# Patient Record
Sex: FEMALE | Race: WHITE | Employment: OTHER | URBAN - METROPOLITAN AREA
[De-identification: names, ages, dates, MRNs, and addresses within clinical notes are randomized per-mention and may not be internally consistent; named-entity substitution may affect disease eponyms.]

---

## 2018-08-08 ENCOUNTER — HOSPITAL ENCOUNTER (OUTPATIENT)
Dept: CT IMAGING | Age: 58
Discharge: HOME | End: 2018-08-08
Payer: MEDICARE

## 2018-08-08 ENCOUNTER — HOSPITAL ENCOUNTER (OUTPATIENT)
Dept: ULTRASOUND IMAGING | Age: 58
Discharge: HOME | End: 2018-08-08
Payer: MEDICARE

## 2018-08-08 DIAGNOSIS — I99.8 ISCHEMIA: ICD-10-CM

## 2018-08-08 PROCEDURE — 70450 CT HEAD/BRAIN W/O DYE: CPT

## 2018-08-08 PROCEDURE — 93880 EXTRACRANIAL BILAT STUDY: CPT

## 2019-04-26 ENCOUNTER — HOSPITAL ENCOUNTER (OUTPATIENT)
Dept: CT IMAGING | Age: 59
Discharge: HOME | End: 2019-04-26
Payer: MEDICARE

## 2019-04-26 ENCOUNTER — HOSPITAL ENCOUNTER (OUTPATIENT)
Age: 59
Discharge: HOME | End: 2019-04-26
Payer: MEDICARE

## 2019-04-26 DIAGNOSIS — I99.8 TRANSIENT ISCHEMIA: ICD-10-CM

## 2019-04-26 DIAGNOSIS — R56.9 SEIZURE (HCC): ICD-10-CM

## 2019-04-26 LAB
ALBUMIN SERPL-MCNC: 4.3 G/DL (ref 3.5–5.2)
ALP BLD-CCNC: 70 U/L (ref 35–104)
ALT SERPL-CCNC: 36 U/L (ref 0–32)
ANION GAP SERPL CALCULATED.3IONS-SCNC: 15 MMOL/L (ref 7–16)
AST SERPL-CCNC: 41 U/L (ref 0–31)
BACTERIA: ABNORMAL /HPF
BASOPHILS ABSOLUTE: 0.04 E9/L (ref 0–0.2)
BASOPHILS RELATIVE PERCENT: 0.4 % (ref 0–2)
BILIRUB SERPL-MCNC: 0.7 MG/DL (ref 0–1.2)
BILIRUBIN URINE: ABNORMAL
BLOOD, URINE: ABNORMAL
BUN BLDV-MCNC: 12 MG/DL (ref 8–23)
CALCIUM SERPL-MCNC: 9.9 MG/DL (ref 8.6–10.2)
CASTS: ABNORMAL /LPF
CHLORIDE BLD-SCNC: 92 MMOL/L (ref 98–107)
CHOLESTEROL, FASTING: 198 MG/DL (ref 0–199)
CLARITY: ABNORMAL
CO2: 28 MMOL/L (ref 22–29)
COLOR: YELLOW
CREAT SERPL-MCNC: 0.6 MG/DL (ref 0.5–1)
EOSINOPHILS ABSOLUTE: 0.06 E9/L (ref 0.05–0.5)
EOSINOPHILS RELATIVE PERCENT: 0.6 % (ref 0–6)
EPITHELIAL CELLS, UA: ABNORMAL /HPF
GFR AFRICAN AMERICAN: >60
GFR NON-AFRICAN AMERICAN: >60 ML/MIN/1.73
GLUCOSE FASTING: 129 MG/DL (ref 74–99)
GLUCOSE URINE: NEGATIVE MG/DL
HCT VFR BLD CALC: 47.3 % (ref 34–48)
HDLC SERPL-MCNC: 55 MG/DL
HEMOGLOBIN: 16.5 G/DL (ref 11.5–15.5)
IMMATURE GRANULOCYTES #: 0.05 E9/L
IMMATURE GRANULOCYTES %: 0.5 % (ref 0–5)
KETONES, URINE: ABNORMAL MG/DL
LDL CHOLESTEROL CALCULATED: 118 MG/DL (ref 0–99)
LEUKOCYTE ESTERASE, URINE: ABNORMAL
LYMPHOCYTES ABSOLUTE: 2 E9/L (ref 1.5–4)
LYMPHOCYTES RELATIVE PERCENT: 21.4 % (ref 20–42)
MCH RBC QN AUTO: 34.6 PG (ref 26–35)
MCHC RBC AUTO-ENTMCNC: 34.9 % (ref 32–34.5)
MCV RBC AUTO: 99.2 FL (ref 80–99.9)
MONOCYTES ABSOLUTE: 1.25 E9/L (ref 0.1–0.95)
MONOCYTES RELATIVE PERCENT: 13.4 % (ref 2–12)
NEUTROPHILS ABSOLUTE: 5.95 E9/L (ref 1.8–7.3)
NEUTROPHILS RELATIVE PERCENT: 63.7 % (ref 43–80)
NITRITE, URINE: NEGATIVE
PDW BLD-RTO: 12.6 FL (ref 11.5–15)
PH UA: 6 (ref 5–9)
PLATELET # BLD: 205 E9/L (ref 130–450)
PMV BLD AUTO: 9.5 FL (ref 7–12)
POTASSIUM SERPL-SCNC: 4 MMOL/L (ref 3.5–5)
PROTEIN UA: 30 MG/DL
RBC # BLD: 4.77 E12/L (ref 3.5–5.5)
RBC UA: ABNORMAL /HPF (ref 0–2)
SODIUM BLD-SCNC: 135 MMOL/L (ref 132–146)
SPECIFIC GRAVITY UA: 1.01 (ref 1–1.03)
TOTAL PROTEIN: 8.6 G/DL (ref 6.4–8.3)
TRIGLYCERIDE, FASTING: 125 MG/DL (ref 0–149)
TSH SERPL DL<=0.05 MIU/L-ACNC: 6.06 UIU/ML (ref 0.27–4.2)
UROBILINOGEN, URINE: >=8 E.U./DL
VLDLC SERPL CALC-MCNC: 25 MG/DL
WBC # BLD: 9.4 E9/L (ref 4.5–11.5)
WBC UA: ABNORMAL /HPF (ref 0–5)

## 2019-04-26 PROCEDURE — 80053 COMPREHEN METABOLIC PANEL: CPT

## 2019-04-26 PROCEDURE — 81001 URINALYSIS AUTO W/SCOPE: CPT

## 2019-04-26 PROCEDURE — 85025 COMPLETE CBC W/AUTO DIFF WBC: CPT

## 2019-04-26 PROCEDURE — 84443 ASSAY THYROID STIM HORMONE: CPT

## 2019-04-26 PROCEDURE — 36415 COLL VENOUS BLD VENIPUNCTURE: CPT

## 2019-04-26 PROCEDURE — 80061 LIPID PANEL: CPT

## 2019-04-26 PROCEDURE — 70450 CT HEAD/BRAIN W/O DYE: CPT

## 2019-12-01 ASSESSMENT — ENCOUNTER SYMPTOMS
DIARRHEA: 0
VOMITING: 0
SHORTNESS OF BREATH: 0
NAUSEA: 0

## 2019-12-03 ENCOUNTER — HOSPITAL ENCOUNTER (OUTPATIENT)
Age: 59
Discharge: HOME | End: 2019-12-03
Payer: MEDICARE

## 2019-12-03 ENCOUNTER — OFFICE VISIT (OUTPATIENT)
Dept: INTERNAL MEDICINE CLINIC | Age: 59
End: 2019-12-03
Payer: MEDICARE

## 2019-12-03 VITALS
TEMPERATURE: 97.9 F | DIASTOLIC BLOOD PRESSURE: 64 MMHG | BODY MASS INDEX: 39.06 KG/M2 | HEIGHT: 58 IN | OXYGEN SATURATION: 92 % | HEART RATE: 102 BPM | SYSTOLIC BLOOD PRESSURE: 130 MMHG | WEIGHT: 186.1 LBS

## 2019-12-03 DIAGNOSIS — F10.10 ALCOHOL ABUSE: ICD-10-CM

## 2019-12-03 DIAGNOSIS — R53.83 FATIGUE, UNSPECIFIED TYPE: ICD-10-CM

## 2019-12-03 DIAGNOSIS — K70.30 ALCOHOLIC CIRRHOSIS, UNSPECIFIED WHETHER ASCITES PRESENT (HCC): Primary | ICD-10-CM

## 2019-12-03 DIAGNOSIS — Z86.73 HISTORY OF CVA (CEREBROVASCULAR ACCIDENT): ICD-10-CM

## 2019-12-03 DIAGNOSIS — I10 ESSENTIAL HYPERTENSION: ICD-10-CM

## 2019-12-03 DIAGNOSIS — F32.A DEPRESSION, UNSPECIFIED DEPRESSION TYPE: ICD-10-CM

## 2019-12-03 DIAGNOSIS — F17.210 CIGARETTE NICOTINE DEPENDENCE WITHOUT COMPLICATION: ICD-10-CM

## 2019-12-03 DIAGNOSIS — Z76.89 ENCOUNTER TO ESTABLISH CARE: ICD-10-CM

## 2019-12-03 DIAGNOSIS — G40.909 SEIZURE DISORDER (HCC): ICD-10-CM

## 2019-12-03 DIAGNOSIS — M19.90 ARTHRITIS: ICD-10-CM

## 2019-12-03 DIAGNOSIS — J44.9 CHRONIC OBSTRUCTIVE PULMONARY DISEASE, UNSPECIFIED COPD TYPE (HCC): ICD-10-CM

## 2019-12-03 DIAGNOSIS — F12.90 MARIJUANA USER: ICD-10-CM

## 2019-12-03 DIAGNOSIS — Z91.14 NON COMPLIANCE W MEDICATION REGIMEN: ICD-10-CM

## 2019-12-03 DIAGNOSIS — Z86.19 HISTORY OF HEPATITIS C: ICD-10-CM

## 2019-12-03 LAB
ALBUMIN SERPL-MCNC: 4.2 G/DL (ref 3.5–5.2)
ALP BLD-CCNC: 58 U/L (ref 35–104)
ALT SERPL-CCNC: 41 U/L (ref 0–32)
ANION GAP SERPL CALCULATED.3IONS-SCNC: 15 MMOL/L (ref 7–16)
AST SERPL-CCNC: 54 U/L (ref 0–31)
BASOPHILS ABSOLUTE: 0.04 E9/L (ref 0–0.2)
BASOPHILS RELATIVE PERCENT: 0.5 % (ref 0–2)
BILIRUB SERPL-MCNC: 0.5 MG/DL (ref 0–1.2)
BUN BLDV-MCNC: 16 MG/DL (ref 8–23)
CALCIUM SERPL-MCNC: 9.9 MG/DL (ref 8.6–10.2)
CHLORIDE BLD-SCNC: 94 MMOL/L (ref 98–107)
CHOLESTEROL, FASTING: 216 MG/DL (ref 0–199)
CO2: 26 MMOL/L (ref 22–29)
CREAT SERPL-MCNC: 0.7 MG/DL (ref 0.5–1)
EOSINOPHILS ABSOLUTE: 0.17 E9/L (ref 0.05–0.5)
EOSINOPHILS RELATIVE PERCENT: 2.1 % (ref 0–6)
FOLATE: 6.2 NG/ML (ref 4.8–24.2)
GFR AFRICAN AMERICAN: >60
GFR NON-AFRICAN AMERICAN: >60 ML/MIN/1.73
GLUCOSE FASTING: 120 MG/DL (ref 74–99)
HCT VFR BLD CALC: 44.7 % (ref 34–48)
HDLC SERPL-MCNC: 37 MG/DL
HEMOGLOBIN: 16 G/DL (ref 11.5–15.5)
IMMATURE GRANULOCYTES #: 0.03 E9/L
IMMATURE GRANULOCYTES %: 0.4 % (ref 0–5)
LDL CHOLESTEROL CALCULATED: 136 MG/DL (ref 0–99)
LYMPHOCYTES ABSOLUTE: 3.39 E9/L (ref 1.5–4)
LYMPHOCYTES RELATIVE PERCENT: 42.3 % (ref 20–42)
MAGNESIUM: 1.5 MG/DL (ref 1.6–2.6)
MCH RBC QN AUTO: 35 PG (ref 26–35)
MCHC RBC AUTO-ENTMCNC: 35.8 % (ref 32–34.5)
MCV RBC AUTO: 97.8 FL (ref 80–99.9)
MONOCYTES ABSOLUTE: 0.91 E9/L (ref 0.1–0.95)
MONOCYTES RELATIVE PERCENT: 11.3 % (ref 2–12)
NEUTROPHILS ABSOLUTE: 3.48 E9/L (ref 1.8–7.3)
NEUTROPHILS RELATIVE PERCENT: 43.4 % (ref 43–80)
PDW BLD-RTO: 12 FL (ref 11.5–15)
PLATELET # BLD: 189 E9/L (ref 130–450)
PMV BLD AUTO: 9.9 FL (ref 7–12)
POTASSIUM SERPL-SCNC: 3.8 MMOL/L (ref 3.5–5)
RBC # BLD: 4.57 E12/L (ref 3.5–5.5)
SODIUM BLD-SCNC: 135 MMOL/L (ref 132–146)
T4 FREE: 1.02 NG/DL (ref 0.93–1.7)
TOTAL PROTEIN: 8.1 G/DL (ref 6.4–8.3)
TRIGLYCERIDE, FASTING: 217 MG/DL (ref 0–149)
TSH SERPL DL<=0.05 MIU/L-ACNC: 7.28 UIU/ML (ref 0.27–4.2)
VITAMIN B-12: 625 PG/ML (ref 211–946)
VLDLC SERPL CALC-MCNC: 43 MG/DL
WBC # BLD: 8 E9/L (ref 4.5–11.5)

## 2019-12-03 PROCEDURE — 82746 ASSAY OF FOLIC ACID SERUM: CPT

## 2019-12-03 PROCEDURE — 85025 COMPLETE CBC W/AUTO DIFF WBC: CPT

## 2019-12-03 PROCEDURE — 84443 ASSAY THYROID STIM HORMONE: CPT

## 2019-12-03 PROCEDURE — 80053 COMPREHEN METABOLIC PANEL: CPT

## 2019-12-03 PROCEDURE — G8427 DOCREV CUR MEDS BY ELIG CLIN: HCPCS | Performed by: PHYSICIAN ASSISTANT

## 2019-12-03 PROCEDURE — 4004F PT TOBACCO SCREEN RCVD TLK: CPT | Performed by: PHYSICIAN ASSISTANT

## 2019-12-03 PROCEDURE — 82607 VITAMIN B-12: CPT

## 2019-12-03 PROCEDURE — 83735 ASSAY OF MAGNESIUM: CPT

## 2019-12-03 PROCEDURE — 80061 LIPID PANEL: CPT

## 2019-12-03 PROCEDURE — 99213 OFFICE O/P EST LOW 20 MIN: CPT | Performed by: PHYSICIAN ASSISTANT

## 2019-12-03 PROCEDURE — 36415 COLL VENOUS BLD VENIPUNCTURE: CPT

## 2019-12-03 PROCEDURE — 3023F SPIROM DOC REV: CPT | Performed by: PHYSICIAN ASSISTANT

## 2019-12-03 PROCEDURE — 90688 IIV4 VACCINE SPLT 0.5 ML IM: CPT | Performed by: PHYSICIAN ASSISTANT

## 2019-12-03 PROCEDURE — 84439 ASSAY OF FREE THYROXINE: CPT

## 2019-12-03 PROCEDURE — G8482 FLU IMMUNIZE ORDER/ADMIN: HCPCS | Performed by: PHYSICIAN ASSISTANT

## 2019-12-03 PROCEDURE — 3017F COLORECTAL CA SCREEN DOC REV: CPT | Performed by: PHYSICIAN ASSISTANT

## 2019-12-03 PROCEDURE — 99204 OFFICE O/P NEW MOD 45 MIN: CPT | Performed by: PHYSICIAN ASSISTANT

## 2019-12-03 PROCEDURE — G8926 SPIRO NO PERF OR DOC: HCPCS | Performed by: PHYSICIAN ASSISTANT

## 2019-12-03 PROCEDURE — G8417 CALC BMI ABV UP PARAM F/U: HCPCS | Performed by: PHYSICIAN ASSISTANT

## 2019-12-03 RX ORDER — CLOPIDOGREL BISULFATE 75 MG/1
75 TABLET ORAL DAILY
Qty: 30 TABLET | Refills: 3 | Status: SHIPPED
Start: 2019-12-03 | End: 2020-03-30 | Stop reason: SDUPTHER

## 2019-12-03 RX ORDER — LEVETIRACETAM 500 MG/1
TABLET ORAL
Refills: 0 | COMMUNITY
Start: 2019-11-06 | End: 2019-12-03 | Stop reason: SDUPTHER

## 2019-12-03 RX ORDER — AMLODIPINE BESYLATE 5 MG/1
5 TABLET ORAL DAILY
Qty: 30 TABLET | Refills: 3 | Status: SHIPPED
Start: 2019-12-03 | End: 2020-03-30 | Stop reason: SDUPTHER

## 2019-12-03 RX ORDER — MELOXICAM 15 MG/1
15 TABLET ORAL DAILY
Qty: 30 TABLET | Refills: 3 | Status: SHIPPED
Start: 2019-12-03 | End: 2020-03-30 | Stop reason: SDUPTHER

## 2019-12-03 RX ORDER — ALBUTEROL SULFATE 90 UG/1
2 AEROSOL, METERED RESPIRATORY (INHALATION) EVERY 6 HOURS PRN
Qty: 1 INHALER | Refills: 2 | Status: SHIPPED
Start: 2019-12-03 | End: 2020-03-30 | Stop reason: SDUPTHER

## 2019-12-03 RX ORDER — BENAZEPRIL HYDROCHLORIDE 20 MG/1
20 TABLET ORAL DAILY
Qty: 30 TABLET | Refills: 3 | Status: SHIPPED
Start: 2019-12-03 | End: 2020-03-30 | Stop reason: SDUPTHER

## 2019-12-03 RX ORDER — LEVETIRACETAM 500 MG/1
500 TABLET ORAL 2 TIMES DAILY
Qty: 60 TABLET | Refills: 3 | Status: SHIPPED
Start: 2019-12-03 | End: 2020-03-30 | Stop reason: SDUPTHER

## 2019-12-03 RX ORDER — ATENOLOL 50 MG/1
50 TABLET ORAL DAILY
Qty: 30 TABLET | Refills: 3 | Status: SHIPPED
Start: 2019-12-03 | End: 2020-03-30 | Stop reason: SDUPTHER

## 2019-12-03 RX ORDER — MELOXICAM 15 MG/1
TABLET ORAL
Refills: 0 | COMMUNITY
Start: 2019-08-29 | End: 2019-12-03 | Stop reason: SDUPTHER

## 2019-12-03 RX ORDER — CITALOPRAM 40 MG/1
40 TABLET ORAL DAILY
Qty: 30 TABLET | Refills: 3 | Status: SHIPPED
Start: 2019-12-03 | End: 2020-03-30 | Stop reason: SDUPTHER

## 2019-12-03 ASSESSMENT — PATIENT HEALTH QUESTIONNAIRE - PHQ9
1. LITTLE INTEREST OR PLEASURE IN DOING THINGS: 1
SUM OF ALL RESPONSES TO PHQ9 QUESTIONS 1 & 2: 2
2. FEELING DOWN, DEPRESSED OR HOPELESS: 1
SUM OF ALL RESPONSES TO PHQ QUESTIONS 1-9: 2
SUM OF ALL RESPONSES TO PHQ QUESTIONS 1-9: 2

## 2019-12-03 ASSESSMENT — ENCOUNTER SYMPTOMS
CHEST TIGHTNESS: 0
TROUBLE SWALLOWING: 0
BACK PAIN: 1
ABDOMINAL PAIN: 0
VOICE CHANGE: 0
WHEEZING: 1
ABDOMINAL DISTENTION: 1
BLOOD IN STOOL: 0
COUGH: 1

## 2019-12-04 ENCOUNTER — TELEPHONE (OUTPATIENT)
Dept: INTERNAL MEDICINE CLINIC | Age: 59
End: 2019-12-04

## 2019-12-05 ENCOUNTER — TELEPHONE (OUTPATIENT)
Dept: INTERNAL MEDICINE CLINIC | Age: 59
End: 2019-12-05

## 2019-12-06 ENCOUNTER — TELEPHONE (OUTPATIENT)
Dept: INTERNAL MEDICINE CLINIC | Age: 59
End: 2019-12-06

## 2019-12-06 DIAGNOSIS — E78.5 HYPERLIPIDEMIA, UNSPECIFIED HYPERLIPIDEMIA TYPE: ICD-10-CM

## 2019-12-06 DIAGNOSIS — E03.8 SUBCLINICAL HYPOTHYROIDISM: ICD-10-CM

## 2019-12-06 DIAGNOSIS — E61.2 MAGNESIUM DEFICIENCY: Primary | ICD-10-CM

## 2019-12-06 RX ORDER — ATORVASTATIN CALCIUM 40 MG/1
40 TABLET, FILM COATED ORAL DAILY
Qty: 30 TABLET | Refills: 2 | Status: SHIPPED
Start: 2019-12-06 | End: 2020-03-25 | Stop reason: SDUPTHER

## 2019-12-06 RX ORDER — CYCLOBENZAPRINE HCL 5 MG
5 TABLET ORAL 2 TIMES DAILY PRN
Qty: 30 TABLET | Refills: 0 | Status: SHIPPED | OUTPATIENT
Start: 2019-12-06 | End: 2020-01-09 | Stop reason: SDUPTHER

## 2019-12-06 RX ORDER — LEVOTHYROXINE SODIUM 0.03 MG/1
25 TABLET ORAL DAILY
Qty: 30 TABLET | Refills: 2 | Status: SHIPPED
Start: 2019-12-06 | End: 2020-03-25 | Stop reason: SDUPTHER

## 2019-12-06 RX ORDER — MAGNESIUM OXIDE 400 MG/1
400 TABLET ORAL 2 TIMES DAILY
Qty: 60 TABLET | Refills: 1 | Status: SHIPPED
Start: 2019-12-06 | End: 2020-03-13 | Stop reason: SDUPTHER

## 2020-01-09 RX ORDER — CYCLOBENZAPRINE HCL 5 MG
5 TABLET ORAL 2 TIMES DAILY PRN
Qty: 60 TABLET | Refills: 0 | Status: SHIPPED
Start: 2020-01-09 | End: 2020-02-24 | Stop reason: SDUPTHER

## 2020-02-24 RX ORDER — CYCLOBENZAPRINE HCL 5 MG
5 TABLET ORAL 2 TIMES DAILY PRN
Qty: 60 TABLET | Refills: 0 | Status: SHIPPED
Start: 2020-02-24 | End: 2020-03-23

## 2020-02-24 NOTE — TELEPHONE ENCOUNTER
Patient left  msg requesting refill of Cyclobenzaprine.     Last seen Visit date not found  Next appt 4/9/2020  [facility]

## 2020-03-11 ENCOUNTER — HOSPITAL ENCOUNTER (EMERGENCY)
Age: 60
Discharge: HOME | End: 2020-03-11
Attending: EMERGENCY MEDICINE
Payer: MEDICARE

## 2020-03-11 ENCOUNTER — TELEPHONE (OUTPATIENT)
Dept: FAMILY MEDICINE CLINIC | Age: 60
End: 2020-03-11

## 2020-03-11 VITALS
TEMPERATURE: 97.6 F | SYSTOLIC BLOOD PRESSURE: 123 MMHG | OXYGEN SATURATION: 94 % | HEART RATE: 82 BPM | RESPIRATION RATE: 20 BRPM | BODY MASS INDEX: 37.41 KG/M2 | WEIGHT: 179 LBS | DIASTOLIC BLOOD PRESSURE: 63 MMHG

## 2020-03-11 PROCEDURE — G0381 LEV 2 HOSP TYPE B ED VISIT: HCPCS

## 2020-03-11 RX ORDER — AMOXICILLIN 500 MG/1
500 CAPSULE ORAL 2 TIMES DAILY
Qty: 14 CAPSULE | Refills: 0 | Status: SHIPPED | OUTPATIENT
Start: 2020-03-11 | End: 2020-03-18

## 2020-03-11 ASSESSMENT — PAIN DESCRIPTION - FREQUENCY: FREQUENCY: CONTINUOUS

## 2020-03-11 ASSESSMENT — PAIN DESCRIPTION - PAIN TYPE: TYPE: ACUTE PAIN

## 2020-03-11 ASSESSMENT — PAIN DESCRIPTION - ORIENTATION: ORIENTATION: LEFT

## 2020-03-11 ASSESSMENT — PAIN DESCRIPTION - LOCATION: LOCATION: EAR

## 2020-03-11 ASSESSMENT — PAIN DESCRIPTION - DESCRIPTORS: DESCRIPTORS: ACHING;SHARP

## 2020-03-11 ASSESSMENT — PAIN DESCRIPTION - ONSET: ONSET: GRADUAL

## 2020-03-11 ASSESSMENT — PAIN - FUNCTIONAL ASSESSMENT: PAIN_FUNCTIONAL_ASSESSMENT: 0-10

## 2020-03-11 ASSESSMENT — PAIN SCALES - GENERAL
PAINLEVEL_OUTOF10: 10
PAINLEVEL_OUTOF10: 10

## 2020-03-11 ASSESSMENT — PAIN DESCRIPTION - PROGRESSION: CLINICAL_PROGRESSION: GRADUALLY WORSENING

## 2020-03-11 NOTE — ED PROVIDER NOTES
Codeine    -------------------------------------------------- RESULTS -------------------------------------------------  All laboratory and radiology results have been personally reviewed by myself   LABS:  No results found for this visit on 03/11/20. RADIOLOGY:  Interpreted by Radiologist.  No orders to display       ------------------------- NURSING NOTES AND VITALS REVIEWED ---------------------------   The nursing notes within the ED encounter and vital signs as below have been reviewed. /63   Pulse 82   Temp 97.6 °F (36.4 °C) (Oral)   Resp 20   Wt 179 lb (81.2 kg)   SpO2 94%   BMI 37.41 kg/m²   Oxygen Saturation Interpretation: Normal      ---------------------------------------------------PHYSICAL EXAM--------------------------------------      Constitutional/General: Alert and oriented x3, well appearing, non toxic in NAD  Head: Normocephalic and atraumatic. Right TM is pearly gray and clear. Left TM is bulging with some purulence behind the tympanic membrane. No pain with manipulation of the external auditory meatus. No pain at the mastoid. Eyes: PERRL, EOMI  Mouth: Oropharynx clear, handling secretions, no trismus  Neck: Supple, full ROM,   Pulmonary: Lungs clear to auscultation bilaterally, no wheezes, rales, or rhonchi. Not in respiratory distress  Cardiovascular:  Regular rate and rhythm, no murmurs, gallops, or rubs. 2+ distal pulses  Abdomen: Soft, non tender, non distended,   Extremities: Moves all extremities x 4. Warm and well perfused  Skin: warm and dry without rash      ------------------------------ ED COURSE/MEDICAL DECISION MAKING----------------------  Medications - No data to display      ED COURSE:       Medical Decision Making:    Patient presents with discomfort in her left ear for the past few days and is found to have a bulging, purulent tympanic membrane raising concern for acute otitis media.   Patient will be prescribed amoxicillin and she is instructed on the use of this antibiotic. Explained the warning signs and symptoms for which she should immediately return to the emergency department. Patient voices understanding is agreeable with this plan. She has no signs of otitis externa nor mastoiditis. She is stable for discharge. Counseling: The emergency provider has spoken with the patient and discussed todays results, in addition to providing specific details for the plan of care and counseling regarding the diagnosis and prognosis. Questions are answered at this time and they are agreeable with the plan.      --------------------------------- IMPRESSION AND DISPOSITION ---------------------------------    IMPRESSION  1.  Acute suppurative otitis media of left ear without spontaneous rupture of tympanic membrane, recurrence not specified        DISPOSITION  Disposition: Discharge to home  Patient condition is stable         Dewayne Su DO  03/11/20 1127

## 2020-03-13 RX ORDER — MAGNESIUM OXIDE 400 MG/1
400 TABLET ORAL 2 TIMES DAILY
Qty: 60 TABLET | Refills: 1 | Status: SHIPPED
Start: 2020-03-13 | End: 2020-04-29

## 2020-03-16 RX ORDER — BENAZEPRIL HYDROCHLORIDE 20 MG/1
TABLET ORAL
Qty: 30 TABLET | Refills: 3 | OUTPATIENT
Start: 2020-03-16

## 2020-03-16 RX ORDER — CITALOPRAM 40 MG/1
TABLET ORAL
Qty: 30 TABLET | Refills: 3 | OUTPATIENT
Start: 2020-03-16

## 2020-03-16 RX ORDER — MELOXICAM 15 MG/1
TABLET ORAL
Qty: 30 TABLET | Refills: 3 | OUTPATIENT
Start: 2020-03-16

## 2020-03-16 RX ORDER — AMLODIPINE BESYLATE 5 MG/1
TABLET ORAL
Qty: 30 TABLET | Refills: 3 | OUTPATIENT
Start: 2020-03-16

## 2020-03-16 RX ORDER — CLOPIDOGREL BISULFATE 75 MG/1
TABLET ORAL
Qty: 30 TABLET | Refills: 3 | OUTPATIENT
Start: 2020-03-16

## 2020-03-23 RX ORDER — CYCLOBENZAPRINE HCL 5 MG
TABLET ORAL
Qty: 60 TABLET | Refills: 0 | Status: SHIPPED
Start: 2020-03-23 | End: 2020-05-12 | Stop reason: SDUPTHER

## 2020-03-25 RX ORDER — ATORVASTATIN CALCIUM 40 MG/1
40 TABLET, FILM COATED ORAL DAILY
Qty: 30 TABLET | Refills: 2 | Status: SHIPPED
Start: 2020-03-25 | End: 2020-06-03

## 2020-03-25 RX ORDER — LEVOTHYROXINE SODIUM 0.03 MG/1
25 TABLET ORAL DAILY
Qty: 30 TABLET | Refills: 2 | Status: SHIPPED
Start: 2020-03-25 | End: 2020-06-03

## 2020-03-25 NOTE — TELEPHONE ENCOUNTER
Vm msg left requesting refills.     Last seen 12/3/2019  Next appt 4/9/2020  [facility] ([place])

## 2020-03-30 ENCOUNTER — TELEPHONE (OUTPATIENT)
Dept: ADMINISTRATIVE | Age: 60
End: 2020-03-30

## 2020-03-30 RX ORDER — BENAZEPRIL HYDROCHLORIDE 20 MG/1
20 TABLET ORAL DAILY
Qty: 30 TABLET | Refills: 3 | Status: SHIPPED
Start: 2020-03-30 | End: 2020-07-27

## 2020-03-30 RX ORDER — CITALOPRAM 40 MG/1
40 TABLET ORAL DAILY
Qty: 30 TABLET | Refills: 3 | Status: SHIPPED
Start: 2020-03-30 | End: 2020-07-27

## 2020-03-30 RX ORDER — MELOXICAM 15 MG/1
15 TABLET ORAL DAILY
Qty: 30 TABLET | Refills: 3 | Status: SHIPPED
Start: 2020-03-30 | End: 2020-07-27

## 2020-03-30 RX ORDER — AMLODIPINE BESYLATE 5 MG/1
5 TABLET ORAL DAILY
Qty: 30 TABLET | Refills: 3 | Status: SHIPPED
Start: 2020-03-30 | End: 2020-07-27

## 2020-03-30 RX ORDER — ATENOLOL 50 MG/1
50 TABLET ORAL DAILY
Qty: 30 TABLET | Refills: 3 | Status: SHIPPED
Start: 2020-03-30 | End: 2020-07-27

## 2020-03-30 RX ORDER — ALBUTEROL SULFATE 90 UG/1
2 AEROSOL, METERED RESPIRATORY (INHALATION) EVERY 6 HOURS PRN
Qty: 1 INHALER | Refills: 2 | Status: SHIPPED
Start: 2020-03-30 | End: 2020-10-02 | Stop reason: SDUPTHER

## 2020-03-30 RX ORDER — CLOPIDOGREL BISULFATE 75 MG/1
75 TABLET ORAL DAILY
Qty: 30 TABLET | Refills: 3 | Status: SHIPPED
Start: 2020-03-30 | End: 2020-07-27

## 2020-03-30 RX ORDER — LEVETIRACETAM 500 MG/1
500 TABLET ORAL 2 TIMES DAILY
Qty: 60 TABLET | Refills: 3 | Status: SHIPPED
Start: 2020-03-30 | End: 2020-07-27

## 2020-04-09 ENCOUNTER — TELEPHONE (OUTPATIENT)
Dept: FAMILY MEDICINE CLINIC | Age: 60
End: 2020-04-09

## 2020-04-10 ENCOUNTER — VIRTUAL VISIT (OUTPATIENT)
Dept: FAMILY MEDICINE CLINIC | Age: 60
End: 2020-04-10
Payer: MEDICARE

## 2020-04-10 PROCEDURE — G2012 BRIEF CHECK IN BY MD/QHP: HCPCS | Performed by: PHYSICIAN ASSISTANT

## 2020-04-10 RX ORDER — AMOXICILLIN AND CLAVULANATE POTASSIUM 875; 125 MG/1; MG/1
1 TABLET, FILM COATED ORAL 2 TIMES DAILY
Qty: 20 TABLET | Refills: 0 | Status: SHIPPED | OUTPATIENT
Start: 2020-04-10 | End: 2020-04-20

## 2020-04-10 ASSESSMENT — ENCOUNTER SYMPTOMS
EYE REDNESS: 0
SHORTNESS OF BREATH: 0
COUGH: 1
NAUSEA: 0
VOMITING: 0
BACK PAIN: 0
EYE DISCHARGE: 0
SORE THROAT: 0
SINUS PRESSURE: 0
DIARRHEA: 0
TROUBLE SWALLOWING: 0
SINUS PAIN: 0
RHINORRHEA: 0
ABDOMINAL PAIN: 0
FACIAL SWELLING: 0
EYE PAIN: 0
CHEST TIGHTNESS: 0
VOICE CHANGE: 0

## 2020-04-11 NOTE — PATIENT INSTRUCTIONS
to your [employer] account. Enter A643 in the [facility] box to learn more about \"[place]: Care Instructions. \"     If you do not have an account, please click on the \"Sign Up Now\" link. Current as of: July 28, 2019Content Version: 12.4  © 0032-7610 [employer]orporated. Care instructions adapted under license by [facility] ([facility]). If you have questions about a medical condition or this instruction, always ask your healthcare professional. [address] any warranty or liability for your use of this information.

## 2020-04-13 ENCOUNTER — TELEPHONE (OUTPATIENT)
Dept: PRIMARY CARE CLINIC | Age: 60
End: 2020-04-13

## 2020-04-29 RX ORDER — MAGNESIUM OXIDE 400 MG/1
TABLET ORAL
Qty: 60 TABLET | Refills: 1 | Status: SHIPPED
Start: 2020-04-29 | End: 2020-05-26

## 2020-05-12 RX ORDER — CYCLOBENZAPRINE HCL 5 MG
TABLET ORAL
Qty: 60 TABLET | Refills: 0 | Status: SHIPPED
Start: 2020-05-12 | End: 2020-06-22 | Stop reason: SDUPTHER

## 2020-06-22 RX ORDER — CYCLOBENZAPRINE HCL 5 MG
TABLET ORAL
Qty: 60 TABLET | Refills: 0 | Status: SHIPPED
Start: 2020-06-22 | End: 2020-08-05 | Stop reason: SDUPTHER

## 2020-06-29 RX ORDER — LEVOTHYROXINE SODIUM 0.03 MG/1
TABLET ORAL
Qty: 30 TABLET | Refills: 1 | OUTPATIENT
Start: 2020-06-29

## 2020-06-29 RX ORDER — ATORVASTATIN CALCIUM 40 MG/1
TABLET, FILM COATED ORAL
Qty: 30 TABLET | Refills: 1 | OUTPATIENT
Start: 2020-06-29

## 2020-07-27 RX ORDER — BENAZEPRIL HYDROCHLORIDE 20 MG/1
TABLET ORAL
Qty: 30 TABLET | Refills: 0 | Status: SHIPPED
Start: 2020-07-27 | End: 2020-08-31

## 2020-07-27 RX ORDER — CITALOPRAM 40 MG/1
TABLET ORAL
Qty: 30 TABLET | Refills: 0 | Status: SHIPPED
Start: 2020-07-27 | End: 2020-08-31

## 2020-07-27 RX ORDER — ATENOLOL 50 MG/1
TABLET ORAL
Qty: 30 TABLET | Refills: 0 | Status: SHIPPED
Start: 2020-07-27 | End: 2020-08-31

## 2020-07-27 RX ORDER — CLOPIDOGREL BISULFATE 75 MG/1
TABLET ORAL
Qty: 30 TABLET | Refills: 0 | Status: SHIPPED
Start: 2020-07-27 | End: 2020-08-31

## 2020-07-27 RX ORDER — AMLODIPINE BESYLATE 5 MG/1
TABLET ORAL
Qty: 30 TABLET | Refills: 0 | Status: SHIPPED
Start: 2020-07-27 | End: 2020-08-31

## 2020-07-27 RX ORDER — MELOXICAM 15 MG/1
TABLET ORAL
Qty: 30 TABLET | Refills: 0 | Status: SHIPPED
Start: 2020-07-27 | End: 2020-08-31

## 2020-07-27 RX ORDER — LEVETIRACETAM 500 MG/1
TABLET ORAL
Qty: 60 TABLET | Refills: 0 | Status: SHIPPED
Start: 2020-07-27 | End: 2020-08-31

## 2020-08-05 RX ORDER — CYCLOBENZAPRINE HCL 5 MG
TABLET ORAL
Qty: 60 TABLET | Refills: 0 | OUTPATIENT
Start: 2020-08-05

## 2020-08-05 RX ORDER — CYCLOBENZAPRINE HCL 5 MG
TABLET ORAL
Qty: 60 TABLET | Refills: 0 | Status: SHIPPED
Start: 2020-08-05 | End: 2020-09-10 | Stop reason: SDUPTHER

## 2020-08-31 RX ORDER — ATENOLOL 50 MG/1
TABLET ORAL
Qty: 30 TABLET | Refills: 0 | Status: SHIPPED
Start: 2020-08-31 | End: 2020-10-02 | Stop reason: SDUPTHER

## 2020-08-31 RX ORDER — CITALOPRAM 40 MG/1
TABLET ORAL
Qty: 30 TABLET | Refills: 0 | Status: SHIPPED
Start: 2020-08-31 | End: 2020-10-29 | Stop reason: SDUPTHER

## 2020-08-31 RX ORDER — BENAZEPRIL HYDROCHLORIDE 20 MG/1
TABLET ORAL
Qty: 30 TABLET | Refills: 0 | Status: SHIPPED
Start: 2020-08-31 | End: 2020-10-02 | Stop reason: SDUPTHER

## 2020-08-31 RX ORDER — MAGNESIUM OXIDE 400 MG/1
TABLET ORAL
Qty: 60 TABLET | Refills: 0 | Status: SHIPPED
Start: 2020-08-31 | End: 2020-10-02 | Stop reason: SDUPTHER

## 2020-08-31 RX ORDER — ATORVASTATIN CALCIUM 40 MG/1
TABLET, FILM COATED ORAL
Qty: 30 TABLET | Refills: 1 | Status: SHIPPED
Start: 2020-08-31 | End: 2020-10-02 | Stop reason: SDUPTHER

## 2020-08-31 RX ORDER — AMLODIPINE BESYLATE 5 MG/1
TABLET ORAL
Qty: 30 TABLET | Refills: 0 | Status: SHIPPED
Start: 2020-08-31 | End: 2020-10-02 | Stop reason: SDUPTHER

## 2020-08-31 RX ORDER — LEVOTHYROXINE SODIUM 0.03 MG/1
TABLET ORAL
Qty: 30 TABLET | Refills: 0 | Status: SHIPPED
Start: 2020-08-31 | End: 2020-10-02 | Stop reason: SDUPTHER

## 2020-08-31 RX ORDER — LEVETIRACETAM 500 MG/1
TABLET ORAL
Qty: 60 TABLET | Refills: 0 | Status: SHIPPED
Start: 2020-08-31 | End: 2020-10-02 | Stop reason: SDUPTHER

## 2020-08-31 RX ORDER — MELOXICAM 15 MG/1
TABLET ORAL
Qty: 30 TABLET | Refills: 0 | Status: SHIPPED
Start: 2020-08-31 | End: 2020-10-02 | Stop reason: SDUPTHER

## 2020-08-31 RX ORDER — CLOPIDOGREL BISULFATE 75 MG/1
TABLET ORAL
Qty: 30 TABLET | Refills: 0 | Status: SHIPPED
Start: 2020-08-31 | End: 2020-10-02 | Stop reason: SDUPTHER

## 2020-09-10 RX ORDER — CYCLOBENZAPRINE HCL 5 MG
TABLET ORAL
Qty: 60 TABLET | Refills: 0 | Status: SHIPPED
Start: 2020-09-10 | End: 2020-10-02 | Stop reason: SDUPTHER

## 2020-09-16 ENCOUNTER — TELEPHONE (OUTPATIENT)
Dept: FAMILY MEDICINE CLINIC | Age: 60
End: 2020-09-16

## 2020-09-16 NOTE — TELEPHONE ENCOUNTER
Patient's  called on behalf of patient who c/o Lt loss of hearing and occasional blood and/or fluid leaking, pain. Patient's  stated unable to make VV, due to no WiFi and transportation issues are prohibiting patient from coming into [facility] on a Wednesday, since patient can only come in the morning.  stated they don't want to have to change providers, but they are uncertain what to do.

## 2020-09-16 NOTE — TELEPHONE ENCOUNTER
The patient needs to be seen for this. She could have a severe infection that could go into her brain. If they cannot get to the office, have them at least be seen at [address] [place] [address] in [place] to get her ear looked at.     Thanks  Grisel Navarrete

## 2020-09-17 ENCOUNTER — HOSPITAL ENCOUNTER (EMERGENCY)
Age: 60
Discharge: HOME | End: 2020-09-17
Attending: EMERGENCY MEDICINE
Payer: MEDICARE

## 2020-09-17 VITALS
HEART RATE: 80 BPM | DIASTOLIC BLOOD PRESSURE: 65 MMHG | SYSTOLIC BLOOD PRESSURE: 103 MMHG | BODY MASS INDEX: 37.62 KG/M2 | OXYGEN SATURATION: 94 % | RESPIRATION RATE: 20 BRPM | TEMPERATURE: 97.3 F | WEIGHT: 180 LBS

## 2020-09-17 PROCEDURE — G0381 LEV 2 HOSP TYPE B ED VISIT: HCPCS

## 2020-09-17 RX ORDER — NEOMYCIN SULFATE, POLYMYXIN B SULFATE, HYDROCORTISONE 3.5; 10000; 1 MG/ML; [USP'U]/ML; MG/ML
2 SOLUTION/ DROPS AURICULAR (OTIC) EVERY 8 HOURS SCHEDULED
Qty: 10 ML | Refills: 0 | Status: SHIPPED | OUTPATIENT
Start: 2020-09-17 | End: 2020-09-27

## 2020-09-17 ASSESSMENT — PAIN DESCRIPTION - PROGRESSION
CLINICAL_PROGRESSION: NOT CHANGED
CLINICAL_PROGRESSION: NOT CHANGED

## 2020-09-17 ASSESSMENT — ENCOUNTER SYMPTOMS
RESPIRATORY NEGATIVE: 1
EYES NEGATIVE: 1
ALLERGIC/IMMUNOLOGIC NEGATIVE: 1
GASTROINTESTINAL NEGATIVE: 1

## 2020-09-17 ASSESSMENT — PAIN DESCRIPTION - FREQUENCY: FREQUENCY: INTERMITTENT

## 2020-09-17 ASSESSMENT — PAIN DESCRIPTION - DESCRIPTORS: DESCRIPTORS: ACHING

## 2020-09-17 ASSESSMENT — PAIN DESCRIPTION - ORIENTATION: ORIENTATION: LEFT

## 2020-09-17 ASSESSMENT — PAIN DESCRIPTION - PAIN TYPE: TYPE: ACUTE PAIN

## 2020-09-17 ASSESSMENT — PAIN SCALES - GENERAL: PAINLEVEL_OUTOF10: 5

## 2020-09-17 ASSESSMENT — PAIN DESCRIPTION - LOCATION: LOCATION: EAR

## 2020-09-17 NOTE — ED PROVIDER NOTES
Chronic drainage, some blood and occasional pain from Left EAC. Admits to QTIP use on a frequent basis           Review of Systems   Constitutional: Negative. HENT: Positive for ear discharge and ear pain. Eyes: Negative. Respiratory: Negative. Cardiovascular: Negative. Gastrointestinal: Negative. Endocrine: Negative. Genitourinary: Negative. Musculoskeletal: Negative. Skin: Negative. Allergic/Immunologic: Negative. Neurological: Negative. Hematological: Negative. Psychiatric/Behavioral: Negative. All other systems reviewed and are negative.     --------------------------------------------- PAST HISTORY ---------------------------------------------  Past Medical History:  has a past medical history of Alcohol abuse, Cerebral artery occlusion with cerebral infarction ([facility] [address] Depression, Elevated blood sugar, Hepatitis C, Hyperlipidemia, Hypertension, Liver cirrhosis ([facility] [address] Magnesium deficiency, Marijuana user, Nicotine dependence, Seizures ([facility] [address] and Subclinical hypothyroidism. Past Surgical History:  has a past surgical history that includes Dilation and curettage of uterus and liver biopsy (07/05/2016). Social History:  reports that she has been smoking. She started smoking about 50 years ago. She has a 49.00 pack-year smoking history. She has never used smokeless tobacco. She reports current alcohol use of about 4.0 standard drinks of alcohol per week. She reports current drug use. Frequency: 7.00 times per week. Drug: Marijuana. Family History: family history includes Alcohol Abuse in her brother and father; Cancer in her brother and mother; Drug Abuse in her brother and father. The patients home medications have been reviewed. Allergies: Codeine    -------------------------------------------------- RESULTS -------------------------------------------------  No results found for this visit on 09/17/20.   No orders to display ------------------------- NURSING NOTES AND VITALS REVIEWED ---------------------------   The nursing notes within the ED encounter and vital signs as below have been reviewed. /65   Pulse 80   Temp 97.3 °F (36.3 °C)   Resp 20   Wt 180 lb (81.6 kg)   SpO2 94%   BMI 37.62 kg/m²   Oxygen Saturation Interpretation: Normal      ------------------------------------------ PROGRESS NOTES ------------------------------------------   I have spoken with the patient and discussed todays results, in addition to providing specific details for the plan of care and counseling regarding the diagnosis and prognosis. Their questions are answered at this time and they are agreeable with the plan.      --------------------------------- ADDITIONAL PROVIDER NOTES ---------------------------------        This patient is stable for discharge. I have shared the specific conditions for return, as well as the importance of follow-up. IMPRESSION:     1.  Malignant otitis externa of left ear, unspecified chronicity      Patient's Medications   New Prescriptions    NEOMYCIN-POLYMYXIN-HYDROCORTISONE 1 % SOLN OTIC SOLUTION    Place 2 drops into the left ear every 8 hours for 10 days   Previous Medications    ALBUTEROL SULFATE HFA (PROAIR HFA) 108 (90 BASE) MCG/ACT INHALER    Inhale 2 puffs into the lungs every 6 hours as needed for Wheezing    AMLODIPINE (NORVASC) 5 MG TABLET    take 1 tablet by mouth once daily    ATENOLOL (TENORMIN) 50 MG TABLET    take 1 tablet by mouth once daily    ATORVASTATIN (LIPITOR) 40 MG TABLET    take 1 tablet by mouth once daily    BENAZEPRIL (LOTENSIN) 20 MG TABLET    take 1 tablet by mouth once daily    CITALOPRAM (CELEXA) 40 MG TABLET    take 1 tablet by mouth once daily    CLOPIDOGREL (PLAVIX) 75 MG TABLET    take 1 tablet by mouth once daily    CYCLOBENZAPRINE (FLEXERIL) 5 MG TABLET    take 1 tablet by mouth twice a day if needed for muscle spasm - DO NOT TAKE WHILE DRINKING ALCOHOL OR DRIVING    LEVETIRACETAM (KEPPRA) 500 MG TABLET    take 1 tablet by mouth twice a day    LEVOTHYROXINE (SYNTHROID) 25 MCG TABLET    take 1 tablet by mouth once daily    MAGNESIUM OXIDE (MAG-OX) 400 MG TABLET    take 1 tablet by mouth twice a day    MELOXICAM (MOBIC) 15 MG TABLET    take 1 tablet by mouth once daily    TIOTROPIUM (SPIRIVA) 18 MCG INHALATION CAPSULE    Inhale 1 capsule into the lungs daily   Modified Medications    No medications on file   Discontinued Medications    No medications on file         Physical Exam  Vitals signs and nursing note reviewed. Constitutional:       Appearance: Normal appearance. HENT:      Head: Normocephalic and atraumatic. Right Ear: Tympanic membrane, ear canal and external ear normal.      Left Ear: Drainage and tenderness present. Nose: Nose normal.      Mouth/Throat:      Mouth: Mucous membranes are moist.      Pharynx: Oropharynx is clear. Eyes:      Conjunctiva/sclera: Conjunctivae normal.      Pupils: Pupils are equal, round, and reactive to light. Neck:      Musculoskeletal: Normal range of motion and neck supple. Cardiovascular:      Rate and Rhythm: Normal rate and regular rhythm. Pulses: Normal pulses. Heart sounds: Normal heart sounds. Pulmonary:      Effort: Pulmonary effort is normal.      Breath sounds: Normal breath sounds. Abdominal:      General: Bowel sounds are normal.   Musculoskeletal: Normal range of motion. Skin:     General: Skin is warm and dry. Capillary Refill: Capillary refill takes less than 2 seconds. Neurological:      General: No focal deficit present. Mental Status: She is alert and oriented to person, place, and time. Mental status is at baseline.    Psychiatric:         Mood and Affect: Mood normal.          Procedures     CHRYSTAL Werner DO  09/17/20 0930

## 2020-09-28 ENCOUNTER — NURSE TRIAGE (OUTPATIENT)
Dept: OTHER | Facility: CLINIC | Age: 60
End: 2020-09-28

## 2020-09-28 ENCOUNTER — TELEPHONE (OUTPATIENT)
Dept: ADMINISTRATIVE | Age: 60
End: 2020-09-28

## 2020-09-28 RX ORDER — ATENOLOL 50 MG/1
TABLET ORAL
Qty: 30 TABLET | Refills: 0 | OUTPATIENT
Start: 2020-09-28

## 2020-09-28 RX ORDER — CLOPIDOGREL BISULFATE 75 MG/1
TABLET ORAL
Qty: 30 TABLET | Refills: 0 | OUTPATIENT
Start: 2020-09-28

## 2020-09-28 RX ORDER — CITALOPRAM 40 MG/1
TABLET ORAL
Qty: 30 TABLET | Refills: 0 | OUTPATIENT
Start: 2020-09-28

## 2020-09-28 RX ORDER — LEVOTHYROXINE SODIUM 0.03 MG/1
TABLET ORAL
Qty: 30 TABLET | Refills: 0 | OUTPATIENT
Start: 2020-09-28

## 2020-09-28 RX ORDER — BENAZEPRIL HYDROCHLORIDE 20 MG/1
TABLET ORAL
Qty: 30 TABLET | Refills: 0 | OUTPATIENT
Start: 2020-09-28

## 2020-09-28 RX ORDER — AMLODIPINE BESYLATE 5 MG/1
TABLET ORAL
Qty: 30 TABLET | Refills: 0 | OUTPATIENT
Start: 2020-09-28

## 2020-09-28 RX ORDER — LEVETIRACETAM 500 MG/1
TABLET ORAL
Qty: 60 TABLET | Refills: 0 | OUTPATIENT
Start: 2020-09-28

## 2020-09-28 RX ORDER — MELOXICAM 15 MG/1
TABLET ORAL
Qty: 30 TABLET | Refills: 0 | OUTPATIENT
Start: 2020-09-28

## 2020-09-28 RX ORDER — MAGNESIUM OXIDE 400 MG/1
TABLET ORAL
Qty: 60 TABLET | Refills: 0 | OUTPATIENT
Start: 2020-09-28

## 2020-09-28 NOTE — TELEPHONE ENCOUNTER
Reason for Disposition   Patient wants to be seen    Answer Assessment - Initial Assessment Questions  1. LOCATION: \"Which ear is involved? \"        Left ear  2. SENSATION: \"Describe how the ear feels. \"       *No Answer*  3. ONSET:  \"When did the ear symptoms start? \"        *No Answer*  4. PAIN: \"Do you also have an earache? \" If so, ask: \"How bad is it? \" (Scale 1-10; or mild, moderate, severe)      *No Answer*  5. CAUSE: \"What do you think is causing the ear congestion? \"      *No Answer*  6. URI: \"Do you have a runny nose or cough? \"       *No Answer*  7. NASAL ALLERGIES: \"Are there symptoms of hay fever, such as sneezing or a clear nasal discharge? \"      *No Answer*  8. PREGNANCY: \"Is there any chance you are pregnant? \" \"When was your last menstrual period? \"      *No Answer*    Protocols used: EAR - CONGESTION-ADULT-OH  pt seen at walk in clinic for ear ache dx with ear infection given ear drops and told to follow up with PCP    Call transferred to opal

## 2020-09-28 NOTE — TELEPHONE ENCOUNTER
Triage nurse called back to pre-service, wants pt seen by tomorrow. There are no openings for Hansa Roy on Wed. Offered virtual visit, but  refused, wants seen in the office. Please contact pt with an appt or further instructions.

## 2020-09-28 NOTE — TELEPHONE ENCOUNTER
Please let patient know that I may have an opening as soon as tomorrow afternoon, but if not this Thursday. She was advised to be seen at Urgent care and offered virtual visit. Not sure what else to do. I could also do phone call, but I cannot treat ear infection via phone call as could be detrimental to patient.       Thanks  Corona Mccarthy

## 2020-10-02 ENCOUNTER — OFFICE VISIT (OUTPATIENT)
Dept: FAMILY MEDICINE CLINIC | Age: 60
End: 2020-10-02
Payer: MEDICARE

## 2020-10-02 VITALS
HEART RATE: 75 BPM | SYSTOLIC BLOOD PRESSURE: 122 MMHG | TEMPERATURE: 97.6 F | OXYGEN SATURATION: 91 % | DIASTOLIC BLOOD PRESSURE: 62 MMHG | WEIGHT: 187 LBS | BODY MASS INDEX: 39.25 KG/M2 | HEIGHT: 58 IN

## 2020-10-02 PROCEDURE — 4004F PT TOBACCO SCREEN RCVD TLK: CPT | Performed by: PHYSICIAN ASSISTANT

## 2020-10-02 PROCEDURE — 90686 IIV4 VACC NO PRSV 0.5 ML IM: CPT | Performed by: PHYSICIAN ASSISTANT

## 2020-10-02 PROCEDURE — G0009 ADMIN PNEUMOCOCCAL VACCINE: HCPCS | Performed by: PHYSICIAN ASSISTANT

## 2020-10-02 PROCEDURE — 90732 PPSV23 VACC 2 YRS+ SUBQ/IM: CPT | Performed by: PHYSICIAN ASSISTANT

## 2020-10-02 PROCEDURE — G0008 ADMIN INFLUENZA VIRUS VAC: HCPCS | Performed by: PHYSICIAN ASSISTANT

## 2020-10-02 PROCEDURE — 3017F COLORECTAL CA SCREEN DOC REV: CPT | Performed by: PHYSICIAN ASSISTANT

## 2020-10-02 PROCEDURE — 3023F SPIROM DOC REV: CPT | Performed by: PHYSICIAN ASSISTANT

## 2020-10-02 PROCEDURE — G8482 FLU IMMUNIZE ORDER/ADMIN: HCPCS | Performed by: PHYSICIAN ASSISTANT

## 2020-10-02 PROCEDURE — G0296 VISIT TO DETERM LDCT ELIG: HCPCS | Performed by: PHYSICIAN ASSISTANT

## 2020-10-02 PROCEDURE — 99214 OFFICE O/P EST MOD 30 MIN: CPT | Performed by: PHYSICIAN ASSISTANT

## 2020-10-02 PROCEDURE — G8417 CALC BMI ABV UP PARAM F/U: HCPCS | Performed by: PHYSICIAN ASSISTANT

## 2020-10-02 PROCEDURE — G8427 DOCREV CUR MEDS BY ELIG CLIN: HCPCS | Performed by: PHYSICIAN ASSISTANT

## 2020-10-02 PROCEDURE — G8926 SPIRO NO PERF OR DOC: HCPCS | Performed by: PHYSICIAN ASSISTANT

## 2020-10-02 RX ORDER — CYCLOBENZAPRINE HCL 5 MG
TABLET ORAL
Qty: 60 TABLET | Refills: 5 | Status: SHIPPED
Start: 2020-10-02 | End: 2021-01-01 | Stop reason: SDUPTHER

## 2020-10-02 RX ORDER — MELOXICAM 15 MG/1
TABLET ORAL
Qty: 30 TABLET | Refills: 5 | Status: SHIPPED
Start: 2020-10-02 | End: 2021-01-01

## 2020-10-02 RX ORDER — LEVETIRACETAM 500 MG/1
TABLET ORAL
Qty: 60 TABLET | Refills: 5 | Status: ON HOLD
Start: 2020-10-02 | End: 2020-10-09 | Stop reason: HOSPADM

## 2020-10-02 RX ORDER — ARIPIPRAZOLE 5 MG/1
5 TABLET ORAL DAILY
Qty: 30 TABLET | Refills: 2 | Status: SHIPPED
Start: 2020-10-02 | End: 2021-01-01 | Stop reason: SDUPTHER

## 2020-10-02 RX ORDER — AMLODIPINE BESYLATE 5 MG/1
TABLET ORAL
Qty: 30 TABLET | Refills: 5 | Status: SHIPPED
Start: 2020-10-02 | End: 2021-01-01

## 2020-10-02 RX ORDER — ALBUTEROL SULFATE 90 UG/1
2 AEROSOL, METERED RESPIRATORY (INHALATION) EVERY 6 HOURS PRN
Qty: 1 INHALER | Refills: 5 | Status: SHIPPED
Start: 2020-10-02 | End: 2021-01-01 | Stop reason: SDUPTHER

## 2020-10-02 RX ORDER — BENAZEPRIL HYDROCHLORIDE 20 MG/1
TABLET ORAL
Qty: 30 TABLET | Refills: 5 | Status: SHIPPED
Start: 2020-10-02 | End: 2021-01-01

## 2020-10-02 RX ORDER — LEVOTHYROXINE SODIUM 0.03 MG/1
TABLET ORAL
Qty: 30 TABLET | Refills: 5 | Status: SHIPPED
Start: 2020-10-02 | End: 2021-01-01

## 2020-10-02 RX ORDER — CLOPIDOGREL BISULFATE 75 MG/1
TABLET ORAL
Qty: 30 TABLET | Refills: 5 | Status: SHIPPED
Start: 2020-10-02 | End: 2021-01-01

## 2020-10-02 RX ORDER — ATORVASTATIN CALCIUM 40 MG/1
TABLET, FILM COATED ORAL
Qty: 30 TABLET | Refills: 5 | Status: SHIPPED
Start: 2020-10-02 | End: 2021-01-01 | Stop reason: SDUPTHER

## 2020-10-02 RX ORDER — ATENOLOL 50 MG/1
TABLET ORAL
Qty: 30 TABLET | Refills: 5 | Status: SHIPPED
Start: 2020-10-02 | End: 2021-01-01

## 2020-10-02 RX ORDER — MAGNESIUM OXIDE 400 MG/1
TABLET ORAL
Qty: 60 TABLET | Refills: 5 | Status: SHIPPED
Start: 2020-10-02 | End: 2021-01-01

## 2020-10-02 RX ORDER — ZOSTER VACCINE RECOMBINANT, ADJUVANTED 50 MCG/0.5
0.5 KIT INTRAMUSCULAR SEE ADMIN INSTRUCTIONS
Qty: 0.5 ML | Refills: 0 | Status: SHIPPED | OUTPATIENT
Start: 2020-10-02 | End: 2021-01-01

## 2020-10-02 RX ORDER — CITALOPRAM 40 MG/1
40 TABLET ORAL DAILY
Qty: 30 TABLET | Refills: 3 | Status: CANCELLED | OUTPATIENT
Start: 2020-10-02

## 2020-10-02 ASSESSMENT — ENCOUNTER SYMPTOMS
EYE DISCHARGE: 0
SINUS PRESSURE: 0
SHORTNESS OF BREATH: 0
BACK PAIN: 1
ABDOMINAL PAIN: 0
EYE REDNESS: 0
CHEST TIGHTNESS: 0
NAUSEA: 0
FACIAL SWELLING: 0
SORE THROAT: 0
VOMITING: 0
DIARRHEA: 0
COUGH: 1
EYE PAIN: 0
WHEEZING: 1

## 2020-10-02 NOTE — PROGRESS NOTES
10/2/2020     Salomón Walker (:  1960) is a 62 y.o. female, here for evaluation of the following medical concerns:    HPI  Patient to office in need of multiple refills and having ongoing issues with her left ear. She reports that her left ear has been bothering her on and off for several months. She most recently was in ER a few weeks ago and diagnosed with malignant otitis externa and given some antibiotic ear drops. She reports that it really didn't help her and she is having difficulty hearing out of her left ear. She was apparently having some bleeding and drainage previously. She denies any severe headaches or posterior left ear pain. She does admit to continuing to drink alcohol, but reports she has \"cut down a lot\". She is continuing to smoke tobacco and has no desire to quit. She is talking all of her medications and the only medication she is out of is her hand held inhaler. She reports she does have frequent \"smokers cough\", but not really productive. Her weight has been stable. She does have a history of previous CVA as well as seizures and Hepatitis C. She knows she should not drink alcohol. She also suffers from magnesium deficiency as well as hypothyroidism, but has not had level checked in almost 1 year. She also admits to some marijuana use as well. She reports that her colonoscopy is UTD and had done with Dr. David Child. She also reports that they are coming out to her house to do Medicare AWV. She does agree to get pneumonia vaccine and flu shot today. She denies any chest pain and is active, but limited due to back pain from arthritis. She does not drive. Review of Systems   Constitutional: Positive for fatigue (chronic issues). Negative for chills and fever. HENT: Positive for ear pain (persistent left ear pain) and hearing loss (left ear). Negative for facial swelling, sinus pressure and sore throat.     Eyes: Negative for pain, discharge and redness. Respiratory: Positive for cough (\"smoker's cough\") and wheezing (on and off, uses albuterol inhaler only). Negative for chest tightness and shortness of breath (denies). Cardiovascular: Negative for chest pain (denies), palpitations and leg swelling. Gastrointestinal: Negative for abdominal pain (denies), diarrhea, nausea and vomiting. Genitourinary: Negative for dysuria and frequency. Musculoskeletal: Positive for arthralgias, back pain, gait problem (uses cane for assistance), myalgias and neck pain. Negative for neck stiffness. Skin: Negative for rash and wound. Neurological: Positive for seizures (history of, on Keppra). Negative for dizziness, syncope, weakness and headaches. Hematological: Negative for adenopathy. Psychiatric/Behavioral: Negative for self-injury and suicidal ideas. The patient is nervous/anxious (on Celexa, her  feels she needs something \"a little more\"). All other systems reviewed and are negative. Prior to Visit Medications    Medication Sig Taking?  Authorizing Provider   albuterol sulfate HFA (PROAIR HFA) 108 (90 Base) MCG/ACT inhaler Inhale 2 puffs into the lungs every 6 hours as needed for Wheezing Yes Scot Roll, PA-C   amLODIPine (NORVASC) 5 MG tablet take 1 tablet by mouth once daily Yes Scot Roll, PA-C   atenolol (TENORMIN) 50 MG tablet take 1 tablet by mouth once daily Yes Scot Roll, PA-C   atorvastatin (LIPITOR) 40 MG tablet take 1 tablet by mouth once daily Yes Scot Roll, PA-C   benazepril (LOTENSIN) 20 MG tablet take 1 tablet by mouth once daily Yes Scot Roll, PA-C   clopidogrel (PLAVIX) 75 MG tablet take 1 tablet by mouth once daily Yes Scot Roll, PA-C   cyclobenzaprine (FLEXERIL) 5 MG tablet take 1 tablet by mouth twice a day if needed for muscle spasm - DO NOT TAKE WHILE DRINKING ALCOHOL OR DRIVING Yes Scot Roll, PA-C   levETIRAcetam (KEPPRA) 500 MG tablet take 1 tablet by mouth twice a day Yes Aydee Arias PA-C   levothyroxine (SYNTHROID) 25 MCG tablet take 1 tablet by mouth once daily Yes Aydee Arias PA-C   magnesium oxide (MAG-OX) 400 MG tablet take 1 tablet by mouth twice a day Yes Aydee Arias PA-C   meloxicam (MOBIC) 15 MG tablet take 1 tablet by mouth once daily Yes Aydee Arias PA-C   zoster recombinant adjuvanted vaccine [facility]) 50 MCG/0.5ML SUSR injection Inject 0.5 mLs into the muscle See Admin Instructions 1 dose now and repeat in 2-6 months Yes Aydee Arias PA-C   ARIPiprazole (ABILIFY) 5 MG tablet Take 1 tablet by mouth daily Yes Aydee Arias PA-C   citalopram (CELEXA) 40 MG tablet take 1 tablet by mouth once daily Yes Aydee Arias PA-C          Vitals:    10/02/20 1353   BP: 122/62   Site: Left Upper Arm   Position: Sitting   Cuff Size: Medium Adult   Pulse: 75   Temp: 97.6 °F (36.4 °C)   TempSrc: Temporal   SpO2: 91%   Weight: 187 lb (84.8 kg)   Height: 4' 10\" (1.473 m)     Estimated body mass index is 39.08 kg/m² as calculated from the following:    Height as of this encounter: 4' 10\" (1.473 m). Weight as of this encounter: 187 lb (84.8 kg). Physical Exam  Vitals signs and nursing note reviewed. Constitutional:       General: She is not in acute distress. Appearance: Normal appearance. She is well-developed. She is not toxic-appearing. HENT:      Head: Normocephalic and atraumatic. Right Ear: Tympanic membrane, ear canal and external ear normal.      Left Ear: Ear canal and external ear normal.      Ears:      Comments: Left TM rupture noted, ? Chronic. No active drainage or bleeding. No tenderness to left tragus and no posterior auricular swelling or redness. No left mastoid swelling, tenderness or erythema. Eyes:      Pupils: Pupils are equal, round, and reactive to light. Neck:      Musculoskeletal: Normal range of motion and neck supple.    Cardiovascular:      Rate and Rhythm: Normal rate and regular rhythm. Pulses: Normal pulses. Heart sounds: Normal heart sounds. Pulmonary:      Effort: Pulmonary effort is normal.      Breath sounds: Normal breath sounds. No wheezing or rhonchi. Comments: Some diminished breath sounds bilateral bases. Abdominal:      General: Bowel sounds are normal.      Palpations: Abdomen is soft. Musculoskeletal: Normal range of motion. General: Swelling (trace to 1+ at bilateral ankles) present. No tenderness. Skin:     General: Skin is warm and dry. Capillary Refill: Capillary refill takes less than 2 seconds. Neurological:      General: No focal deficit present. Mental Status: She is alert and oriented to person, place, and time. Cranial Nerves: No cranial nerve deficit. Psychiatric:         Attention and Perception: Attention and perception normal.         Mood and Affect: Mood is depressed. Speech: Speech normal.         Behavior: Behavior normal. Behavior is cooperative. Thought Content: Thought content normal. Thought content does not include homicidal or suicidal ideation. Cognition and Memory: Cognition and memory normal.         Judgment: Judgment normal.         ASSESSMENT/PLAN:  [facility] was seen today for otalgia and other. Diagnoses and all orders for this visit:    Chronic obstructive pulmonary disease, unspecified COPD type ([facility] [address]  -     albuterol sulfate HFA (PROAIR HFA) 108 (90 Base) MCG/ACT inhaler; Inhale 2 puffs into the lungs every 6 hours as needed for Wheezing    Cigarette nicotine dependence without complication  -     albuterol sulfate HFA (PROAIR HFA) 108 (90 Base) MCG/ACT inhaler;  Inhale 2 puffs into the lungs every 6 hours as needed for Wheezing    Essential hypertension  -     amLODIPine (NORVASC) 5 MG tablet; take 1 tablet by mouth once daily  -     atenolol (TENORMIN) 50 MG tablet; take 1 tablet by mouth once daily  -     benazepril (LOTENSIN) 20 MG tablet; take 1 tablet by mouth once daily  -     CBC WITH AUTO DIFFERENTIAL; Future  -     COMPREHENSIVE METABOLIC PANEL; Future    Hyperlipidemia, unspecified hyperlipidemia type  -     atorvastatin (LIPITOR) 40 MG tablet; take 1 tablet by mouth once daily  -     Lipid, Fasting; Future    Depression, unspecified depression type  -     ARIPiprazole (ABILIFY) 5 MG tablet; Take 1 tablet by mouth daily    History of CVA (cerebrovascular accident)  -     clopidogrel (PLAVIX) 75 MG tablet; take 1 tablet by mouth once daily  -     COMPREHENSIVE METABOLIC PANEL; Future    Seizure disorder (HCC)  -     levETIRAcetam (KEPPRA) 500 MG tablet; take 1 tablet by mouth twice a day    Subclinical hypothyroidism  -     levothyroxine (SYNTHROID) 25 MCG tablet; take 1 tablet by mouth once daily  -     TSH; Future    Magnesium deficiency  -     magnesium oxide (MAG-OX) 400 MG tablet; take 1 tablet by mouth twice a day  -     MAGNESIUM; Future    Arthritis  -     meloxicam (MOBIC) 15 MG tablet; take 1 tablet by mouth once daily    Need for shingles vaccine  -     zoster recombinant adjuvanted vaccine [facility]) 50 MCG/0.5ML SUSR injection; Inject 0.5 mLs into the muscle See Admin Instructions 1 dose now and repeat in 2-6 months    Encounter for screening mammogram for malignant neoplasm of breast  -     [facility] DIGITAL SCREEN W OR WO CAD BILATERAL; Future    Personal history of tobacco use  -     DC VISIT TO DISCUSS LUNG CA SCREEN W LDCT  -     CT Lung Screen (Annual);  Future    Encounter for vitamin deficiency screening    Ruptured ear drum, left  -     [facility] DO, Ear, Nose, Thro[facility], [place]    Hearing loss of left ear, unspecified hearing loss type  -     [facility], Ear, N[facility], Th[facility], [place]    Need for pneumococcal vaccine  -     PNEUMOVAX 23 subcutaneous/IM (Pneumococcal polysaccharide vaccine 23-valent >= 3yo)    Elevated blood sugar  -     HEMOGLOBIN A1C; Future    Other orders  -     INFLUENZA, QUADV, 3 YRS AND OLDER, IM PF, PREFILL SYR OR SDV, 0.5ML (AFLURIA QUADV, PF)  -     cyclobenzaprine (FLEXERIL) 5 MG tablet; take 1 tablet by mouth twice a day if needed for muscle spasm - DO NOT TAKE WHILE DRINKING ALCOHOL OR DRIVING      Multiple medication refills given. Patient advised of need to get low dose CT of her chest with long standing smoking history as well as mammogram and will schedule for pap. She needs to get updated labs as well and Shingrix Rx given. Patient reports that she had colonoscopy at Dr. Gume Robles, will try and obtain a report of this. She will be referred to ENT concerning her left TM rupture and ongoing pain as well as loss of hearing to her left ear. Advised to STOP SMOKING and limit and STOP ALCOHOL. In addition to her current Celexa, will add small dose of Abilify as patient still having depressive episodes as some periodic times of elated mood and mood issues. May need official psych evaluation if not improving. An electronic signature was used to authenticate this note. --Nathan Boles PA-C on 10/3/2020 at 7:41 AM    Low Dose CT (LDCT) Lung Screening criteria met   Age 55-77   Pack year smoking >30   Still smoking or less than 15 year since quit   No sign or symptoms of lung cancer   > 11 months since last LDCT     Risks and benefits of lung cancer screening with LDCT scans discussed:    Significance of positive screen - False-positive LDCT results often occur. 95% of all positive results do not lead to a diagnosis of cancer. Usually further imaging can resolve most false-positive results; however, some patients may require invasive procedures. Over diagnosis risk - 10% to 12% of screen-detected lung cancer cases are over diagnosed--that is, the cancer would not have been detected in the patient's lifetime without the screening.     Need for follow up screens annually to continue lung cancer screening effectiveness     Risks associated with radiation from annual LDCT- Radiation exposure is about the same as for a mammogram, which is about 1/3 of the annual background radiation exposure from everyday life. Starting screening at age 54 is not likely to increase cancer risk from radiation exposure. Patients with comorbidities resulting in life expectancy of < 10 years, or that would preclude treatment of an abnormality identified on CT, should not be screened due to lack of benefit.     To obtain maximal benefit from this screening, smoking cessation and long-term abstinence from smoking is critical

## 2020-10-02 NOTE — PATIENT INSTRUCTIONS
What is lung cancer screening? Lung cancer screening is a way in which doctors check the lungs for early signs of cancer in people who have no symptoms of lung cancer. A low-dose CT scan uses much less radiation than a normal CT scan and shows a more detailed image of the lungs than a standard X-ray. The goal of lung cancer screening is to find cancer early, before it has a chance to grow, spread, or cause problems. One large study found that smokers who were screened with low-dose CT scans were less likely to die of lung cancer than those who were screened with standard X-ray. Below is a summary of the things you need to know regarding screening for lung cancer with low-dose computed tomography (LDCT). This is a screening program that involves routine annual screening with LDCT studies of the lung. The LDCTs are done using low-dose radiation that is not thought to increase your cancer risk. If you have other serious medical conditions (other cancers, congestive heart failure) that limit your life expectancy to less than 10 years, you should not undergo lung cancer screening with LDCT. The chance is 20%-60% that the LDCT result will show abnormalities. This would require additional testing which could include repeat imaging or even invasive procedures. Most (about 95%) of \"abnormal\" LDCT results are false in the sense that no lung cancer is ultimately found. Additionally, some (about 10%) of the cancers found would not affect your life expectancy, even if undetected and untreated. If you are still smoking, the single most important thing that you can do to reduce your risk of dying of lung cancer is to quit. For this screening to be covered by Medicare and most other insurers, strict criteria must be met. If you do not meet these criteria, but still wish to undergo LDCT testing, you will be required to sign a waiver indicating your willingness to pay for the scan.   Patient Education Perforated Eardrum: Care Instructions  Your Care Instructions     A tear or hole in the membrane of the middle ear is called a perforated or ruptured eardrum. This can happen if an infection builds up inside the ear or if the eardrum gets injured. You may find it hard to hear out of that ear or may hear a buzzing sound. You may have an earache or have fluids that drain from the ear. Your eardrum should heal on its own in a few weeks, and you should hear normally then. If you have an infection, your doctor may prescribe antibiotics. You may need pain relief medicine for your earache. Your doctor will check to see if your eardrum has healed. If not, you may need surgery to repair the eardrum. Follow-up care is a key part of your treatment and safety. Be sure to make and go to all appointments, and call your doctor if you are having problems. It's also a good idea to know your test results and keep a list of the medicines you take. How can you care for yourself at home? · If your doctor prescribed antibiotics, take them as directed. Do not stop taking them just because you feel better. You need to take the full course of antibiotics. · Take an over-the-counter pain medicine, such as acetaminophen (Tylenol), ibuprofen (Advil, Motrin), or naproxen (Aleve), as needed. Read and follow all instructions on the label. · Do not take two or more pain medicines at the same time unless the doctor told you to. Many pain medicines have acetaminophen, which is Tylenol. Too much acetaminophen (Tylenol) can be harmful. · To ease pain, put a warm washcloth or a heating pad set on low on your ear. You may have some drainage from the ear. · Be careful when taking over-the-counter cold or flu medicines and Tylenol at the same time. Many of these medicines have acetaminophen, which is Tylenol. Read the labels to make sure that you are not taking more than the recommended dose. Too much Tylenol can be harmful.   · Keep your ears dry.  ? Take baths until your doctor says you can take showers again. ? When you wash your hair, use cotton lightly coated with petroleum jelly as an earplug. Do not use plastic earplugs. ? Do not swim until your doctor says you can.  ? If you get water in your ears, turn your head to each side and pull the earlobe in different directions. This will help the water run out. If your ears are still wet, use a hair dryer set on the lowest heat. Hold the dryer several inches from your ear. · Do not put anything into your ear canal. For example, do not use a cotton swab to clean the inside of your ear. It can damage your ear. If you think you have something inside your ear, ask your doctor to check it. When should you call for help? Call your doctor now or seek immediate medical care if:  · You have signs of infection, such as:  ? Increased pain, swelling, warmth, or redness. ? Pus draining from the ear. ? A fever. Watch closely for changes in your health, and be sure to contact your doctor if:  · You have changes in hearing. · You do not get better as expected. Where can you learn more? Go to https://IntelliGeneScan.[employer]. org and sign in to your [employer] account. Enter X627 in the [facility] box to learn more about \"Perforated Eardrum: Care Instructions. \"     If you do not have an account, please click on the \"Sign Up Now\" link. Current as of: July 29, 2019               Content Version: 12.5  © 2006-2020 Healthwise, Incorporated. Care instructions adapted under license by [facility] ([facility]). If you have questions about a medical condition or this instruction, always ask your healthcare professional. [place] [address] any warranty or liability for your use of this information. Patient Education        Recombinant Zoster (Shingles) Vaccine: What You Need to Know  Why get vaccinated? Recombinant zoster (shingles) vaccine can prevent shingles.   Shingles (also called herpes zoster, or just zoster) is a painful skin rash, usually with blisters. In addition to the rash, shingles can cause fever, headache, chills, or upset stomach. More rarely, shingles can lead to pneumonia, hearing problems, blindness, brain inflammation (encephalitis), or death. The most common complication of shingles is long-term nerve pain called postherpetic neuralgia (PHN). PHN occurs in the areas where the shingles rash was, even after the rash clears up. It can last for months or years after the rash goes away. The pain from PHN can be severe and debilitating. About 10 to 18% of people who get shingles will experience PHN. The risk of PHN increases with age. An older adult with shingles is more likely to develop PHN and have longer lasting and more severe pain than a younger person with shingles. Shingles is caused by the varicella zoster virus, the same virus that causes chickenpox. After you have chickenpox, the virus stays in your body and can cause shingles later in life. Shingles cannot be passed from one person to another, but the virus that causes shingles can spread and cause chickenpox in someone who had never had chickenpox or received chickenpox vaccine. Recombinant shingles vaccine  Recombinant shingles vaccine provides strong protection against shingles. By preventing shingles, recombinant shingles vaccine also protects against PHN. Recombinant shingles vaccine is the preferred vaccine for the prevention of shingles. However, a different vaccine, live shingles vaccine, may be used in some circumstances. The recombinant shingles vaccine is recommended for adults 50 years and older without serious immune problems. It is given as a two-dose series. This vaccine is also recommended for people who have already gotten another type of shingles vaccine, the live shingles vaccine. There is no live virus in this vaccine. Shingles vaccine may be given at the same time as other vaccines.   Talk with your health care provider  Tell your vaccine provider if the person getting the vaccine:  · Has had an allergic reaction after a previous dose of recombinant shingles vaccine, or has any severe, life-threatening allergies. · Is pregnant or breastfeeding. · Is currently experiencing an episode of shingles. In some cases, your health care provider may decide to postpone shingles vaccination to a future visit. People with minor illnesses, such as a cold, may be vaccinated. People who are moderately or severely ill should usually wait until they recover before getting recombinant shingles vaccine. Your health care provider can give you more information. Risks of a vaccine reaction  · A sore arm with mild or moderate pain is very common after recombinant shingles vaccine, affecting about 80% of vaccinated people. Redness and swelling can also happen at the site of the injection. · Tiredness, muscle pain, headache, shivering, fever, stomach pain, and nausea happen after vaccination in more than half of people who receive recombinant shingles vaccine. In clinical trials, about 1 out of 6 people who got recombinant zoster vaccine experienced side effects that prevented them from doing regular activities. Symptoms usually went away on their own in 2 to 3 days. You should still get the second dose of recombinant zoster vaccine even if you had one of these reactions after the first dose. People sometimes faint after medical procedures, including vaccination. Tell your provider if you feel dizzy or have vision changes or ringing in the ears. As with any medicine, there is a very remote chance of a vaccine causing a severe allergic reaction, other serious injury, or death. What if there is a serious problem? An allergic reaction could occur after the vaccinated person leaves the clinic.  If you see signs of a severe allergic reaction (hives, swelling of the face and throat, difficulty breathing, a fast heartbeat, dizziness, or weakness), call 9-1-1 and get the person to the nearest hospital.  For other signs that concern you, call your health care provider. Adverse reactions should be reported to the Vaccine Adverse Event Reporting System (VAERS). Your health care provider will usually file this report, or you can do it yourself. Visit the VAERS website at www.vaers. [facility].gov or call 2-310.124.4010. VAERS is only for reporting reactions, and VAERS staff do not give medical advice. How can I learn more? · Ask your health care provider. · Call your local or state health department. · Contact the Centers for Disease Control and Prevention (CDC):  ? Call 2-940.230.3499 (1-800-CDC-INFO) or  ? Visit CDC's website at www.cdc.gov/vaccines  Vaccine Information Statement  Recombinant Zoster Vaccine  10/30/2019  [facility] and [facility] for Disease Control and Prevention  Many Vaccine Information Statements are available in Chinese and other languages. See www.immunize.org/vis. Hojas de Información Sobre Vacunas están disponibles en Español y en muchos otros idiomas. Visite Jose RafaelSchumza.si   Care instructions adapted under license by [facility] ([facility]). If you have questions about a medical condition or this instruction, always ask your healthcare professional. [place] [address] any warranty or liability for your use of this information. Patient Education        Learning About Benefits From Quitting Smoking  How does quitting smoking make you healthier? If you're thinking about quitting smoking, you may have a few reasons to be smoke-free. Your health may be one of them. · When you quit smoking, you lower your risks for cancer, lung disease, heart attack, stroke, blood vessel disease, and blindness from macular degeneration. · When you're smoke-free, you get sick less often, and you heal faster.  You are less likely to get colds, flu, bronchitis, and pneumonia. · As a nonsmoker, you may find that your mood is better and you are less stressed. When and how will you feel healthier? Quitting has real health benefits that start from day 1 of being smoke-free. And the longer you stay smoke-free, the healthier you get and the better you feel. The first hours  · After just 20 minutes, your blood pressure and heart rate go down. That means there's less stress on your heart and blood vessels. · Within 12 hours, the level of carbon monoxide in your blood drops back to normal. That makes room for more oxygen. With more oxygen in your body, you may notice that you have more energy than when you smoked. After 2 weeks  · Your lungs start to work better. · Your risk of heart attack starts to drop. After 1 month  · When your lungs are clear, you cough less and breathe deeper, so it's easier to be active. · Your sense of taste and smell return. That means you can enjoy food more than you have since you started smoking. Over the years  · Over the years, your risks of heart disease, heart attack, and stroke are lower. · After 10 years, your risk of dying from lung cancer is cut by about half. And your risk for many other types of cancer is lower too. How would quitting help others in your life? When you quit smoking, you improve the health of everyone who now breathes in your smoke. · Their heart, lung, and cancer risks drop, much like yours. · They are sick less. For babies and small children, living smoke-free means they're less likely to have ear infections, pneumonia, and bronchitis. · If you're a woman who is or will be pregnant someday, quitting smoking means a healthier . · Children who are close to you are less likely to become adult smokers. Where can you learn more? Go to https://alyssa.healthCarbon Adspartners. org and sign in to your [employer] account.  Enter [phone] in the [facility] box to learn more about \"Learning About Benefits From Quitting Smoking. \"     If you do not have an account, please click on the \"Sign Up Now\" link. Current as of: March 12, 2020               Content Version: 12.5  © 2006-2020 Healthwise, Incorporated. Care instructions adapted under license by [facility] ([facility]). If you have questions about a medical condition or this instruction, always ask your healthcare professional. [place] [address] any warranty or liability for your use of this information. Patient Education        Learning about Antidepressants  What are antidepressants? Antidepressants are medicines that change the levels of some chemicals in the brain. They can help affect moods. There are different types that work on brain chemicals in different ways. These medicines can help treat depression. They are also used for anxiety, eating disorders, post-traumatic stress disorder, and chronic pain. What are some examples? There are many different types of antidepressant medicines. They include:  · Selective serotonin reuptake inhibitors (SSRIs). Some examples include citalopram, fluoxetine, and sertraline. · Serotonin and norepinephrine reuptake inhibitors (SNRIs). Some examples include duloxetine and venlafaxine. · Atypical antidepressants. Some examples include bupropion, mirtazapine, and trazodone. · Tricyclic and tetracyclic antidepressants. Some examples include amitriptyline and nortriptyline. · Monoamine oxidase inhibitors (MAOIs). An example includes selegiline. What are the side effects? Side effects may vary. They depend on the medicine you take. But common ones include:  · Nausea. · Dry mouth. · Loss of appetite. · Diarrhea or constipation. · Sexual problems. (These may include loss of desire or erection problems.)  · Headaches. · Trouble falling asleep. Or you may wake up a lot at night. · Weight gain. · Feeling nervous or on edge. · Feeling drowsy in the daytime.   Problems with sexual arousal and a lack of interest in sex are common side effects. If this happens to you, talk to your doctor. There are other medicines that may help with these problems. Mild side effects may go away after you take the medicine for a few weeks. If the side effects bother you, talk with your doctor. He or she may prescribe a different medicine. Or the doctor may suggest ways to manage your side effects. How do you take antidepressants safely? If your doctor has prescribed antidepressants, here are some tips to help you get the most from your medicine. · Share your health history with your doctor. Tell your doctor about your other medicines and health conditions. This information can affect which antidepressant your doctor prescribes for you. Tell your doctor if you or anyone in your family has had bipolar disorder or used antidepressants before. · Take your medicine as prescribed. It works best and has fewer side effects when you take it exactly as your doctor prescribed. If you miss a dose, and if it's not too late in the day, it's okay to take it. Don't double up doses. · Keep taking your medicine for a while. Taking it for at least 6 months after you feel better can help keep you from getting symptoms again. · Be prepared to try different medicines and doses. You may start to feel better within 1 to 3 weeks after you start the medicine. If you have not improved at all in 3 weeks, your doctor may increase your dose. Or you may need to try a different medicine. Over time, your doctor may need to adjust your dose again to manage your symptoms better. · Don't take any new medicines unless you talk to your doctor first.   Even common medicines like aspirin and some vitamins and herbs can cause problems if you use them while you take antidepressants. · Do not drink alcohol. It can make the side effects worse. · Don't stop taking your medicine on your own.    Quitting antidepressants too quickly can cause withdrawal symptoms. It can also cause depression to return. If you are having a problem with your medicine or are ready to quit taking it, work with your doctor. He or she can help you to slowly reduce the dose over a span of a few weeks. · Call your doctor right away for serious side effects. Examples include:  ? Warning signs of suicide. These include talking or writing about death, giving away belongings, or withdrawing from family and friends. ? Manic behavior. This includes having very high energy, sleeping less than normal, being impulsive, or being grouchy or restless. ? Serotonin syndrome. This can happen if you take too much antidepressant medicine or take more than one type of medicine that affects serotonin. Signs may include high body temperature, clumsiness, nausea, or feeling restless. How might you feel about taking antidepressants? You may feel nervous, relieved, or a little surprised that your doctor is talking to you about antidepressants. And the thought of needing to take medicine for a long time can be scary. But whether you are depressed or you have another condition, you are taking a step to help yourself feel better. Follow-up care is a key part of your treatment and safety. Be sure to make and go to all appointments, and call your doctor if you are having problems. It's also a good idea to know your test results and keep a list of the medicines you take. Where can you learn more? Go to https://Right Relevancemasood.Petrabytes. org and sign in to your [employer] account. Enter A230 in the [employer] box to learn more about \"Learning about Antidepressants. \"     If you do not have an account, please click on the \"Sign Up Now\" link. Current as of: January 31, 2020               Content Version: 12.5  © 3882-2095 Healthwise, Incorporated. Care instructions adapted under license by [facility] ([facility]).  If you have questions about a medical condition or this instruction, always ask your epilepsy;  · trouble swallowing;  · diabetes (in you or a family member); or  · obsessive-compulsive disorder, impulse-control disorder, or addictive behaviors. Some people have thoughts about suicide while taking aripiprazole. Your doctor will need to check your progress at regular visits. Your family or other caregivers should also be alert to changes in your mood or symptoms. The liquid form (oral solution) of this medication may contain up to 15 grams of sugar per dose. Before taking aripiprazole oral solution, tell your doctor if you have diabetes. Aripiprazole may cause you to have high blood sugar (hyperglycemia). If you are diabetic, check your blood sugar levels on a regular basis while you are taking aripiprazole. The orally disintegrating tablet form of this medication may contain over 3 milligrams of phenylalanine per tablet. Before taking Abilify Discmelt, tell your doctor if you have phenylketonuria. Taking antipsychotic medicine in the last 3 months of pregnancy may cause problems in the , such as withdrawal symptoms, breathing problems, feeding problems, fussiness, tremors, and limp or stiff muscles. However, you may have withdrawal symptoms or other problems if you stop taking your medicine during pregnancy. If you become pregnant, do not stop taking aripiprazole without your doctor's advice. If you are pregnant, your name may be listed on a pregnancy registry to track the effects of aripiprazole on the baby. It may not be safe to breastfeed while using this medicine. Ask your doctor about any risk. How should I take aripiprazole? Follow all directions on your prescription label and read all medication guides or instruction sheets. Your doctor may occasionally change your dose. Use the medicine exactly as directed. Do not take aripiprazole for longer than 6 weeks unless your doctor has told you to. Aripiprazole can be taken with or without food.   Swallow the regular tablet  whole and do not crush, chew, or break it. Measure liquid medicine carefully. Use the dosing syringe provided, or use a medicine dose-measuring device (not a kitchen spoon). Remove an orally disintegrating tablet from the package only when you are ready to take the medicine. Place the tablet in your mouth and allow it to dissolve, without chewing. Swallow several times as the tablet dissolves. If desired, you may drink liquid to help swallow the dissolved tablet. Do not stop using aripiprazole suddenly, or you could have unpleasant withdrawal symptoms. Ask your doctor how to safely stop using this medicine. Your doctor will need to check your progress on a regular basis. Store at room temperature away from moisture and heat. Aripiprazole liquid may be used for up to 6 months after opening, but not after the expiration date on the medicine label. What happens if I miss a dose? Take the medicine as soon as you can, but skip the missed dose if it is almost time for your next dose. Do not take two doses at one time. Get your prescription refilled before you run out of medicine completely. What happens if I overdose? Seek emergency medical attention or call the Poison Help line at 1-297.747.6496. Overdose symptoms may include drowsiness, vomiting, aggression, confusion, tremors, fast or slow heart rate, seizure (convulsions), trouble breathing, or fainting. What should I avoid while taking aripiprazole? Avoid getting up too fast from a sitting or lying position, or you may feel dizzy. Avoid driving or hazardous activity until you know how this medicine will affect you. Dizziness or drowsiness can cause falls, accidents, or severe injuries. Avoid drinking alcohol. Dangerous side effects could occur. Avoid becoming overheated or dehydrated. Drink plenty of fluids, especially in hot weather and during exercise. It is easier to become dangerously overheated and dehydrated while you are taking aripiprazole.   What are products. Not all possible interactions are listed here. Tell your doctor about all your current medicines and any medicine you start or stop using. Where can I get more information? Your pharmacist can provide more information about aripiprazole. Remember, keep this and all other medicines out of the reach of children, never share your medicines with others, and use this medication only for the indication prescribed. Every effort has been made to ensure that the information provided by [address] [place] [address]  is accurate, up-to-date, and complete, but no guarantee is made to that effect. Drug information contained herein may be time sensitive. [facility] information has been compiled for use by healthcare practitioners and consumers in the [place] and therefore [facility] does not warrant that uses outside of the [place] are appropriate, unless specifically indicated otherwise. [facility]'s drug information does not endorse drugs, diagnose patients or recommend therapy. [facility] drug information is an informational resource designed to assist licensed healthcare practitioners in caring for their patients and/or to serve consumers viewing this service as a supplement to, and not a substitute for, the expertise, skill, knowledge and judgment of healthcare practitioners. The absence of a warning for a given drug or drug combination in no way should be construed to indicate that the drug or drug combination is safe, effective or appropriate for any given patient. [facility] does not assume any responsibility for any aspect of healthcare administered with the aid of information [facility] provides. The information contained herein is not intended to cover all possible uses, directions, precautions, warnings, drug interactions, allergic reactions, or adverse effects.  If you have questions about the drugs you are taking, check with your doctor, nurse or pharmacist.  Copyright 2501-2078 [address]: 12.02. Revision date: 2/5/2020. Care instructions adapted under license by [facility] ([facility]). If you have questions about a medical condition or this instruction, always ask your healthcare professional. [address] any warranty or liability for your use of this information.

## 2020-10-07 ENCOUNTER — APPOINTMENT (OUTPATIENT)
Dept: CT IMAGING | Age: 60
DRG: 100 | End: 2020-10-07
Payer: MEDICARE

## 2020-10-07 ENCOUNTER — HOSPITAL ENCOUNTER (INPATIENT)
Age: 60
LOS: 2 days | Discharge: HOME | DRG: 100 | End: 2020-10-09
Attending: EMERGENCY MEDICINE | Admitting: INTERNAL MEDICINE
Payer: MEDICARE

## 2020-10-07 ENCOUNTER — TELEPHONE (OUTPATIENT)
Dept: FAMILY MEDICINE CLINIC | Age: 60
End: 2020-10-07

## 2020-10-07 PROBLEM — R09.02 HYPOXIA: Status: ACTIVE | Noted: 2020-10-07

## 2020-10-07 LAB
ACETAMINOPHEN LEVEL: <5 MCG/ML (ref 10–30)
ALBUMIN SERPL-MCNC: 3.9 G/DL (ref 3.5–5.2)
ALP BLD-CCNC: 66 U/L (ref 35–104)
ALT SERPL-CCNC: 24 U/L (ref 0–32)
AMPHETAMINE SCREEN, URINE: NOT DETECTED
ANION GAP SERPL CALCULATED.3IONS-SCNC: 17 MMOL/L (ref 7–16)
AST SERPL-CCNC: 25 U/L (ref 0–31)
B.E.: -1.3 MMOL/L (ref -3–3)
BACTERIA: ABNORMAL /HPF
BARBITURATE SCREEN URINE: NOT DETECTED
BASOPHILS ABSOLUTE: 0.04 E9/L (ref 0–0.2)
BASOPHILS RELATIVE PERCENT: 0.5 % (ref 0–2)
BENZODIAZEPINE SCREEN, URINE: NOT DETECTED
BILIRUB SERPL-MCNC: 0.2 MG/DL (ref 0–1.2)
BILIRUBIN URINE: NEGATIVE
BLOOD, URINE: NEGATIVE
BUN BLDV-MCNC: 15 MG/DL (ref 8–23)
CALCIUM SERPL-MCNC: 8.6 MG/DL (ref 8.6–10.2)
CANNABINOID SCREEN URINE: NOT DETECTED
CHLORIDE BLD-SCNC: 87 MMOL/L (ref 98–107)
CLARITY: CLEAR
CO2: 24 MMOL/L (ref 22–29)
COCAINE METABOLITE SCREEN URINE: NOT DETECTED
COLOR: YELLOW
CREAT SERPL-MCNC: 0.6 MG/DL (ref 0.5–1)
DEVICE: ABNORMAL
EKG ATRIAL RATE: 75 BPM
EKG P AXIS: 34 DEGREES
EKG P-R INTERVAL: 192 MS
EKG Q-T INTERVAL: 424 MS
EKG QRS DURATION: 104 MS
EKG QTC CALCULATION (BAZETT): 473 MS
EKG R AXIS: 69 DEGREES
EKG T AXIS: 66 DEGREES
EKG VENTRICULAR RATE: 75 BPM
EOSINOPHILS ABSOLUTE: 0.13 E9/L (ref 0.05–0.5)
EOSINOPHILS RELATIVE PERCENT: 1.8 % (ref 0–6)
EPITHELIAL CELLS, UA: ABNORMAL /HPF
ETHANOL: 303 MG/DL (ref 0–0.08)
FENTANYL SCREEN, URINE: POSITIVE
GFR AFRICAN AMERICAN: >60
GFR NON-AFRICAN AMERICAN: >60 ML/MIN/1.73
GLUCOSE BLD-MCNC: 107 MG/DL (ref 74–99)
GLUCOSE URINE: NEGATIVE MG/DL
HCO3 ARTERIAL: 25 MMOL/L (ref 22–26)
HCT VFR BLD CALC: 40 % (ref 34–48)
HEMOGLOBIN: 14.3 G/DL (ref 11.5–15.5)
IMMATURE GRANULOCYTES #: 0.05 E9/L
IMMATURE GRANULOCYTES %: 0.7 % (ref 0–5)
KETONES, URINE: NEGATIVE MG/DL
LEUKOCYTE ESTERASE, URINE: ABNORMAL
LYMPHOCYTES ABSOLUTE: 3.3 E9/L (ref 1.5–4)
LYMPHOCYTES RELATIVE PERCENT: 45.2 % (ref 20–42)
Lab: ABNORMAL
MCH RBC QN AUTO: 34.4 PG (ref 26–35)
MCHC RBC AUTO-ENTMCNC: 35.8 % (ref 32–34.5)
MCV RBC AUTO: 96.2 FL (ref 80–99.9)
METHADONE SCREEN, URINE: NOT DETECTED
MONOCYTES ABSOLUTE: 0.93 E9/L (ref 0.1–0.95)
MONOCYTES RELATIVE PERCENT: 12.7 % (ref 2–12)
NEUTROPHILS ABSOLUTE: 2.85 E9/L (ref 1.8–7.3)
NEUTROPHILS RELATIVE PERCENT: 39.1 % (ref 43–80)
NITRITE, URINE: NEGATIVE
O2 SATURATION: 80 % (ref 92–98.5)
OPERATOR ID: 4473
OPIATE SCREEN URINE: NOT DETECTED
OXYCODONE URINE: NOT DETECTED
PCO2 ARTERIAL: 46.9 MMHG (ref 35–45)
PDW BLD-RTO: 12 FL (ref 11.5–15)
PH BLOOD GAS: 7.33 (ref 7.35–7.45)
PH UA: 5.5 (ref 5–9)
PHENCYCLIDINE SCREEN URINE: NOT DETECTED
PLATELET # BLD: 189 E9/L (ref 130–450)
PMV BLD AUTO: 9.1 FL (ref 7–12)
PO2 ARTERIAL: 47.6 MMHG (ref 60–80)
POTASSIUM SERPL-SCNC: 3.6 MMOL/L (ref 3.5–5)
PRO-BNP: 137 PG/ML (ref 0–125)
PROTEIN UA: NEGATIVE MG/DL
RBC # BLD: 4.16 E12/L (ref 3.5–5.5)
RBC UA: ABNORMAL /HPF (ref 0–2)
SALICYLATE, SERUM: <0.3 MG/DL (ref 0–30)
SODIUM BLD-SCNC: 128 MMOL/L (ref 132–146)
SOURCE, BLOOD GAS: ABNORMAL
SPECIFIC GRAVITY UA: <=1.005 (ref 1–1.03)
TOTAL CK: 151 U/L (ref 20–180)
TOTAL PROTEIN: 7.6 G/DL (ref 6.4–8.3)
TRICYCLIC ANTIDEPRESSANTS SCREEN SERUM: NEGATIVE NG/ML
TROPONIN: <0.01 NG/ML (ref 0–0.03)
TSH SERPL DL<=0.05 MIU/L-ACNC: 2.77 UIU/ML (ref 0.27–4.2)
UROBILINOGEN, URINE: 0.2 E.U./DL
WBC # BLD: 7.3 E9/L (ref 4.5–11.5)
WBC UA: ABNORMAL /HPF (ref 0–5)

## 2020-10-07 PROCEDURE — 85025 COMPLETE CBC W/AUTO DIFF WBC: CPT

## 2020-10-07 PROCEDURE — 80307 DRUG TEST PRSMV CHEM ANLYZR: CPT

## 2020-10-07 PROCEDURE — 96376 TX/PRO/DX INJ SAME DRUG ADON: CPT

## 2020-10-07 PROCEDURE — 96365 THER/PROPH/DIAG IV INF INIT: CPT

## 2020-10-07 PROCEDURE — G0480 DRUG TEST DEF 1-7 CLASSES: HCPCS

## 2020-10-07 PROCEDURE — 6360000002 HC RX W HCPCS: Performed by: PHYSICIAN ASSISTANT

## 2020-10-07 PROCEDURE — G0378 HOSPITAL OBSERVATION PER HR: HCPCS

## 2020-10-07 PROCEDURE — 82550 ASSAY OF CK (CPK): CPT

## 2020-10-07 PROCEDURE — 6370000000 HC RX 637 (ALT 250 FOR IP): Performed by: INTERNAL MEDICINE

## 2020-10-07 PROCEDURE — 93005 ELECTROCARDIOGRAM TRACING: CPT | Performed by: PHYSICIAN ASSISTANT

## 2020-10-07 PROCEDURE — 2580000003 HC RX 258: Performed by: PHYSICIAN ASSISTANT

## 2020-10-07 PROCEDURE — 96375 TX/PRO/DX INJ NEW DRUG ADDON: CPT

## 2020-10-07 PROCEDURE — 99223 1ST HOSP IP/OBS HIGH 75: CPT | Performed by: INTERNAL MEDICINE

## 2020-10-07 PROCEDURE — 84443 ASSAY THYROID STIM HORMONE: CPT

## 2020-10-07 PROCEDURE — 36415 COLL VENOUS BLD VENIPUNCTURE: CPT

## 2020-10-07 PROCEDURE — 99285 EMERGENCY DEPT VISIT HI MDM: CPT

## 2020-10-07 PROCEDURE — 72125 CT NECK SPINE W/O DYE: CPT

## 2020-10-07 PROCEDURE — 82803 BLOOD GASES ANY COMBINATION: CPT

## 2020-10-07 PROCEDURE — 6360000002 HC RX W HCPCS

## 2020-10-07 PROCEDURE — 81001 URINALYSIS AUTO W/SCOPE: CPT

## 2020-10-07 PROCEDURE — 6360000004 HC RX CONTRAST MEDICATION: Performed by: RADIOLOGY

## 2020-10-07 PROCEDURE — 84484 ASSAY OF TROPONIN QUANT: CPT

## 2020-10-07 PROCEDURE — 2060000000 HC ICU INTERMEDIATE R&B

## 2020-10-07 PROCEDURE — 70450 CT HEAD/BRAIN W/O DYE: CPT

## 2020-10-07 PROCEDURE — 83880 ASSAY OF NATRIURETIC PEPTIDE: CPT

## 2020-10-07 PROCEDURE — 71250 CT THORAX DX C-: CPT

## 2020-10-07 PROCEDURE — 96374 THER/PROPH/DIAG INJ IV PUSH: CPT

## 2020-10-07 PROCEDURE — 74177 CT ABD & PELVIS W/CONTRAST: CPT

## 2020-10-07 PROCEDURE — 80053 COMPREHEN METABOLIC PANEL: CPT

## 2020-10-07 PROCEDURE — 96366 THER/PROPH/DIAG IV INF ADDON: CPT

## 2020-10-07 RX ORDER — LEVETIRACETAM 500 MG/1
500 TABLET ORAL 2 TIMES DAILY
Status: DISCONTINUED | OUTPATIENT
Start: 2020-10-08 | End: 2020-10-08

## 2020-10-07 RX ORDER — CLOPIDOGREL BISULFATE 75 MG/1
75 TABLET ORAL DAILY
Status: DISCONTINUED | OUTPATIENT
Start: 2020-10-08 | End: 2020-10-09 | Stop reason: HOSPADM

## 2020-10-07 RX ORDER — NICOTINE 21 MG/24HR
1 PATCH, TRANSDERMAL 24 HOURS TRANSDERMAL DAILY
Status: DISCONTINUED | OUTPATIENT
Start: 2020-10-08 | End: 2020-10-07

## 2020-10-07 RX ORDER — AMLODIPINE BESYLATE 5 MG/1
5 TABLET ORAL DAILY
Status: DISCONTINUED | OUTPATIENT
Start: 2020-10-08 | End: 2020-10-09 | Stop reason: HOSPADM

## 2020-10-07 RX ORDER — FENTANYL CITRATE 50 UG/ML
50 INJECTION, SOLUTION INTRAMUSCULAR; INTRAVENOUS ONCE
Status: COMPLETED | OUTPATIENT
Start: 2020-10-07 | End: 2020-10-07

## 2020-10-07 RX ORDER — OXYCODONE HYDROCHLORIDE 15 MG/1
7.5 TABLET ORAL EVERY 6 HOURS PRN
Status: COMPLETED | OUTPATIENT
Start: 2020-10-07 | End: 2020-10-08

## 2020-10-07 RX ORDER — ACETAMINOPHEN 325 MG/1
325 TABLET ORAL EVERY 6 HOURS PRN
Status: DISCONTINUED | OUTPATIENT
Start: 2020-10-07 | End: 2020-10-09 | Stop reason: HOSPADM

## 2020-10-07 RX ORDER — ACETAMINOPHEN 650 MG/1
650 SUPPOSITORY RECTAL EVERY 6 HOURS PRN
Status: DISCONTINUED | OUTPATIENT
Start: 2020-10-07 | End: 2020-10-09 | Stop reason: HOSPADM

## 2020-10-07 RX ORDER — CEPHALEXIN 250 MG/1
500 CAPSULE ORAL ONCE
Status: DISCONTINUED | OUTPATIENT
Start: 2020-10-07 | End: 2020-10-07

## 2020-10-07 RX ORDER — NICOTINE 21 MG/24HR
1 PATCH, TRANSDERMAL 24 HOURS TRANSDERMAL DAILY
Status: DISCONTINUED | OUTPATIENT
Start: 2020-10-08 | End: 2020-10-09 | Stop reason: HOSPADM

## 2020-10-07 RX ORDER — IBUPROFEN 600 MG/1
600 TABLET ORAL EVERY 6 HOURS PRN
Status: DISCONTINUED | OUTPATIENT
Start: 2020-10-07 | End: 2020-10-09 | Stop reason: HOSPADM

## 2020-10-07 RX ORDER — IPRATROPIUM BROMIDE AND ALBUTEROL SULFATE 2.5; .5 MG/3ML; MG/3ML
1 SOLUTION RESPIRATORY (INHALATION) 4 TIMES DAILY
Status: DISCONTINUED | OUTPATIENT
Start: 2020-10-08 | End: 2020-10-09 | Stop reason: HOSPADM

## 2020-10-07 RX ORDER — LISINOPRIL 20 MG/1
20 TABLET ORAL DAILY
Status: DISCONTINUED | OUTPATIENT
Start: 2020-10-08 | End: 2020-10-09 | Stop reason: HOSPADM

## 2020-10-07 RX ORDER — OXYCODONE AND ACETAMINOPHEN 7.5; 325 MG/1; MG/1
1 TABLET ORAL EVERY 6 HOURS PRN
Status: DISCONTINUED | OUTPATIENT
Start: 2020-10-07 | End: 2020-10-07 | Stop reason: CLARIF

## 2020-10-07 RX ORDER — PROMETHAZINE HYDROCHLORIDE 25 MG/1
12.5 TABLET ORAL EVERY 6 HOURS PRN
Status: DISCONTINUED | OUTPATIENT
Start: 2020-10-07 | End: 2020-10-09 | Stop reason: HOSPADM

## 2020-10-07 RX ORDER — POTASSIUM CHLORIDE 20 MEQ/1
40 TABLET, EXTENDED RELEASE ORAL PRN
Status: DISCONTINUED | OUTPATIENT
Start: 2020-10-07 | End: 2020-10-09 | Stop reason: HOSPADM

## 2020-10-07 RX ORDER — LEVOTHYROXINE SODIUM 0.03 MG/1
25 TABLET ORAL DAILY
Status: DISCONTINUED | OUTPATIENT
Start: 2020-10-08 | End: 2020-10-09 | Stop reason: HOSPADM

## 2020-10-07 RX ORDER — POTASSIUM CHLORIDE 7.45 MG/ML
10 INJECTION INTRAVENOUS PRN
Status: DISCONTINUED | OUTPATIENT
Start: 2020-10-07 | End: 2020-10-09 | Stop reason: HOSPADM

## 2020-10-07 RX ORDER — ONDANSETRON 2 MG/ML
4 INJECTION INTRAMUSCULAR; INTRAVENOUS EVERY 6 HOURS PRN
Status: DISCONTINUED | OUTPATIENT
Start: 2020-10-07 | End: 2020-10-09 | Stop reason: HOSPADM

## 2020-10-07 RX ORDER — SODIUM CHLORIDE 0.9 % (FLUSH) 0.9 %
10 SYRINGE (ML) INJECTION PRN
Status: DISCONTINUED | OUTPATIENT
Start: 2020-10-07 | End: 2020-10-09 | Stop reason: HOSPADM

## 2020-10-07 RX ORDER — CYCLOBENZAPRINE HCL 10 MG
5 TABLET ORAL NIGHTLY PRN
Status: DISCONTINUED | OUTPATIENT
Start: 2020-10-07 | End: 2020-10-09 | Stop reason: HOSPADM

## 2020-10-07 RX ORDER — MAGNESIUM SULFATE 1 G/100ML
1 INJECTION INTRAVENOUS PRN
Status: DISCONTINUED | OUTPATIENT
Start: 2020-10-07 | End: 2020-10-09 | Stop reason: HOSPADM

## 2020-10-07 RX ORDER — ATENOLOL 50 MG/1
50 TABLET ORAL DAILY
Status: DISCONTINUED | OUTPATIENT
Start: 2020-10-08 | End: 2020-10-09 | Stop reason: HOSPADM

## 2020-10-07 RX ORDER — ACETAMINOPHEN 325 MG/1
650 TABLET ORAL EVERY 6 HOURS PRN
Status: DISCONTINUED | OUTPATIENT
Start: 2020-10-07 | End: 2020-10-09 | Stop reason: HOSPADM

## 2020-10-07 RX ORDER — SODIUM CHLORIDE 0.9 % (FLUSH) 0.9 %
10 SYRINGE (ML) INJECTION EVERY 12 HOURS SCHEDULED
Status: DISCONTINUED | OUTPATIENT
Start: 2020-10-08 | End: 2020-10-09 | Stop reason: HOSPADM

## 2020-10-07 RX ORDER — ATORVASTATIN CALCIUM 40 MG/1
40 TABLET, FILM COATED ORAL DAILY
Status: DISCONTINUED | OUTPATIENT
Start: 2020-10-08 | End: 2020-10-09 | Stop reason: HOSPADM

## 2020-10-07 RX ORDER — ARIPIPRAZOLE 5 MG/1
5 TABLET ORAL DAILY
Status: DISCONTINUED | OUTPATIENT
Start: 2020-10-08 | End: 2020-10-09 | Stop reason: HOSPADM

## 2020-10-07 RX ORDER — 0.9 % SODIUM CHLORIDE 0.9 %
1000 INTRAVENOUS SOLUTION INTRAVENOUS ONCE
Status: COMPLETED | OUTPATIENT
Start: 2020-10-07 | End: 2020-10-07

## 2020-10-07 RX ORDER — FENTANYL CITRATE 50 UG/ML
INJECTION, SOLUTION INTRAMUSCULAR; INTRAVENOUS
Status: COMPLETED
Start: 2020-10-07 | End: 2020-10-07

## 2020-10-07 RX ORDER — CITALOPRAM 20 MG/1
20 TABLET ORAL DAILY
Status: DISCONTINUED | OUTPATIENT
Start: 2020-10-08 | End: 2020-10-09 | Stop reason: HOSPADM

## 2020-10-07 RX ADMIN — FENTANYL CITRATE 50 MCG: 50 INJECTION, SOLUTION INTRAMUSCULAR; INTRAVENOUS at 15:29

## 2020-10-07 RX ADMIN — OXYCODONE HYDROCHLORIDE 7.5 MG: 15 TABLET ORAL at 23:58

## 2020-10-07 RX ADMIN — FENTANYL CITRATE 50 MCG: 50 INJECTION, SOLUTION INTRAMUSCULAR; INTRAVENOUS at 11:45

## 2020-10-07 RX ADMIN — SODIUM CHLORIDE 1000 ML: 9 INJECTION, SOLUTION INTRAVENOUS at 11:12

## 2020-10-07 RX ADMIN — CEFTRIAXONE SODIUM 1 G: 1 INJECTION, POWDER, FOR SOLUTION INTRAMUSCULAR; INTRAVENOUS at 15:22

## 2020-10-07 RX ADMIN — FENTANYL CITRATE 50 MCG: 50 INJECTION, SOLUTION INTRAMUSCULAR; INTRAVENOUS at 13:27

## 2020-10-07 RX ADMIN — LEVETIRACETAM 500 MG: 500 TABLET ORAL at 23:58

## 2020-10-07 RX ADMIN — IOPAMIDOL 75 ML: 755 INJECTION, SOLUTION INTRAVENOUS at 12:17

## 2020-10-07 ASSESSMENT — PAIN DESCRIPTION - ORIENTATION: ORIENTATION: LEFT

## 2020-10-07 ASSESSMENT — PAIN DESCRIPTION - PAIN TYPE: TYPE: ACUTE PAIN

## 2020-10-07 ASSESSMENT — PAIN SCALES - GENERAL
PAINLEVEL_OUTOF10: 8
PAINLEVEL_OUTOF10: 10
PAINLEVEL_OUTOF10: 6
PAINLEVEL_OUTOF10: 10

## 2020-10-07 ASSESSMENT — PAIN DESCRIPTION - LOCATION: LOCATION: RIB CAGE

## 2020-10-07 ASSESSMENT — PAIN DESCRIPTION - PROGRESSION: CLINICAL_PROGRESSION: NOT CHANGED

## 2020-10-07 NOTE — CARE COORDINATION
Emergency Department Social Work Assessment:    Pt presents to the ED with hypoxia, recent falls and alcohol intoxication. SW received a call from pt's PCP Elton Dalton PA-C, stating that she has a lot of concerns regarding pt and her  who is an amputee and wheelchair bound. Asuncion Alexis reported that pt uses alcohol daily and is her 's primary caregiver. She stated that both patient and her  appeared disheveled at their recent appointment on 10/02. SW met with pt in room to discuss transition of care and discharge plan. Pt was alert/oriented, sitting up on the side of the hospital bed. She reported that she fell two times this morning, during a \"mini seizure episode\". Pt admitted to drinking alcohol this morning, her ethanol level was 303 at 11:10am. She reported that she drinks about 4 beers and 4 diet pepsi and vodka \"cocktails\" daily. SW discussed substance abuse resources with patient, including Peer Recovery Support. Pt declined the need for any resources or interventions at this time. Pt reported that she lives with her , was independent PTA, owns/uses a cane, does not drive. She reported that her friend provides transportation to appointments. Pt stated that her  is wheelchair bound due to a recent amputation and requires hands on assistance with all ADLs. SW and pt called pt's  via speaker phone to discuss who will provide him assistance while she is admitted to the hospital. Pt's  reported that his sister, Davida Barragan, is present at his house now and will assist daily with ADLs. Pt reported that she plans on returning home at discharge with no needs. Pt would benefit from PT/OT evaluations. Assigned SW/ADELINA to follow.     Electronically signed by HARJIT Woods LSW on 10/7/2020 at 4:08 PM

## 2020-10-07 NOTE — ED NOTES
The pt complained of feeling SOB. She was placed on 3 liters cannula oxygen. Pulse ox increased to 97%.       Chantal Ceja RN  10/07/20 1016

## 2020-10-07 NOTE — TELEPHONE ENCOUNTER
Spoke with Amos,  at [facility] about my concerns with patient as well as her  who is at home and unable to care for himself. Advised her of my recent communication with patient and her  during recent office visit and my consideration in reaching out to [facility]. She will speak to patient. I advised her that patient is drinking despite the fact she told me she had slowed down as we spoke to her  today via phone. He wants her to go to rehab, but she threatened to leave him and then he would have nobody to care for him. Her husbands sister has been coming to the home to help and bathe him as he cannot do it himself and his wife refuses to as well. She will look into this as it appears that patient is going to stay for observation overnight as she apparently had hypoxia as well when EMS arrived.     Elton Dalton PA-C

## 2020-10-07 NOTE — PROGRESS NOTES
Patient is unsure of medications. Stated that no one at home would be able to tell me what she takes and that her pharmacy or doctors office would be best. Attempted to call [facility] separate times with no answer.

## 2020-10-07 NOTE — ED PROVIDER NOTES
ED Attending  CC: No                                                                                                                                      Department of Emergency Medicine   ED  Provider Note  Admit Date/RoomTime: 10/7/2020  9:58 AM  ED Room: 04/04        HPI:  10/7/20,   Time: 11:06 AM EDT         Sanju Castellano is a 62 y.o. female presenting to the ED for fall x 2 at home with hypoxia, and left sided chest and back pain, beginning just prior to arrival.  The complaint has been persistent, moderate in severity, and worsened by any movment. The patient presents to the emergency room via EMS after falling at home. On arrival she admitted to the medics that the patient was acutely intoxicated with alcohol. She was noted to be hypoxic on arrival as well. She tells me that she fell at least twice last evening. She has a history of recurrent falls. The patient states that on her second fall she did hit her head. She is on Plavix daily. The patient states she did not pass out though her memory is sketchy of some of the events. She tells me that she is not on any oxygen normally. She is a chronic smoker. The patient is complaining of severe pain in her left lateral chest region and ribs. She is also having some low back pain. She is moaning and crying out in pain.         ROS:     Constitutional: Negative for fever and chills  HENT: Negative for ear pain, sore throat and sinus pressure  Eyes: Negative for pain, discharge and redness  Respiratory: See HPI  Cardiovascular: See HPI  Gastrointestinal: Negative for nausea, vomiting, diarrhea and abdominal distention  Genitourinary: Negative for dysuria and frequency  Musculoskeletal:  See HPI  Skin: Negative for rash and wound  Neurological: See HPI  Hematological: Negative for adenopathy    All other systems reviewed and are negative      -------------------------------- PAST HISTORY ----------------------------------  Past Medical History: has a past medical history of Alcohol abuse, Cerebral artery occlusion with cerebral infarction ([facility] [address] Depression, Elevated blood sugar, Hepatitis C, Hyperlipidemia, Hypertension, Liver cirrhosis ([facility] [address] Magnesium deficiency, Marijuana user, Nicotine dependence, Seizures ([facility] [address] and Subclinical hypothyroidism. Past Surgical History:  has a past surgical history that includes Dilation and curettage of uterus and liver biopsy (07/05/2016). Social History:  reports that she has been smoking. She started smoking about 50 years ago. She has a 49.00 pack-year smoking history. She has never used smokeless tobacco. She reports current alcohol use of about 4.0 standard drinks of alcohol per week. She reports current drug use. Frequency: 7.00 times per week. Drug: Marijuana. Family History: family history includes Alcohol Abuse in her brother and father; Cancer in her brother and mother; Drug Abuse in her brother and father. The patients home medications have been reviewed.     Allergies: Codeine    --------------------------------- RESULTS ------------------------------------------  All laboratory and radiology results have been personally reviewed by myself   LABS:  Results for orders placed or performed during the hospital encounter of 10/07/20   CBC Auto Differential   Result Value Ref Range    WBC 7.3 4.5 - 11.5 E9/L    RBC 4.16 3.50 - 5.50 E12/L    Hemoglobin 14.3 11.5 - 15.5 g/dL    Hematocrit 40.0 34.0 - 48.0 %    MCV 96.2 80.0 - 99.9 fL    MCH 34.4 26.0 - 35.0 pg    MCHC 35.8 (H) 32.0 - 34.5 %    RDW 12.0 11.5 - 15.0 fL    Platelets 875 345 - 991 E9/L    MPV 9.1 7.0 - 12.0 fL    Neutrophils % 39.1 (L) 43.0 - 80.0 %    Immature Granulocytes % 0.7 0.0 - 5.0 %    Lymphocytes % 45.2 (H) 20.0 - 42.0 %    Monocytes % 12.7 (H) 2.0 - 12.0 %    Eosinophils % 1.8 0.0 - 6.0 %    Basophils % 0.5 0.0 - 2.0 %    Neutrophils Absolute 2.85 1.80 - 7.30 E9/L    Immature Granulocytes # 0.05 E9/L    Lymphocytes Absolute 3.30 1.50 - 4.00 E9/L    Monocytes Absolute 0.93 0.10 - 0.95 E9/L    Eosinophils Absolute 0.13 0.05 - 0.50 E9/L    Basophils Absolute 0.04 0.00 - 0.20 E9/L   Comprehensive Metabolic Panel   Result Value Ref Range    Sodium 128 (L) 132 - 146 mmol/L    Potassium 3.6 3.5 - 5.0 mmol/L    Chloride 87 (L) 98 - 107 mmol/L    CO2 24 22 - 29 mmol/L    Anion Gap 17 (H) 7 - 16 mmol/L    Glucose 107 (H) 74 - 99 mg/dL    BUN 15 8 - 23 mg/dL    CREATININE 0.6 0.5 - 1.0 mg/dL    GFR Non-African American >60 >=60 mL/min/1.73    GFR African American >60     Calcium 8.6 8.6 - 10.2 mg/dL    Total Protein 7.6 6.4 - 8.3 g/dL    Alb 3.9 3.5 - 5.2 g/dL    Total Bilirubin 0.2 0.0 - 1.2 mg/dL    Alkaline Phosphatase 66 35 - 104 U/L    ALT 24 0 - 32 U/L    AST 25 0 - 31 U/L   Troponin   Result Value Ref Range    Troponin <0.01 0.00 - 0.03 ng/mL   Urinalysis   Result Value Ref Range    Color, UA Yellow Straw/Yellow    Clarity, UA Clear Clear    Glucose, Ur Negative Negative mg/dL    Bilirubin Urine Negative Negative    Ketones, Urine Negative Negative mg/dL    Specific Gravity, UA <=1.005 1.005 - 1.030    Blood, Urine Negative Negative    pH, UA 5.5 5.0 - 9.0    Protein, UA Negative Negative mg/dL    Urobilinogen, Urine 0.2 <2.0 E.U./dL    Nitrite, Urine Negative Negative    Leukocyte Esterase, Urine SMALL (A) Negative   Serum Drug Screen   Result Value Ref Range    Ethanol Lvl 303 mg/dL    Acetaminophen Level <5.0 (L) 10.0 - 15.2 mcg/mL    Salicylate, Serum <8.3 0.0 - 30.0 mg/dL   Urine Drug Screen   Result Value Ref Range    Amphetamine Screen, Urine NOT DETECTED Negative <1000 ng/mL    Barbiturate Screen, Ur NOT DETECTED Negative < 200 ng/mL    Benzodiazepine Screen, Urine NOT DETECTED Negative < 200 ng/mL    Cannabinoid Scrn, Ur NOT DETECTED Negative < 50ng/mL    Cocaine Metabolite Screen, Urine NOT DETECTED Negative < 300 ng/mL    Opiate Scrn, Ur NOT DETECTED Negative < 300ng/mL    PCP Screen, Urine NOT DETECTED Negative < 25 ng/mL Methadone Screen, Urine NOT DETECTED Negative <300 ng/mL    Oxycodone Urine NOT DETECTED Negative <100 ng/mL    FENTANYL SCREEN, URINE POSITIVE (A) Negative <1 ng/mL    Drug Screen Comment: see below    Brain Natriuretic Peptide   Result Value Ref Range    Pro- (H) 0 - 125 pg/mL   TSH without Reflex   Result Value Ref Range    TSH 2.770 0.270 - 4.200 uIU/mL   CK   Result Value Ref Range    Total  20 - 180 U/L   Arterial Blood Gas, Respiratory Only   Result Value Ref Range    Source: Arterial     pH, Blood Gas 7.335 (L) 7.350 - 7.450    pCO2, Arterial 46.9 (H) 35.0 - 45.0 mmHg    pO2, Arterial 47.6 (L) 60.0 - 80.0 mmHg    HCO3, Arterial 25.0 22.0 - 26.0 mmol/L    B.E. -1.3 -3.0 - 3.0 mmol/L    O2 Sat 80.0 (L) 92.0 - 98.5 %     ID 4,473     DEVICE 15,065,[id number],933    Microscopic Urinalysis   Result Value Ref Range    WBC, UA 10-20 (A) 0 - 5 /HPF    RBC, UA NONE 0 - 2 /HPF    Epithelial Cells, UA FEW /HPF    Bacteria, UA MANY (A) None Seen /HPF   EKG 12 Lead   Result Value Ref Range    Ventricular Rate 75 BPM    Atrial Rate 75 BPM    P-R Interval 192 ms    QRS Duration 104 ms    Q-T Interval 424 ms    QTc Calculation (Bazett) 473 ms    P Axis 34 degrees    R Axis 69 degrees    T Axis 66 degrees       RADIOLOGY:  Interpreted by Radiologist.  CT CHEST WO CONTRAST   Preliminary Result   No acute intrathoracic abnormality. CT HEAD WO CONTRAST   Final Result   1. There is no acute intracranial abnormality. Specifically, there is no   intracranial hemorrhage. 2. Atrophy and periventricular leukomalacia,   3. Findings of an old left posterior parietal/occipital lobe junction   infarct. These findings are unchanged. CT CERVICAL SPINE WO CONTRAST   Final Result   1. There is no acute compression fracture or subluxation of the cervical   spine. 2. Multilevel degenerative disc and degenerative joint disease.          CT ABDOMEN PELVIS W IV CONTRAST Additional Contrast? None   Final Result   No acute posttraumatic abnormality detected. Incidental findings as detailed above including nonspecific focal stranding   of fat and edema around the gallbladder. Correlate for clinical signs of   cholecystitis. Hepatic steatosis and possible early changes of hepatic cirrhosis. Diverticulosis coli.             ----------------- NURSING NOTES AND VITALS REVIEWED ---------------   The nursing notes within the ED encounter and vital signs as below have been reviewed. /76   Pulse 81   Temp 98 °F (36.7 °C) (Oral)   Resp 20   Ht 4' 10\" (1.473 m)   Wt 187 lb (84.8 kg)   SpO2 97%   BMI 39.08 kg/m²   Oxygen Saturation Interpretation: Normal      --------------------------------PHYSICAL EXAM------------------------------------      Constitutional/General:  Somnolent. Slow to respond at times. Requires multiple repeat questioning to respond at times. Hypoxic on room air. O2 initiated via NC  Head: NC/AT  Eyes: PERRL, EOMI  Mouth: Oropharynx clear, handling secretions, no trismus  Neck: Supple, full ROM, no meningeal signs  Pulmonary: Lungs decreased but clear to auscultation bilaterally, no wheezes, rales, or rhonchi. Not in respiratory distress  Cardiovascular:  Regular rate and rhythm, no murmurs, gallops, or rubs. 2+ distal pulses  Chest: No visible trauma injury swelling or contusions. The patient is markedly tender to palpation of the left lateral chest wall with guarding. No palpable crepitations  Abdomen: Soft, + BS. No distension. Nontender. No palpable rigidity, rebound or guarding  Extremities: Moves all extremities x 4. Minor contusions noted on both knees. ROM intact. Warm and well perfused  Back:  No obvious swelling or bruising. Patient is having some bony midline tenderness in the upper lumbar region. Skin: Extensive LE telangiectasias  Neurologic: Somnolent. Intoxicated by admission. Slow to respond to questions at times.   Oriented to place, date and time.  No focal deficits. Psych:  See above      ------------------------ ED COURSE/MEDICAL DECISION MAKING----------------------  Medications   fentaNYL (SUBLIMAZE) injection 50 mcg (has no administration in time range)   0.9 % sodium chloride bolus (0 mLs Intravenous Stopped 10/7/20 1219)   fentaNYL (SUBLIMAZE) injection 50 mcg (50 mcg Intravenous Given 10/7/20 1145)   iopamidol (ISOVUE-370) 76 % injection 75 mL (75 mLs Intravenous Given 10/7/20 1217)   fentaNYL (SUBLIMAZE) injection 50 mcg (50 mcg Intravenous Given 10/7/20 1327)   cefTRIAXone (ROCEPHIN) 1 g in sterile water 10 mL IV syringe (0 g Intravenous Stopped 10/7/20 1522)         Medical Decision Making:    The patient has been in a pretty significant amount of pain throughout the hospital stay here. She continues to drop her O2 sats when we take her off oxygen as well. Her labs do show mild hyponatremia. She has a urinary tract infection as well. Imaging is negative for any obvious fracture or acute bleed. We did scans of the head cervical spine abdomen pelvis and chest.  No noted spinal injury was seen either. Again she did require several doses of pain meds to be comfortable here. Her alcohol level was 303 in spite of the fact that she told her she had not had any alcohol for at least 12 hours. There was a report of recurrent falls. May need to see . Watch for any signs or symptoms of withdrawal.  Discussed at length with Dr. Dhaval Cope. We gave her dose of Rocephin down in the ED for her urine. Additional recommendations per admitting physician       Counseling: The emergency provider has spoken with the patient and discussed todays results, in addition to providing specific details for the plan of care and counseling regarding the diagnosis and prognosis.   Questions are answered at this time and they are agreeable with the plan.      ------------------------ IMPRESSION AND DISPOSITION -------------------------------    IMPRESSION  1. Fall, initial encounter    2. Hypoxia    3. Left-sided chest wall pain    4. Acute midline low back pain without sciatica    5. Hyponatremia    6. Acute cystitis without hematuria    7.  [facility]        DISPOSITION  Disposition: Admit to telemetry  Patient condition is stable                   Dereck Lagunas PA-C  10/07/20 152

## 2020-10-07 NOTE — ED NOTES
Bed: 04  Expected date: 10/7/20  Expected time: 9:47 AM  Means of arrival: [facility]  Comments:  Fall- back/flank pain [address], RN  10/07/20 [id number]

## 2020-10-07 NOTE — PROGRESS NOTES
GOT A HOLD OF [facility]. THEY STATED THEY WILL FAX OVER A LIST OF THE PATIENT'S MEDICATIONS.

## 2020-10-08 PROBLEM — J96.01 ACUTE RESPIRATORY FAILURE WITH HYPOXIA (HCC): Status: ACTIVE | Noted: 2020-10-07

## 2020-10-08 LAB
ALBUMIN SERPL-MCNC: 4.2 G/DL (ref 3.5–5.2)
ALP BLD-CCNC: 67 U/L (ref 35–104)
ALT SERPL-CCNC: 25 U/L (ref 0–32)
ANION GAP SERPL CALCULATED.3IONS-SCNC: 11 MMOL/L (ref 7–16)
AST SERPL-CCNC: 26 U/L (ref 0–31)
BASOPHILS ABSOLUTE: 0.03 E9/L (ref 0–0.2)
BASOPHILS RELATIVE PERCENT: 0.4 % (ref 0–2)
BILIRUB SERPL-MCNC: 0.6 MG/DL (ref 0–1.2)
BILIRUBIN DIRECT: <0.2 MG/DL (ref 0–0.3)
BILIRUBIN, INDIRECT: NORMAL MG/DL (ref 0–1)
BUN BLDV-MCNC: 10 MG/DL (ref 8–23)
CALCIUM SERPL-MCNC: 9.4 MG/DL (ref 8.6–10.2)
CHLORIDE BLD-SCNC: 96 MMOL/L (ref 98–107)
CO2: 30 MMOL/L (ref 22–29)
CREAT SERPL-MCNC: 0.5 MG/DL (ref 0.5–1)
EOSINOPHILS ABSOLUTE: 0.1 E9/L (ref 0.05–0.5)
EOSINOPHILS RELATIVE PERCENT: 1.2 % (ref 0–6)
GFR AFRICAN AMERICAN: >60
GFR NON-AFRICAN AMERICAN: >60 ML/MIN/1.73
GLUCOSE BLD-MCNC: 122 MG/DL (ref 74–99)
HCT VFR BLD CALC: 44.5 % (ref 34–48)
HEMOGLOBIN: 14.8 G/DL (ref 11.5–15.5)
IMMATURE GRANULOCYTES #: 0.03 E9/L
IMMATURE GRANULOCYTES %: 0.4 % (ref 0–5)
LYMPHOCYTES ABSOLUTE: 2.71 E9/L (ref 1.5–4)
LYMPHOCYTES RELATIVE PERCENT: 32 % (ref 20–42)
MCH RBC QN AUTO: 33.4 PG (ref 26–35)
MCHC RBC AUTO-ENTMCNC: 33.3 % (ref 32–34.5)
MCV RBC AUTO: 100.5 FL (ref 80–99.9)
MONOCYTES ABSOLUTE: 1.16 E9/L (ref 0.1–0.95)
MONOCYTES RELATIVE PERCENT: 13.7 % (ref 2–12)
NEUTROPHILS ABSOLUTE: 4.44 E9/L (ref 1.8–7.3)
NEUTROPHILS RELATIVE PERCENT: 52.3 % (ref 43–80)
PDW BLD-RTO: 12.2 FL (ref 11.5–15)
PLATELET # BLD: 176 E9/L (ref 130–450)
PMV BLD AUTO: 9.8 FL (ref 7–12)
POTASSIUM REFLEX MAGNESIUM: 4.1 MMOL/L (ref 3.5–5)
RBC # BLD: 4.43 E12/L (ref 3.5–5.5)
SODIUM BLD-SCNC: 137 MMOL/L (ref 132–146)
TOTAL PROTEIN: 8 G/DL (ref 6.4–8.3)
WBC # BLD: 8.5 E9/L (ref 4.5–11.5)

## 2020-10-08 PROCEDURE — 2580000003 HC RX 258: Performed by: INTERNAL MEDICINE

## 2020-10-08 PROCEDURE — 2060000000 HC ICU INTERMEDIATE R&B

## 2020-10-08 PROCEDURE — 6360000002 HC RX W HCPCS: Performed by: INTERNAL MEDICINE

## 2020-10-08 PROCEDURE — 99233 SBSQ HOSP IP/OBS HIGH 50: CPT | Performed by: INTERNAL MEDICINE

## 2020-10-08 PROCEDURE — 97165 OT EVAL LOW COMPLEX 30 MIN: CPT

## 2020-10-08 PROCEDURE — 80076 HEPATIC FUNCTION PANEL: CPT

## 2020-10-08 PROCEDURE — 36415 COLL VENOUS BLD VENIPUNCTURE: CPT

## 2020-10-08 PROCEDURE — 6370000000 HC RX 637 (ALT 250 FOR IP): Performed by: INTERNAL MEDICINE

## 2020-10-08 PROCEDURE — 85025 COMPLETE CBC W/AUTO DIFF WBC: CPT

## 2020-10-08 PROCEDURE — G0378 HOSPITAL OBSERVATION PER HR: HCPCS

## 2020-10-08 PROCEDURE — 97530 THERAPEUTIC ACTIVITIES: CPT

## 2020-10-08 PROCEDURE — 97535 SELF CARE MNGMENT TRAINING: CPT

## 2020-10-08 PROCEDURE — 96372 THER/PROPH/DIAG INJ SC/IM: CPT

## 2020-10-08 PROCEDURE — 97110 THERAPEUTIC EXERCISES: CPT | Performed by: PHYSICAL THERAPIST

## 2020-10-08 PROCEDURE — 80048 BASIC METABOLIC PNL TOTAL CA: CPT

## 2020-10-08 PROCEDURE — 2700000000 HC OXYGEN THERAPY PER DAY

## 2020-10-08 PROCEDURE — 97161 PT EVAL LOW COMPLEX 20 MIN: CPT | Performed by: PHYSICAL THERAPIST

## 2020-10-08 PROCEDURE — 94640 AIRWAY INHALATION TREATMENT: CPT

## 2020-10-08 RX ORDER — LEVETIRACETAM 500 MG/1
1000 TABLET ORAL 2 TIMES DAILY
Status: DISCONTINUED | OUTPATIENT
Start: 2020-10-08 | End: 2020-10-09 | Stop reason: HOSPADM

## 2020-10-08 RX ADMIN — WATER 1 G: 1 INJECTION INTRAMUSCULAR; INTRAVENOUS; SUBCUTANEOUS at 18:48

## 2020-10-08 RX ADMIN — LEVOTHYROXINE SODIUM 25 MCG: 25 TABLET ORAL at 06:02

## 2020-10-08 RX ADMIN — LEVETIRACETAM 1000 MG: 500 TABLET ORAL at 11:38

## 2020-10-08 RX ADMIN — ENOXAPARIN SODIUM 40 MG: 40 INJECTION SUBCUTANEOUS at 11:39

## 2020-10-08 RX ADMIN — LEVETIRACETAM 1000 MG: 500 TABLET ORAL at 21:04

## 2020-10-08 RX ADMIN — CITALOPRAM HYDROBROMIDE 20 MG: 20 TABLET ORAL at 11:30

## 2020-10-08 RX ADMIN — ATORVASTATIN CALCIUM 40 MG: 40 TABLET, FILM COATED ORAL at 21:04

## 2020-10-08 RX ADMIN — IBUPROFEN 600 MG: 600 TABLET, FILM COATED ORAL at 17:32

## 2020-10-08 RX ADMIN — OXYCODONE HYDROCHLORIDE 7.5 MG: 15 TABLET ORAL at 12:20

## 2020-10-08 RX ADMIN — SODIUM CHLORIDE, PRESERVATIVE FREE 10 ML: 5 INJECTION INTRAVENOUS at 21:04

## 2020-10-08 RX ADMIN — ARIPIPRAZOLE 5 MG: 5 TABLET ORAL at 11:39

## 2020-10-08 RX ADMIN — IPRATROPIUM BROMIDE AND ALBUTEROL SULFATE 1 AMPULE: .5; 3 SOLUTION RESPIRATORY (INHALATION) at 18:01

## 2020-10-08 RX ADMIN — ATENOLOL 50 MG: 50 TABLET ORAL at 11:28

## 2020-10-08 RX ADMIN — OXYCODONE HYDROCHLORIDE 7.5 MG: 15 TABLET ORAL at 06:02

## 2020-10-08 RX ADMIN — AMLODIPINE BESYLATE 5 MG: 5 TABLET ORAL at 11:30

## 2020-10-08 RX ADMIN — CLOPIDOGREL BISULFATE 75 MG: 75 TABLET ORAL at 11:30

## 2020-10-08 RX ADMIN — LISINOPRIL 20 MG: 20 TABLET ORAL at 11:31

## 2020-10-08 RX ADMIN — IPRATROPIUM BROMIDE AND ALBUTEROL SULFATE 1 AMPULE: .5; 3 SOLUTION RESPIRATORY (INHALATION) at 14:18

## 2020-10-08 RX ADMIN — ATENOLOL 50 MG: 50 TABLET ORAL at 11:31

## 2020-10-08 RX ADMIN — IPRATROPIUM BROMIDE AND ALBUTEROL SULFATE 1 AMPULE: .5; 3 SOLUTION RESPIRATORY (INHALATION) at 09:19

## 2020-10-08 ASSESSMENT — PAIN SCALES - GENERAL
PAINLEVEL_OUTOF10: 5
PAINLEVEL_OUTOF10: 9
PAINLEVEL_OUTOF10: 3
PAINLEVEL_OUTOF10: 9
PAINLEVEL_OUTOF10: 5
PAINLEVEL_OUTOF10: 8

## 2020-10-08 NOTE — H&P
10 Trena Blevins: had concerns including Fall (fell at 3am and stated that her whole left side hurts. patient is positive for ETOH at her own admission). History of Present Illness:    Informant(s) for H&P:Pt and chart   Sanju Castellano is a 62 y.o. female with PMH of ( Seizure, alcohol abuse, HTN, HLD, COPD) presented to the ER in [facility] after she fell at home and possibly had seizure, patient had fell couple times in the last couple days, but last night she fell and hit her head and she is not sure about her having seizure or not but she thinks she might had 1, patient is on Keppra 500 twice a day and she has been compliant on her meds, but she is alcoholic and drinks about 3 cans of beers in addition to some cocktails, her alcohol level in ER was 300. Patient is alert oriented and answering questions appropriately. She is in pain in her left side chest where she fell on and her imaging does not show any significant fractures, and no intra-pulmonary problems or intracranial problem   Denied any fever, nausea, vomiting, diarrhea, abdominal pain, blood in stool or black stool. CT Head   Impression:      1. There is no acute intracranial abnormality.  Specifically, there is no intracranial hemorrhage. 2. Atrophy and periventricular leukomalacia,   3. Findings of an old left posterior parietal/occipital lobe junction infarct.  These findings are unchanged. CT cervical   Impression:      1. There is no acute compression fracture or subluxation of the cervical spine. 2. Multilevel degenerative disc and degenerative joint disease. CT abdomen  Impression:      No acute posttraumatic abnormality detected. Incidental findings as detailed above including nonspecific focal stranding of fat and edema around the gallbladder. Correlate for clinical signs of cholecystitis. Hepatic steatosis and possible early changes of hepatic cirrhosis. Diverticulosis coli. CT Chest  Impression:      No acute intrathoracic abnormality. In ER:    Vitals BP (!) 128/59   Pulse 82   Temp 98 °F (36.7 °C) (Oral)   Resp 18   Ht 4' 10\" (1.473 m)   Wt 190 lb (86.2 kg)   SpO2 93%   BMI 39.71 kg/m²   WBC, neutrophil %, neutrophil absolute count   Lab Results   Component Value Date    WBC 7.3 10/07/2020    NEUTOPHILPCT 39.1 (L) 10/07/2020    NEUTROABS 2.85 10/07/2020     Lactic acid No results found for: LACTSEPSIS  Cr   Lab Results   Component Value Date    CREATININE 0.6 10/07/2020       REVIEW OF SYSTEMS:  A comprehensive review of systems was negative except for: what is in the HPI    PMH:  Past Medical History:   Diagnosis Date    Alcohol abuse     [facility]erebral [facility] occlusion [facility] infarction [address] [address]     Depression     Elevated blood sugar     Hepatitis C     from blood transfusion, underwent treatment per patient and \"cured\"    Hyperlipidemia     Hypertension     Liver cirrhosis ([facility] [address]     Magnesium deficiency     Marijuana user     Nicotine dependence     Seizures ([facility] [address]     Subclinical hypothyroidism        Surgical History:  Past Surgical History:   Procedure Laterality Date    DILATION AND CURETTAGE OF UTERUS      LIVER BIOPSY  07/05/2016       Medications Prior to Admission:    Prior to Admission medications    Medication Sig Start Date End Date Taking?  Authorizing Provider   albuterol sulfate HFA (PROAIR HFA) 108 (90 Base) MCG/ACT inhaler Inhale 2 puffs into the lungs every 6 hours as needed for Wheezing 10/2/20  Yes Edward Persaud PA-C   amLODIPine (NORVASC) 5 MG tablet take 1 tablet by mouth once daily 10/2/20  Yes Edward Persaud PA-C   atenolol (TENORMIN) 50 MG tablet take 1 tablet by mouth once daily 10/2/20  Yes Edward Persaud PA-C   atorvastatin (LIPITOR) 40 MG tablet take 1 tablet by mouth once daily 10/2/20  Yes Edward Persaud PA-C   benazepril (LOTENSIN) 20 MG tablet take 1 tablet by mouth once daily 10/2/20  Yes Tyrel Charisse Marciano Garcia PA-C   clopidogrel (PLAVIX) 75 MG tablet take 1 tablet by mouth once daily 10/2/20  Yes Shaunna Flynn PA-C   cyclobenzaprine (FLEXERIL) 5 MG tablet take 1 tablet by mouth twice a day if needed for muscle spasm - DO NOT TAKE WHILE DRINKING ALCOHOL OR DRIVING 57/8/37  Yes Shaunna Flynn PA-C   levETIRAcetam (KEPPRA) 500 MG tablet take 1 tablet by mouth twice a day 10/2/20  Yes Shaunna Flynn PA-C   levothyroxine (SYNTHROID) 25 MCG tablet take 1 tablet by mouth once daily 10/2/20  Yes Shaunna Flynn PA-C   magnesium oxide (MAG-OX) 400 MG tablet take 1 tablet by mouth twice a day 10/2/20  Yes Shaunna Flynn PA-C   meloxicam (MOBIC) 15 MG tablet take 1 tablet by mouth once daily 10/2/20  Yes Shaunna Flynn PA-C   ARIPiprazole (ABILIFY) 5 MG tablet Take 1 tablet by mouth daily 10/2/20  Yes Shaunna Flynn PA-C   citalopram (CELEXA) 40 MG tablet take 1 tablet by mouth once daily 8/31/20  Yes Shaunna Flynn PA-C   zoster recombinant adjuvanted vaccine [facility]) 50 MCG/0.5ML SUSR injection Inject 0.5 mLs into the muscle See Admin Instructions 1 dose now and repeat in 2-6 months 10/2/20 3/31/21  Shaunna Flynn PA-C       Allergies:    Codeine    Social History:    reports that she has been smoking. She started smoking about 50 years ago. She has a 49.00 pack-year smoking history. She has never used smokeless tobacco. She reports current alcohol use of about 4.0 standard drinks of alcohol per week. She reports current drug use. Frequency: 7.00 times per week. Drug: Marijuana. Family History:   family history includes Alcohol Abuse in her brother and father; Cancer in her brother and mother; Drug Abuse in her brother and father.      PHYSICAL EXAM:  Vitals:  BP (!) 128/59   Pulse 82   Temp 98 °F (36.7 °C) (Oral)   Resp 18   Ht 4' 10\" (1.473 m)   Wt 190 lb (86.2 kg)   SpO2 93%   BMI 39.71 kg/m²   General Appearance: AOx3 and NAD  Skin: Warm and dry  Head: Normocephalic and is already on Keppra 500 twice a day  · Does not remember exactly when was the last seizure she had but she is had had couple seizures this year  · His Keppra at 1000 twice a day  · Neurology consult  · Percocet and motrin for pain control     Acute respiratory failure with hypoxia  · That she fell on her left side she had been having some chest wall tenderness causing some limitation of her inspiratory force  · Pain control  · Incentive spirometry  · Patient is saturating fine    Alcohol intoxication  Alcohol abuse  Hyponatremia  · Patient is a chronic drinker, not in withdrawal now, she usually does not withdrawal  · Her hyponatremia is likely due to beer potmania  · Restrict fluid free water     # Asymptomatic bacteruria: no need to treat   # Alcoholic fatty liver disease   # Hypothyroidism: Synthroid  # Essential hypertension: ACEI, amlodipine, atenolol  # Hyperlipidemia: Atorvastatin  # Depression and anxiety: Citalopram, Abilify  # Current daily smoker: Nicotine patch      Code Status: Full  DVT prophylaxis: Lovenox  Diet: General      PLEASE NOTE:    This report was transcribed using typing and voice recognition software. Every effort was made to ensure accuracy; however, inadvertent computerized transcription errors may be present.  More than 50 minutes have been spent in evaluating the patient, reviewing records and results, discussing with staff / family and other treating physicians.     Monica Michaud MD, MSc   [facility]

## 2020-10-08 NOTE — PROGRESS NOTES
Occupational Therapy  OCCUPATIONAL THERAPY INITIAL EVALUATION      Date:10/8/2020  Patient Name: Robby Verma  MRN: [id number]  : 1960  Room: [address] [place] [address]    Referring Provider: Jn Morgan MD     Evaluating OT: Jazlyn Griffith OTR/L #[id number]    AM-PAC Daily Activity Raw Score:     Recommended Placement: Home with hhot  Recommended Adaptive Equipment: TBD     Diagnosis:   1. Fall, initial encounter    2. Hypoxia    3. Left-sided chest wall pain    4. Acute midline low back pain without sciatica    5. Hyponatremia    6. Acute cystitis without hematuria    7. Acute alcoholic intoxication with complication [facility])        Surgery: N/A      Pertinent Medical History:    Past Medical History:   Diagnosis Date    Alcohol abuse     Cerebral artery occlusion with cerebral infarction ([facility] [address]     Depression     Elevated blood sugar     Hepatitis C     from blood transfusion, underwent treatment per patient and \"cured\"    Hyperlipidemia     Hypertension     Liver cirrhosis ([facility] [address]     Magnesium deficiency     Marijuana user     Nicotine dependence     Seizures ([facility] [address]     Subclinical hypothyroidism       Precautions:  Falls, O2     Home Living: Pt lives with and cares for  (who is w/c bound due to amputation) in a 1 story home with 2 SIMON with 1 rail(s). Bathroom setup: walk in shower   Equipment owned: Wheelchair, [employer], [address], Standard Walker, Bedside commode, Shower chair and [place]    Prior Level of Function: Assist prn with ADLs , Assist prn with IADLs; ambulated with hurry cane.  assists with LB ADLs. Driving: No  Occupation: Not reported    Pain Level: 9/10 pain in back. Therapist facilitated repositioning to address pain.   Cognition: A&O: 4/4; Follows 2 step directions   Memory:  good   Sequencing:  Fair+   Problem solving:  fair   Judgement/safety:  fair     Functional Assessment:   Initial Eval Status  Date: 10/8/20 Treatment Status  Date: STGs = LTGs  Time frame: 5-7 days Feeding Independent        Grooming Supervision   standing at sink, bilateral tasks  Independent    UB Dressing Minimal Assist   Don/doff gown while standing  Independent    LB Dressing Moderate Assist   Assist for threading feet and steadying assist for pulling pants to waist  Modified [place]   Bathing Moderate Assist    Modified [place]    Toileting Minimal Assist   Using commode bathroom, steadying assist upon standing  Independent    Bed Mobility  Supine to sit: NT   Sit to supine: NT  Pt EOB at beginning and end of session     Supine to sit: Independent   Sit to supine: Independent    Functional Transfers Minimal Assist   Sit<>stand  Bed, commode  Independent    Functional Mobility Minimal Assist   HHA, to/from bathroom; household distances  Modified [place]    Balance Sitting:     Static:  Fair+    Dynamic:fair+  Standing: fair  Sitting:     Static:  Good    Dynamic:Good  Standing: Good   Activity Tolerance Fair  Limited due to pain. SpO2 89% with activity on RA. Returned NC, SpO2 low 90s at end of session. Good   Visual/  Perceptual Glasses: Yes, but she lost them   [facility]       Hand Dominance Right     Strength ROM Additional Info:    RUE  4/5  WFL good  and wfl FMC/dexterity noted during ADL tasks       LUE 4/5  WFL good  and wfl FMC/dexterity noted during ADL tasks       Hearing: WFL   Sensation:  No c/o numbness or tingling Tone: WFL   Edema: None noted    Comments:   Nursing approved therapy session. Upon arrival, patient EOB and agreeable to OT session. Therapist educated pt on role of OT. At end of session, patient EOB with call light and phone within reach, all lines and tubes intact; nursing aware. Pt demonstrated fair+ understanding of education/techniques and decreased independence and safety during completion of ADL/functional transfer/mobility tasks.   Pt would benefit from continued skilled OT to increase safety and independence with completion of ADL/IADL tasks for functional independence and quality of life. Treatment:   Skilled occupational therapy services provided include instruction/training on safety and adapted techniques for completion of therapeutic activities, ADLs/IADLs. Skilled monitoring of O2 sats, HR, and pt response throughout treatment.  Therapist facilitated therapeutic activities: bed mobility, functional transfers, graded functional activities (static/dynamic sitting, static/dynamic standing, functional reaching), and functional ambulation - providing min cuing (verbal, visual, tactile) on AD management, hand placement, body mechanics, posture, breathing techniques, energy conservation, compensatory strategies, and safety.  Therapist facilitated self-care retraining: UB dressing, LB dressing, grooming, toileting tasks - providing min cuing (verbal, visual, tactile) on body mechanics, posture, breathing techniques, energy conservation, compensatory strategies, and safety. Therapist educated pt on compensatory strategies/energy conservation techniques to safely complete ADLs/IADLs.      Eval Complexity: Low    Assessment of current deficits   Functional mobility [x]  ADLs [x] Strength [x]  Cognition []  Functional transfers  [x] IADLs [x] Safety Awareness [x]  Endurance [x]  Fine Motor Coordination [] Balance [x] Vision/perception [] Sensation []   Gross Motor Coordination [] ROM [] Delirium []                  Motor Control []    Plan of Care: OT 1-3x/week for 5-7 days PRN   [x] ADL retraining/AE recommendations - AE, long handled equipment for LB ADLs  [x] Energy Conservation Techniques/Strategies - seated rest breaks, breathing techniques     [x] Neuromuscular Re-Education - standing balance     [x] Functional Transfer Training - sit<>stand from multiple surfaces        [x] Functional Mobility Training - increase ambulation distance with AD         [] Cognitive Re-Training          [] Splinting/Positioning Needs           [x] Therapeutic Activity - increase out of bed activity  [x]Therapeutic Exercise - BUE and LE strengthening  [] Visual/Perceptual   [] Delirium Prevention/Treatment   [] Positioning to Improve Functional [place], Safety, and Skin Integrity   [x] Patient and/or Family Education to Increase Safety and Functional [place]   [] Other:                 Rehab Potential: Good for established goals     Patient / Family Goal: Not stated. Patient and/or family were instructed on functional diagnosis, prognosis/goals and OT plan of care. Demonstrated fair+ understanding.     Low Evaluation    Time In: 1112  Time Out: 1139  Total Treatment Time: 23 minutes     Min Units   OT Eval Low 97165 X 1   OT Eval Medium 11560     OT Eval High 96222     OT Re-Eval [id number]     Therapeutic Ex 74181     Therapeutic Activities 60959 8 1   ADL/Self Care 04821 15 1   Orthotic Management 11340     Neuro Re-Ed 11524     Non-Billable Time     TOTAL TIMED TREATMENT 23 3       Evaluation time includes thorough review of current medical information, gathering information on past medical history/social history and prior level of function, completion of standardized testing/informal observation of tasks, assessment of data, and development of POC/Goals    Percy Mullen OTR/L #[id number]

## 2020-10-08 NOTE — CONSULTS
History Of Present Illness: Phil Mixon is a 62 y.o. female with PMH of ( Seizure, alcohol abuse, HTN, HLD, COPD) presented to the ER in [facility] after she fell at home and possibly had seizure, patient had fell couple times in the last couple days, but last night she fell and hit her head and she is not sure about her having seizure or not but she thinks she might had 1, patient is on Keppra 500 twice a day and she has been compliant on her meds, but she is alcoholic and drinks about 3 cans of beers in addition to some cocktails, her alcohol level in ER was 300. Patient is alert oriented and answering questions appropriately. She is in pain in her left side chest where she fell on and her imaging does not show any significant fractures, and no intra-pulmonary problems or intracranial problem  As above per . Patient is interviewed and somewhat of a poor historian. She reports several falls at home and is unsure if she had a seizure which led to the fall. She reports taking care of her disabled  and he may have witnessed 1 of the spells  but she is unsure of what he saw. The patient is a 62 y.o. female with significant past medical history of see below who presents with above.       The patient has the following symptoms:    Change in level of consciousness: alert    New Weakness: no    Numbness or Tingling: no    Difficulty Swallowing: no    Current Medications:   Scheduled Meds:   levETIRAcetam  1,000 mg Oral BID    ipratropium-albuterol  1 ampule Inhalation 4x daily    amLODIPine  5 mg Oral Daily    ARIPiprazole  5 mg Oral Daily    atenolol  50 mg Oral Daily    atorvastatin  40 mg Oral Daily    lisinopril  20 mg Oral Daily    citalopram  20 mg Oral Daily    clopidogrel  75 mg Oral Daily    levothyroxine  25 mcg Oral Daily    sodium chloride flush  10 mL Intravenous 2 times per day    enoxaparin  40 mg Subcutaneous Daily    nicotine  1 patch Transdermal Daily     Continuous Infusions:  PRN Meds:cyclobenzaprine, sodium chloride flush, potassium chloride **OR** potassium alternative oral replacement **OR** potassium chloride, magnesium sulfate, acetaminophen **OR** acetaminophen, magnesium hydroxide, promethazine **OR** ondansetron, ibuprofen, oxyCODONE **AND** acetaminophen    Allergies:  Codeine    Social History:   TOBACCO:   reports that she has been smoking. She started smoking about 50 years ago. She has a 49.00 pack-year smoking history. She has never used smokeless tobacco.  ETOH:   reports current alcohol use of about 4.0 standard drinks of alcohol per week. Past Medical History:        Diagnosis Date    Alcohol abuse     Cerebral artery occlusion with [facility] ([facility] [address]     Depression     Elevated blood sugar     Hepatitis C     from blood transfusion, underwent treatment per patient and \"cured\"    Hyperlipidemia     Hypertension     Liver cirrhosis ([facility] [address]     Magnesium deficiency     Marijuana user     Nicotine dependence     Seizures ([facility] [address]     Subclinical hypothyroidism        Past Surgical History:        Procedure Laterality Date    DILATION AND CURETTAGE OF UTERUS      LIVER BIOPSY  07/05/2016         Outside reports reviewed: ER records, historical medical records, lab reports and radiology reports. Patient's medications, allergies, past medical, surgical, social and family histories were reviewed and updated as appropriate. Review of Systems  A comprehensive review of systems was negative except for:       Objective:     Neuro exam 128/60, pulse 82, she is afebrile  General: normal orientation and alertness. Cranial nerve testing was normal.  Funduscopic eye exam revealed not testable. Motor exam: 4/5. Deep tendon reflexes were absent bilaterally. Plantar responses were flexor bilaterally. Cerebellar exam noted finger to nose without dysmetria.   Sensation was normal to joint position sense, light touch and a pin prick

## 2020-10-08 NOTE — PROGRESS NOTES
Physical Therapy Initial Evaluation    Room #:  0621/0621-02  Patient Name: Rosales Matthew  YOB: 1960  MRN: [id number]    Referring Provider:   Karly Major MD      Date of Service: 10/8/2020    Evaluating Physical Therapist: Mayra Wolfe, PT #[id number]       Diagnosis:   Hypoxia [R09.02]    hypoxia, recent falls and alcohol intoxication    Patient Active Problem List   Diagnosis    Acute respiratory failure with hypoxia ([facility] [address]    Alcohol abuse    Cerebral artery occlusion with cerebral infarction ([facility] [address]    Hypertension    Depression    Hyperlipidemia    Seizures ([facility] [address]    Alcoholic intoxication without complication ([facility] [address]    Hyponatremia    Nicotine dependence    Marijuana user    Fall at home, initial encounter        Tentative placement recommendation: Home Health Physical Therapy     Equipment recommendation: Nori Callejas possibly       Prior Level of Function: Patient ambulated with HurryCane    Rehab Potential: good  for baseline    Past medical history:   Past Medical History:   Diagnosis Date    Alcohol abuse     Cerebral artery occlusion with cerebral infarction ([facility] [address]     Depression     Elevated blood sugar     Hepatitis C     from blood transfusion, underwent treatment per patient and \"cured\"    Hyperlipidemia     Hypertension     Liver cirrhosis ([facility] [address]     Magnesium deficiency     Marijuana user     Nicotine dependence     Seizures ([facility] [address]     Subclinical hypothyroidism      Past Surgical History:   Procedure Laterality Date    DILATION AND CURETTAGE OF UTERUS      LIVER BIOPSY  07/05/2016       Precautions: Up as tolerated, falls, ETOH and seizures ,      SUBJECTIVE:    Social history: Patient lives with spouse   who is an amputee and wheelchair bound in a ranch home  with Ramp or 2 steps  to enter with [facility] in [facility] owned: Wheelchair, Cane, Rollator, Standard Walker, Bedside commode, Shower chair and [employer]

## 2020-10-08 NOTE — PROGRESS NOTES
[address] [place], [place] [address]ist    Progress Note    Admitting Date and Time: 10/7/2020  9:58 AM  Admit Dx: Hypoxia [R09.02]    Subjective:    Pt feeling better but her back is sore. Per RN: Neurology had wanted EEG but patient did not want today as she had wanted to get home to her .        levETIRAcetam  1,000 mg Oral BID    ipratropium-albuterol  1 ampule Inhalation 4x daily    amLODIPine  5 mg Oral Daily    ARIPiprazole  5 mg Oral Daily    atenolol  50 mg Oral Daily    atorvastatin  40 mg Oral Daily    lisinopril  20 mg Oral Daily    citalopram  20 mg Oral Daily    clopidogrel  75 mg Oral Daily    levothyroxine  25 mcg Oral Daily    sodium chloride flush  10 mL Intravenous 2 times per day    enoxaparin  40 mg Subcutaneous Daily    nicotine  1 patch Transdermal Daily     cyclobenzaprine, 5 mg, Nightly PRN  sodium chloride flush, 10 mL, PRN  potassium chloride, 40 mEq, PRN    Or  potassium alternative oral replacement, 40 mEq, PRN    Or  potassium chloride, 10 mEq, PRN  magnesium sulfate, 1 g, PRN  acetaminophen, 650 mg, Q6H PRN    Or  acetaminophen, 650 mg, Q6H PRN  magnesium hydroxide, 30 mL, Daily PRN  promethazine, 12.5 mg, Q6H PRN    Or  ondansetron, 4 mg, Q6H PRN  ibuprofen, 600 mg, Q6H PRN  acetaminophen, 325 mg, Q6H PRN         Objective:    BP (!) 174/81   Pulse 99   Temp 98.8 °F (37.1 °C) (Oral)   Resp 16   Ht 4' 10\" (1.473 m)   Wt 190 lb (86.2 kg)   SpO2 92%   BMI 39.71 kg/m²   General Appearance: alert and oriented to person, place and time and in no acute distress  Skin: warm and dry  Head: normocephalic and atraumatic  Eyes: pupils equal, round, and reactive to light, extraocular eye movements intact, conjunctivae normal  Neck: neck supple and non tender without mass   Pulmonary/Chest: clear to auscultation bilaterally- no wheezes, rales or rhonchi, normal air movement, no respiratory distress  Cardiovascular: normal rate, normal S1 and S2 and no carotid 1. There is no acute compression fracture or subluxation of the cervical   spine. 2. Multilevel degenerative disc and degenerative joint disease. CT ABDOMEN PELVIS W IV CONTRAST Additional Contrast? None   Final Result   No acute posttraumatic abnormality detected. Incidental findings as detailed above including nonspecific focal stranding   of fat and edema around the gallbladder. Correlate for clinical signs of   cholecystitis. Hepatic steatosis and possible early changes of hepatic cirrhosis. Diverticulosis coli. Assessment:  Principal Problem:    Acute respiratory failure with hypoxia (HCC)  Active Problems:    Alcohol abuse    Cerebral artery occlusion with cerebral infarction ([address]    Hypertension    Depression    Hyperlipidemia    Seizures (HCC)    Alcoholic intoxication without complication ([address]    Hyponatremia    Nicotine dependence    Marijuana user    Fall at home, initial encounter  Resolved Problems:    * No resolved hospital problems. *      Plan:    1. Fall at home--seizure vs intoxication vs both. 2. Seizure disorder--Keppra dose increased to 1000 mg bid. Appreciated neurology input. EEG recommended but patient declined. 3. Acute hypoxic respiratory failure--maybe from atelectasis from chest wall pain after fall. Encouraged IS. Wean oxygen as tolerated. She is 92% on 3L. 4. Alcohol intoxication/alcohol abuse--monitor for withdrawal. Ativan CIWA in place. 5. Hyponatremia--likely beer potomania. Na improved from admission 128 to 137. Recheck in AM.  6. Probable UTI--nocturnist felt asymptomatic but patient had fall at home. I favor treating. She did receive ceftriaxone 1g in ER and I wii continue for now. NOTE: This report was transcribed using voice recognition software. Every effort was made to ensure accuracy; however, inadvertent computerized transcription errors may be present.      Electronically signed by Paulo Espinosa MD on 10/8/2020 at 5:38 PM

## 2020-10-09 VITALS
BODY MASS INDEX: 39.88 KG/M2 | HEIGHT: 58 IN | TEMPERATURE: 98.7 F | RESPIRATION RATE: 20 BRPM | SYSTOLIC BLOOD PRESSURE: 119 MMHG | HEART RATE: 87 BPM | DIASTOLIC BLOOD PRESSURE: 65 MMHG | OXYGEN SATURATION: 93 % | WEIGHT: 190 LBS

## 2020-10-09 LAB
ALBUMIN SERPL-MCNC: 3.9 G/DL (ref 3.5–5.2)
ALP BLD-CCNC: 62 U/L (ref 35–104)
ALT SERPL-CCNC: 22 U/L (ref 0–32)
ANION GAP SERPL CALCULATED.3IONS-SCNC: 14 MMOL/L (ref 7–16)
AST SERPL-CCNC: 24 U/L (ref 0–31)
BASOPHILS ABSOLUTE: 0.04 E9/L (ref 0–0.2)
BASOPHILS RELATIVE PERCENT: 0.6 % (ref 0–2)
BILIRUB SERPL-MCNC: 0.8 MG/DL (ref 0–1.2)
BILIRUBIN DIRECT: 0.2 MG/DL (ref 0–0.3)
BILIRUBIN, INDIRECT: 0.6 MG/DL (ref 0–1)
BUN BLDV-MCNC: 8 MG/DL (ref 8–23)
CALCIUM SERPL-MCNC: 9.4 MG/DL (ref 8.6–10.2)
CHLORIDE BLD-SCNC: 95 MMOL/L (ref 98–107)
CO2: 28 MMOL/L (ref 22–29)
CREAT SERPL-MCNC: 0.5 MG/DL (ref 0.5–1)
EOSINOPHILS ABSOLUTE: 0.19 E9/L (ref 0.05–0.5)
EOSINOPHILS RELATIVE PERCENT: 2.9 % (ref 0–6)
GFR AFRICAN AMERICAN: >60
GFR NON-AFRICAN AMERICAN: >60 ML/MIN/1.73
GLUCOSE BLD-MCNC: 99 MG/DL (ref 74–99)
HCT VFR BLD CALC: 41.3 % (ref 34–48)
HEMOGLOBIN: 14 G/DL (ref 11.5–15.5)
IMMATURE GRANULOCYTES #: 0.02 E9/L
IMMATURE GRANULOCYTES %: 0.3 % (ref 0–5)
LYMPHOCYTES ABSOLUTE: 2.25 E9/L (ref 1.5–4)
LYMPHOCYTES RELATIVE PERCENT: 34.2 % (ref 20–42)
MCH RBC QN AUTO: 33.7 PG (ref 26–35)
MCHC RBC AUTO-ENTMCNC: 33.9 % (ref 32–34.5)
MCV RBC AUTO: 99.3 FL (ref 80–99.9)
MONOCYTES ABSOLUTE: 0.81 E9/L (ref 0.1–0.95)
MONOCYTES RELATIVE PERCENT: 12.3 % (ref 2–12)
NEUTROPHILS ABSOLUTE: 3.27 E9/L (ref 1.8–7.3)
NEUTROPHILS RELATIVE PERCENT: 49.7 % (ref 43–80)
PDW BLD-RTO: 11.9 FL (ref 11.5–15)
PLATELET # BLD: 167 E9/L (ref 130–450)
PMV BLD AUTO: 9.9 FL (ref 7–12)
POTASSIUM REFLEX MAGNESIUM: 3.7 MMOL/L (ref 3.5–5)
RBC # BLD: 4.16 E12/L (ref 3.5–5.5)
SODIUM BLD-SCNC: 137 MMOL/L (ref 132–146)
TOTAL PROTEIN: 7.4 G/DL (ref 6.4–8.3)
WBC # BLD: 6.6 E9/L (ref 4.5–11.5)

## 2020-10-09 PROCEDURE — 99239 HOSP IP/OBS DSCHRG MGMT >30: CPT | Performed by: INTERNAL MEDICINE

## 2020-10-09 PROCEDURE — 96366 THER/PROPH/DIAG IV INF ADDON: CPT

## 2020-10-09 PROCEDURE — 6370000000 HC RX 637 (ALT 250 FOR IP): Performed by: INTERNAL MEDICINE

## 2020-10-09 PROCEDURE — 36415 COLL VENOUS BLD VENIPUNCTURE: CPT

## 2020-10-09 PROCEDURE — 97116 GAIT TRAINING THERAPY: CPT

## 2020-10-09 PROCEDURE — G0378 HOSPITAL OBSERVATION PER HR: HCPCS

## 2020-10-09 PROCEDURE — 2580000003 HC RX 258: Performed by: INTERNAL MEDICINE

## 2020-10-09 PROCEDURE — 96372 THER/PROPH/DIAG INJ SC/IM: CPT

## 2020-10-09 PROCEDURE — 80076 HEPATIC FUNCTION PANEL: CPT

## 2020-10-09 PROCEDURE — 80048 BASIC METABOLIC PNL TOTAL CA: CPT

## 2020-10-09 PROCEDURE — 6360000002 HC RX W HCPCS: Performed by: INTERNAL MEDICINE

## 2020-10-09 PROCEDURE — 85025 COMPLETE CBC W/AUTO DIFF WBC: CPT

## 2020-10-09 RX ORDER — LEVETIRACETAM 1000 MG/1
1000 TABLET ORAL 2 TIMES DAILY
Qty: 60 TABLET | Refills: 0 | Status: SHIPPED | OUTPATIENT
Start: 2020-10-09 | End: 2021-01-01

## 2020-10-09 RX ORDER — OXYCODONE HYDROCHLORIDE 15 MG/1
7.5 TABLET ORAL ONCE
Status: COMPLETED | OUTPATIENT
Start: 2020-10-09 | End: 2020-10-09

## 2020-10-09 RX ADMIN — LEVOTHYROXINE SODIUM 25 MCG: 25 TABLET ORAL at 05:46

## 2020-10-09 RX ADMIN — ENOXAPARIN SODIUM 40 MG: 40 INJECTION SUBCUTANEOUS at 08:45

## 2020-10-09 RX ADMIN — CITALOPRAM HYDROBROMIDE 20 MG: 20 TABLET ORAL at 08:45

## 2020-10-09 RX ADMIN — ARIPIPRAZOLE 5 MG: 5 TABLET ORAL at 08:58

## 2020-10-09 RX ADMIN — CLOPIDOGREL BISULFATE 75 MG: 75 TABLET ORAL at 08:46

## 2020-10-09 RX ADMIN — LEVETIRACETAM 1000 MG: 500 TABLET ORAL at 08:45

## 2020-10-09 RX ADMIN — WATER 1 G: 1 INJECTION INTRAMUSCULAR; INTRAVENOUS; SUBCUTANEOUS at 08:45

## 2020-10-09 RX ADMIN — LISINOPRIL 20 MG: 20 TABLET ORAL at 08:45

## 2020-10-09 RX ADMIN — AMLODIPINE BESYLATE 5 MG: 5 TABLET ORAL at 08:46

## 2020-10-09 RX ADMIN — ATENOLOL 50 MG: 50 TABLET ORAL at 08:46

## 2020-10-09 RX ADMIN — OXYCODONE HYDROCHLORIDE 7.5 MG: 15 TABLET ORAL at 00:34

## 2020-10-09 ASSESSMENT — PAIN DESCRIPTION - PROGRESSION: CLINICAL_PROGRESSION: NOT CHANGED

## 2020-10-09 ASSESSMENT — PAIN SCALES - GENERAL
PAINLEVEL_OUTOF10: 7
PAINLEVEL_OUTOF10: 2
PAINLEVEL_OUTOF10: 8
PAINLEVEL_OUTOF10: 6

## 2020-10-09 NOTE — PROGRESS NOTES
Physical Therapy Treatment Note    Room #:  0621/0621-02  Patient Name: Rosanne Lopez  YOB: 1960  MRN: [id number]    Referring Provider: Bonnie Kaplan MD      Date of Service: 10/9/2020    Evaluating Physical Therapist: Alissa Moreno, PT #[id number]       Diagnosis: Hypoxia [R09.02] hypoxia, recent falls and alcohol intoxication    Patient Active Problem List   Diagnosis    Acute respiratory failure with hypoxia ([address]    Alcohol abuse    Cerebral artery occlusion with cerebral infarction ([address]    Hypertension    Depression    Hyperlipidemia    Seizures ([address]    [facility] without complication [facility]    Hyponatremia    Nicotine dependence    Marijuana user    Fall at home, initial encounter      Tentative placement recommendation: Home Health Physical Therapy     Equipment recommendation: [address] possibly       Prior Level of Function: Patient ambulated with [employer]    Rehab Potential: good for baseline    Past medical history:   Past Medical History:   Diagnosis Date    Alcohol abuse     Cerebral artery occlusion with cerebral infarction ([address]     Depression     Elevated blood sugar     Hepatitis C     from blood transfusion, underwent treatment per patient and \"cured\"    Hyperlipidemia     Hypertension     Liver cirrhosis ([address]     Magnesium deficiency     Marijuana user     Nicotine dependence     Seizures ([address]     Subclinical hypothyroidism      Past Surgical History:   Procedure Laterality Date    DILATION AND CURETTAGE OF UTERUS      LIVER BIOPSY  07/05/2016     Precautions: Up as tolerated, falls, ETOH and seizures,      SUBJECTIVE:    Social history: Patient lives with spouse  who is an amputee and wheelchair bound in a ranch home with Ramp or 2 steps to enter with [employer], Walk in shower     Equipment owned: Wheelchair, Cane, Rollator, Standard Walker, Bedside commode, Shower chair and [place]       AM-PAC Basic Mobility        AM-PAC fair wheeled walker  Sitting EOB: good    Dynamic Standing: fair  using quad cane Sitting EOB:  good    Dynamic Standing: good wheeled walker versus cane     Patient is Alert & Oriented x person, place, time and situation and follows directions    Sensation:  Patient  reports numbness and tingling bilateral lower extremities    Edema:  yes bilateral lower extremities    Endurance: fair       Vitals:    Blood Pressure at rest   Blood Pressure during session     Heart Rate at rest  Heart Rate during session    SPO2 at rest 87% 97% after donning 3 liters SPO2 during session 96%     Patient education  Patient educated on role of Physical Therapy, risks of immobility, safety and plan of care and ankle pumps, quad set and glut set for edema control, blood clot prevention  O2 line safety awareness    Patient response to education:   Pt verbalized understanding Pt demonstrated skill Pt requires further education in this area   Yes Partial Yes      Treatment:  Patient practiced and was instructed/facilitated in the following treatment:   Patient stood from bed and ambulated in hallway. Patient needed 1 standing rest break and returned to room. Therapeutic Exercises: not performed       At end of session, patient sitting edge of bed with call light and phone within reach, all lines and tubes intact, nursing notified. Patient would benefit from continued skilled Physical Therapy to improve functional independence and quality of life. Patient's/ family goals   home        ASSESSMENT: Patient was able to progress ambulation distance but was slightly unsteady at times. Patient SpO2 varied from 88-93% throughout ambulation with slight shortness of breath noted.     Plan of Care:   -Transfers: Provide instruction on proper hand and foot position for adequate transfer of weight onto lower extremities and use of gait device  -Gait: Standing activities to improve: base of support, weight shift, weight bearing -Endurance: Use graduated activities to promote good breathing techniques and provide support and education to maximize respiratory function  -Stairs: Stair training with instruction on proper technique and hand placement on rail  -Instruction in independent management of O2 line    Patient and or family understand(s) diagnosis, prognosis, and plan of care. Frequency of treatments: Patient will be seen daily. Time in: 8:22  Time out: 8:32    Total Treatment Time: 10 minutes    Evaluation time includes thorough review of current medical information, gathering information on past medical history/social history and prior level of function, completion of standardized testing/informal observation of tasks, assessment of data, and development of Plan of care and goals.      CPT codes:    Gait Training (37947) 10 minutes 1 unit(s)    Teresa Valladares, PTA

## 2020-10-09 NOTE — DISCHARGE SUMMARY
[facility] Physician Discharge Summary       Rossy Day, [address] [id number]    Schedule an appointment as soon as possible for a visit in 1 week  Hospital follow up      Activity level: As toelrated    Diet: DIET GENERAL;    Labs:None    Condition at discharge: Stable     Dispo:Home    Patient ID:  Swetha Retana  [id number]  62 y.o.  1960    Admit date: 10/7/2020    Discharge date and time:  10/9/2020  8:36 AM    Admission Diagnoses: Principal Problem:    Acute respiratory failure with hypoxia ([address]  Active Problems:    Alcohol abuse    Cerebral artery occlusion with cerebral infarction ([address]    Hypertension    Depression    Hyperlipidemia    Seizures ([address]    Alcoholic intoxication without complication ([address]    Hyponatremia    Nicotine dependence    Marijuana user    Fall at home, initial encounter  Resolved Problems:    * No resolved hospital problems. *      Discharge Diagnoses: Principal Problem:    Acute respiratory failure with hypoxia (HCC)  Active Problems:    Alcohol abuse    Cerebral artery occlusion with cerebral infarction ([address]    Hypertension    Depression    Hyperlipidemia    Seizures (HCC)    Alcoholic intoxication without complication ([address]    Hyponatremia    Nicotine dependence    Marijuana user    Fall at home, initial encounter  Resolved Problems:    * No resolved hospital problems.  *      Consults:  IP CONSULT TO NEUROLOGY    Procedures: None    History of Present Illness:    Informant(s) for H&P:Pt and chart   Swetha Retana is a 62 y.o. female with PMH of ( Seizure, alcohol abuse, HTN, HLD, COPD) presented to the ER in [facility] after she fell at home and possibly had seizure, patient had fell couple times in the last couple days, but last night she fell and hit her head and she is not sure about her having seizure or not but she thinks she might had 1, patient is on Keppra 500 twice a day and she has been compliant on her meds, but she is alcoholic and drinks about 3 cans of beers in addition to some cocktails, her alcohol level in ER was 300. Patient is alert oriented and answering questions appropriately. She is in pain in her left side chest where she fell on and her imaging does not show any significant fractures, and no intra-pulmonary problems or intracranial problem  · Denied any fever, nausea, vomiting, diarrhea, abdominal pain, blood in stool or black stool.      Hospital Course: 61 yo female admitted as per above details. See outline below for additional details and issues addressed this admission:     1. Fall at home--seizure vs intoxication vs both. Patient walked with PT today. She uses quad cane at home. 2. Seizure disorder--Keppra dose increased to 1000 mg bid. Appreciated neurology input. EEG recommended but patient declined. Rx for higher dose Keppra sent to her pharmacy. 3. Acute hypoxic respiratory failure--maybe from atelectasis from chest wall pain after fall. Encouraged IS. Wean oxygen as tolerated. She is 92% on 3L yesterday but today 93%. Ambulated with PT and lowest saturation 88%. She does not qualify for it; she did not want it as patient and  smoke in the house. 4. Alcohol intoxication/alcohol abuse--monitor for withdrawal. Ativan CIWA in place. 5. Hyponatremia--likely beer potomania. Na improved from admission 128 to 137. Recheck this morning stable at 137.  6. Probable UTI--nocturnist felt asymptomatic but patient had fall at home. I favor treating. She did receive ceftriaxone 1g in ER and I wii continue for now. Today, will get 3rd dose but since not symptomatic will not continue after discharge.       Discharge Exam:  Vitals:    10/08/20 1732 10/08/20 1945 10/09/20 0000 10/09/20 0415   BP:  139/79 (!) 154/74 (!) 140/91   Pulse:  86 71 82   Resp:  16 16 16   Temp:  97.7 °F (36.5 °C) 98.1 °F (36.7 °C) 97.7 °F (36.5 °C)   TempSrc:  Oral Oral Infrared   SpO2: 95% 98% 91% 92%   Weight:       Height:         General Appearance: alert and in no acute distress; facial plethora  Skin: warm and dry, no rash or erythema  Head: normocephalic and atraumatic  Eyes: pupils equal, round, and reactive to light, extraocular eye movements intact, conjunctivae normal  ENT: external ear and ear canal normal bilaterally, nose without deformity  Neck: supple and non-tender without mass, no cervical lymphadenopathy  Pulmonary/Chest: coarse breath sounds. Tender to palpitation on chest wall.   Cardiovascular: normal rate, regular rhythm, normal S1 and S2, no murmurs, rubs, clicks, or gallops  Abdomen: soft, non-tender, non-distended, normal bowel sounds, no masses or organomegaly  Extremities: no cyanosis, clubbing or edema  Musculoskeletal: normal range of motion, no joint swelling, deformity or tenderness  Neurologic: reflexes normal and symmetric, no cranial nerve deficit, gait, coordination and speech normal    LABS:  Recent Labs     10/07/20  1110 10/08/20  0759 10/09/20  0528   * 137 137   K 3.6 4.1 3.7   CL 87* 96* 95*   CO2 24 30* 28   BUN 15 10 8   CREATININE 0.6 0.5 0.5   GLUCOSE 107* 122* 99   CALCIUM 8.6 9.4 9.4       Recent Labs     10/07/20  1110 10/08/20  0759 10/09/20  0528   WBC 7.3 8.5 6.6   RBC 4.16 4.43 4.16   HGB 14.3 14.8 14.0   HCT 40.0 44.5 41.3   MCV 96.2 100.5* 99.3   MCH 34.4 33.4 33.7   MCHC 35.8* 33.3 33.9   RDW 12.0 12.2 11.9    176 167   MPV 9.1 9.8 9.9        Urinalysis [[id number]] (Abnormal)  Collected: 10/07/20 1308       Specimen: Urine voided  Updated: 10/07/20 1320         Color, UA  Yellow         Clarity, UA  Clear         Glucose, Ur  Negative  mg/dL           Bilirubin Urine  Negative         Ketones, Urine  Negative  mg/dL           Specific Gravity, UA  <=1.005         Blood, Urine  Negative         pH, UA  5.5         Protein, UA  Negative  mg/dL           Urobilinogen, Urine  0.2  E.U./dL           Nitrite, Urine  Negative         Leukocyte Esterase, Urine  SMALL                  Microscopic Urinalysis [[id number]] (Abnormal)  Collected: 10/07/20 1308         Updated: 10/07/20 1346         WBC, UA  10-20  /HPF           RBC, UA  NONE  /HPF           Epithelial Cells, UA  FEW  /HPF           Bacteria, UA  MANY  /HPF           Imaging:      Ct Head Wo Contrast    Result Date: 10/7/2020  EXAMINATION: CT OF THE HEAD WITHOUT CONTRAST  10/7/2020 12:15 pm TECHNIQUE: CT of the head was performed without the administration of intravenous contrast. Dose modulation, iterative reconstruction, and/or weight based adjustment of the mA/kV was utilized to reduce the radiation dose to as low as reasonably achievable. COMPARISON: 04/26/2019 HISTORY: ORDERING SYSTEM PROVIDED HISTORY: Evaluate intracranial abnormality TECHNOLOGIST PROVIDED HISTORY: Has a \"code stroke\" or \"stroke alert\" been called? ->No Reason for exam:->Evaluate intracranial abnormality Reason for exam:->Fall/head injury FINDINGS: BRAIN/VENTRICLES: There is no acute intracranial hemorrhage, mass effect or midline shift. No abnormal extra-axial fluid collection. The gray-white differentiation is maintained without evidence of an acute infarct. There is no evidence of hydrocephalus. Scattered foci of low attenuation involving the periventricular white matter compatible with microvascular ischemic changes. There is decreased attenuation seen within the posterior left parietooccipital lobe junction consistent with an old infarct. ORBITS: The visualized portion of the orbits demonstrate no acute abnormality. SINUSES: The visualized paranasal sinuses and mastoid air cells demonstrate no acute abnormality. SOFT TISSUES/SKULL:  No acute abnormality of the visualized skull or soft tissues. 1. There is no acute intracranial abnormality. Specifically, there is no intracranial hemorrhage. 2. Atrophy and periventricular leukomalacia, 3.  Findings of an old left posterior parietal/occipital lobe junction infarct. These findings are unchanged. Ct Chest Wo Contrast    Result Date: 10/8/2020  EXAMINATION: CT OF THE CHEST WITHOUT CONTRAST 10/7/2020 1:43 pm TECHNIQUE: CT of the chest was performed without the administration of intravenous contrast. Multiplanar reformatted images are provided for review. Dose modulation, iterative reconstruction, and/or weight based adjustment of the mA/kV was utilized to reduce the radiation dose to as low as reasonably achievable. COMPARISON: None. HISTORY: ORDERING SYSTEM PROVIDED HISTORY: Fall x 2 TECHNOLOGIST PROVIDED HISTORY: Reason for exam:->Fall x 2 Initial exam FINDINGS: Mediastinum: Heart is enlarged without pericardial effusion. The aorta demonstrates normal course and caliber. There are small nonenlarged mediastinal lymph nodes not meeting size criteria to indicate pathologic involvement. Lungs/pleura: The central airways are patent. No consolidation or pleural effusion. No pneumothorax noted. Subpleural interlobular septal thickening representing chronic fibrotic change bilaterally. No suspicious lung nodule. Upper Abdomen: The liver is fatty infiltrated. Soft Tissues/Bones: No suspicious lytic or blastic osseous lesion. No acute intrathoracic abnormality. Ct Cervical Spine Wo Contrast    Result Date: 10/7/2020  EXAMINATION: CT OF THE CERVICAL SPINE WITHOUT CONTRAST 10/7/2020 12:15 pm TECHNIQUE: CT of the cervical spine was performed without the administration of intravenous contrast. Multiplanar reformatted images are provided for review. Dose modulation, iterative reconstruction, and/or weight based adjustment of the mA/kV was utilized to reduce the radiation dose to as low as reasonably achievable. HISTORY: ORDERING SYSTEM PROVIDED HISTORY: Fall with left sided pain TECHNOLOGIST PROVIDED HISTORY: Reason for exam:->Fall with left sided pain FINDINGS; The ring of C1 is intact as is the dense.   There is no compression fracture of the cervical spine.  No jumped or perched facet is noted. Multilevel degenerative disc and degenerative joint disease is noted. The prevertebral soft tissues are unremarkable. The airway is widely patent. Images through the lung apices are negative for a pneumothorax. 1. There is no acute compression fracture or subluxation of the cervical spine. 2. Multilevel degenerative disc and degenerative joint disease. Ct Abdomen Pelvis W Iv Contrast Additional Contrast? None    Result Date: 10/7/2020  EXAMINATION: CT OF THE ABDOMEN AND PELVIS WITH CONTRAST 10/7/2020 12:15 pm TECHNIQUE: CT of the abdomen and pelvis was performed with the administration of intravenous contrast. Multiplanar reformatted images are provided for review. Dose modulation, iterative reconstruction, and/or weight based adjustment of the mA/kV was utilized to reduce the radiation dose to as low as reasonably achievable. COMPARISON: None. HISTORY: ORDERING SYSTEM PROVIDED HISTORY: Fall with left sided pain. Lumbar tenderness. On Plavix. TECHNOLOGIST PROVIDED HISTORY: Reason for exam:->Fall with left sided pain. Lumbar tenderness. On Plavix. Additional Contrast?->None FINDINGS: Lower Chest: The lung bases are grossly clear. Organs: There is fatty infiltration of the liver. Mild nodularity to the liver surface may reflect the early changes of hepatic cirrhosis. There is focal stranding of fat and edema around the gallbladder but no gallstones are identified there is no significant gallbladder distention. The pancreas enhances homogeneously. No hepatic or splenic laceration is visualized. No adrenal masses are identified. The kidneys enhance symmetrically. There is no hydronephrosis. No renal stones are visualized. GI/Bowel: There is no bowel obstruction or free air or free fluid. There is diverticulosis coli, without evidence of diverticulitis or colitis. The appendix is not identified.  The stomach and duodenum are grossly within normal limits. There is a small duodenal diverticulum. Pelvis: No acute abnormality visualized Peritoneum/Retroperitoneum: Severe vascular calcifications are present. No pathologically enlarged lymph nodes or retroperitoneal bleed is visualized. Bones/Soft Tissues: Degenerative changes of the spine are present. No fracture is identified. No acute posttraumatic abnormality detected. Incidental findings as detailed above including nonspecific focal stranding of fat and edema around the gallbladder. Correlate for clinical signs of cholecystitis. Hepatic steatosis and possible early changes of hepatic cirrhosis. Diverticulosis coli. Patient Instructions:   Current Discharge Medication List      CONTINUE these medications which have CHANGED    Details   levETIRAcetam (KEPPRA) 1000 MG tablet Take 1 tablet by mouth 2 times daily New higher dose  Qty: 60 tablet, Refills: 0         CONTINUE these medications which have NOT CHANGED    Details   albuterol sulfate HFA (PROAIR HFA) 108 (90 Base) MCG/ACT inhaler Inhale 2 puffs into the lungs every 6 hours as needed for Wheezing  Qty: 1 Inhaler, Refills: 5    Associated Diagnoses: Chronic obstructive pulmonary disease, unspecified COPD type ([employer] [facility] [address]  Cigarette nicotine dependence without complication      amLODIPine (NORVASC) 5 MG tablet take 1 tablet by mouth once daily  Qty: 30 tablet, Refills: 5    Associated Diagnoses: Essential hypertension      atenolol (TENORMIN) 50 MG tablet take 1 tablet by mouth once daily  Qty: 30 tablet, Refills: 5    Associated Diagnoses: Essential hypertension      atorvastatin (LIPITOR) 40 MG tablet take 1 tablet by mouth once daily  Qty: 30 tablet, Refills: 5    Associated Diagnoses: Hyperlipidemia, unspecified hyperlipidemia type      benazepril (LOTENSIN) 20 MG tablet take 1 tablet by mouth once daily  Qty: 30 tablet, Refills: 5    Associated Diagnoses: Essential hypertension      clopidogrel (PLAVIX) 75 MG tablet take 1 tablet by mouth once daily  Qty: 30 tablet, Refills: 5    Associated Diagnoses: History of CVA (cerebrovascular accident)      cyclobenzaprine (FLEXERIL) 5 MG tablet take 1 tablet by mouth twice a day if needed for muscle spasm - DO NOT TAKE WHILE DRINKING ALCOHOL OR DRIVING  Qty: 60 tablet, Refills: 5      levothyroxine (SYNTHROID) 25 MCG tablet take 1 tablet by mouth once daily  Qty: 30 tablet, Refills: 5    Associated Diagnoses: Subclinical hypothyroidism      magnesium oxide (MAG-OX) 400 MG tablet take 1 tablet by mouth twice a day  Qty: 60 tablet, Refills: 5    Associated Diagnoses: Magnesium deficiency      meloxicam (MOBIC) 15 MG tablet take 1 tablet by mouth once daily  Qty: 30 tablet, Refills: 5    Associated Diagnoses: Arthritis      ARIPiprazole (ABILIFY) 5 MG tablet Take 1 tablet by mouth daily  Qty: 30 tablet, Refills: 2    Associated Diagnoses: Depression, unspecified depression type      citalopram (CELEXA) 40 MG tablet take 1 tablet by mouth once daily  Qty: 30 tablet, Refills: 0    Associated Diagnoses: Depression, unspecified depression type      zoster recombinant adjuvanted vaccine (SHINGRIX) 50 MCG/0.5ML SUSR injection Inject 0.5 mLs into the muscle See Admin Instructions 1 dose now and repeat in 2-6 months  Qty: 0.5 mL, Refills: 0    Associated Diagnoses: Need for shingles vaccine           Note that more than 30 minutes was spent in preparing discharge papers, discussing discharge with patient, medication review, etc.    NOTE: This report was transcribed using voice recognition software.  Every effort was made to ensure accuracy; however, inadvertent computerized transcription errors may be present.     Signed:  Electronically signed by Ricardo Pritchett MD on 10/9/2020 at 8:36 AM

## 2020-10-09 NOTE — PROGRESS NOTES
Pt inquiring about receiving a COVID-19 test prior to DC. Pt is asymptomatic but would like peace of mind before returning home to . Pt is agreeable to have COVID test at PCP's office at upcoming appointment if a COVID test would delay her discharge here.

## 2020-10-12 ENCOUNTER — VIRTUAL VISIT (OUTPATIENT)
Dept: FAMILY MEDICINE CLINIC | Age: 60
End: 2020-10-12
Payer: MEDICARE

## 2020-10-12 PROCEDURE — G2012 BRIEF CHECK IN BY MD/QHP: HCPCS | Performed by: PHYSICIAN ASSISTANT

## 2020-10-12 PROCEDURE — 1111F DSCHRG MED/CURRENT MED MERGE: CPT | Performed by: PHYSICIAN ASSISTANT

## 2020-10-12 ASSESSMENT — ENCOUNTER SYMPTOMS
COUGH: 0
SHORTNESS OF BREATH: 0
EYE REDNESS: 0
DIARRHEA: 0
BACK PAIN: 1
NAUSEA: 0
VOMITING: 0
ABDOMINAL PAIN: 0
EYE PAIN: 0
SORE THROAT: 0
EYE DISCHARGE: 0
SINUS PRESSURE: 0

## 2020-10-12 NOTE — PATIENT INSTRUCTIONS
Patient Education        Epilepsy: Care Instructions  Your Care Instructions     Epilepsy is a common condition that causes repeated seizures. The seizures are caused by bursts of electrical activity in the brain that aren't normal. Seizures may cause problems with muscle control, movement, speech, vision, or awareness. They can be scary. Epilepsy affects each person differently. Some people have only a few seizures. Others get them more often. If you know what triggers a seizure, you may be able to avoid having one. You can take medicines to control and reduce seizures. You and your doctor will need to find the right combination, schedule, and dose of medicine. This may take time and careful changes. Seizures may get worse and happen more often over time. Follow-up care is a key part of your treatment and safety. Be sure to make and go to all appointments, and call your doctor if you are having problems. It's also a good idea to know your test results and keep a list of the medicines you take. How can you care for yourself at home? · Be safe with medicines. Take your medicines exactly as prescribed. Call your doctor if you think you are having a problem with your medicine. · Make a treatment plan with your doctor. Be sure to follow your plan. · Try to identify and avoid things that may make you more likely to have a seizure. These may include:  ? Not getting enough sleep. ? Using drugs or alcohol. ? Being emotionally stressed. ? Skipping meals. · Keep a record of any seizures you have. Note the date, time of day, and any details about the seizure that you can remember. Your doctor can use this information to plan or adjust your medicine or other treatment. · Be sure that any doctor treating you for another condition knows that you have epilepsy. Each doctor should know what medicines you are taking, if any. · Wear a medical ID bracelet. You can buy this at most [employer].  If you have a seizure that leaves you unconscious or unable to speak for yourself, this bracelet will let those who are treating you know that you have epilepsy. · Talk to your doctor about whether it is safe for you to do certain activities, such as drive or swim. When should you call for help? Call 911 anytime you think you may need emergency care. For example, call if:    · A seizure does not stop as it normally does.     · You have new symptoms such as:  ? Numbness, tingling, or weakness on one side of your body or face. ? Vision changes. ? Trouble speaking or thinking clearly. Call your doctor now or seek immediate medical care if:    · You have a fever.     · You have a severe headache. Watch closely for changes in your health, and be sure to contact your doctor if:    · The normal pattern or features of your seizures change. Where can you learn more? Go to https://Synthesiopepiceweb.Professionali.ru. org and sign in to your [employer] account. Enter V222 in the [employer] box to learn more about \"Epilepsy: Care Instructions. \"     If you do not have an account, please click on the \"Sign Up Now\" link. Current as of: November 20, 2019               Content Version: 12.6  © 8865-8664 [employer]. Care instructions adapted under license by [facility] ([facility]). If you have questions about a medical condition or this instruction, always ask your healthcare professional. [place] [address] any warranty or liability for your use of this information. Patient Education        Seizure: Care Instructions  Your Care Instructions     Seizures are caused by abnormal patterns of electrical signals in the brain. They are different for each person. Seizures can affect movement, speech, vision, or awareness. Some people have only slight shaking of a hand and do not pass out. Other people may pass out and have violent shaking of the whole body. Some people appear to stare into space.  They are awake, but they can't respond normally. Later, they may not remember what happened. You may need tests to identify the type and cause of the seizures. A seizure may occur only once, or you may have them more than one time. Taking medicines as directed and following up with your doctor may help keep you from having more seizures. The doctor has checked you carefully, but problems can develop later. If you notice any problems or new symptoms, get medical treatment right away. Follow-up care is a key part of your treatment and safety. Be sure to make and go to all appointments, and call your doctor if you are having problems. It's also a good idea to know your test results and keep a list of the medicines you take. How can you care for yourself at home? · Be safe with medicines. Take your medicines exactly as prescribed. Call your doctor if you think you are having a problem with your medicine. · Do not do any activity that could be dangerous to you or others until your doctor says it is safe to do so. For example, do not drive a car, operate machinery, swim, or climb ladders. · Be sure that anyone treating you for any health problem knows that you have had a seizure and what medicines you are taking for it. · Identify and avoid things that may make you more likely to have a seizure. These may include lack of sleep, alcohol or drug use, stress, or not eating. · Make sure you go to your follow-up appointment. When should you call for help? Call 911 anytime you think you may need emergency care. For example, call if:    · You have another seizure.     · You have new symptoms, such as trouble walking, speaking, or thinking clearly. Call your doctor now or seek immediate medical care if:    · You are not acting normally. Watch closely for changes in your health, and be sure to contact your doctor if you have any problems. Where can you learn more? Go to https://chpeetelvinaeb.ACKme Networks. org and sign in to your [employer] account. Enter U545 in the [facility] box to learn more about \"Seizure: Care Instructions. \"     If you do not have an account, please click on the \"Sign Up Now\" link. Current as of: November 20, 2019               Content Version: 12.6  © 1192-4169 [employer]. Care instructions adapted under license by [facility] ([facility]). If you have questions about a medical condition or this instruction, always ask your healthcare professional. [place] [address] any warranty or liability for your use of this information. Patient Education        Preventing Outdoor Falls: Care Instructions  Your Care Instructions     Worries about falls don't need to keep you indoors. Outdoor activities like walking have big benefits for your health. You will need to watch your step and learn a few safety measures. If you are worried about having a fall outdoors, ask your doctor about exercises, classes, or physical therapy that may help. You can learn ways to gain strength, flexibility, and balance. Ask if it might help to use a cane or walker. You can make your time outdoors safer with a few simple measures. Follow-up care is a key part of your treatment and safety. Be sure to make and go to all appointments, and call your doctor if you are having problems. It's also a good idea to know your test results and keep a list of the medicines you take. How can you prevent falls outdoors? · Wear shoes with firm soles and low heels. If you have to walk on an icy surface, use grippers that can be worn over your shoes in bad weather. · Be extra careful if weather is bad. Walk on the grass when the sidewalks are slick. If you live in a place that gets snow and ice in the winter, sprinkle salt on slippery stairs and sidewalks. · Be careful getting on or off buses and trains or getting in and out of cars. If handrails are available, use them. · Be careful when you cross the street.  Look for crosswalks or places where curb cuts or ramps are present. · Try not to hurry, especially if you are carrying something. · Be cautious in parking lots or garages. There may be curbs or changes in pavement, or the height of the pavement may vary. · Make sure to wear the correct eyeglasses, if you need them. Reading glasses or bifocals can make it harder to see hazards that might be in your way. · If you are walking outdoors for exercise, try to:  ? Walk in well-lighted, well-maintained areas. These include high school or college tracks, shopping malls, and public spaces. ? Walk with a partner. ? Watch out for cracked sidewalks, curbs, changes in the height of the pavement, exposed tree roots, and debris such as fallen leaves or branches. Where can you learn more? Go to https://Domo Safetypepiceweb.healthNuvola. org and sign in to your [employer] account. Enter K392 in the [employer] box to learn more about \"Preventing Outdoor Falls: Care Instructions. \"     If you do not have an account, please click on the \"Sign Up Now\" link. Current as of: April 15, 2020               Content Version: 12.6  © 2006-2020 [employer]. Care instructions adapted under license by [facility] ([facility]). If you have questions about a medical condition or this instruction, always ask your healthcare professional. [place] [address] any warranty or liability for your use of this information.

## 2020-10-12 NOTE — PROGRESS NOTES
Post-Discharge Transitional Care Management Services or Hospital Follow Up      Anna Chapa   YOB: 1960    Date of Office Visit:  10/12/2020  Date of Hospital Admission: 10/7/20  Date of Hospital Discharge: 10/9/20  Readmission Risk Score(high >=14%.  Medium >=10%):Readmission Risk Score: 16      Care management risk score Rising risk (score 2-5) and Complex Care (Scores >=6): 1     Non face to face  following discharge, date last encounter closed (first attempt may have been earlier): *No documented post hospital discharge outreach found in the last 14 days *No documented post hospital discharge outreach found in the last 14 days    Call initiated 2 business days of discharge: *No response recorded in the last 14 days     Patient Active Problem List   Diagnosis    Acute respiratory failure with hypoxia ([address]    Alcohol abuse    [facility]bral artery occlusion [facility]    Hypertension    Depression    Hyperlipidemia    Seizures ([address]    Alcoholic intoxication without complication ([facility]    Hyponatremia    Nicotine dependence    Marijuana user    Fall at home, initial encounter       Allergies   Allergen Reactions    Codeine Nausea And Vomiting       Medications listed as ordered at the time of discharge from hospital   Derick HURLEY   Home Medication Instructions JAZMINE:    Printed on:10/12/20 0044   Medication Information                      albuterol sulfate HFA (PROAIR HFA) 108 (90 Base) MCG/ACT inhaler  Inhale 2 puffs into the lungs every 6 hours as needed for Wheezing             amLODIPine (NORVASC) 5 MG tablet  take 1 tablet by mouth once daily             ARIPiprazole (ABILIFY) 5 MG tablet  Take 1 tablet by mouth daily             atenolol (TENORMIN) 50 MG tablet  take 1 tablet by mouth once daily             atorvastatin (LIPITOR) 40 MG tablet  take 1 tablet by mouth once daily             benazepril (LOTENSIN) 20 MG tablet  take 1 tablet by mouth MCG tablet take 1 tablet by mouth once daily 30 tablet 5    magnesium oxide (MAG-OX) 400 MG tablet take 1 tablet by mouth twice a day 60 tablet 5    meloxicam (MOBIC) 15 MG tablet take 1 tablet by mouth once daily 30 tablet 5    zoster recombinant adjuvanted vaccine (SHINGRIX) 50 MCG/0.5ML SUSR injection Inject 0.5 mLs into the muscle See Admin Instructions 1 dose now and repeat in 2-6 months 0.5 mL 0    ARIPiprazole (ABILIFY) 5 MG tablet Take 1 tablet by mouth daily 30 tablet 2    citalopram (CELEXA) 40 MG tablet take 1 tablet by mouth once daily 30 tablet 0        Medications patient taking as of now reconciled against medications ordered at time of hospital discharge: Yes    Chief Complaint   Patient presents with    Follow-Up from Hospital     increased Ludmila Alderman to 1000 mg BID       Patient reports that the day she went to hospital, she had fallen twice at home. She reports that it is not uncommon for her to fall. She does not recall feeling lightheaded or dizzy prior to the fall. She did not bite her tongue and denies LOC. She reports no loss of her bowel or bladder function with the falls she sustained. She was hospitalized for several days and did not want to stay for EEG. She apparently was hypoxic per EMS, but her oxygen appeared stable when hospitalized. She continues to smoke tobacco and has no desire to quit. She also continues to drink alcohol daily. Inpatient course: Discharge summary reviewed- see chart. Interval history/Current status: Stable    Review of Systems   Constitutional: Positive for fatigue (since home from hospital). Negative for chills and fever. HENT: Negative for ear pain, sinus pressure and sore throat. Eyes: Negative for pain, discharge and redness. Respiratory: Negative for cough and shortness of breath. Cardiovascular: Negative for chest pain. Gastrointestinal: Negative for abdominal pain, diarrhea, nausea and vomiting.    Genitourinary: Negative for dysuria and frequency. Musculoskeletal: Positive for back pain (chronic) and gait problem (uses cane for assistance). Negative for arthralgias. Skin: Negative for rash and wound. Neurological: Positive for seizures (history of seizures, unsure if she had a seizure when she fell). Negative for weakness and headaches. Continues to drink alcohol. 3 beers daily and 3 to 4 drinks with vodka     Hematological: Negative for adenopathy. Psychiatric/Behavioral: Negative. All other systems reviewed and are negative. There were no vitals filed for this visit. There is no height or weight on file to calculate BMI. Wt Readings from Last 3 Encounters:   10/07/20 190 lb (86.2 kg)   10/02/20 187 lb (84.8 kg)   09/17/20 180 lb (81.6 kg)     BP Readings from Last 3 Encounters:   10/09/20 119/65   10/02/20 122/62   09/17/20 103/65       Physical Exam  No exam, phone visit only as patient has transportation issues. Assessment/Plan:   Diagnosis Orders   1. Seizure disorder ([facility]  OR DISCHARGE MEDS RECONCILED W/ CURRENT OUTPATIENT MED LIST    External Referral To Neurology   2. At high risk for falls  External Referral To Neurology   3. Alcohol abuse           Medical Decision Making: high complexity    Will place outpatient consult to neurology for follow up as patient needs to complete EEG. Patient saw Dr. Aniceto Ramirez in the hospital at [facility] so consult placed with him first to see if he can follow up with patient as she has transportation issues as well and [place] office would work well for her. She will continue the Keppra 1000mg BID as well as advised to cut down her smoking, but she has no desire to quit. She is not interested in help for her alcohol use and refuses alcohol program as she does not want to quit. She is very high risk for life ending events as she has history of previous CVA as well as history of CAD, hyperlipidemia, borderline DM as well as heavy tobacco use and alcohol use. She is also obese and does not follow a heart healthy diet and gets very little if any exercise. Patient was also reminded that her low dose lung CT and her mammogram scheduled on 10-. She needs to get her lab done that day and patient aware. She was advised of need to get other labs I ordered ASAP as well as need to get her into ENT for her ruptured ear drum.

## 2020-10-19 ENCOUNTER — TELEPHONE (OUTPATIENT)
Dept: CASE MANAGEMENT | Age: 60
End: 2020-10-19

## 2020-10-29 RX ORDER — CITALOPRAM 40 MG/1
TABLET ORAL
Qty: 30 TABLET | Refills: 3 | Status: SHIPPED
Start: 2020-10-29 | End: 2021-01-01 | Stop reason: SDUPTHER

## 2021-01-01 ENCOUNTER — HOSPITAL ENCOUNTER (OUTPATIENT)
Age: 61
Discharge: HOME | End: 2021-11-15
Payer: MEDICARE

## 2021-01-01 ENCOUNTER — APPOINTMENT (OUTPATIENT)
Dept: CT IMAGING | Age: 61
DRG: 477 | End: 2021-01-01
Attending: INTERNAL MEDICINE
Payer: MEDICARE

## 2021-01-01 ENCOUNTER — CARE COORDINATION (OUTPATIENT)
Dept: CASE MANAGEMENT | Age: 61
End: 2021-01-01

## 2021-01-01 ENCOUNTER — APPOINTMENT (OUTPATIENT)
Dept: CT IMAGING | Age: 61
End: 2021-01-01
Payer: MEDICARE

## 2021-01-01 ENCOUNTER — HOSPITAL ENCOUNTER (OUTPATIENT)
Age: 61
Discharge: HOME | End: 2021-06-10
Payer: MEDICARE

## 2021-01-01 ENCOUNTER — APPOINTMENT (OUTPATIENT)
Dept: ULTRASOUND IMAGING | Age: 61
DRG: 543 | End: 2021-01-01
Payer: MEDICARE

## 2021-01-01 ENCOUNTER — APPOINTMENT (OUTPATIENT)
Dept: MRI IMAGING | Age: 61
DRG: 477 | End: 2021-01-01
Attending: INTERNAL MEDICINE
Payer: MEDICARE

## 2021-01-01 ENCOUNTER — ANESTHESIA EVENT (OUTPATIENT)
Dept: OPERATING ROOM | Age: 61
DRG: 477 | End: 2021-01-01
Payer: MEDICARE

## 2021-01-01 ENCOUNTER — APPOINTMENT (OUTPATIENT)
Dept: MRI IMAGING | Age: 61
DRG: 543 | End: 2021-01-01
Payer: MEDICARE

## 2021-01-01 ENCOUNTER — HOSPITAL ENCOUNTER (INPATIENT)
Age: 61
LOS: 5 days | Discharge: ANOTHER HOSPITAL | DRG: 543 | End: 2021-11-25
Attending: EMERGENCY MEDICINE | Admitting: FAMILY MEDICINE
Payer: MEDICARE

## 2021-01-01 ENCOUNTER — HOSPITAL ENCOUNTER (EMERGENCY)
Age: 61
Discharge: HOME | End: 2021-05-13
Attending: EMERGENCY MEDICINE
Payer: MEDICARE

## 2021-01-01 ENCOUNTER — APPOINTMENT (OUTPATIENT)
Dept: CT IMAGING | Age: 61
DRG: 543 | End: 2021-01-01
Payer: MEDICARE

## 2021-01-01 ENCOUNTER — HOSPITAL ENCOUNTER (INPATIENT)
Age: 61
LOS: 18 days | Discharge: ANOTHER HOSPITAL | DRG: 477 | End: 2021-12-13
Attending: INTERNAL MEDICINE | Admitting: INTERNAL MEDICINE
Payer: MEDICARE

## 2021-01-01 ENCOUNTER — TELEPHONE (OUTPATIENT)
Dept: FAMILY MEDICINE CLINIC | Age: 61
End: 2021-01-01

## 2021-01-01 ENCOUNTER — APPOINTMENT (OUTPATIENT)
Dept: ULTRASOUND IMAGING | Age: 61
DRG: 291 | End: 2021-01-01
Payer: MEDICARE

## 2021-01-01 ENCOUNTER — TELEPHONE (OUTPATIENT)
Dept: OTHER | Facility: CLINIC | Age: 61
End: 2021-01-01

## 2021-01-01 ENCOUNTER — HOSPITAL ENCOUNTER (INPATIENT)
Age: 61
LOS: 10 days | Discharge: HOME | DRG: 291 | End: 2021-11-19
Attending: FAMILY MEDICINE | Admitting: FAMILY MEDICINE
Payer: MEDICARE

## 2021-01-01 ENCOUNTER — APPOINTMENT (OUTPATIENT)
Dept: GENERAL RADIOLOGY | Age: 61
DRG: 477 | End: 2021-01-01
Attending: INTERNAL MEDICINE
Payer: MEDICARE

## 2021-01-01 ENCOUNTER — APPOINTMENT (OUTPATIENT)
Dept: GENERAL RADIOLOGY | Age: 61
DRG: 291 | End: 2021-01-01
Payer: MEDICARE

## 2021-01-01 ENCOUNTER — APPOINTMENT (OUTPATIENT)
Dept: NUCLEAR MEDICINE | Age: 61
DRG: 291 | End: 2021-01-01
Payer: MEDICARE

## 2021-01-01 ENCOUNTER — ANESTHESIA (OUTPATIENT)
Dept: OPERATING ROOM | Age: 61
DRG: 477 | End: 2021-01-01
Payer: MEDICARE

## 2021-01-01 ENCOUNTER — HOSPITAL ENCOUNTER (OUTPATIENT)
Age: 61
Discharge: HOME | End: 2021-06-04
Payer: MEDICARE

## 2021-01-01 ENCOUNTER — HOSPITAL ENCOUNTER (EMERGENCY)
Age: 61
Discharge: HOME | End: 2021-10-22
Payer: MEDICARE

## 2021-01-01 ENCOUNTER — OFFICE VISIT (OUTPATIENT)
Dept: FAMILY MEDICINE CLINIC | Age: 61
End: 2021-01-01
Payer: MEDICARE

## 2021-01-01 ENCOUNTER — VIRTUAL VISIT (OUTPATIENT)
Dept: FAMILY MEDICINE CLINIC | Age: 61
End: 2021-01-01
Payer: MEDICARE

## 2021-01-01 VITALS
BODY MASS INDEX: 37.36 KG/M2 | DIASTOLIC BLOOD PRESSURE: 66 MMHG | WEIGHT: 178 LBS | TEMPERATURE: 97.6 F | OXYGEN SATURATION: 90 % | RESPIRATION RATE: 16 BRPM | HEART RATE: 91 BPM | HEIGHT: 58 IN | SYSTOLIC BLOOD PRESSURE: 116 MMHG

## 2021-01-01 VITALS
WEIGHT: 175.2 LBS | RESPIRATION RATE: 18 BRPM | DIASTOLIC BLOOD PRESSURE: 61 MMHG | TEMPERATURE: 100.7 F | HEIGHT: 58 IN | BODY MASS INDEX: 36.78 KG/M2 | OXYGEN SATURATION: 95 % | SYSTOLIC BLOOD PRESSURE: 136 MMHG | HEART RATE: 74 BPM

## 2021-01-01 VITALS
SYSTOLIC BLOOD PRESSURE: 108 MMHG | TEMPERATURE: 98.3 F | DIASTOLIC BLOOD PRESSURE: 55 MMHG | HEIGHT: 58 IN | RESPIRATION RATE: 20 BRPM | WEIGHT: 175 LBS | BODY MASS INDEX: 36.73 KG/M2 | OXYGEN SATURATION: 91 % | HEART RATE: 94 BPM

## 2021-01-01 VITALS
HEIGHT: 58 IN | WEIGHT: 194.4 LBS | RESPIRATION RATE: 16 BRPM | DIASTOLIC BLOOD PRESSURE: 48 MMHG | HEART RATE: 69 BPM | BODY MASS INDEX: 40.81 KG/M2 | TEMPERATURE: 97.7 F | OXYGEN SATURATION: 91 % | SYSTOLIC BLOOD PRESSURE: 82 MMHG

## 2021-01-01 VITALS
TEMPERATURE: 98.2 F | BODY MASS INDEX: 40.72 KG/M2 | HEART RATE: 69 BPM | DIASTOLIC BLOOD PRESSURE: 57 MMHG | SYSTOLIC BLOOD PRESSURE: 118 MMHG | RESPIRATION RATE: 20 BRPM | HEIGHT: 58 IN | WEIGHT: 194 LBS | OXYGEN SATURATION: 95 %

## 2021-01-01 VITALS
DIASTOLIC BLOOD PRESSURE: 60 MMHG | OXYGEN SATURATION: 90 % | BODY MASS INDEX: 42.02 KG/M2 | WEIGHT: 200.2 LBS | TEMPERATURE: 98.9 F | HEART RATE: 82 BPM | HEIGHT: 58 IN | SYSTOLIC BLOOD PRESSURE: 126 MMHG

## 2021-01-01 VITALS
DIASTOLIC BLOOD PRESSURE: 58 MMHG | SYSTOLIC BLOOD PRESSURE: 92 MMHG | HEIGHT: 58 IN | BODY MASS INDEX: 37.99 KG/M2 | OXYGEN SATURATION: 96 % | HEART RATE: 78 BPM | TEMPERATURE: 98.3 F | RESPIRATION RATE: 16 BRPM | WEIGHT: 181 LBS

## 2021-01-01 VITALS
SYSTOLIC BLOOD PRESSURE: 110 MMHG | RESPIRATION RATE: 17 BRPM | DIASTOLIC BLOOD PRESSURE: 56 MMHG | WEIGHT: 189 LBS | HEART RATE: 91 BPM | BODY MASS INDEX: 39.5 KG/M2 | OXYGEN SATURATION: 94 %

## 2021-01-01 VITALS
DIASTOLIC BLOOD PRESSURE: 51 MMHG | SYSTOLIC BLOOD PRESSURE: 117 MMHG | OXYGEN SATURATION: 99 % | RESPIRATION RATE: 14 BRPM

## 2021-01-01 DIAGNOSIS — F17.210 CIGARETTE NICOTINE DEPENDENCE WITHOUT COMPLICATION: Chronic | ICD-10-CM

## 2021-01-01 DIAGNOSIS — R26.2 AMBULATORY DYSFUNCTION: ICD-10-CM

## 2021-01-01 DIAGNOSIS — M19.90 ARTHRITIS: ICD-10-CM

## 2021-01-01 DIAGNOSIS — I10 ESSENTIAL HYPERTENSION: ICD-10-CM

## 2021-01-01 DIAGNOSIS — E87.1 HYPONATREMIA: ICD-10-CM

## 2021-01-01 DIAGNOSIS — J44.9 CHRONIC OBSTRUCTIVE PULMONARY DISEASE, UNSPECIFIED COPD TYPE (HCC): ICD-10-CM

## 2021-01-01 DIAGNOSIS — E78.5 HYPERLIPIDEMIA, UNSPECIFIED HYPERLIPIDEMIA TYPE: ICD-10-CM

## 2021-01-01 DIAGNOSIS — F10.20 ALCOHOLISM (HCC): ICD-10-CM

## 2021-01-01 DIAGNOSIS — F32.A DEPRESSION, UNSPECIFIED DEPRESSION TYPE: ICD-10-CM

## 2021-01-01 DIAGNOSIS — Z00.00 ROUTINE GENERAL MEDICAL EXAMINATION AT A HEALTH CARE FACILITY: Primary | ICD-10-CM

## 2021-01-01 DIAGNOSIS — E86.0 DEHYDRATION: ICD-10-CM

## 2021-01-01 DIAGNOSIS — Z12.11 COLON CANCER SCREENING: ICD-10-CM

## 2021-01-01 DIAGNOSIS — B35.9 TINEA: ICD-10-CM

## 2021-01-01 DIAGNOSIS — Z71.1 FEARED COMPLAINT WITHOUT DIAGNOSIS: Primary | ICD-10-CM

## 2021-01-01 DIAGNOSIS — I10 ESSENTIAL HYPERTENSION: Primary | ICD-10-CM

## 2021-01-01 DIAGNOSIS — R73.9 HYPERGLYCEMIA: ICD-10-CM

## 2021-01-01 DIAGNOSIS — E03.8 SUBCLINICAL HYPOTHYROIDISM: ICD-10-CM

## 2021-01-01 DIAGNOSIS — I10 ESSENTIAL HYPERTENSION: Chronic | ICD-10-CM

## 2021-01-01 DIAGNOSIS — S32.010K COMPRESSION FRACTURE OF FIRST LUMBAR VERTEBRA WITH NONUNION: Primary | ICD-10-CM

## 2021-01-01 DIAGNOSIS — R09.02 HYPOXIA: ICD-10-CM

## 2021-01-01 DIAGNOSIS — E61.2 MAGNESIUM DEFICIENCY: ICD-10-CM

## 2021-01-01 DIAGNOSIS — I10 ESSENTIAL HYPERTENSION: Primary | Chronic | ICD-10-CM

## 2021-01-01 DIAGNOSIS — E78.5 HYPERLIPIDEMIA, UNSPECIFIED HYPERLIPIDEMIA TYPE: Chronic | ICD-10-CM

## 2021-01-01 DIAGNOSIS — Z13.31 POSITIVE DEPRESSION SCREENING: ICD-10-CM

## 2021-01-01 DIAGNOSIS — I95.9 HYPOTENSION, UNSPECIFIED HYPOTENSION TYPE: Primary | ICD-10-CM

## 2021-01-01 DIAGNOSIS — N30.00 ACUTE CYSTITIS WITHOUT HEMATURIA: ICD-10-CM

## 2021-01-01 DIAGNOSIS — R26.2 UNABLE TO AMBULATE: ICD-10-CM

## 2021-01-01 DIAGNOSIS — S32.050A COMPRESSION FRACTURE OF L5 VERTEBRA, INITIAL ENCOUNTER (HCC): Primary | ICD-10-CM

## 2021-01-01 DIAGNOSIS — F17.210 CIGARETTE NICOTINE DEPENDENCE WITHOUT COMPLICATION: ICD-10-CM

## 2021-01-01 DIAGNOSIS — I50.9 ACUTE ON CHRONIC CONGESTIVE HEART FAILURE, UNSPECIFIED HEART FAILURE TYPE (HCC): Primary | ICD-10-CM

## 2021-01-01 DIAGNOSIS — Z86.73 HISTORY OF CVA (CEREBROVASCULAR ACCIDENT): ICD-10-CM

## 2021-01-01 LAB
ABO/RH: NORMAL
ACETAMINOPHEN LEVEL: <5 MCG/ML (ref 10–30)
ADENOVIRUS BY PCR: NOT DETECTED
AFP-TUMOR MARKER: 1829 NG/ML (ref 0–9)
AFP-TUMOR MARKER: 1831 NG/ML (ref 0–9)
ALBUMIN SERPL-MCNC: 1.8 G/DL (ref 3.5–5.2)
ALBUMIN SERPL-MCNC: 2.2 G/DL (ref 3.5–5.2)
ALBUMIN SERPL-MCNC: 2.4 G/DL (ref 3.5–5.2)
ALBUMIN SERPL-MCNC: 2.4 G/DL (ref 3.5–5.2)
ALBUMIN SERPL-MCNC: 2.5 G/DL (ref 3.5–5.2)
ALBUMIN SERPL-MCNC: 2.6 G/DL (ref 3.5–5.2)
ALBUMIN SERPL-MCNC: 2.7 G/DL (ref 3.5–5.2)
ALBUMIN SERPL-MCNC: 2.9 G/DL (ref 3.5–5.2)
ALBUMIN SERPL-MCNC: 2.9 G/DL (ref 3.5–5.2)
ALBUMIN SERPL-MCNC: 3.3 G/DL (ref 3.5–5.2)
ALBUMIN SERPL-MCNC: 3.3 G/DL (ref 3.5–5.2)
ALBUMIN SERPL-MCNC: 3.9 G/DL (ref 3.5–5.2)
ALP BLD-CCNC: 145 U/L (ref 35–104)
ALP BLD-CCNC: 148 U/L (ref 35–104)
ALP BLD-CCNC: 154 U/L (ref 35–104)
ALP BLD-CCNC: 159 U/L (ref 35–104)
ALP BLD-CCNC: 160 U/L (ref 35–104)
ALP BLD-CCNC: 160 U/L (ref 35–104)
ALP BLD-CCNC: 164 U/L (ref 35–104)
ALP BLD-CCNC: 165 U/L (ref 35–104)
ALP BLD-CCNC: 169 U/L (ref 35–104)
ALP BLD-CCNC: 175 U/L (ref 35–104)
ALP BLD-CCNC: 177 U/L (ref 35–104)
ALP BLD-CCNC: 184 U/L (ref 35–104)
ALP BLD-CCNC: 192 U/L (ref 35–104)
ALP BLD-CCNC: 198 U/L (ref 35–104)
ALP BLD-CCNC: 94 U/L (ref 35–104)
ALT SERPL-CCNC: 118 U/L (ref 0–32)
ALT SERPL-CCNC: 120 U/L (ref 0–32)
ALT SERPL-CCNC: 132 U/L (ref 0–32)
ALT SERPL-CCNC: 195 U/L (ref 0–32)
ALT SERPL-CCNC: 22 U/L (ref 0–32)
ALT SERPL-CCNC: 228 U/L (ref 0–32)
ALT SERPL-CCNC: 246 U/L (ref 0–32)
ALT SERPL-CCNC: 259 U/L (ref 0–32)
ALT SERPL-CCNC: 264 U/L (ref 0–32)
ALT SERPL-CCNC: 274 U/L (ref 0–32)
ALT SERPL-CCNC: 280 U/L (ref 0–32)
ALT SERPL-CCNC: 294 U/L (ref 0–32)
ALT SERPL-CCNC: 310 U/L (ref 0–32)
ALT SERPL-CCNC: 337 U/L (ref 0–32)
ALT SERPL-CCNC: 364 U/L (ref 0–32)
ALT SERPL-CCNC: 59 U/L (ref 0–32)
ALT SERPL-CCNC: 99 U/L (ref 0–32)
AMMONIA: 37 UMOL/L (ref 11–51)
AMMONIA: 37 UMOL/L (ref 11–51)
AMMONIA: 45 UMOL/L (ref 11–51)
AMMONIA: 48 UMOL/L (ref 11–51)
AMMONIA: 55 UMOL/L (ref 11–51)
AMMONIA: 56 UMOL/L (ref 11–51)
AMMONIA: 58 UMOL/L (ref 11–51)
AMMONIA: 60 UMOL/L (ref 11–51)
AMMONIA: 62 UMOL/L (ref 11–51)
AMMONIA: 67 UMOL/L (ref 11–51)
AMMONIA: 69 UMOL/L (ref 11–51)
AMMONIA: 91 UMOL/L (ref 11–51)
ANION GAP SERPL CALCULATED.3IONS-SCNC: 10 MMOL/L (ref 7–16)
ANION GAP SERPL CALCULATED.3IONS-SCNC: 10 MMOL/L (ref 7–16)
ANION GAP SERPL CALCULATED.3IONS-SCNC: 11 MMOL/L (ref 7–16)
ANION GAP SERPL CALCULATED.3IONS-SCNC: 12 MMOL/L (ref 7–16)
ANION GAP SERPL CALCULATED.3IONS-SCNC: 13 MMOL/L (ref 7–16)
ANION GAP SERPL CALCULATED.3IONS-SCNC: 14 MMOL/L (ref 7–16)
ANION GAP SERPL CALCULATED.3IONS-SCNC: 14 MMOL/L (ref 7–16)
ANION GAP SERPL CALCULATED.3IONS-SCNC: 15 MMOL/L (ref 7–16)
ANION GAP SERPL CALCULATED.3IONS-SCNC: 16 MMOL/L (ref 7–16)
ANION GAP SERPL CALCULATED.3IONS-SCNC: 19 MMOL/L (ref 7–16)
ANION GAP SERPL CALCULATED.3IONS-SCNC: 7 MMOL/L (ref 7–16)
ANION GAP SERPL CALCULATED.3IONS-SCNC: 8 MMOL/L (ref 7–16)
ANION GAP SERPL CALCULATED.3IONS-SCNC: 8 MMOL/L (ref 7–16)
ANISOCYTOSIS: ABNORMAL
ANTIBODY IDENTIFICATION: NORMAL
ANTIBODY SCREEN: NORMAL
AST SERPL-CCNC: 122 U/L (ref 0–31)
AST SERPL-CCNC: 127 U/L (ref 0–31)
AST SERPL-CCNC: 166 U/L (ref 0–31)
AST SERPL-CCNC: 195 U/L (ref 0–31)
AST SERPL-CCNC: 221 U/L (ref 0–31)
AST SERPL-CCNC: 221 U/L (ref 0–31)
AST SERPL-CCNC: 228 U/L (ref 0–31)
AST SERPL-CCNC: 229 U/L (ref 0–31)
AST SERPL-CCNC: 231 U/L (ref 0–31)
AST SERPL-CCNC: 248 U/L (ref 0–31)
AST SERPL-CCNC: 25 U/L (ref 0–31)
AST SERPL-CCNC: 255 U/L (ref 0–31)
AST SERPL-CCNC: 264 U/L (ref 0–31)
AST SERPL-CCNC: 268 U/L (ref 0–31)
AST SERPL-CCNC: 321 U/L (ref 0–31)
AST SERPL-CCNC: 353 U/L (ref 0–31)
AST SERPL-CCNC: 82 U/L (ref 0–31)
BACTERIA: ABNORMAL /HPF
BASOPHILS ABSOLUTE: 0 E9/L (ref 0–0.2)
BASOPHILS ABSOLUTE: 0.03 E9/L (ref 0–0.2)
BASOPHILS ABSOLUTE: 0.03 E9/L (ref 0–0.2)
BASOPHILS ABSOLUTE: 0.04 E9/L (ref 0–0.2)
BASOPHILS ABSOLUTE: 0.05 E9/L (ref 0–0.2)
BASOPHILS ABSOLUTE: 0.06 E9/L (ref 0–0.2)
BASOPHILS ABSOLUTE: 0.24 E9/L (ref 0–0.2)
BASOPHILS RELATIVE PERCENT: 0 % (ref 0–2)
BASOPHILS RELATIVE PERCENT: 0.2 % (ref 0–2)
BASOPHILS RELATIVE PERCENT: 0.3 % (ref 0–2)
BASOPHILS RELATIVE PERCENT: 0.4 % (ref 0–2)
BASOPHILS RELATIVE PERCENT: 0.5 % (ref 0–2)
BASOPHILS RELATIVE PERCENT: 0.5 % (ref 0–2)
BASOPHILS RELATIVE PERCENT: 0.6 % (ref 0–2)
BASOPHILS RELATIVE PERCENT: 0.8 % (ref 0–2)
BASOPHILS RELATIVE PERCENT: 1.7 % (ref 0–2)
BILIRUB SERPL-MCNC: 0.9 MG/DL (ref 0–1.2)
BILIRUB SERPL-MCNC: 1 MG/DL (ref 0–1.2)
BILIRUB SERPL-MCNC: 1.1 MG/DL (ref 0–1.2)
BILIRUB SERPL-MCNC: 1.1 MG/DL (ref 0–1.2)
BILIRUB SERPL-MCNC: 1.2 MG/DL (ref 0–1.2)
BILIRUB SERPL-MCNC: 1.3 MG/DL (ref 0–1.2)
BILIRUB SERPL-MCNC: 1.3 MG/DL (ref 0–1.2)
BILIRUB SERPL-MCNC: 1.4 MG/DL (ref 0–1.2)
BILIRUB SERPL-MCNC: 1.6 MG/DL (ref 0–1.2)
BILIRUB SERPL-MCNC: 1.7 MG/DL (ref 0–1.2)
BILIRUB SERPL-MCNC: 1.8 MG/DL (ref 0–1.2)
BILIRUBIN DIRECT: 0.3 MG/DL (ref 0–0.3)
BILIRUBIN DIRECT: 0.4 MG/DL (ref 0–0.3)
BILIRUBIN DIRECT: 0.5 MG/DL (ref 0–0.3)
BILIRUBIN DIRECT: 0.6 MG/DL (ref 0–0.3)
BILIRUBIN DIRECT: 0.7 MG/DL (ref 0–0.3)
BILIRUBIN URINE: NEGATIVE
BILIRUBIN, INDIRECT: 0.7 MG/DL (ref 0–1)
BILIRUBIN, INDIRECT: 0.8 MG/DL (ref 0–1)
BILIRUBIN, INDIRECT: 0.8 MG/DL (ref 0–1)
BILIRUBIN, INDIRECT: 0.9 MG/DL (ref 0–1)
BILIRUBIN, INDIRECT: 0.9 MG/DL (ref 0–1)
BILIRUBIN, INDIRECT: 1 MG/DL (ref 0–1)
BILIRUBIN, INDIRECT: 1 MG/DL (ref 0–1)
BILIRUBIN, INDIRECT: 1.1 MG/DL (ref 0–1)
BILIRUBIN, INDIRECT: 1.3 MG/DL (ref 0–1)
BLOOD CULTURE, ROUTINE: NORMAL
BLOOD, URINE: ABNORMAL
BLOOD, URINE: ABNORMAL
BLOOD, URINE: NEGATIVE
BLOOD, URINE: NEGATIVE
BORDETELLA PARAPERTUSSIS BY PCR: NOT DETECTED
BORDETELLA PERTUSSIS BY PCR: NOT DETECTED
BUN BLDV-MCNC: 10 MG/DL (ref 6–23)
BUN BLDV-MCNC: 12 MG/DL (ref 6–23)
BUN BLDV-MCNC: 13 MG/DL (ref 6–23)
BUN BLDV-MCNC: 14 MG/DL (ref 6–23)
BUN BLDV-MCNC: 14 MG/DL (ref 6–23)
BUN BLDV-MCNC: 15 MG/DL (ref 6–23)
BUN BLDV-MCNC: 16 MG/DL (ref 6–23)
BUN BLDV-MCNC: 16 MG/DL (ref 6–23)
BUN BLDV-MCNC: 17 MG/DL (ref 6–23)
BUN BLDV-MCNC: 18 MG/DL (ref 6–23)
BUN BLDV-MCNC: 19 MG/DL (ref 6–23)
BUN BLDV-MCNC: 26 MG/DL (ref 6–23)
BUN BLDV-MCNC: 7 MG/DL (ref 6–23)
BUN BLDV-MCNC: 7 MG/DL (ref 6–23)
BUN BLDV-MCNC: 9 MG/DL (ref 6–23)
BURR CELLS: ABNORMAL
CA 19-9: 21 U/ML (ref 0–37)
CALCIUM SERPL-MCNC: 10.3 MG/DL (ref 8.6–10.2)
CALCIUM SERPL-MCNC: 10.5 MG/DL (ref 8.6–10.2)
CALCIUM SERPL-MCNC: 10.8 MG/DL (ref 8.6–10.2)
CALCIUM SERPL-MCNC: 10.9 MG/DL (ref 8.6–10.2)
CALCIUM SERPL-MCNC: 11 MG/DL (ref 8.6–10.2)
CALCIUM SERPL-MCNC: 11.1 MG/DL (ref 8.6–10.2)
CALCIUM SERPL-MCNC: 11.2 MG/DL (ref 8.6–10.2)
CALCIUM SERPL-MCNC: 11.4 MG/DL (ref 8.6–10.2)
CALCIUM SERPL-MCNC: 11.4 MG/DL (ref 8.6–10.2)
CALCIUM SERPL-MCNC: 11.5 MG/DL (ref 8.6–10.2)
CALCIUM SERPL-MCNC: 11.6 MG/DL (ref 8.6–10.2)
CALCIUM SERPL-MCNC: 11.9 MG/DL (ref 8.6–10.2)
CALCIUM SERPL-MCNC: 12 MG/DL (ref 8.6–10.2)
CALCIUM SERPL-MCNC: 8.8 MG/DL (ref 8.6–10.2)
CALCIUM SERPL-MCNC: 9.1 MG/DL (ref 8.6–10.2)
CALCIUM SERPL-MCNC: 9.2 MG/DL (ref 8.6–10.2)
CALCIUM SERPL-MCNC: 9.2 MG/DL (ref 8.6–10.2)
CALCIUM SERPL-MCNC: 9.3 MG/DL (ref 8.6–10.2)
CALCIUM SERPL-MCNC: 9.5 MG/DL (ref 8.6–10.2)
CALCIUM SERPL-MCNC: 9.5 MG/DL (ref 8.6–10.2)
CALCIUM SERPL-MCNC: 9.6 MG/DL (ref 8.6–10.2)
CALCIUM SERPL-MCNC: 9.7 MG/DL (ref 8.6–10.2)
CALCIUM SERPL-MCNC: 9.8 MG/DL (ref 8.6–10.2)
CALCIUM SERPL-MCNC: 9.9 MG/DL (ref 8.6–10.2)
CEA: 12.6 NG/ML (ref 0–5.2)
CHLAMYDOPHILIA PNEUMONIAE BY PCR: NOT DETECTED
CHLORIDE BLD-SCNC: 100 MMOL/L (ref 98–107)
CHLORIDE BLD-SCNC: 101 MMOL/L (ref 98–107)
CHLORIDE BLD-SCNC: 102 MMOL/L (ref 98–107)
CHLORIDE BLD-SCNC: 103 MMOL/L (ref 98–107)
CHLORIDE BLD-SCNC: 107 MMOL/L (ref 98–107)
CHLORIDE BLD-SCNC: 112 MMOL/L (ref 98–107)
CHLORIDE BLD-SCNC: 115 MMOL/L (ref 98–107)
CHLORIDE BLD-SCNC: 116 MMOL/L (ref 98–107)
CHLORIDE BLD-SCNC: 118 MMOL/L (ref 98–107)
CHLORIDE BLD-SCNC: 119 MMOL/L (ref 98–107)
CHLORIDE BLD-SCNC: 121 MMOL/L (ref 98–107)
CHLORIDE BLD-SCNC: 122 MMOL/L (ref 98–107)
CHLORIDE BLD-SCNC: 124 MMOL/L (ref 98–107)
CHLORIDE BLD-SCNC: 88 MMOL/L (ref 98–107)
CHLORIDE BLD-SCNC: 89 MMOL/L (ref 98–107)
CHLORIDE BLD-SCNC: 90 MMOL/L (ref 98–107)
CHLORIDE BLD-SCNC: 90 MMOL/L (ref 98–107)
CHLORIDE BLD-SCNC: 91 MMOL/L (ref 98–107)
CHLORIDE BLD-SCNC: 93 MMOL/L (ref 98–107)
CHLORIDE BLD-SCNC: 94 MMOL/L (ref 98–107)
CHLORIDE BLD-SCNC: 97 MMOL/L (ref 98–107)
CHLORIDE BLD-SCNC: 97 MMOL/L (ref 98–107)
CHLORIDE BLD-SCNC: 99 MMOL/L (ref 98–107)
CHLORIDE BLD-SCNC: 99 MMOL/L (ref 98–107)
CHOLESTEROL, FASTING: 126 MG/DL (ref 0–199)
CHOLESTEROL, TOTAL: 122 MG/DL (ref 0–199)
CHROMOGRANIN A: 53 NG/ML (ref 0–103)
CLARITY: ABNORMAL
CLARITY: CLEAR
CO2: 21 MMOL/L (ref 22–29)
CO2: 24 MMOL/L (ref 22–29)
CO2: 24 MMOL/L (ref 22–29)
CO2: 25 MMOL/L (ref 22–29)
CO2: 26 MMOL/L (ref 22–29)
CO2: 26 MMOL/L (ref 22–29)
CO2: 27 MMOL/L (ref 22–29)
CO2: 28 MMOL/L (ref 22–29)
CO2: 29 MMOL/L (ref 22–29)
CO2: 30 MMOL/L (ref 22–29)
CO2: 30 MMOL/L (ref 22–29)
CO2: 31 MMOL/L (ref 22–29)
CO2: 31 MMOL/L (ref 22–29)
COLOR: YELLOW
CORONAVIRUS 229E BY PCR: NOT DETECTED
CORONAVIRUS HKU1 BY PCR: NOT DETECTED
CORONAVIRUS NL63 BY PCR: NOT DETECTED
CORONAVIRUS OC43 BY PCR: NOT DETECTED
CREAT SERPL-MCNC: 0.4 MG/DL (ref 0.5–1)
CREAT SERPL-MCNC: 0.5 MG/DL (ref 0.5–1)
CREAT SERPL-MCNC: 0.6 MG/DL (ref 0.5–1)
CREAT SERPL-MCNC: 0.7 MG/DL (ref 0.5–1)
CULTURE, BLOOD 2: NORMAL
DAT POLYSPECIFIC: NORMAL
DR. NOTIFY: NORMAL
EKG ATRIAL RATE: 83 BPM
EKG ATRIAL RATE: 89 BPM
EKG P AXIS: 41 DEGREES
EKG P AXIS: 46 DEGREES
EKG P-R INTERVAL: 170 MS
EKG P-R INTERVAL: 182 MS
EKG Q-T INTERVAL: 380 MS
EKG Q-T INTERVAL: 394 MS
EKG QRS DURATION: 90 MS
EKG QRS DURATION: 90 MS
EKG QTC CALCULATION (BAZETT): 462 MS
EKG QTC CALCULATION (BAZETT): 462 MS
EKG R AXIS: 54 DEGREES
EKG R AXIS: 56 DEGREES
EKG T AXIS: 56 DEGREES
EKG T AXIS: 59 DEGREES
EKG VENTRICULAR RATE: 83 BPM
EKG VENTRICULAR RATE: 89 BPM
EOSINOPHILS ABSOLUTE: 0 E9/L (ref 0.05–0.5)
EOSINOPHILS ABSOLUTE: 0.02 E9/L (ref 0.05–0.5)
EOSINOPHILS ABSOLUTE: 0.04 E9/L (ref 0.05–0.5)
EOSINOPHILS ABSOLUTE: 0.09 E9/L (ref 0.05–0.5)
EOSINOPHILS ABSOLUTE: 0.09 E9/L (ref 0.05–0.5)
EOSINOPHILS ABSOLUTE: 0.1 E9/L (ref 0.05–0.5)
EOSINOPHILS ABSOLUTE: 0.1 E9/L (ref 0.05–0.5)
EOSINOPHILS ABSOLUTE: 0.13 E9/L (ref 0.05–0.5)
EOSINOPHILS ABSOLUTE: 0.19 E9/L (ref 0.05–0.5)
EOSINOPHILS ABSOLUTE: 0.22 E9/L (ref 0.05–0.5)
EOSINOPHILS ABSOLUTE: 0.3 E9/L (ref 0.05–0.5)
EOSINOPHILS RELATIVE PERCENT: 0.1 % (ref 0–6)
EOSINOPHILS RELATIVE PERCENT: 0.2 % (ref 0–6)
EOSINOPHILS RELATIVE PERCENT: 0.2 % (ref 0–6)
EOSINOPHILS RELATIVE PERCENT: 0.3 % (ref 0–6)
EOSINOPHILS RELATIVE PERCENT: 0.3 % (ref 0–6)
EOSINOPHILS RELATIVE PERCENT: 0.5 % (ref 0–6)
EOSINOPHILS RELATIVE PERCENT: 0.6 % (ref 0–6)
EOSINOPHILS RELATIVE PERCENT: 0.8 % (ref 0–6)
EOSINOPHILS RELATIVE PERCENT: 0.8 % (ref 0–6)
EOSINOPHILS RELATIVE PERCENT: 0.9 % (ref 0–6)
EOSINOPHILS RELATIVE PERCENT: 0.9 % (ref 0–6)
EOSINOPHILS RELATIVE PERCENT: 1 % (ref 0–6)
EOSINOPHILS RELATIVE PERCENT: 1.6 % (ref 0–6)
EOSINOPHILS RELATIVE PERCENT: 1.7 % (ref 0–6)
EOSINOPHILS RELATIVE PERCENT: 3 % (ref 0–6)
EPITHELIAL CELLS, UA: ABNORMAL /HPF
ETHANOL: <10 MG/DL (ref 0–0.08)
GFR AFRICAN AMERICAN: >60
GFR NON-AFRICAN AMERICAN: >60 ML/MIN/1.73
GLUCOSE BLD-MCNC: 101 MG/DL (ref 74–99)
GLUCOSE BLD-MCNC: 101 MG/DL (ref 74–99)
GLUCOSE BLD-MCNC: 103 MG/DL (ref 74–99)
GLUCOSE BLD-MCNC: 104 MG/DL (ref 74–99)
GLUCOSE BLD-MCNC: 105 MG/DL (ref 74–99)
GLUCOSE BLD-MCNC: 106 MG/DL
GLUCOSE BLD-MCNC: 107 MG/DL (ref 74–99)
GLUCOSE BLD-MCNC: 109 MG/DL (ref 74–99)
GLUCOSE BLD-MCNC: 111 MG/DL (ref 74–99)
GLUCOSE BLD-MCNC: 113 MG/DL (ref 74–99)
GLUCOSE BLD-MCNC: 116 MG/DL (ref 74–99)
GLUCOSE BLD-MCNC: 116 MG/DL (ref 74–99)
GLUCOSE BLD-MCNC: 117 MG/DL (ref 74–99)
GLUCOSE BLD-MCNC: 119 MG/DL (ref 74–99)
GLUCOSE BLD-MCNC: 121 MG/DL (ref 74–99)
GLUCOSE BLD-MCNC: 124 MG/DL (ref 74–99)
GLUCOSE BLD-MCNC: 127 MG/DL (ref 74–99)
GLUCOSE BLD-MCNC: 128 MG/DL (ref 74–99)
GLUCOSE BLD-MCNC: 133 MG/DL (ref 74–99)
GLUCOSE BLD-MCNC: 135 MG/DL (ref 74–99)
GLUCOSE BLD-MCNC: 144 MG/DL (ref 74–99)
GLUCOSE BLD-MCNC: 158 MG/DL (ref 74–99)
GLUCOSE BLD-MCNC: 183 MG/DL (ref 74–99)
GLUCOSE BLD-MCNC: 73 MG/DL (ref 74–99)
GLUCOSE BLD-MCNC: 90 MG/DL (ref 74–99)
GLUCOSE BLD-MCNC: 90 MG/DL (ref 74–99)
GLUCOSE BLD-MCNC: 91 MG/DL (ref 74–99)
GLUCOSE BLD-MCNC: 91 MG/DL (ref 74–99)
GLUCOSE BLD-MCNC: 99 MG/DL (ref 74–99)
GLUCOSE URINE: NEGATIVE MG/DL
HBA1C MFR BLD: 5.9 % (ref 4–5.6)
HBA1C MFR BLD: 5.9 % (ref 4–5.6)
HCT VFR BLD CALC: 36.5 % (ref 34–48)
HCT VFR BLD CALC: 37.5 % (ref 34–48)
HCT VFR BLD CALC: 38.8 % (ref 34–48)
HCT VFR BLD CALC: 39.6 % (ref 34–48)
HCT VFR BLD CALC: 39.7 % (ref 34–48)
HCT VFR BLD CALC: 40.2 % (ref 34–48)
HCT VFR BLD CALC: 41.5 % (ref 34–48)
HCT VFR BLD CALC: 41.7 % (ref 34–48)
HCT VFR BLD CALC: 41.7 % (ref 34–48)
HCT VFR BLD CALC: 41.8 % (ref 34–48)
HCT VFR BLD CALC: 43.1 % (ref 34–48)
HCT VFR BLD CALC: 44.2 % (ref 34–48)
HCT VFR BLD CALC: 44.3 % (ref 34–48)
HCT VFR BLD CALC: 44.6 % (ref 34–48)
HCT VFR BLD CALC: 44.8 % (ref 34–48)
HCT VFR BLD CALC: 44.9 % (ref 34–48)
HCT VFR BLD CALC: 45.8 % (ref 34–48)
HCT VFR BLD CALC: 46.5 % (ref 34–48)
HCV QNT BY NAAT IU/ML: NOT DETECTED IU/ML
HCV QNT BY NAAT LOG IU/ML: NOT DETECTED LOG IU/ML
HDLC SERPL-MCNC: 21 MG/DL
HDLC SERPL-MCNC: 42 MG/DL
HEMOGLOBIN: 12 G/DL (ref 11.5–15.5)
HEMOGLOBIN: 12.7 G/DL (ref 11.5–15.5)
HEMOGLOBIN: 12.9 G/DL (ref 11.5–15.5)
HEMOGLOBIN: 13.3 G/DL (ref 11.5–15.5)
HEMOGLOBIN: 13.7 G/DL (ref 11.5–15.5)
HEMOGLOBIN: 13.8 G/DL (ref 11.5–15.5)
HEMOGLOBIN: 13.8 G/DL (ref 11.5–15.5)
HEMOGLOBIN: 14.5 G/DL (ref 11.5–15.5)
HEMOGLOBIN: 14.6 G/DL (ref 11.5–15.5)
HEMOGLOBIN: 14.6 G/DL (ref 11.5–15.5)
HEMOGLOBIN: 14.7 G/DL (ref 11.5–15.5)
HEMOGLOBIN: 14.9 G/DL (ref 11.5–15.5)
HEMOGLOBIN: 15 G/DL (ref 11.5–15.5)
HEMOGLOBIN: 15.3 G/DL (ref 11.5–15.5)
HEMOGLOBIN: 15.6 G/DL (ref 11.5–15.5)
HEMOGLOBIN: 15.7 G/DL (ref 11.5–15.5)
HEMOGLOBIN: 15.8 G/DL (ref 11.5–15.5)
HEMOGLOBIN: 15.8 G/DL (ref 11.5–15.5)
HUMAN METAPNEUMOVIRUS BY PCR: NOT DETECTED
HUMAN RHINOVIRUS/ENTEROVIRUS BY PCR: NOT DETECTED
IMMATURE GRANULOCYTES #: 0.03 E9/L
IMMATURE GRANULOCYTES #: 0.07 E9/L
IMMATURE GRANULOCYTES #: 0.08 E9/L
IMMATURE GRANULOCYTES #: 0.08 E9/L
IMMATURE GRANULOCYTES #: 0.11 E9/L
IMMATURE GRANULOCYTES %: 0.3 % (ref 0–5)
IMMATURE GRANULOCYTES %: 0.5 % (ref 0–5)
IMMATURE GRANULOCYTES %: 0.5 % (ref 0–5)
IMMATURE GRANULOCYTES %: 0.7 % (ref 0–5)
IMMATURE GRANULOCYTES %: 0.8 % (ref 0–5)
INFLUENZA A BY PCR: NOT DETECTED
INFLUENZA B BY PCR: NOT DETECTED
INR BLD: 1.3
INR BLD: 1.4
INR BLD: 1.5
INR BLD: 1.5
INR BLD: 1.6
INR BLD: 1.7
INR BLD: 1.8
INTERPRETATION: NOT DETECTED
KETONES, URINE: 15 MG/DL
KETONES, URINE: ABNORMAL MG/DL
KETONES, URINE: NEGATIVE MG/DL
KETONES, URINE: NEGATIVE MG/DL
LACTIC ACID, SEPSIS: 2.3 MMOL/L (ref 0.5–1.9)
LACTIC ACID: 1.4 MMOL/L (ref 0.5–2.2)
LACTIC ACID: 4 MMOL/L (ref 0.5–2.2)
LDL CHOLESTEROL CALCULATED: 70 MG/DL (ref 0–99)
LDL CHOLESTEROL CALCULATED: 75 MG/DL (ref 0–99)
LEUKOCYTE ESTERASE, URINE: ABNORMAL
LEUKOCYTE ESTERASE, URINE: NEGATIVE
LIPASE: 22 U/L (ref 13–60)
LV EF: 55 %
LV EF: 89 %
LVEF MODALITY: NORMAL
LVEF MODALITY: NORMAL
LYMPHOCYTES ABSOLUTE: 1.22 E9/L (ref 1.5–4)
LYMPHOCYTES ABSOLUTE: 1.49 E9/L (ref 1.5–4)
LYMPHOCYTES ABSOLUTE: 1.65 E9/L (ref 1.5–4)
LYMPHOCYTES ABSOLUTE: 1.72 E9/L (ref 1.5–4)
LYMPHOCYTES ABSOLUTE: 2.21 E9/L (ref 1.5–4)
LYMPHOCYTES ABSOLUTE: 2.28 E9/L (ref 1.5–4)
LYMPHOCYTES ABSOLUTE: 2.42 E9/L (ref 1.5–4)
LYMPHOCYTES ABSOLUTE: 2.47 E9/L (ref 1.5–4)
LYMPHOCYTES ABSOLUTE: 2.66 E9/L (ref 1.5–4)
LYMPHOCYTES ABSOLUTE: 2.74 E9/L (ref 1.5–4)
LYMPHOCYTES ABSOLUTE: 2.78 E9/L (ref 1.5–4)
LYMPHOCYTES ABSOLUTE: 2.95 E9/L (ref 1.5–4)
LYMPHOCYTES ABSOLUTE: 2.95 E9/L (ref 1.5–4)
LYMPHOCYTES ABSOLUTE: 3.14 E9/L (ref 1.5–4)
LYMPHOCYTES ABSOLUTE: 3.99 E9/L (ref 1.5–4)
LYMPHOCYTES RELATIVE PERCENT: 12.2 % (ref 20–42)
LYMPHOCYTES RELATIVE PERCENT: 16.5 % (ref 20–42)
LYMPHOCYTES RELATIVE PERCENT: 16.9 % (ref 20–42)
LYMPHOCYTES RELATIVE PERCENT: 17 % (ref 20–42)
LYMPHOCYTES RELATIVE PERCENT: 18 % (ref 20–42)
LYMPHOCYTES RELATIVE PERCENT: 19.1 % (ref 20–42)
LYMPHOCYTES RELATIVE PERCENT: 19.3 % (ref 20–42)
LYMPHOCYTES RELATIVE PERCENT: 20 % (ref 20–42)
LYMPHOCYTES RELATIVE PERCENT: 20.9 % (ref 20–42)
LYMPHOCYTES RELATIVE PERCENT: 22.8 % (ref 20–42)
LYMPHOCYTES RELATIVE PERCENT: 22.8 % (ref 20–42)
LYMPHOCYTES RELATIVE PERCENT: 23.5 % (ref 20–42)
LYMPHOCYTES RELATIVE PERCENT: 24.4 % (ref 20–42)
LYMPHOCYTES RELATIVE PERCENT: 6.1 % (ref 20–42)
LYMPHOCYTES RELATIVE PERCENT: 7 % (ref 20–42)
MAGNESIUM: 1.5 MG/DL (ref 1.6–2.6)
MAGNESIUM: 1.5 MG/DL (ref 1.6–2.6)
MAGNESIUM: 1.6 MG/DL (ref 1.6–2.6)
MAGNESIUM: 1.7 MG/DL (ref 1.6–2.6)
MAGNESIUM: 1.9 MG/DL (ref 1.6–2.6)
MAGNESIUM: 1.9 MG/DL (ref 1.6–2.6)
MAGNESIUM: 2 MG/DL (ref 1.6–2.6)
MAGNESIUM: 2.1 MG/DL (ref 1.6–2.6)
MAGNESIUM: 2.3 MG/DL (ref 1.6–2.6)
MCH RBC QN AUTO: 31.3 PG (ref 26–35)
MCH RBC QN AUTO: 31.7 PG (ref 26–35)
MCH RBC QN AUTO: 31.8 PG (ref 26–35)
MCH RBC QN AUTO: 31.8 PG (ref 26–35)
MCH RBC QN AUTO: 31.9 PG (ref 26–35)
MCH RBC QN AUTO: 32.1 PG (ref 26–35)
MCH RBC QN AUTO: 32.2 PG (ref 26–35)
MCH RBC QN AUTO: 32.3 PG (ref 26–35)
MCH RBC QN AUTO: 32.4 PG (ref 26–35)
MCH RBC QN AUTO: 32.5 PG (ref 26–35)
MCH RBC QN AUTO: 32.6 PG (ref 26–35)
MCH RBC QN AUTO: 32.6 PG (ref 26–35)
MCH RBC QN AUTO: 33.3 PG (ref 26–35)
MCH RBC QN AUTO: 33.3 PG (ref 26–35)
MCH RBC QN AUTO: 33.5 PG (ref 26–35)
MCHC RBC AUTO-ENTMCNC: 32.4 % (ref 32–34.5)
MCHC RBC AUTO-ENTMCNC: 32.5 % (ref 32–34.5)
MCHC RBC AUTO-ENTMCNC: 32.9 % (ref 32–34.5)
MCHC RBC AUTO-ENTMCNC: 33 % (ref 32–34.5)
MCHC RBC AUTO-ENTMCNC: 33.9 % (ref 32–34.5)
MCHC RBC AUTO-ENTMCNC: 34 % (ref 32–34.5)
MCHC RBC AUTO-ENTMCNC: 34.1 % (ref 32–34.5)
MCHC RBC AUTO-ENTMCNC: 34.1 % (ref 32–34.5)
MCHC RBC AUTO-ENTMCNC: 34.3 % (ref 32–34.5)
MCHC RBC AUTO-ENTMCNC: 34.3 % (ref 32–34.5)
MCHC RBC AUTO-ENTMCNC: 34.8 % (ref 32–34.5)
MCHC RBC AUTO-ENTMCNC: 35 % (ref 32–34.5)
MCHC RBC AUTO-ENTMCNC: 35.2 % (ref 32–34.5)
MCHC RBC AUTO-ENTMCNC: 35.2 % (ref 32–34.5)
MCHC RBC AUTO-ENTMCNC: 35.7 % (ref 32–34.5)
MCHC RBC AUTO-ENTMCNC: 35.7 % (ref 32–34.5)
MCV RBC AUTO: 91.1 FL (ref 80–99.9)
MCV RBC AUTO: 91.7 FL (ref 80–99.9)
MCV RBC AUTO: 91.9 FL (ref 80–99.9)
MCV RBC AUTO: 93 FL (ref 80–99.9)
MCV RBC AUTO: 93.1 FL (ref 80–99.9)
MCV RBC AUTO: 93.1 FL (ref 80–99.9)
MCV RBC AUTO: 93.7 FL (ref 80–99.9)
MCV RBC AUTO: 93.8 FL (ref 80–99.9)
MCV RBC AUTO: 93.9 FL (ref 80–99.9)
MCV RBC AUTO: 94.5 FL (ref 80–99.9)
MCV RBC AUTO: 94.8 FL (ref 80–99.9)
MCV RBC AUTO: 95.3 FL (ref 80–99.9)
MCV RBC AUTO: 95.9 FL (ref 80–99.9)
MCV RBC AUTO: 98.1 FL (ref 80–99.9)
MCV RBC AUTO: 98.3 FL (ref 80–99.9)
MCV RBC AUTO: 99.1 FL (ref 80–99.9)
METAMYELOCYTES RELATIVE PERCENT: 0.9 % (ref 0–1)
METAMYELOCYTES RELATIVE PERCENT: 0.9 % (ref 0–1)
METER GLUCOSE: 106 MG/DL (ref 74–99)
MONOCYTES ABSOLUTE: 0.74 E9/L (ref 0.1–0.95)
MONOCYTES ABSOLUTE: 0.79 E9/L (ref 0.1–0.95)
MONOCYTES ABSOLUTE: 0.85 E9/L (ref 0.1–0.95)
MONOCYTES ABSOLUTE: 1.02 E9/L (ref 0.1–0.95)
MONOCYTES ABSOLUTE: 1.24 E9/L (ref 0.1–0.95)
MONOCYTES ABSOLUTE: 1.43 E9/L (ref 0.1–0.95)
MONOCYTES ABSOLUTE: 1.44 E9/L (ref 0.1–0.95)
MONOCYTES ABSOLUTE: 1.46 E9/L (ref 0.1–0.95)
MONOCYTES ABSOLUTE: 1.52 E9/L (ref 0.1–0.95)
MONOCYTES ABSOLUTE: 1.58 E9/L (ref 0.1–0.95)
MONOCYTES ABSOLUTE: 1.67 E9/L (ref 0.1–0.95)
MONOCYTES ABSOLUTE: 1.69 E9/L (ref 0.1–0.95)
MONOCYTES ABSOLUTE: 1.73 E9/L (ref 0.1–0.95)
MONOCYTES ABSOLUTE: 1.95 E9/L (ref 0.1–0.95)
MONOCYTES ABSOLUTE: 2.18 E9/L (ref 0.1–0.95)
MONOCYTES RELATIVE PERCENT: 12.3 % (ref 2–12)
MONOCYTES RELATIVE PERCENT: 12.3 % (ref 2–12)
MONOCYTES RELATIVE PERCENT: 12.7 % (ref 2–12)
MONOCYTES RELATIVE PERCENT: 13.6 % (ref 2–12)
MONOCYTES RELATIVE PERCENT: 13.9 % (ref 2–12)
MONOCYTES RELATIVE PERCENT: 14.4 % (ref 2–12)
MONOCYTES RELATIVE PERCENT: 14.8 % (ref 2–12)
MONOCYTES RELATIVE PERCENT: 15 % (ref 2–12)
MONOCYTES RELATIVE PERCENT: 4.3 % (ref 2–12)
MONOCYTES RELATIVE PERCENT: 6.1 % (ref 2–12)
MONOCYTES RELATIVE PERCENT: 6.1 % (ref 2–12)
MONOCYTES RELATIVE PERCENT: 6.9 % (ref 2–12)
MONOCYTES RELATIVE PERCENT: 6.9 % (ref 2–12)
MONOCYTES RELATIVE PERCENT: 7.8 % (ref 2–12)
MONOCYTES RELATIVE PERCENT: 8.3 % (ref 2–12)
MYCOPLASMA PNEUMONIAE BY PCR: NOT DETECTED
MYELOCYTE PERCENT: 0.9 % (ref 0–0)
MYELOCYTE PERCENT: 1.7 % (ref 0–0)
NEUTROPHILS ABSOLUTE: 10.06 E9/L (ref 1.8–7.3)
NEUTROPHILS ABSOLUTE: 11.02 E9/L (ref 1.8–7.3)
NEUTROPHILS ABSOLUTE: 11.32 E9/L (ref 1.8–7.3)
NEUTROPHILS ABSOLUTE: 11.83 E9/L (ref 1.8–7.3)
NEUTROPHILS ABSOLUTE: 17.75 E9/L (ref 1.8–7.3)
NEUTROPHILS ABSOLUTE: 18.96 E9/L (ref 1.8–7.3)
NEUTROPHILS ABSOLUTE: 6.13 E9/L (ref 1.8–7.3)
NEUTROPHILS ABSOLUTE: 6.57 E9/L (ref 1.8–7.3)
NEUTROPHILS ABSOLUTE: 7.25 E9/L (ref 1.8–7.3)
NEUTROPHILS ABSOLUTE: 8.61 E9/L (ref 1.8–7.3)
NEUTROPHILS ABSOLUTE: 8.91 E9/L (ref 1.8–7.3)
NEUTROPHILS ABSOLUTE: 9.1 E9/L (ref 1.8–7.3)
NEUTROPHILS ABSOLUTE: 9.17 E9/L (ref 1.8–7.3)
NEUTROPHILS ABSOLUTE: 9.65 E9/L (ref 1.8–7.3)
NEUTROPHILS ABSOLUTE: 9.81 E9/L (ref 1.8–7.3)
NEUTROPHILS RELATIVE PERCENT: 61.2 % (ref 43–80)
NEUTROPHILS RELATIVE PERCENT: 62.7 % (ref 43–80)
NEUTROPHILS RELATIVE PERCENT: 64.4 % (ref 43–80)
NEUTROPHILS RELATIVE PERCENT: 65 % (ref 43–80)
NEUTROPHILS RELATIVE PERCENT: 66.4 % (ref 43–80)
NEUTROPHILS RELATIVE PERCENT: 66.7 % (ref 43–80)
NEUTROPHILS RELATIVE PERCENT: 67.6 % (ref 43–80)
NEUTROPHILS RELATIVE PERCENT: 67.8 % (ref 43–80)
NEUTROPHILS RELATIVE PERCENT: 68.7 % (ref 43–80)
NEUTROPHILS RELATIVE PERCENT: 69.5 % (ref 43–80)
NEUTROPHILS RELATIVE PERCENT: 73.1 % (ref 43–80)
NEUTROPHILS RELATIVE PERCENT: 74.2 % (ref 43–80)
NEUTROPHILS RELATIVE PERCENT: 74.8 % (ref 43–80)
NEUTROPHILS RELATIVE PERCENT: 86.1 % (ref 43–80)
NEUTROPHILS RELATIVE PERCENT: 88.7 % (ref 43–80)
NITRITE, URINE: NEGATIVE
NITRITE, URINE: NEGATIVE
NITRITE, URINE: POSITIVE
NITRITE, URINE: POSITIVE
NUCLEATED RED BLOOD CELLS: 10.4 /100 WBC
NUCLEATED RED BLOOD CELLS: 3.5 /100 WBC
NUCLEATED RED BLOOD CELLS: 4.3 /100 WBC
NUCLEATED RED BLOOD CELLS: 4.3 /100 WBC
NUCLEATED RED BLOOD CELLS: 6.1 /100 WBC
ORGANISM: ABNORMAL
OSMOLALITY: 337 MOSM/KG (ref 285–310)
OVALOCYTES: ABNORMAL
PARAINFLUENZA VIRUS 1 BY PCR: NOT DETECTED
PARAINFLUENZA VIRUS 2 BY PCR: NOT DETECTED
PARAINFLUENZA VIRUS 3 BY PCR: NOT DETECTED
PARAINFLUENZA VIRUS 4 BY PCR: NOT DETECTED
PDW BLD-RTO: 12.1 FL (ref 11.5–15)
PDW BLD-RTO: 13.2 FL (ref 11.5–15)
PDW BLD-RTO: 13.7 FL (ref 11.5–15)
PDW BLD-RTO: 13.8 FL (ref 11.5–15)
PDW BLD-RTO: 14.2 FL (ref 11.5–15)
PDW BLD-RTO: 14.4 FL (ref 11.5–15)
PDW BLD-RTO: 14.6 FL (ref 11.5–15)
PDW BLD-RTO: 14.6 FL (ref 11.5–15)
PDW BLD-RTO: 14.7 FL (ref 11.5–15)
PDW BLD-RTO: 14.8 FL (ref 11.5–15)
PDW BLD-RTO: 14.9 FL (ref 11.5–15)
PDW BLD-RTO: 15.1 FL (ref 11.5–15)
PDW BLD-RTO: 15.3 FL (ref 11.5–15)
PDW BLD-RTO: 15.6 FL (ref 11.5–15)
PDW BLD-RTO: 15.7 FL (ref 11.5–15)
PDW BLD-RTO: 15.9 FL (ref 11.5–15)
PH UA: 5.5 (ref 5–9)
PLATELET # BLD: 174 E9/L (ref 130–450)
PLATELET # BLD: 187 E9/L (ref 130–450)
PLATELET # BLD: 198 E9/L (ref 130–450)
PLATELET # BLD: 200 E9/L (ref 130–450)
PLATELET # BLD: 204 E9/L (ref 130–450)
PLATELET # BLD: 207 E9/L (ref 130–450)
PLATELET # BLD: 209 E9/L (ref 130–450)
PLATELET # BLD: 214 E9/L (ref 130–450)
PLATELET # BLD: 218 E9/L (ref 130–450)
PLATELET # BLD: 219 E9/L (ref 130–450)
PLATELET # BLD: 220 E9/L (ref 130–450)
PLATELET # BLD: 222 E9/L (ref 130–450)
PLATELET # BLD: 224 E9/L (ref 130–450)
PLATELET # BLD: 247 E9/L (ref 130–450)
PLATELET # BLD: 267 E9/L (ref 130–450)
PLATELET # BLD: 277 E9/L (ref 130–450)
PLATELET # BLD: 281 E9/L (ref 130–450)
PLATELET # BLD: 291 E9/L (ref 130–450)
PMV BLD AUTO: 10.2 FL (ref 7–12)
PMV BLD AUTO: 10.4 FL (ref 7–12)
PMV BLD AUTO: 10.5 FL (ref 7–12)
PMV BLD AUTO: 10.8 FL (ref 7–12)
PMV BLD AUTO: 10.8 FL (ref 7–12)
PMV BLD AUTO: 11.2 FL (ref 7–12)
PMV BLD AUTO: 11.3 FL (ref 7–12)
PMV BLD AUTO: 11.3 FL (ref 7–12)
PMV BLD AUTO: 11.4 FL (ref 7–12)
PMV BLD AUTO: 11.4 FL (ref 7–12)
PMV BLD AUTO: 11.5 FL (ref 7–12)
PMV BLD AUTO: 11.5 FL (ref 7–12)
PMV BLD AUTO: 11.7 FL (ref 7–12)
PMV BLD AUTO: 11.7 FL (ref 7–12)
PMV BLD AUTO: 12.1 FL (ref 7–12)
PMV BLD AUTO: 12.7 FL (ref 7–12)
PMV BLD AUTO: 12.9 FL (ref 7–12)
PMV BLD AUTO: 9.5 FL (ref 7–12)
POIKILOCYTES: ABNORMAL
POLYCHROMASIA: ABNORMAL
POTASSIUM REFLEX MAGNESIUM: 2.8 MMOL/L (ref 3.5–5)
POTASSIUM REFLEX MAGNESIUM: 2.9 MMOL/L (ref 3.5–5)
POTASSIUM REFLEX MAGNESIUM: 2.9 MMOL/L (ref 3.5–5)
POTASSIUM REFLEX MAGNESIUM: 3 MMOL/L (ref 3.5–5)
POTASSIUM REFLEX MAGNESIUM: 3 MMOL/L (ref 3.5–5)
POTASSIUM REFLEX MAGNESIUM: 3.2 MMOL/L (ref 3.5–5)
POTASSIUM REFLEX MAGNESIUM: 3.3 MMOL/L (ref 3.5–5)
POTASSIUM REFLEX MAGNESIUM: 3.7 MMOL/L (ref 3.5–5)
POTASSIUM REFLEX MAGNESIUM: 3.9 MMOL/L (ref 3.5–5)
POTASSIUM REFLEX MAGNESIUM: 3.9 MMOL/L (ref 3.5–5)
POTASSIUM SERPL-SCNC: 3 MMOL/L (ref 3.5–5)
POTASSIUM SERPL-SCNC: 3.2 MMOL/L (ref 3.5–5)
POTASSIUM SERPL-SCNC: 3.2 MMOL/L (ref 3.5–5)
POTASSIUM SERPL-SCNC: 3.3 MMOL/L (ref 3.5–5)
POTASSIUM SERPL-SCNC: 3.3 MMOL/L (ref 3.5–5)
POTASSIUM SERPL-SCNC: 3.4 MMOL/L (ref 3.5–5)
POTASSIUM SERPL-SCNC: 3.5 MMOL/L (ref 3.5–5)
POTASSIUM SERPL-SCNC: 3.6 MMOL/L (ref 3.5–5)
POTASSIUM SERPL-SCNC: 3.7 MMOL/L (ref 3.5–5)
POTASSIUM SERPL-SCNC: 3.7 MMOL/L (ref 3.5–5)
POTASSIUM SERPL-SCNC: 3.8 MMOL/L (ref 3.5–5)
POTASSIUM SERPL-SCNC: 3.8 MMOL/L (ref 3.5–5)
POTASSIUM SERPL-SCNC: 3.9 MMOL/L (ref 3.5–5)
POTASSIUM SERPL-SCNC: 4.3 MMOL/L (ref 3.5–5)
POTASSIUM SERPL-SCNC: 4.3 MMOL/L (ref 3.5–5)
POTASSIUM SERPL-SCNC: 4.6 MMOL/L (ref 3.5–5)
PRO-BNP: 221 PG/ML (ref 0–125)
PRO-BNP: 231 PG/ML (ref 0–125)
PRO-BNP: 290 PG/ML (ref 0–125)
PRO-BNP: 333 PG/ML (ref 0–125)
PRO-BNP: 337 PG/ML (ref 0–125)
PROTEIN UA: NEGATIVE MG/DL
PROTHROMBIN TIME: 14.4 SEC (ref 9.3–12.4)
PROTHROMBIN TIME: 15.3 SEC (ref 9.3–12.4)
PROTHROMBIN TIME: 15.7 SEC (ref 9.3–12.4)
PROTHROMBIN TIME: 15.8 SEC (ref 9.3–12.4)
PROTHROMBIN TIME: 16.2 SEC (ref 9.3–12.4)
PROTHROMBIN TIME: 16.3 SEC (ref 9.3–12.4)
PROTHROMBIN TIME: 17.6 SEC (ref 9.3–12.4)
PROTHROMBIN TIME: 18.1 SEC (ref 9.3–12.4)
PROTHROMBIN TIME: 18.2 SEC (ref 9.3–12.4)
PROTHROMBIN TIME: 18.3 SEC (ref 9.3–12.4)
PROTHROMBIN TIME: 19.4 SEC (ref 9.3–12.4)
PROTHROMBIN TIME: 19.7 SEC (ref 9.3–12.4)
PROTHROMBIN TIME: 20.1 SEC (ref 9.3–12.4)
PROTHROMBIN TIME: 20.4 SEC (ref 9.3–12.4)
RBC # BLD: 3.83 E12/L (ref 3.5–5.5)
RBC # BLD: 4 E12/L (ref 3.5–5.5)
RBC # BLD: 4.04 E12/L (ref 3.5–5.5)
RBC # BLD: 4.17 E12/L (ref 3.5–5.5)
RBC # BLD: 4.23 E12/L (ref 3.5–5.5)
RBC # BLD: 4.32 E12/L (ref 3.5–5.5)
RBC # BLD: 4.32 E12/L (ref 3.5–5.5)
RBC # BLD: 4.41 E12/L (ref 3.5–5.5)
RBC # BLD: 4.45 E12/L (ref 3.5–5.5)
RBC # BLD: 4.52 E12/L (ref 3.5–5.5)
RBC # BLD: 4.54 E12/L (ref 3.5–5.5)
RBC # BLD: 4.59 E12/L (ref 3.5–5.5)
RBC # BLD: 4.65 E12/L (ref 3.5–5.5)
RBC # BLD: 4.71 E12/L (ref 3.5–5.5)
RBC # BLD: 4.72 E12/L (ref 3.5–5.5)
RBC # BLD: 4.85 E12/L (ref 3.5–5.5)
RBC # BLD: 4.85 E12/L (ref 3.5–5.5)
RBC # BLD: 4.92 E12/L (ref 3.5–5.5)
RBC UA: ABNORMAL /HPF (ref 0–2)
REASON FOR REJECTION: NORMAL
REJECTED TEST: NORMAL
RESPIRATORY SYNCYTIAL VIRUS BY PCR: NOT DETECTED
SALICYLATE, SERUM: <0.3 MG/DL (ref 0–30)
SARS-COV-2, NAAT: ABNORMAL
SARS-COV-2, NAAT: NOT DETECTED
SARS-COV-2, PCR: NOT DETECTED
SCHISTOCYTES: ABNORMAL
SMUDGE CELLS: ABNORMAL
SODIUM BLD-SCNC: 128 MMOL/L (ref 132–146)
SODIUM BLD-SCNC: 129 MMOL/L (ref 132–146)
SODIUM BLD-SCNC: 129 MMOL/L (ref 132–146)
SODIUM BLD-SCNC: 132 MMOL/L (ref 132–146)
SODIUM BLD-SCNC: 133 MMOL/L (ref 132–146)
SODIUM BLD-SCNC: 134 MMOL/L (ref 132–146)
SODIUM BLD-SCNC: 135 MMOL/L (ref 132–146)
SODIUM BLD-SCNC: 135 MMOL/L (ref 132–146)
SODIUM BLD-SCNC: 138 MMOL/L (ref 132–146)
SODIUM BLD-SCNC: 140 MMOL/L (ref 132–146)
SODIUM BLD-SCNC: 141 MMOL/L (ref 132–146)
SODIUM BLD-SCNC: 146 MMOL/L (ref 132–146)
SODIUM BLD-SCNC: 152 MMOL/L (ref 132–146)
SODIUM BLD-SCNC: 153 MMOL/L (ref 132–146)
SODIUM BLD-SCNC: 155 MMOL/L (ref 132–146)
SODIUM BLD-SCNC: 158 MMOL/L (ref 132–146)
SODIUM BLD-SCNC: 159 MMOL/L (ref 132–146)
SODIUM BLD-SCNC: 162 MMOL/L (ref 132–146)
SPECIFIC GRAVITY UA: 1.01 (ref 1–1.03)
T4 FREE: 1.29 NG/DL (ref 0.93–1.7)
TARGET CELLS: ABNORMAL
TEAR DROP CELLS: ABNORMAL
TOTAL PROTEIN: 6 G/DL (ref 6.4–8.3)
TOTAL PROTEIN: 6.1 G/DL (ref 6.4–8.3)
TOTAL PROTEIN: 6.2 G/DL (ref 6.4–8.3)
TOTAL PROTEIN: 6.5 G/DL (ref 6.4–8.3)
TOTAL PROTEIN: 6.5 G/DL (ref 6.4–8.3)
TOTAL PROTEIN: 6.6 G/DL (ref 6.4–8.3)
TOTAL PROTEIN: 6.7 G/DL (ref 6.4–8.3)
TOTAL PROTEIN: 6.7 G/DL (ref 6.4–8.3)
TOTAL PROTEIN: 6.8 G/DL (ref 6.4–8.3)
TOTAL PROTEIN: 7 G/DL (ref 6.4–8.3)
TOTAL PROTEIN: 7 G/DL (ref 6.4–8.3)
TOTAL PROTEIN: 7.1 G/DL (ref 6.4–8.3)
TOTAL PROTEIN: 7.2 G/DL (ref 6.4–8.3)
TOTAL PROTEIN: 7.3 G/DL (ref 6.4–8.3)
TOTAL PROTEIN: 7.4 G/DL (ref 6.4–8.3)
TOTAL PROTEIN: 7.8 G/DL (ref 6.4–8.3)
TOTAL PROTEIN: 7.9 G/DL (ref 6.4–8.3)
TRICYCLIC ANTIDEPRESSANTS SCREEN SERUM: NEGATIVE NG/ML
TRIGL SERPL-MCNC: 129 MG/DL (ref 0–149)
TRIGLYCERIDE, FASTING: 70 MG/DL (ref 0–149)
TROPONIN, HIGH SENSITIVITY: 10 NG/L (ref 0–9)
TROPONIN, HIGH SENSITIVITY: 11 NG/L (ref 0–9)
TROPONIN, HIGH SENSITIVITY: 16 NG/L (ref 0–9)
TSH SERPL DL<=0.05 MIU/L-ACNC: 3.13 UIU/ML (ref 0.27–4.2)
TSH SERPL DL<=0.05 MIU/L-ACNC: 3.45 UIU/ML (ref 0.27–4.2)
TSH SERPL DL<=0.05 MIU/L-ACNC: 3.55 UIU/ML (ref 0.27–4.2)
URINE CULTURE, ROUTINE: ABNORMAL
UROBILINOGEN, URINE: 0.2 E.U./DL
UROBILINOGEN, URINE: 0.2 E.U./DL
UROBILINOGEN, URINE: 1 E.U./DL
UROBILINOGEN, URINE: 2 E.U./DL
VLDLC SERPL CALC-MCNC: 14 MG/DL
VLDLC SERPL CALC-MCNC: 26 MG/DL
WBC # BLD: 10 E9/L (ref 4.5–11.5)
WBC # BLD: 10.1 E9/L (ref 4.5–11.5)
WBC # BLD: 11 E9/L (ref 4.5–11.5)
WBC # BLD: 11.3 E9/L (ref 4.5–11.5)
WBC # BLD: 11.6 E9/L (ref 4.5–11.5)
WBC # BLD: 12.3 E9/L (ref 4.5–11.5)
WBC # BLD: 13 E9/L (ref 4.5–11.5)
WBC # BLD: 13.1 E9/L (ref 4.5–11.5)
WBC # BLD: 13.6 E9/L (ref 4.5–11.5)
WBC # BLD: 13.9 E9/L (ref 4.5–11.5)
WBC # BLD: 14.4 E9/L (ref 4.5–11.5)
WBC # BLD: 14.5 E9/L (ref 4.5–11.5)
WBC # BLD: 14.8 E9/L (ref 4.5–11.5)
WBC # BLD: 14.9 E9/L (ref 4.5–11.5)
WBC # BLD: 15.5 E9/L (ref 4.5–11.5)
WBC # BLD: 17.5 E9/L (ref 4.5–11.5)
WBC # BLD: 20.4 E9/L (ref 4.5–11.5)
WBC # BLD: 21.3 E9/L (ref 4.5–11.5)
WBC UA: >20 /HPF (ref 0–5)
WBC UA: ABNORMAL /HPF (ref 0–5)
YEAST: PRESENT /HPF

## 2021-01-01 PROCEDURE — 6370000000 HC RX 637 (ALT 250 FOR IP): Performed by: FAMILY MEDICINE

## 2021-01-01 PROCEDURE — 2060000000 HC ICU INTERMEDIATE R&B

## 2021-01-01 PROCEDURE — 6370000000 HC RX 637 (ALT 250 FOR IP): Performed by: INTERNAL MEDICINE

## 2021-01-01 PROCEDURE — 6360000002 HC RX W HCPCS: Performed by: EMERGENCY MEDICINE

## 2021-01-01 PROCEDURE — 85610 PROTHROMBIN TIME: CPT

## 2021-01-01 PROCEDURE — 2720000010 HC SURG SUPPLY STERILE: Performed by: NEUROLOGICAL SURGERY

## 2021-01-01 PROCEDURE — 97530 THERAPEUTIC ACTIVITIES: CPT

## 2021-01-01 PROCEDURE — 6360000002 HC RX W HCPCS: Performed by: INTERNAL MEDICINE

## 2021-01-01 PROCEDURE — 71260 CT THORAX DX C+: CPT

## 2021-01-01 PROCEDURE — 36415 COLL VENOUS BLD VENIPUNCTURE: CPT

## 2021-01-01 PROCEDURE — 2700000000 HC OXYGEN THERAPY PER DAY

## 2021-01-01 PROCEDURE — 96361 HYDRATE IV INFUSION ADD-ON: CPT

## 2021-01-01 PROCEDURE — 2580000003 HC RX 258: Performed by: INTERNAL MEDICINE

## 2021-01-01 PROCEDURE — 83605 ASSAY OF LACTIC ACID: CPT

## 2021-01-01 PROCEDURE — 81001 URINALYSIS AUTO W/SCOPE: CPT

## 2021-01-01 PROCEDURE — 3017F COLORECTAL CA SCREEN DOC REV: CPT | Performed by: FAMILY MEDICINE

## 2021-01-01 PROCEDURE — 76705 ECHO EXAM OF ABDOMEN: CPT

## 2021-01-01 PROCEDURE — 2500000003 HC RX 250 WO HCPCS: Performed by: NEUROLOGICAL SURGERY

## 2021-01-01 PROCEDURE — 2580000003 HC RX 258: Performed by: SPECIALIST

## 2021-01-01 PROCEDURE — 99232 SBSQ HOSP IP/OBS MODERATE 35: CPT | Performed by: TRANSPLANT SURGERY

## 2021-01-01 PROCEDURE — 3700000000 HC ANESTHESIA ATTENDED CARE: Performed by: NEUROLOGICAL SURGERY

## 2021-01-01 PROCEDURE — A9500 TC99M SESTAMIBI: HCPCS | Performed by: RADIOLOGY

## 2021-01-01 PROCEDURE — 82105 ALPHA-FETOPROTEIN SERUM: CPT

## 2021-01-01 PROCEDURE — 99223 1ST HOSP IP/OBS HIGH 75: CPT | Performed by: STUDENT IN AN ORGANIZED HEALTH CARE EDUCATION/TRAINING PROGRAM

## 2021-01-01 PROCEDURE — 80048 BASIC METABOLIC PNL TOTAL CA: CPT

## 2021-01-01 PROCEDURE — 97161 PT EVAL LOW COMPLEX 20 MIN: CPT | Performed by: PHYSICAL THERAPIST

## 2021-01-01 PROCEDURE — 6360000002 HC RX W HCPCS

## 2021-01-01 PROCEDURE — 80076 HEPATIC FUNCTION PANEL: CPT

## 2021-01-01 PROCEDURE — 80053 COMPREHEN METABOLIC PANEL: CPT

## 2021-01-01 PROCEDURE — 85025 COMPLETE CBC W/AUTO DIFF WBC: CPT

## 2021-01-01 PROCEDURE — 97110 THERAPEUTIC EXERCISES: CPT

## 2021-01-01 PROCEDURE — 1200000000 HC SEMI PRIVATE

## 2021-01-01 PROCEDURE — 6370000000 HC RX 637 (ALT 250 FOR IP): Performed by: PHYSICIAN ASSISTANT

## 2021-01-01 PROCEDURE — 99222 1ST HOSP IP/OBS MODERATE 55: CPT | Performed by: NEUROLOGICAL SURGERY

## 2021-01-01 PROCEDURE — 36410 VNPNXR 3YR/> PHY/QHP DX/THER: CPT

## 2021-01-01 PROCEDURE — 86901 BLOOD TYPING SEROLOGIC RH(D): CPT

## 2021-01-01 PROCEDURE — 99284 EMERGENCY DEPT VISIT MOD MDM: CPT

## 2021-01-01 PROCEDURE — 97535 SELF CARE MNGMENT TRAINING: CPT

## 2021-01-01 PROCEDURE — 74178 CT ABD&PLV WO CNTR FLWD CNTR: CPT

## 2021-01-01 PROCEDURE — 2580000003 HC RX 258: Performed by: FAMILY MEDICINE

## 2021-01-01 PROCEDURE — 82962 GLUCOSE BLOOD TEST: CPT

## 2021-01-01 PROCEDURE — 84443 ASSAY THYROID STIM HORMONE: CPT

## 2021-01-01 PROCEDURE — 88342 IMHCHEM/IMCYTCHM 1ST ANTB: CPT

## 2021-01-01 PROCEDURE — 93454 CORONARY ARTERY ANGIO S&I: CPT | Performed by: INTERNAL MEDICINE

## 2021-01-01 PROCEDURE — 6370000000 HC RX 637 (ALT 250 FOR IP): Performed by: EMERGENCY MEDICINE

## 2021-01-01 PROCEDURE — 6360000002 HC RX W HCPCS: Performed by: FAMILY MEDICINE

## 2021-01-01 PROCEDURE — 99232 SBSQ HOSP IP/OBS MODERATE 35: CPT | Performed by: INTERNAL MEDICINE

## 2021-01-01 PROCEDURE — 99233 SBSQ HOSP IP/OBS HIGH 50: CPT | Performed by: INTERNAL MEDICINE

## 2021-01-01 PROCEDURE — 80061 LIPID PANEL: CPT

## 2021-01-01 PROCEDURE — 83735 ASSAY OF MAGNESIUM: CPT

## 2021-01-01 PROCEDURE — 99214 OFFICE O/P EST MOD 30 MIN: CPT | Performed by: FAMILY MEDICINE

## 2021-01-01 PROCEDURE — 0QU03JZ SUPPLEMENT LUMBAR VERTEBRA WITH SYNTHETIC SUBSTITUTE, PERCUTANEOUS APPROACH: ICD-10-PCS | Performed by: NEUROLOGICAL SURGERY

## 2021-01-01 PROCEDURE — 2500000003 HC RX 250 WO HCPCS: Performed by: RADIOLOGY

## 2021-01-01 PROCEDURE — 97530 THERAPEUTIC ACTIVITIES: CPT | Performed by: OCCUPATIONAL THERAPIST

## 2021-01-01 PROCEDURE — 82140 ASSAY OF AMMONIA: CPT

## 2021-01-01 PROCEDURE — 47000 NEEDLE BIOPSY OF LIVER PERQ: CPT | Performed by: RADIOLOGY

## 2021-01-01 PROCEDURE — 97165 OT EVAL LOW COMPLEX 30 MIN: CPT

## 2021-01-01 PROCEDURE — 22514 PERQ VERTEBRAL AUGMENTATION: CPT | Performed by: NEUROLOGICAL SURGERY

## 2021-01-01 PROCEDURE — 3600000004 HC SURGERY LEVEL 4 BASE: Performed by: NEUROLOGICAL SURGERY

## 2021-01-01 PROCEDURE — 3700000001 HC ADD 15 MINUTES (ANESTHESIA): Performed by: NEUROLOGICAL SURGERY

## 2021-01-01 PROCEDURE — 0QS03ZZ REPOSITION LUMBAR VERTEBRA, PERCUTANEOUS APPROACH: ICD-10-PCS | Performed by: NEUROLOGICAL SURGERY

## 2021-01-01 PROCEDURE — 86880 COOMBS TEST DIRECT: CPT

## 2021-01-01 PROCEDURE — 99283 EMERGENCY DEPT VISIT LOW MDM: CPT

## 2021-01-01 PROCEDURE — 87040 BLOOD CULTURE FOR BACTERIA: CPT

## 2021-01-01 PROCEDURE — 97530 THERAPEUTIC ACTIVITIES: CPT | Performed by: PHYSICAL THERAPIST

## 2021-01-01 PROCEDURE — 71045 X-RAY EXAM CHEST 1 VIEW: CPT

## 2021-01-01 PROCEDURE — 83880 ASSAY OF NATRIURETIC PEPTIDE: CPT

## 2021-01-01 PROCEDURE — G0438 PPPS, INITIAL VISIT: HCPCS | Performed by: PHYSICIAN ASSISTANT

## 2021-01-01 PROCEDURE — 87635 SARS-COV-2 COVID-19 AMP PRB: CPT

## 2021-01-01 PROCEDURE — 88341 IMHCHEM/IMCYTCHM EA ADD ANTB: CPT

## 2021-01-01 PROCEDURE — 93016 CV STRESS TEST SUPVJ ONLY: CPT | Performed by: INTERNAL MEDICINE

## 2021-01-01 PROCEDURE — 93017 CV STRESS TEST TRACING ONLY: CPT

## 2021-01-01 PROCEDURE — 83036 HEMOGLOBIN GLYCOSYLATED A1C: CPT

## 2021-01-01 PROCEDURE — 85027 COMPLETE CBC AUTOMATED: CPT

## 2021-01-01 PROCEDURE — 05HB33Z INSERTION OF INFUSION DEVICE INTO RIGHT BASILIC VEIN, PERCUTANEOUS APPROACH: ICD-10-PCS | Performed by: INTERNAL MEDICINE

## 2021-01-01 PROCEDURE — C1713 ANCHOR/SCREW BN/BN,TIS/BN: HCPCS | Performed by: NEUROLOGICAL SURGERY

## 2021-01-01 PROCEDURE — 78452 HT MUSCLE IMAGE SPECT MULT: CPT

## 2021-01-01 PROCEDURE — 99285 EMERGENCY DEPT VISIT HI MDM: CPT

## 2021-01-01 PROCEDURE — 2709999900 CT NEEDLE BIOPSY LIVER PERCUTANEOUS

## 2021-01-01 PROCEDURE — C1769 GUIDE WIRE: HCPCS

## 2021-01-01 PROCEDURE — 88307 TISSUE EXAM BY PATHOLOGIST: CPT

## 2021-01-01 PROCEDURE — G8417 CALC BMI ABV UP PARAM F/U: HCPCS | Performed by: FAMILY MEDICINE

## 2021-01-01 PROCEDURE — 0202U NFCT DS 22 TRGT SARS-COV-2: CPT

## 2021-01-01 PROCEDURE — 71046 X-RAY EXAM CHEST 2 VIEWS: CPT

## 2021-01-01 PROCEDURE — 3017F COLORECTAL CA SCREEN DOC REV: CPT | Performed by: PHYSICIAN ASSISTANT

## 2021-01-01 PROCEDURE — G8431 POS CLIN DEPRES SCRN F/U DOC: HCPCS | Performed by: PHYSICIAN ASSISTANT

## 2021-01-01 PROCEDURE — 78452 HT MUSCLE IMAGE SPECT MULT: CPT | Performed by: INTERNAL MEDICINE

## 2021-01-01 PROCEDURE — 2580000003 HC RX 258: Performed by: EMERGENCY MEDICINE

## 2021-01-01 PROCEDURE — 6360000002 HC RX W HCPCS: Performed by: RADIOLOGY

## 2021-01-01 PROCEDURE — 76937 US GUIDE VASCULAR ACCESS: CPT

## 2021-01-01 PROCEDURE — 3430000000 HC RX DIAGNOSTIC RADIOPHARMACEUTICAL: Performed by: RADIOLOGY

## 2021-01-01 PROCEDURE — 92507 TX SP LANG VOICE COMM INDIV: CPT

## 2021-01-01 PROCEDURE — 83930 ASSAY OF BLOOD OSMOLALITY: CPT

## 2021-01-01 PROCEDURE — 96374 THER/PROPH/DIAG INJ IV PUSH: CPT

## 2021-01-01 PROCEDURE — 97112 NEUROMUSCULAR REEDUCATION: CPT | Performed by: PHYSICAL THERAPIST

## 2021-01-01 PROCEDURE — 2500000003 HC RX 250 WO HCPCS

## 2021-01-01 PROCEDURE — 77012 CT SCAN FOR NEEDLE BIOPSY: CPT

## 2021-01-01 PROCEDURE — 82077 ASSAY SPEC XCP UR&BREATH IA: CPT

## 2021-01-01 PROCEDURE — 2709999900 HC NON-CHARGEABLE SUPPLY

## 2021-01-01 PROCEDURE — 99223 1ST HOSP IP/OBS HIGH 75: CPT | Performed by: NURSE PRACTITIONER

## 2021-01-01 PROCEDURE — 96372 THER/PROPH/DIAG INJ SC/IM: CPT

## 2021-01-01 PROCEDURE — 6370000000 HC RX 637 (ALT 250 FOR IP)

## 2021-01-01 PROCEDURE — 72100 X-RAY EXAM L-S SPINE 2/3 VWS: CPT

## 2021-01-01 PROCEDURE — 2580000003 HC RX 258

## 2021-01-01 PROCEDURE — 96365 THER/PROPH/DIAG IV INF INIT: CPT

## 2021-01-01 PROCEDURE — 97161 PT EVAL LOW COMPLEX 20 MIN: CPT

## 2021-01-01 PROCEDURE — 83690 ASSAY OF LIPASE: CPT

## 2021-01-01 PROCEDURE — 6360000004 HC RX CONTRAST MEDICATION: Performed by: RADIOLOGY

## 2021-01-01 PROCEDURE — 87088 URINE BACTERIA CULTURE: CPT

## 2021-01-01 PROCEDURE — 97165 OT EVAL LOW COMPLEX 30 MIN: CPT | Performed by: OCCUPATIONAL THERAPIST

## 2021-01-01 PROCEDURE — 84439 ASSAY OF FREE THYROXINE: CPT

## 2021-01-01 PROCEDURE — C1894 INTRO/SHEATH, NON-LASER: HCPCS | Performed by: NEUROLOGICAL SURGERY

## 2021-01-01 PROCEDURE — 1111F DSCHRG MED/CURRENT MED MERGE: CPT | Performed by: FAMILY MEDICINE

## 2021-01-01 PROCEDURE — APPSS60 APP SPLIT SHARED TIME 46-60 MINUTES: Performed by: PHYSICIAN ASSISTANT

## 2021-01-01 PROCEDURE — 87077 CULTURE AEROBIC IDENTIFY: CPT

## 2021-01-01 PROCEDURE — 6360000004 HC RX CONTRAST MEDICATION: Performed by: FAMILY MEDICINE

## 2021-01-01 PROCEDURE — 6360000002 HC RX W HCPCS: Performed by: SPECIALIST

## 2021-01-01 PROCEDURE — C1751 CATH, INF, PER/CENT/MIDLINE: HCPCS

## 2021-01-01 PROCEDURE — G8427 DOCREV CUR MEDS BY ELIG CLIN: HCPCS | Performed by: FAMILY MEDICINE

## 2021-01-01 PROCEDURE — 86316 IMMUNOASSAY TUMOR OTHER: CPT

## 2021-01-01 PROCEDURE — 77012 CT SCAN FOR NEEDLE BIOPSY: CPT | Performed by: RADIOLOGY

## 2021-01-01 PROCEDURE — 93005 ELECTROCARDIOGRAM TRACING: CPT | Performed by: EMERGENCY MEDICINE

## 2021-01-01 PROCEDURE — 86870 RBC ANTIBODY IDENTIFICATION: CPT

## 2021-01-01 PROCEDURE — APPSS30 APP SPLIT SHARED TIME 16-30 MINUTES: Performed by: PHYSICIAN ASSISTANT

## 2021-01-01 PROCEDURE — 87522 HEPATITIS C REVRS TRNSCRPJ: CPT

## 2021-01-01 PROCEDURE — 70450 CT HEAD/BRAIN W/O DYE: CPT

## 2021-01-01 PROCEDURE — 86301 IMMUNOASSAY TUMOR CA 19-9: CPT

## 2021-01-01 PROCEDURE — 93005 ELECTROCARDIOGRAM TRACING: CPT | Performed by: PHYSICIAN ASSISTANT

## 2021-01-01 PROCEDURE — 72146 MRI CHEST SPINE W/O DYE: CPT

## 2021-01-01 PROCEDURE — 72131 CT LUMBAR SPINE W/O DYE: CPT

## 2021-01-01 PROCEDURE — 0QB03ZX EXCISION OF LUMBAR VERTEBRA, PERCUTANEOUS APPROACH, DIAGNOSTIC: ICD-10-PCS | Performed by: NEUROLOGICAL SURGERY

## 2021-01-01 PROCEDURE — 99223 1ST HOSP IP/OBS HIGH 75: CPT | Performed by: TRANSPLANT SURGERY

## 2021-01-01 PROCEDURE — 99221 1ST HOSP IP/OBS SF/LOW 40: CPT

## 2021-01-01 PROCEDURE — 3600000014 HC SURGERY LEVEL 4 ADDTL 15MIN: Performed by: NEUROLOGICAL SURGERY

## 2021-01-01 PROCEDURE — 6360000002 HC RX W HCPCS: Performed by: PHYSICIAN ASSISTANT

## 2021-01-01 PROCEDURE — 6360000002 HC RX W HCPCS: Performed by: NURSE PRACTITIONER

## 2021-01-01 PROCEDURE — 84484 ASSAY OF TROPONIN QUANT: CPT

## 2021-01-01 PROCEDURE — 72148 MRI LUMBAR SPINE W/O DYE: CPT

## 2021-01-01 PROCEDURE — 2709999900 HC NON-CHARGEABLE SUPPLY: Performed by: NEUROLOGICAL SURGERY

## 2021-01-01 PROCEDURE — 88311 DECALCIFY TISSUE: CPT

## 2021-01-01 PROCEDURE — 80143 DRUG ASSAY ACETAMINOPHEN: CPT

## 2021-01-01 PROCEDURE — 7100000001 HC PACU RECOVERY - ADDTL 15 MIN: Performed by: NEUROLOGICAL SURGERY

## 2021-01-01 PROCEDURE — 92523 SPEECH SOUND LANG COMPREHEN: CPT

## 2021-01-01 PROCEDURE — 80307 DRUG TEST PRSMV CHEM ANLYZR: CPT

## 2021-01-01 PROCEDURE — 3209999900 FLUORO FOR SURGICAL PROCEDURES

## 2021-01-01 PROCEDURE — 7100000000 HC PACU RECOVERY - FIRST 15 MIN: Performed by: NEUROLOGICAL SURGERY

## 2021-01-01 PROCEDURE — 4004F PT TOBACCO SCREEN RCVD TLK: CPT | Performed by: FAMILY MEDICINE

## 2021-01-01 PROCEDURE — 82378 CARCINOEMBRYONIC ANTIGEN: CPT

## 2021-01-01 PROCEDURE — C8929 TTE W OR WO FOL WCON,DOPPLER: HCPCS

## 2021-01-01 PROCEDURE — 86900 BLOOD TYPING SEROLOGIC ABO: CPT

## 2021-01-01 PROCEDURE — C1894 INTRO/SHEATH, NON-LASER: HCPCS

## 2021-01-01 PROCEDURE — 87186 SC STD MICRODIL/AGAR DIL: CPT

## 2021-01-01 PROCEDURE — 93018 CV STRESS TEST I&R ONLY: CPT | Performed by: INTERNAL MEDICINE

## 2021-01-01 PROCEDURE — 86850 RBC ANTIBODY SCREEN: CPT

## 2021-01-01 PROCEDURE — 80179 DRUG ASSAY SALICYLATE: CPT

## 2021-01-01 PROCEDURE — 70551 MRI BRAIN STEM W/O DYE: CPT

## 2021-01-01 DEVICE — BONE CEMENT C01B HV-R WITH MIXER  US
Type: IMPLANTABLE DEVICE | Site: BACK | Status: FUNCTIONAL
Brand: KYPHON® HV-R® BONE CEMENT AND KYPHON® MIXER PACK

## 2021-01-01 RX ORDER — MORPHINE SULFATE 2 MG/ML
1 INJECTION, SOLUTION INTRAMUSCULAR; INTRAVENOUS EVERY 5 MIN PRN
Status: DISCONTINUED | OUTPATIENT
Start: 2021-01-01 | End: 2021-01-01 | Stop reason: HOSPADM

## 2021-01-01 RX ORDER — OXYCODONE HYDROCHLORIDE 5 MG/1
5 TABLET ORAL EVERY 4 HOURS PRN
Status: DISCONTINUED | OUTPATIENT
Start: 2021-01-01 | End: 2021-01-01 | Stop reason: HOSPADM

## 2021-01-01 RX ORDER — ATORVASTATIN CALCIUM 40 MG/1
40 TABLET, FILM COATED ORAL NIGHTLY
Status: DISCONTINUED | OUTPATIENT
Start: 2021-01-01 | End: 2021-01-01 | Stop reason: HOSPADM

## 2021-01-01 RX ORDER — LORAZEPAM 2 MG/ML
2 INJECTION INTRAMUSCULAR
Status: DISCONTINUED | OUTPATIENT
Start: 2021-01-01 | End: 2021-01-01

## 2021-01-01 RX ORDER — LORAZEPAM 1 MG/1
1 TABLET ORAL
Status: DISCONTINUED | OUTPATIENT
Start: 2021-01-01 | End: 2021-01-01

## 2021-01-01 RX ORDER — POTASSIUM CHLORIDE 7.45 MG/ML
30 INJECTION INTRAVENOUS ONCE
Status: DISCONTINUED | OUTPATIENT
Start: 2021-01-01 | End: 2021-01-01

## 2021-01-01 RX ORDER — SODIUM CHLORIDE 9 MG/ML
25 INJECTION, SOLUTION INTRAVENOUS PRN
Status: DISCONTINUED | OUTPATIENT
Start: 2021-01-01 | End: 2021-01-01 | Stop reason: HOSPADM

## 2021-01-01 RX ORDER — NICOTINE 21 MG/24HR
1 PATCH, TRANSDERMAL 24 HOURS TRANSDERMAL DAILY
Status: DISCONTINUED | OUTPATIENT
Start: 2021-01-01 | End: 2021-01-01 | Stop reason: HOSPADM

## 2021-01-01 RX ORDER — ONDANSETRON 4 MG/1
4 TABLET, ORALLY DISINTEGRATING ORAL EVERY 8 HOURS PRN
Status: DISCONTINUED | OUTPATIENT
Start: 2021-01-01 | End: 2021-01-01 | Stop reason: HOSPADM

## 2021-01-01 RX ORDER — CLOPIDOGREL BISULFATE 75 MG/1
75 TABLET ORAL DAILY
Status: DISCONTINUED | OUTPATIENT
Start: 2021-01-01 | End: 2021-01-01 | Stop reason: HOSPADM

## 2021-01-01 RX ORDER — NEOSTIGMINE METHYLSULFATE 1 MG/ML
INJECTION, SOLUTION INTRAVENOUS PRN
Status: DISCONTINUED | OUTPATIENT
Start: 2021-01-01 | End: 2021-01-01 | Stop reason: SDUPTHER

## 2021-01-01 RX ORDER — LORAZEPAM 1 MG/1
2 TABLET ORAL
Status: DISCONTINUED | OUTPATIENT
Start: 2021-01-01 | End: 2021-01-01 | Stop reason: HOSPADM

## 2021-01-01 RX ORDER — FUROSEMIDE 40 MG/1
40 TABLET ORAL DAILY
Status: DISCONTINUED | OUTPATIENT
Start: 2021-01-01 | End: 2021-01-01 | Stop reason: HOSPADM

## 2021-01-01 RX ORDER — ATENOLOL 25 MG/1
25 TABLET ORAL DAILY
Qty: 30 TABLET | Refills: 3 | Status: ON HOLD
Start: 2021-01-01 | End: 2021-01-01 | Stop reason: HOSPADM

## 2021-01-01 RX ORDER — OXYCODONE HYDROCHLORIDE 5 MG/1
5 TABLET ORAL EVERY 8 HOURS PRN
Status: ON HOLD | COMMUNITY
End: 2021-01-01 | Stop reason: HOSPADM

## 2021-01-01 RX ORDER — GAUZE BANDAGE 2" X 2"
100 BANDAGE TOPICAL DAILY
Status: DISCONTINUED | OUTPATIENT
Start: 2021-01-01 | End: 2021-01-01 | Stop reason: HOSPADM

## 2021-01-01 RX ORDER — SODIUM CHLORIDE 0.9 % (FLUSH) 0.9 %
5-40 SYRINGE (ML) INJECTION PRN
Status: DISCONTINUED | OUTPATIENT
Start: 2021-01-01 | End: 2021-01-01 | Stop reason: HOSPADM

## 2021-01-01 RX ORDER — 0.9 % SODIUM CHLORIDE 0.9 %
500 INTRAVENOUS SOLUTION INTRAVENOUS ONCE
Status: COMPLETED | OUTPATIENT
Start: 2021-01-01 | End: 2021-01-01

## 2021-01-01 RX ORDER — LORAZEPAM 1 MG/1
4 TABLET ORAL
Status: DISCONTINUED | OUTPATIENT
Start: 2021-01-01 | End: 2021-01-01

## 2021-01-01 RX ORDER — MELOXICAM 15 MG/1
TABLET ORAL
Qty: 30 TABLET | Refills: 0 | Status: SHIPPED
Start: 2021-01-01 | End: 2021-01-01 | Stop reason: SDUPTHER

## 2021-01-01 RX ORDER — POTASSIUM CHLORIDE 20 MEQ/1
20 TABLET, EXTENDED RELEASE ORAL ONCE
Status: COMPLETED | OUTPATIENT
Start: 2021-01-01 | End: 2021-01-01

## 2021-01-01 RX ORDER — ZIPRASIDONE MESYLATE 20 MG/ML
20 INJECTION, POWDER, LYOPHILIZED, FOR SOLUTION INTRAMUSCULAR ONCE
Status: COMPLETED | OUTPATIENT
Start: 2021-01-01 | End: 2021-01-01

## 2021-01-01 RX ORDER — LORAZEPAM 1 MG/1
3 TABLET ORAL
Status: DISCONTINUED | OUTPATIENT
Start: 2021-01-01 | End: 2021-01-01

## 2021-01-01 RX ORDER — LEVETIRACETAM 500 MG/1
500 TABLET ORAL 2 TIMES DAILY
Status: DISCONTINUED | OUTPATIENT
Start: 2021-01-01 | End: 2021-01-01 | Stop reason: HOSPADM

## 2021-01-01 RX ORDER — HEPARIN SODIUM (PORCINE) LOCK FLUSH IV SOLN 100 UNIT/ML 100 UNIT/ML
1 SOLUTION INTRAVENOUS EVERY 12 HOURS SCHEDULED
Status: DISCONTINUED | OUTPATIENT
Start: 2021-01-01 | End: 2021-01-01 | Stop reason: HOSPADM

## 2021-01-01 RX ORDER — ATENOLOL 25 MG/1
25 TABLET ORAL DAILY
Status: DISCONTINUED | OUTPATIENT
Start: 2021-01-01 | End: 2021-01-01 | Stop reason: HOSPADM

## 2021-01-01 RX ORDER — MIDAZOLAM HYDROCHLORIDE 1 MG/ML
INJECTION INTRAMUSCULAR; INTRAVENOUS
Status: COMPLETED | OUTPATIENT
Start: 2021-01-01 | End: 2021-01-01

## 2021-01-01 RX ORDER — ACETAMINOPHEN 325 MG/1
650 TABLET ORAL EVERY 6 HOURS PRN
Status: DISCONTINUED | OUTPATIENT
Start: 2021-01-01 | End: 2021-01-01 | Stop reason: HOSPADM

## 2021-01-01 RX ORDER — 0.9 % SODIUM CHLORIDE 0.9 %
1000 INTRAVENOUS SOLUTION INTRAVENOUS ONCE
Status: COMPLETED | OUTPATIENT
Start: 2021-01-01 | End: 2021-01-01

## 2021-01-01 RX ORDER — POTASSIUM CHLORIDE 7.45 MG/ML
10 INJECTION INTRAVENOUS
Status: DISPENSED | OUTPATIENT
Start: 2021-01-01 | End: 2021-01-01

## 2021-01-01 RX ORDER — LACTULOSE 10 G/15ML
20 SOLUTION ORAL 2 TIMES DAILY
Status: DISCONTINUED | OUTPATIENT
Start: 2021-01-01 | End: 2021-01-01 | Stop reason: HOSPADM

## 2021-01-01 RX ORDER — HALOPERIDOL 0.5 MG/1
0.5 TABLET ORAL EVERY 6 HOURS PRN
Qty: 120 TABLET | Refills: 3
Start: 2021-01-01

## 2021-01-01 RX ORDER — CITALOPRAM 40 MG/1
TABLET ORAL
Qty: 90 TABLET | Refills: 1 | Status: SHIPPED | OUTPATIENT
Start: 2021-01-01

## 2021-01-01 RX ORDER — HEPARIN SODIUM (PORCINE) LOCK FLUSH IV SOLN 100 UNIT/ML 100 UNIT/ML
1 SOLUTION INTRAVENOUS PRN
Status: DISCONTINUED | OUTPATIENT
Start: 2021-01-01 | End: 2021-01-01 | Stop reason: HOSPADM

## 2021-01-01 RX ORDER — SODIUM CHLORIDE 0.9 % (FLUSH) 0.9 %
10 SYRINGE (ML) INJECTION PRN
Status: DISCONTINUED | OUTPATIENT
Start: 2021-01-01 | End: 2021-01-01 | Stop reason: HOSPADM

## 2021-01-01 RX ORDER — LEVETIRACETAM 5 MG/ML
500 INJECTION INTRAVASCULAR 2 TIMES DAILY
Status: DISCONTINUED | OUTPATIENT
Start: 2021-01-01 | End: 2021-01-01 | Stop reason: HOSPADM

## 2021-01-01 RX ORDER — LACTULOSE 10 G/15ML
30 SOLUTION ORAL 3 TIMES DAILY
Status: DISCONTINUED | OUTPATIENT
Start: 2021-01-01 | End: 2021-01-01 | Stop reason: HOSPADM

## 2021-01-01 RX ORDER — SODIUM CHLORIDE 9 MG/ML
INJECTION, SOLUTION INTRAVENOUS CONTINUOUS PRN
Status: DISCONTINUED | OUTPATIENT
Start: 2021-01-01 | End: 2021-01-01 | Stop reason: SDUPTHER

## 2021-01-01 RX ORDER — BUPIVACAINE HYDROCHLORIDE 2.5 MG/ML
INJECTION, SOLUTION EPIDURAL; INFILTRATION; INTRACAUDAL PRN
Status: DISCONTINUED | OUTPATIENT
Start: 2021-01-01 | End: 2021-01-01 | Stop reason: ALTCHOICE

## 2021-01-01 RX ORDER — LISINOPRIL 10 MG/1
10 TABLET ORAL DAILY
Status: DISCONTINUED | OUTPATIENT
Start: 2021-01-01 | End: 2021-01-01 | Stop reason: HOSPADM

## 2021-01-01 RX ORDER — POLYETHYLENE GLYCOL 3350 17 G/17G
17 POWDER, FOR SOLUTION ORAL DAILY PRN
Status: DISCONTINUED | OUTPATIENT
Start: 2021-01-01 | End: 2021-01-01 | Stop reason: HOSPADM

## 2021-01-01 RX ORDER — POTASSIUM CHLORIDE 7.45 MG/ML
10 INJECTION INTRAVENOUS
Status: COMPLETED | OUTPATIENT
Start: 2021-01-01 | End: 2021-01-01

## 2021-01-01 RX ORDER — ONDANSETRON 2 MG/ML
4 INJECTION INTRAMUSCULAR; INTRAVENOUS EVERY 6 HOURS PRN
Status: DISCONTINUED | OUTPATIENT
Start: 2021-01-01 | End: 2021-01-01 | Stop reason: HOSPADM

## 2021-01-01 RX ORDER — MAGNESIUM OXIDE 400 MG/1
400 TABLET ORAL 2 TIMES DAILY
Status: DISCONTINUED | OUTPATIENT
Start: 2021-01-01 | End: 2021-01-01 | Stop reason: HOSPADM

## 2021-01-01 RX ORDER — MELOXICAM 15 MG/1
TABLET ORAL
Qty: 30 TABLET | Refills: 0 | Status: SHIPPED
Start: 2021-01-01 | End: 2021-01-01

## 2021-01-01 RX ORDER — LACTULOSE 10 G/15ML
20 SOLUTION ORAL 2 TIMES DAILY
Status: DISCONTINUED | OUTPATIENT
Start: 2021-01-01 | End: 2021-01-01

## 2021-01-01 RX ORDER — LISINOPRIL 10 MG/1
10 TABLET ORAL DAILY
Qty: 30 TABLET | Refills: 1
Start: 2021-01-01

## 2021-01-01 RX ORDER — CYCLOBENZAPRINE HCL 5 MG
TABLET ORAL
Qty: 60 TABLET | Refills: 5 | Status: ON HOLD
Start: 2021-01-01 | End: 2021-01-01 | Stop reason: HOSPADM

## 2021-01-01 RX ORDER — LEVOTHYROXINE SODIUM 0.05 MG/1
50 TABLET ORAL
Status: DISCONTINUED | OUTPATIENT
Start: 2021-01-01 | End: 2021-01-01 | Stop reason: HOSPADM

## 2021-01-01 RX ORDER — SPIRONOLACTONE 25 MG/1
25 TABLET ORAL DAILY
Status: DISCONTINUED | OUTPATIENT
Start: 2021-01-01 | End: 2021-01-01 | Stop reason: HOSPADM

## 2021-01-01 RX ORDER — SODIUM CHLORIDE 0.9 % (FLUSH) 0.9 %
10 SYRINGE (ML) INJECTION
Status: ACTIVE | OUTPATIENT
Start: 2021-01-01 | End: 2021-01-01

## 2021-01-01 RX ORDER — OXYCODONE HYDROCHLORIDE 5 MG/1
5 TABLET ORAL EVERY 6 HOURS PRN
Status: DISCONTINUED | OUTPATIENT
Start: 2021-01-01 | End: 2021-01-01 | Stop reason: HOSPADM

## 2021-01-01 RX ORDER — FLUCONAZOLE 150 MG/1
150 TABLET ORAL DAILY
Qty: 3 TABLET | Refills: 0 | Status: SHIPPED | OUTPATIENT
Start: 2021-01-01 | End: 2021-01-01

## 2021-01-01 RX ORDER — CITALOPRAM 20 MG/1
40 TABLET ORAL DAILY
Status: DISCONTINUED | OUTPATIENT
Start: 2021-01-01 | End: 2021-01-01 | Stop reason: HOSPADM

## 2021-01-01 RX ORDER — ONDANSETRON 2 MG/ML
INJECTION INTRAMUSCULAR; INTRAVENOUS PRN
Status: DISCONTINUED | OUTPATIENT
Start: 2021-01-01 | End: 2021-01-01 | Stop reason: SDUPTHER

## 2021-01-01 RX ORDER — MEPERIDINE HYDROCHLORIDE 25 MG/ML
12.5 INJECTION INTRAMUSCULAR; INTRAVENOUS; SUBCUTANEOUS EVERY 5 MIN PRN
Status: DISCONTINUED | OUTPATIENT
Start: 2021-01-01 | End: 2021-01-01 | Stop reason: HOSPADM

## 2021-01-01 RX ORDER — MAGNESIUM SULFATE 1 G/100ML
1000 INJECTION INTRAVENOUS ONCE
Status: COMPLETED | OUTPATIENT
Start: 2021-01-01 | End: 2021-01-01

## 2021-01-01 RX ORDER — GABAPENTIN 100 MG/1
200 CAPSULE ORAL 3 TIMES DAILY
Status: DISCONTINUED | OUTPATIENT
Start: 2021-01-01 | End: 2021-01-01 | Stop reason: HOSPADM

## 2021-01-01 RX ORDER — THIAMINE HYDROCHLORIDE 100 MG/ML
100 INJECTION, SOLUTION INTRAMUSCULAR; INTRAVENOUS ONCE
Status: COMPLETED | OUTPATIENT
Start: 2021-01-01 | End: 2021-01-01

## 2021-01-01 RX ORDER — LEVETIRACETAM 500 MG/1
TABLET ORAL
COMMUNITY
Start: 2021-01-01 | End: 2021-01-01 | Stop reason: SDUPTHER

## 2021-01-01 RX ORDER — LISINOPRIL 10 MG/1
10 TABLET ORAL DAILY
Refills: 1 | Status: DISCONTINUED | OUTPATIENT
Start: 2021-01-01 | End: 2021-01-01 | Stop reason: HOSPADM

## 2021-01-01 RX ORDER — LORAZEPAM 1 MG/1
2 TABLET ORAL
Status: DISCONTINUED | OUTPATIENT
Start: 2021-01-01 | End: 2021-01-01

## 2021-01-01 RX ORDER — CHLORDIAZEPOXIDE HYDROCHLORIDE 5 MG/1
5 CAPSULE, GELATIN COATED ORAL EVERY 6 HOURS
Status: DISPENSED | OUTPATIENT
Start: 2021-01-01 | End: 2021-01-01

## 2021-01-01 RX ORDER — FUROSEMIDE 10 MG/ML
40 INJECTION INTRAMUSCULAR; INTRAVENOUS 2 TIMES DAILY
Status: DISCONTINUED | OUTPATIENT
Start: 2021-01-01 | End: 2021-01-01

## 2021-01-01 RX ORDER — SUCCINYLCHOLINE/SOD CL,ISO/PF 200MG/10ML
SYRINGE (ML) INTRAVENOUS PRN
Status: DISCONTINUED | OUTPATIENT
Start: 2021-01-01 | End: 2021-01-01 | Stop reason: SDUPTHER

## 2021-01-01 RX ORDER — POTASSIUM CHLORIDE 7.45 MG/ML
10 INJECTION INTRAVENOUS PRN
Status: DISCONTINUED | OUTPATIENT
Start: 2021-01-01 | End: 2021-01-01 | Stop reason: HOSPADM

## 2021-01-01 RX ORDER — LORAZEPAM 2 MG/ML
3 INJECTION INTRAMUSCULAR
Status: DISCONTINUED | OUTPATIENT
Start: 2021-01-01 | End: 2021-01-01 | Stop reason: HOSPADM

## 2021-01-01 RX ORDER — ONDANSETRON 2 MG/ML
4 INJECTION INTRAMUSCULAR; INTRAVENOUS
Status: DISCONTINUED | OUTPATIENT
Start: 2021-01-01 | End: 2021-01-01 | Stop reason: HOSPADM

## 2021-01-01 RX ORDER — LORAZEPAM 2 MG/ML
3 INJECTION INTRAMUSCULAR
Status: DISCONTINUED | OUTPATIENT
Start: 2021-01-01 | End: 2021-01-01

## 2021-01-01 RX ORDER — SODIUM CHLORIDE 0.9 % (FLUSH) 0.9 %
5-40 SYRINGE (ML) INJECTION EVERY 12 HOURS SCHEDULED
Status: DISCONTINUED | OUTPATIENT
Start: 2021-01-01 | End: 2021-01-01 | Stop reason: HOSPADM

## 2021-01-01 RX ORDER — MORPHINE SULFATE 2 MG/ML
2 INJECTION, SOLUTION INTRAMUSCULAR; INTRAVENOUS EVERY 5 MIN PRN
Status: DISCONTINUED | OUTPATIENT
Start: 2021-01-01 | End: 2021-01-01 | Stop reason: HOSPADM

## 2021-01-01 RX ORDER — ALBUTEROL SULFATE 90 UG/1
2 AEROSOL, METERED RESPIRATORY (INHALATION) EVERY 6 HOURS PRN
Qty: 1 INHALER | Refills: 5 | Status: ON HOLD
Start: 2021-01-01 | End: 2021-01-01 | Stop reason: HOSPADM

## 2021-01-01 RX ORDER — LORAZEPAM 2 MG/ML
4 INJECTION INTRAMUSCULAR
Status: DISCONTINUED | OUTPATIENT
Start: 2021-01-01 | End: 2021-01-01

## 2021-01-01 RX ORDER — ONDANSETRON 4 MG/1
4 TABLET, ORALLY DISINTEGRATING ORAL EVERY 8 HOURS PRN
Qty: 1 TABLET | Refills: 0
Start: 2021-01-01

## 2021-01-01 RX ORDER — LORAZEPAM 2 MG/ML
2 INJECTION INTRAMUSCULAR
Status: DISCONTINUED | OUTPATIENT
Start: 2021-01-01 | End: 2021-01-01 | Stop reason: HOSPADM

## 2021-01-01 RX ORDER — ACETAMINOPHEN 650 MG/1
650 SUPPOSITORY RECTAL EVERY 6 HOURS PRN
Status: DISCONTINUED | OUTPATIENT
Start: 2021-01-01 | End: 2021-01-01 | Stop reason: HOSPADM

## 2021-01-01 RX ORDER — MAGNESIUM OXIDE 400 MG/1
TABLET ORAL
Qty: 60 TABLET | Refills: 3 | Status: ON HOLD
Start: 2021-01-01 | End: 2021-01-01 | Stop reason: HOSPADM

## 2021-01-01 RX ORDER — LIDOCAINE HYDROCHLORIDE AND EPINEPHRINE BITARTRATE 20; .01 MG/ML; MG/ML
INJECTION, SOLUTION SUBCUTANEOUS PRN
Status: DISCONTINUED | OUTPATIENT
Start: 2021-01-01 | End: 2021-01-01 | Stop reason: ALTCHOICE

## 2021-01-01 RX ORDER — ATORVASTATIN CALCIUM 40 MG/1
TABLET, FILM COATED ORAL
Status: COMPLETED
Start: 2021-01-01 | End: 2021-01-01

## 2021-01-01 RX ORDER — MAGNESIUM OXIDE 400 MG/1
TABLET ORAL
Qty: 60 TABLET | Refills: 5 | Status: SHIPPED
Start: 2021-01-01 | End: 2021-01-01 | Stop reason: SDUPTHER

## 2021-01-01 RX ORDER — FOLIC ACID 1 MG/1
1 TABLET ORAL ONCE
Status: COMPLETED | OUTPATIENT
Start: 2021-01-01 | End: 2021-01-01

## 2021-01-01 RX ORDER — CITALOPRAM 40 MG/1
TABLET ORAL
Qty: 30 TABLET | Refills: 3 | Status: SHIPPED
Start: 2021-01-01 | End: 2021-01-01 | Stop reason: SDUPTHER

## 2021-01-01 RX ORDER — LACTULOSE 10 G/15ML
20 SOLUTION ORAL 2 TIMES DAILY
Qty: 1800 ML | Refills: 1 | Status: SHIPPED | OUTPATIENT
Start: 2021-01-01 | End: 2021-01-01

## 2021-01-01 RX ORDER — LEVETIRACETAM 500 MG/1
TABLET ORAL
Qty: 180 TABLET | Refills: 1 | Status: SHIPPED | OUTPATIENT
Start: 2021-01-01

## 2021-01-01 RX ORDER — MAGNESIUM OXIDE 400 MG/1
TABLET ORAL
Qty: 60 TABLET | Refills: 3 | Status: SHIPPED
Start: 2021-01-01 | End: 2021-01-01 | Stop reason: SDUPTHER

## 2021-01-01 RX ORDER — DEXAMETHASONE SODIUM PHOSPHATE 10 MG/ML
INJECTION INTRAMUSCULAR; INTRAVENOUS PRN
Status: DISCONTINUED | OUTPATIENT
Start: 2021-01-01 | End: 2021-01-01 | Stop reason: SDUPTHER

## 2021-01-01 RX ORDER — SODIUM CHLORIDE 9 MG/ML
INJECTION, SOLUTION INTRAVENOUS CONTINUOUS
Status: DISCONTINUED | OUTPATIENT
Start: 2021-01-01 | End: 2021-01-01 | Stop reason: HOSPADM

## 2021-01-01 RX ORDER — LIDOCAINE HYDROCHLORIDE 20 MG/ML
INJECTION, SOLUTION INFILTRATION; PERINEURAL
Status: COMPLETED | OUTPATIENT
Start: 2021-01-01 | End: 2021-01-01

## 2021-01-01 RX ORDER — ACETAMINOPHEN 325 MG/1
650 TABLET ORAL EVERY 4 HOURS PRN
Status: DISCONTINUED | OUTPATIENT
Start: 2021-01-01 | End: 2021-01-01 | Stop reason: HOSPADM

## 2021-01-01 RX ORDER — CLOPIDOGREL BISULFATE 75 MG/1
TABLET ORAL
Qty: 90 TABLET | Refills: 1 | Status: SHIPPED | OUTPATIENT
Start: 2021-01-01

## 2021-01-01 RX ORDER — SODIUM CHLORIDE 0.9 % (FLUSH) 0.9 %
5-40 SYRINGE (ML) INJECTION PRN
Status: DISCONTINUED | OUTPATIENT
Start: 2021-01-01 | End: 2021-01-01 | Stop reason: SDUPTHER

## 2021-01-01 RX ORDER — POTASSIUM CHLORIDE 7.45 MG/ML
30 INJECTION INTRAVENOUS ONCE
Status: DISCONTINUED | OUTPATIENT
Start: 2021-01-01 | End: 2021-01-01 | Stop reason: SDUPTHER

## 2021-01-01 RX ORDER — POTASSIUM CHLORIDE 20 MEQ/1
40 TABLET, EXTENDED RELEASE ORAL ONCE
Status: COMPLETED | OUTPATIENT
Start: 2021-01-01 | End: 2021-01-01

## 2021-01-01 RX ORDER — LORAZEPAM 2 MG/ML
1 INJECTION INTRAMUSCULAR
Status: DISCONTINUED | OUTPATIENT
Start: 2021-01-01 | End: 2021-01-01

## 2021-01-01 RX ORDER — LISINOPRIL 10 MG/1
20 TABLET ORAL DAILY
Refills: 1 | Status: DISCONTINUED | OUTPATIENT
Start: 2021-01-01 | End: 2021-01-01

## 2021-01-01 RX ORDER — HALOPERIDOL 0.5 MG/1
0.5 TABLET ORAL EVERY 6 HOURS PRN
Status: DISCONTINUED | OUTPATIENT
Start: 2021-01-01 | End: 2021-01-01 | Stop reason: HOSPADM

## 2021-01-01 RX ORDER — SODIUM CHLORIDE 9 MG/ML
25 INJECTION, SOLUTION INTRAVENOUS PRN
Status: DISCONTINUED | OUTPATIENT
Start: 2021-01-01 | End: 2021-01-01 | Stop reason: SDUPTHER

## 2021-01-01 RX ORDER — THIAMINE MONONITRATE (VIT B1) 100 MG
100 TABLET ORAL DAILY
Qty: 1 TABLET | Refills: 0
Start: 2021-01-01

## 2021-01-01 RX ORDER — ARIPIPRAZOLE 5 MG/1
5 TABLET ORAL DAILY
Status: DISCONTINUED | OUTPATIENT
Start: 2021-01-01 | End: 2021-01-01 | Stop reason: HOSPADM

## 2021-01-01 RX ORDER — LORAZEPAM 1 MG/1
1 TABLET ORAL
Status: DISCONTINUED | OUTPATIENT
Start: 2021-01-01 | End: 2021-01-01 | Stop reason: HOSPADM

## 2021-01-01 RX ORDER — ATORVASTATIN CALCIUM 40 MG/1
TABLET, FILM COATED ORAL
Qty: 90 TABLET | Refills: 1 | Status: ON HOLD
Start: 2021-01-01 | End: 2021-01-01 | Stop reason: HOSPADM

## 2021-01-01 RX ORDER — FENTANYL CITRATE 50 UG/ML
INJECTION, SOLUTION INTRAMUSCULAR; INTRAVENOUS PRN
Status: DISCONTINUED | OUTPATIENT
Start: 2021-01-01 | End: 2021-01-01 | Stop reason: SDUPTHER

## 2021-01-01 RX ORDER — LORAZEPAM 2 MG/ML
4 INJECTION INTRAMUSCULAR
Status: DISCONTINUED | OUTPATIENT
Start: 2021-01-01 | End: 2021-01-01 | Stop reason: HOSPADM

## 2021-01-01 RX ORDER — GLYCOPYRROLATE 1 MG/5 ML
SYRINGE (ML) INTRAVENOUS PRN
Status: DISCONTINUED | OUTPATIENT
Start: 2021-01-01 | End: 2021-01-01 | Stop reason: SDUPTHER

## 2021-01-01 RX ORDER — SODIUM CHLORIDE 0.9 % (FLUSH) 0.9 %
5-40 SYRINGE (ML) INJECTION EVERY 12 HOURS SCHEDULED
Status: DISCONTINUED | OUTPATIENT
Start: 2021-01-01 | End: 2021-01-01 | Stop reason: SDUPTHER

## 2021-01-01 RX ORDER — ROCURONIUM BROMIDE 10 MG/ML
INJECTION, SOLUTION INTRAVENOUS PRN
Status: DISCONTINUED | OUTPATIENT
Start: 2021-01-01 | End: 2021-01-01 | Stop reason: SDUPTHER

## 2021-01-01 RX ORDER — FUROSEMIDE 20 MG/1
20 TABLET ORAL 2 TIMES DAILY
Qty: 60 TABLET | Refills: 0 | Status: ON HOLD | OUTPATIENT
Start: 2021-01-01 | End: 2021-01-01 | Stop reason: HOSPADM

## 2021-01-01 RX ORDER — ATENOLOL 50 MG/1
TABLET ORAL
Qty: 90 TABLET | Refills: 1 | Status: ON HOLD
Start: 2021-01-01 | End: 2021-01-01 | Stop reason: HOSPADM

## 2021-01-01 RX ORDER — KETOROLAC TROMETHAMINE 30 MG/ML
30 INJECTION, SOLUTION INTRAMUSCULAR; INTRAVENOUS ONCE
Status: COMPLETED | OUTPATIENT
Start: 2021-01-01 | End: 2021-01-01

## 2021-01-01 RX ORDER — ATENOLOL 50 MG/1
50 TABLET ORAL DAILY
Status: DISCONTINUED | OUTPATIENT
Start: 2021-01-01 | End: 2021-01-01 | Stop reason: HOSPADM

## 2021-01-01 RX ORDER — FUROSEMIDE 20 MG/1
20 TABLET ORAL 2 TIMES DAILY
Status: DISCONTINUED | OUTPATIENT
Start: 2021-01-01 | End: 2021-01-01 | Stop reason: HOSPADM

## 2021-01-01 RX ORDER — ALBUTEROL SULFATE 90 UG/1
2 AEROSOL, METERED RESPIRATORY (INHALATION) EVERY 6 HOURS PRN
Status: DISCONTINUED | OUTPATIENT
Start: 2021-01-01 | End: 2021-01-01 | Stop reason: HOSPADM

## 2021-01-01 RX ORDER — M-VIT,TX,IRON,MINS/CALC/FOLIC 27MG-0.4MG
1 TABLET ORAL DAILY
Status: DISCONTINUED | OUTPATIENT
Start: 2021-01-01 | End: 2021-01-01 | Stop reason: HOSPADM

## 2021-01-01 RX ORDER — LIDOCAINE 4 G/G
1 PATCH TOPICAL DAILY
Status: DISCONTINUED | OUTPATIENT
Start: 2021-01-01 | End: 2021-01-01 | Stop reason: HOSPADM

## 2021-01-01 RX ORDER — OXYCODONE HYDROCHLORIDE 5 MG/1
5 TABLET ORAL EVERY 6 HOURS PRN
Qty: 1 TABLET | Refills: 0 | Status: ON HOLD
Start: 2021-01-01 | End: 2021-01-01 | Stop reason: HOSPADM

## 2021-01-01 RX ORDER — HYDROCODONE BITARTRATE AND ACETAMINOPHEN 5; 325 MG/1; MG/1
1 TABLET ORAL EVERY 6 HOURS PRN
Qty: 12 TABLET | Refills: 0 | Status: SHIPPED | OUTPATIENT
Start: 2021-01-01 | End: 2021-01-01

## 2021-01-01 RX ORDER — CITALOPRAM 20 MG/1
20 TABLET ORAL DAILY
Status: DISCONTINUED | OUTPATIENT
Start: 2021-01-01 | End: 2021-01-01 | Stop reason: HOSPADM

## 2021-01-01 RX ORDER — LEVETIRACETAM 500 MG/1
500 TABLET ORAL 2 TIMES DAILY
Status: DISCONTINUED | OUTPATIENT
Start: 2021-01-01 | End: 2021-01-01

## 2021-01-01 RX ORDER — POTASSIUM CHLORIDE 7.45 MG/ML
40 INJECTION INTRAVENOUS ONCE
Status: COMPLETED | OUTPATIENT
Start: 2021-01-01 | End: 2021-01-01

## 2021-01-01 RX ORDER — POTASSIUM CHLORIDE 20 MEQ/1
40 TABLET, EXTENDED RELEASE ORAL PRN
Status: DISCONTINUED | OUTPATIENT
Start: 2021-01-01 | End: 2021-01-01 | Stop reason: HOSPADM

## 2021-01-01 RX ORDER — AMLODIPINE BESYLATE 5 MG/1
TABLET ORAL
Qty: 90 TABLET | Refills: 1 | Status: SHIPPED | OUTPATIENT
Start: 2021-01-01

## 2021-01-01 RX ORDER — ALBUTEROL SULFATE 2.5 MG/3ML
2.5 SOLUTION RESPIRATORY (INHALATION) EVERY 6 HOURS PRN
Qty: 120 EACH | Refills: 3
Start: 2021-01-01

## 2021-01-01 RX ORDER — LEVOTHYROXINE SODIUM 0.05 MG/1
50 TABLET ORAL DAILY
Status: DISCONTINUED | OUTPATIENT
Start: 2021-01-01 | End: 2021-01-01 | Stop reason: HOSPADM

## 2021-01-01 RX ORDER — LIDOCAINE HYDROCHLORIDE 20 MG/ML
INJECTION, SOLUTION INTRAVENOUS PRN
Status: DISCONTINUED | OUTPATIENT
Start: 2021-01-01 | End: 2021-01-01 | Stop reason: SDUPTHER

## 2021-01-01 RX ORDER — KETOCONAZOLE 20 MG/G
CREAM TOPICAL
Qty: 30 G | Refills: 1 | Status: SHIPPED
Start: 2021-01-01 | End: 2021-01-01 | Stop reason: ALTCHOICE

## 2021-01-01 RX ORDER — ARIPIPRAZOLE 5 MG/1
5 TABLET ORAL DAILY
Qty: 30 TABLET | Refills: 2 | Status: SHIPPED
Start: 2021-01-01 | End: 2021-01-01

## 2021-01-01 RX ORDER — ATORVASTATIN CALCIUM 40 MG/1
40 TABLET, FILM COATED ORAL DAILY
Status: DISCONTINUED | OUTPATIENT
Start: 2021-01-01 | End: 2021-01-01 | Stop reason: HOSPADM

## 2021-01-01 RX ORDER — CYCLOBENZAPRINE HCL 10 MG
10 TABLET ORAL 3 TIMES DAILY PRN
Status: DISCONTINUED | OUTPATIENT
Start: 2021-01-01 | End: 2021-01-01 | Stop reason: HOSPADM

## 2021-01-01 RX ORDER — LACTULOSE 10 G/15ML
30 SOLUTION ORAL
Status: DISCONTINUED | OUTPATIENT
Start: 2021-01-01 | End: 2021-01-01

## 2021-01-01 RX ORDER — LORAZEPAM 1 MG/1
4 TABLET ORAL
Status: DISCONTINUED | OUTPATIENT
Start: 2021-01-01 | End: 2021-01-01 | Stop reason: HOSPADM

## 2021-01-01 RX ORDER — HALOPERIDOL 5 MG/ML
5 INJECTION INTRAMUSCULAR ONCE
Status: COMPLETED | OUTPATIENT
Start: 2021-01-01 | End: 2021-01-01

## 2021-01-01 RX ORDER — GABAPENTIN 100 MG/1
100 CAPSULE ORAL 3 TIMES DAILY
Status: DISCONTINUED | OUTPATIENT
Start: 2021-01-01 | End: 2021-01-01

## 2021-01-01 RX ORDER — POLYETHYLENE GLYCOL 3350 17 G/17G
17 POWDER, FOR SOLUTION ORAL DAILY PRN
Qty: 527 G | Refills: 1
Start: 2021-01-01 | End: 2021-12-25

## 2021-01-01 RX ORDER — ALBUTEROL SULFATE 90 UG/1
2 AEROSOL, METERED RESPIRATORY (INHALATION) EVERY 6 HOURS PRN
Status: DISCONTINUED | OUTPATIENT
Start: 2021-01-01 | End: 2021-01-01

## 2021-01-01 RX ORDER — LORAZEPAM 2 MG/ML
1 INJECTION INTRAMUSCULAR
Status: DISCONTINUED | OUTPATIENT
Start: 2021-01-01 | End: 2021-01-01 | Stop reason: HOSPADM

## 2021-01-01 RX ORDER — MAGNESIUM OXIDE 400 MG/1
400 TABLET ORAL DAILY
Qty: 30 TABLET | Refills: 1
Start: 2021-01-01

## 2021-01-01 RX ORDER — OXYCODONE HYDROCHLORIDE AND ACETAMINOPHEN 5; 325 MG/1; MG/1
1 TABLET ORAL
Status: DISCONTINUED | OUTPATIENT
Start: 2021-01-01 | End: 2021-01-01 | Stop reason: HOSPADM

## 2021-01-01 RX ORDER — AMLODIPINE BESYLATE 2.5 MG/1
2.5 TABLET ORAL DAILY
Status: DISCONTINUED | OUTPATIENT
Start: 2021-01-01 | End: 2021-01-01 | Stop reason: HOSPADM

## 2021-01-01 RX ORDER — AMLODIPINE BESYLATE 5 MG/1
5 TABLET ORAL DAILY
Status: DISCONTINUED | OUTPATIENT
Start: 2021-01-01 | End: 2021-01-01 | Stop reason: HOSPADM

## 2021-01-01 RX ORDER — MAGNESIUM OXIDE 400 MG/1
400 TABLET ORAL DAILY
Status: DISCONTINUED | OUTPATIENT
Start: 2021-01-01 | End: 2021-01-01 | Stop reason: HOSPADM

## 2021-01-01 RX ORDER — LEVOFLOXACIN 500 MG/1
500 TABLET, FILM COATED ORAL DAILY
Status: COMPLETED | OUTPATIENT
Start: 2021-01-01 | End: 2021-01-01

## 2021-01-01 RX ORDER — HALOPERIDOL 5 MG/ML
0.5 INJECTION INTRAMUSCULAR EVERY 6 HOURS PRN
Status: DISCONTINUED | OUTPATIENT
Start: 2021-01-01 | End: 2021-01-01 | Stop reason: HOSPADM

## 2021-01-01 RX ORDER — PROPOFOL 10 MG/ML
INJECTION, EMULSION INTRAVENOUS PRN
Status: DISCONTINUED | OUTPATIENT
Start: 2021-01-01 | End: 2021-01-01 | Stop reason: SDUPTHER

## 2021-01-01 RX ORDER — ONDANSETRON 2 MG/ML
4 INJECTION INTRAMUSCULAR; INTRAVENOUS EVERY 6 HOURS PRN
Qty: 84 ML | Refills: 0
Start: 2021-01-01

## 2021-01-01 RX ORDER — M-VIT,TX,IRON,MINS/CALC/FOLIC 27MG-0.4MG
1 TABLET ORAL DAILY
Qty: 1 TABLET | Refills: 0 | Status: ON HOLD
Start: 2021-01-01 | End: 2021-01-01 | Stop reason: HOSPADM

## 2021-01-01 RX ORDER — LEVOFLOXACIN 500 MG/1
500 TABLET, FILM COATED ORAL DAILY
Qty: 5 TABLET | Refills: 0 | Status: SHIPPED | OUTPATIENT
Start: 2021-01-01 | End: 2021-01-01

## 2021-01-01 RX ORDER — NICOTINE 21 MG/24HR
1 PATCH, TRANSDERMAL 24 HOURS TRANSDERMAL DAILY
Qty: 30 PATCH | Refills: 3 | Status: SHIPPED | OUTPATIENT
Start: 2021-01-01

## 2021-01-01 RX ORDER — LEVOTHYROXINE SODIUM 0.03 MG/1
TABLET ORAL
Qty: 90 TABLET | Refills: 0 | Status: SHIPPED
Start: 2021-01-01 | End: 2021-01-01

## 2021-01-01 RX ORDER — DEXTROSE MONOHYDRATE 50 MG/ML
INJECTION, SOLUTION INTRAVENOUS CONTINUOUS
Status: DISCONTINUED | OUTPATIENT
Start: 2021-01-01 | End: 2021-01-01 | Stop reason: HOSPADM

## 2021-01-01 RX ORDER — SPIRONOLACTONE 25 MG/1
25 TABLET ORAL DAILY
Qty: 30 TABLET | Refills: 3 | Status: ON HOLD | OUTPATIENT
Start: 2021-01-01 | End: 2021-01-01 | Stop reason: HOSPADM

## 2021-01-01 RX ORDER — MELOXICAM 15 MG/1
15 TABLET ORAL DAILY
Qty: 90 TABLET | Refills: 1 | Status: ON HOLD
Start: 2021-01-01 | End: 2021-01-01 | Stop reason: HOSPADM

## 2021-01-01 RX ORDER — ALBUTEROL SULFATE 2.5 MG/3ML
2.5 SOLUTION RESPIRATORY (INHALATION) EVERY 6 HOURS PRN
Status: DISCONTINUED | OUTPATIENT
Start: 2021-01-01 | End: 2021-01-01 | Stop reason: HOSPADM

## 2021-01-01 RX ORDER — FENTANYL CITRATE 50 UG/ML
INJECTION, SOLUTION INTRAMUSCULAR; INTRAVENOUS
Status: COMPLETED | OUTPATIENT
Start: 2021-01-01 | End: 2021-01-01

## 2021-01-01 RX ORDER — BENAZEPRIL HYDROCHLORIDE 20 MG/1
TABLET ORAL
Qty: 90 TABLET | Refills: 1 | Status: ON HOLD
Start: 2021-01-01 | End: 2021-01-01 | Stop reason: HOSPADM

## 2021-01-01 RX ORDER — LORAZEPAM 1 MG/1
3 TABLET ORAL
Status: DISCONTINUED | OUTPATIENT
Start: 2021-01-01 | End: 2021-01-01 | Stop reason: HOSPADM

## 2021-01-01 RX ORDER — LEVOTHYROXINE SODIUM 0.03 MG/1
25 TABLET ORAL DAILY
Status: DISCONTINUED | OUTPATIENT
Start: 2021-01-01 | End: 2021-01-01 | Stop reason: HOSPADM

## 2021-01-01 RX ADMIN — SPIRONOLACTONE 25 MG: 25 TABLET ORAL at 09:43

## 2021-01-01 RX ADMIN — LEVETIRACETAM 500 MG: 500 TABLET, FILM COATED ORAL at 20:30

## 2021-01-01 RX ADMIN — HALOPERIDOL 0.5 MG: 0.5 TABLET ORAL at 03:27

## 2021-01-01 RX ADMIN — AMPICILLIN SODIUM AND SULBACTAM SODIUM 3000 MG: 2; 1 INJECTION, POWDER, FOR SOLUTION INTRAMUSCULAR; INTRAVENOUS at 04:26

## 2021-01-01 RX ADMIN — ARIPIPRAZOLE 5 MG: 5 TABLET ORAL at 08:43

## 2021-01-01 RX ADMIN — SODIUM CHLORIDE: 9 INJECTION, SOLUTION INTRAVENOUS at 17:43

## 2021-01-01 RX ADMIN — CITALOPRAM HYDROBROMIDE 40 MG: 20 TABLET ORAL at 13:39

## 2021-01-01 RX ADMIN — CITALOPRAM HYDROBROMIDE 40 MG: 20 TABLET ORAL at 08:10

## 2021-01-01 RX ADMIN — Medication 10 ML: at 12:34

## 2021-01-01 RX ADMIN — CLOPIDOGREL BISULFATE 75 MG: 75 TABLET ORAL at 08:18

## 2021-01-01 RX ADMIN — AMPICILLIN SODIUM AND SULBACTAM SODIUM 3000 MG: 2; 1 INJECTION, POWDER, FOR SOLUTION INTRAMUSCULAR; INTRAVENOUS at 16:03

## 2021-01-01 RX ADMIN — FOLIC ACID 1 MG: 1 TABLET ORAL at 13:14

## 2021-01-01 RX ADMIN — ATENOLOL 25 MG: 25 TABLET ORAL at 12:31

## 2021-01-01 RX ADMIN — CITALOPRAM 20 MG: 20 TABLET, FILM COATED ORAL at 09:39

## 2021-01-01 RX ADMIN — POTASSIUM CHLORIDE 10 MEQ: 10 INJECTION, SOLUTION INTRAVENOUS at 17:35

## 2021-01-01 RX ADMIN — SODIUM CHLORIDE, PRESERVATIVE FREE 10 ML: 5 INJECTION INTRAVENOUS at 22:44

## 2021-01-01 RX ADMIN — Medication 100 MG: at 09:38

## 2021-01-01 RX ADMIN — FENTANYL CITRATE 50 MCG: 50 INJECTION, SOLUTION INTRAMUSCULAR; INTRAVENOUS at 10:21

## 2021-01-01 RX ADMIN — RIFAXIMIN 550 MG: 550 TABLET ORAL at 21:14

## 2021-01-01 RX ADMIN — MAGNESIUM OXIDE 400 MG: 400 TABLET ORAL at 20:49

## 2021-01-01 RX ADMIN — LACTULOSE 20 G: 20 SOLUTION ORAL at 20:23

## 2021-01-01 RX ADMIN — RIFAXIMIN 550 MG: 550 TABLET ORAL at 10:14

## 2021-01-01 RX ADMIN — LACTULOSE 20 G: 20 SOLUTION ORAL at 21:24

## 2021-01-01 RX ADMIN — LEVETIRACETAM 500 MG: 500 TABLET, FILM COATED ORAL at 09:27

## 2021-01-01 RX ADMIN — LEVOTHYROXINE SODIUM 50 MCG: 0.05 TABLET ORAL at 06:33

## 2021-01-01 RX ADMIN — FUROSEMIDE 40 MG: 40 TABLET ORAL at 12:51

## 2021-01-01 RX ADMIN — LEVETIRACETAM 500 MG: 500 TABLET, FILM COATED ORAL at 20:35

## 2021-01-01 RX ADMIN — FUROSEMIDE 40 MG: 10 INJECTION, SOLUTION INTRAMUSCULAR; INTRAVENOUS at 09:29

## 2021-01-01 RX ADMIN — LEVETIRACETAM 500 MG: 5 INJECTION INTRAVENOUS at 22:21

## 2021-01-01 RX ADMIN — Medication 100 MG: at 10:07

## 2021-01-01 RX ADMIN — LACTULOSE 30 G: 20 SOLUTION ORAL at 20:25

## 2021-01-01 RX ADMIN — GABAPENTIN 200 MG: 100 CAPSULE ORAL at 00:04

## 2021-01-01 RX ADMIN — MAGNESIUM OXIDE 400 MG: 400 TABLET ORAL at 10:11

## 2021-01-01 RX ADMIN — LACTULOSE 30 G: 20 SOLUTION ORAL at 09:29

## 2021-01-01 RX ADMIN — LACTULOSE 20 G: 20 SOLUTION ORAL at 20:26

## 2021-01-01 RX ADMIN — ATORVASTATIN CALCIUM 40 MG: 40 TABLET, FILM COATED ORAL at 09:29

## 2021-01-01 RX ADMIN — CITALOPRAM 20 MG: 20 TABLET, FILM COATED ORAL at 10:08

## 2021-01-01 RX ADMIN — MAGNESIUM OXIDE 400 MG: 400 TABLET ORAL at 12:51

## 2021-01-01 RX ADMIN — Medication 1 TABLET: at 12:29

## 2021-01-01 RX ADMIN — SODIUM CHLORIDE, PRESERVATIVE FREE 10 ML: 5 INJECTION INTRAVENOUS at 22:35

## 2021-01-01 RX ADMIN — SODIUM CHLORIDE, PRESERVATIVE FREE 10 ML: 5 INJECTION INTRAVENOUS at 08:53

## 2021-01-01 RX ADMIN — CITALOPRAM HYDROBROMIDE 40 MG: 20 TABLET ORAL at 10:10

## 2021-01-01 RX ADMIN — RIFAXIMIN 550 MG: 550 TABLET ORAL at 09:30

## 2021-01-01 RX ADMIN — LEVOTHYROXINE SODIUM 25 MCG: 0.03 TABLET ORAL at 06:17

## 2021-01-01 RX ADMIN — LEVETIRACETAM 500 MG: 500 TABLET, FILM COATED ORAL at 13:18

## 2021-01-01 RX ADMIN — GABAPENTIN 200 MG: 100 CAPSULE ORAL at 08:16

## 2021-01-01 RX ADMIN — LEVETIRACETAM 500 MG: 500 TABLET, FILM COATED ORAL at 21:54

## 2021-01-01 RX ADMIN — CITALOPRAM HYDROBROMIDE 40 MG: 20 TABLET ORAL at 08:36

## 2021-01-01 RX ADMIN — LEVETIRACETAM 500 MG: 5 INJECTION INTRAVENOUS at 20:21

## 2021-01-01 RX ADMIN — LEVETIRACETAM 500 MG: 500 TABLET, FILM COATED ORAL at 22:38

## 2021-01-01 RX ADMIN — LEVETIRACETAM 500 MG: 500 TABLET, FILM COATED ORAL at 08:11

## 2021-01-01 RX ADMIN — HALOPERIDOL LACTATE 5 MG: 5 INJECTION, SOLUTION INTRAMUSCULAR at 05:38

## 2021-01-01 RX ADMIN — CITALOPRAM 20 MG: 20 TABLET, FILM COATED ORAL at 09:37

## 2021-01-01 RX ADMIN — GABAPENTIN 100 MG: 100 CAPSULE ORAL at 09:32

## 2021-01-01 RX ADMIN — LEVETIRACETAM 500 MG: 500 TABLET, FILM COATED ORAL at 21:00

## 2021-01-01 RX ADMIN — LEVOFLOXACIN 500 MG: 500 TABLET, FILM COATED ORAL at 08:36

## 2021-01-01 RX ADMIN — ATORVASTATIN CALCIUM 40 MG: 40 TABLET, FILM COATED ORAL at 21:46

## 2021-01-01 RX ADMIN — LEVETIRACETAM 500 MG: 500 TABLET, FILM COATED ORAL at 12:51

## 2021-01-01 RX ADMIN — GABAPENTIN 100 MG: 100 CAPSULE ORAL at 13:56

## 2021-01-01 RX ADMIN — ZIPRASIDONE MESYLATE 20 MG: 20 INJECTION, POWDER, LYOPHILIZED, FOR SOLUTION INTRAMUSCULAR at 22:18

## 2021-01-01 RX ADMIN — AMPICILLIN SODIUM AND SULBACTAM SODIUM 3000 MG: 2; 1 INJECTION, POWDER, FOR SOLUTION INTRAMUSCULAR; INTRAVENOUS at 05:05

## 2021-01-01 RX ADMIN — POTASSIUM CHLORIDE 40 MEQ: 7.46 INJECTION, SOLUTION INTRAVENOUS at 21:30

## 2021-01-01 RX ADMIN — LEVETIRACETAM 500 MG: 5 INJECTION INTRAVENOUS at 08:51

## 2021-01-01 RX ADMIN — LEVETIRACETAM 500 MG: 500 TABLET, FILM COATED ORAL at 22:00

## 2021-01-01 RX ADMIN — OXYCODONE 5 MG: 5 TABLET ORAL at 17:32

## 2021-01-01 RX ADMIN — RIFAXIMIN 550 MG: 550 TABLET ORAL at 20:26

## 2021-01-01 RX ADMIN — MAGNESIUM OXIDE 400 MG: 400 TABLET ORAL at 21:00

## 2021-01-01 RX ADMIN — DEXTROSE MONOHYDRATE: 50 INJECTION, SOLUTION INTRAVENOUS at 21:36

## 2021-01-01 RX ADMIN — Medication 0.2 MG: at 10:28

## 2021-01-01 RX ADMIN — WATER 1000 MG: 1 INJECTION INTRAMUSCULAR; INTRAVENOUS; SUBCUTANEOUS at 09:42

## 2021-01-01 RX ADMIN — ENOXAPARIN SODIUM 80 MG: 100 INJECTION SUBCUTANEOUS at 21:15

## 2021-01-01 RX ADMIN — AMLODIPINE BESYLATE 5 MG: 5 TABLET ORAL at 09:43

## 2021-01-01 RX ADMIN — RIFAXIMIN 550 MG: 550 TABLET ORAL at 21:37

## 2021-01-01 RX ADMIN — LEVETIRACETAM 500 MG: 500 TABLET, FILM COATED ORAL at 19:54

## 2021-01-01 RX ADMIN — ATORVASTATIN CALCIUM 40 MG: 40 TABLET, FILM COATED ORAL at 21:54

## 2021-01-01 RX ADMIN — OXYCODONE 5 MG: 5 TABLET ORAL at 08:10

## 2021-01-01 RX ADMIN — LEVOTHYROXINE SODIUM 25 MCG: 0.03 TABLET ORAL at 06:40

## 2021-01-01 RX ADMIN — LEVOTHYROXINE SODIUM 50 MCG: 0.05 TABLET ORAL at 05:19

## 2021-01-01 RX ADMIN — AMLODIPINE BESYLATE 5 MG: 5 TABLET ORAL at 09:41

## 2021-01-01 RX ADMIN — LISINOPRIL 10 MG: 10 TABLET ORAL at 09:29

## 2021-01-01 RX ADMIN — LEVETIRACETAM 500 MG: 500 TABLET, FILM COATED ORAL at 20:36

## 2021-01-01 RX ADMIN — RIFAXIMIN 550 MG: 550 TABLET ORAL at 14:45

## 2021-01-01 RX ADMIN — OXYCODONE 5 MG: 5 TABLET ORAL at 05:56

## 2021-01-01 RX ADMIN — OXYCODONE 5 MG: 5 TABLET ORAL at 16:28

## 2021-01-01 RX ADMIN — LACTULOSE 30 G: 20 SOLUTION ORAL at 13:38

## 2021-01-01 RX ADMIN — Medication 400 MG: at 09:38

## 2021-01-01 RX ADMIN — ARIPIPRAZOLE 5 MG: 5 TABLET ORAL at 12:51

## 2021-01-01 RX ADMIN — GABAPENTIN 200 MG: 100 CAPSULE ORAL at 20:22

## 2021-01-01 RX ADMIN — LEVETIRACETAM 500 MG: 500 TABLET, FILM COATED ORAL at 09:03

## 2021-01-01 RX ADMIN — FUROSEMIDE 20 MG: 20 TABLET ORAL at 21:30

## 2021-01-01 RX ADMIN — Medication 10 ML: at 08:36

## 2021-01-01 RX ADMIN — SODIUM CHLORIDE, PRESERVATIVE FREE 10 ML: 5 INJECTION INTRAVENOUS at 09:33

## 2021-01-01 RX ADMIN — OXYCODONE 5 MG: 5 TABLET ORAL at 01:07

## 2021-01-01 RX ADMIN — CHLORDIAZEPOXIDE HYDROCHLORIDE 5 MG: 5 CAPSULE ORAL at 14:23

## 2021-01-01 RX ADMIN — GABAPENTIN 200 MG: 100 CAPSULE ORAL at 13:54

## 2021-01-01 RX ADMIN — FUROSEMIDE 40 MG: 40 TABLET ORAL at 10:10

## 2021-01-01 RX ADMIN — MAGNESIUM OXIDE 400 MG: 400 TABLET ORAL at 08:59

## 2021-01-01 RX ADMIN — CITALOPRAM 20 MG: 20 TABLET, FILM COATED ORAL at 08:50

## 2021-01-01 RX ADMIN — GABAPENTIN 200 MG: 100 CAPSULE ORAL at 15:09

## 2021-01-01 RX ADMIN — ENOXAPARIN SODIUM 80 MG: 100 INJECTION SUBCUTANEOUS at 10:26

## 2021-01-01 RX ADMIN — ARIPIPRAZOLE 5 MG: 5 TABLET ORAL at 08:52

## 2021-01-01 RX ADMIN — Medication 10 ML: at 10:08

## 2021-01-01 RX ADMIN — OXYCODONE 5 MG: 5 TABLET ORAL at 12:28

## 2021-01-01 RX ADMIN — ATENOLOL 25 MG: 25 TABLET ORAL at 10:31

## 2021-01-01 RX ADMIN — GABAPENTIN 200 MG: 100 CAPSULE ORAL at 22:20

## 2021-01-01 RX ADMIN — Medication 10 ML: at 09:39

## 2021-01-01 RX ADMIN — SODIUM CHLORIDE 500 ML: 9 INJECTION, SOLUTION INTRAVENOUS at 13:48

## 2021-01-01 RX ADMIN — LISINOPRIL 10 MG: 10 TABLET ORAL at 09:41

## 2021-01-01 RX ADMIN — LISINOPRIL 10 MG: 10 TABLET ORAL at 09:38

## 2021-01-01 RX ADMIN — LACTULOSE 30 G: 20 SOLUTION ORAL at 14:49

## 2021-01-01 RX ADMIN — LEVOFLOXACIN 500 MG: 500 TABLET, FILM COATED ORAL at 08:10

## 2021-01-01 RX ADMIN — Medication 1 TABLET: at 08:52

## 2021-01-01 RX ADMIN — SPIRONOLACTONE 25 MG: 25 TABLET ORAL at 08:10

## 2021-01-01 RX ADMIN — LEVOTHYROXINE SODIUM 50 MCG: 0.05 TABLET ORAL at 05:56

## 2021-01-01 RX ADMIN — OXYCODONE 5 MG: 5 TABLET ORAL at 23:48

## 2021-01-01 RX ADMIN — MAGNESIUM OXIDE 400 MG: 400 TABLET ORAL at 08:24

## 2021-01-01 RX ADMIN — Medication 10 ML: at 22:04

## 2021-01-01 RX ADMIN — RIFAXIMIN 550 MG: 550 TABLET ORAL at 08:16

## 2021-01-01 RX ADMIN — LEVETIRACETAM 500 MG: 500 TABLET, FILM COATED ORAL at 10:08

## 2021-01-01 RX ADMIN — KETOROLAC TROMETHAMINE 30 MG: 30 INJECTION, SOLUTION INTRAMUSCULAR at 12:02

## 2021-01-01 RX ADMIN — Medication 10 ML: at 20:02

## 2021-01-01 RX ADMIN — RIFAXIMIN 550 MG: 550 TABLET ORAL at 19:38

## 2021-01-01 RX ADMIN — IOHEXOL 50 ML: 240 INJECTION, SOLUTION INTRATHECAL; INTRAVASCULAR; INTRAVENOUS; ORAL at 19:03

## 2021-01-01 RX ADMIN — AMLODIPINE BESYLATE 5 MG: 5 TABLET ORAL at 09:30

## 2021-01-01 RX ADMIN — ARIPIPRAZOLE 5 MG: 5 TABLET ORAL at 08:18

## 2021-01-01 RX ADMIN — LACTULOSE 20 G: 20 SOLUTION ORAL at 08:12

## 2021-01-01 RX ADMIN — Medication 10 ML: at 09:48

## 2021-01-01 RX ADMIN — AMLODIPINE BESYLATE 5 MG: 5 TABLET ORAL at 08:11

## 2021-01-01 RX ADMIN — CLOPIDOGREL BISULFATE 75 MG: 75 TABLET ORAL at 08:11

## 2021-01-01 RX ADMIN — POTASSIUM CHLORIDE 10 MEQ: 10 INJECTION, SOLUTION INTRAVENOUS at 16:21

## 2021-01-01 RX ADMIN — GABAPENTIN 200 MG: 100 CAPSULE ORAL at 09:00

## 2021-01-01 RX ADMIN — THIAMINE HCL TAB 100 MG 100 MG: 100 TAB at 09:27

## 2021-01-01 RX ADMIN — ATENOLOL 25 MG: 25 TABLET ORAL at 09:26

## 2021-01-01 RX ADMIN — LEVETIRACETAM 500 MG: 500 TABLET, FILM COATED ORAL at 08:53

## 2021-01-01 RX ADMIN — FUROSEMIDE 40 MG: 10 INJECTION, SOLUTION INTRAMUSCULAR; INTRAVENOUS at 08:36

## 2021-01-01 RX ADMIN — ATORVASTATIN CALCIUM 40 MG: 40 TABLET, FILM COATED ORAL at 21:37

## 2021-01-01 RX ADMIN — RIFAXIMIN 550 MG: 550 TABLET ORAL at 09:38

## 2021-01-01 RX ADMIN — LEVETIRACETAM 500 MG: 500 TABLET, FILM COATED ORAL at 20:34

## 2021-01-01 RX ADMIN — AMPICILLIN SODIUM AND SULBACTAM SODIUM 3000 MG: 2; 1 INJECTION, POWDER, FOR SOLUTION INTRAMUSCULAR; INTRAVENOUS at 05:06

## 2021-01-01 RX ADMIN — MULTIPLE VITAMINS W/ MINERALS TAB 1 TABLET: TAB at 09:30

## 2021-01-01 RX ADMIN — AMLODIPINE BESYLATE 5 MG: 5 TABLET ORAL at 09:25

## 2021-01-01 RX ADMIN — POTASSIUM CHLORIDE 40 MEQ: 1500 TABLET, EXTENDED RELEASE ORAL at 12:21

## 2021-01-01 RX ADMIN — LEVETIRACETAM 500 MG: 500 TABLET, FILM COATED ORAL at 20:27

## 2021-01-01 RX ADMIN — Medication 10 ML: at 21:10

## 2021-01-01 RX ADMIN — MULTIPLE VITAMINS W/ MINERALS TAB 1 TABLET: TAB at 08:10

## 2021-01-01 RX ADMIN — ATORVASTATIN CALCIUM 40 MG: 40 TABLET, FILM COATED ORAL at 08:10

## 2021-01-01 RX ADMIN — Medication 1 TABLET: at 11:32

## 2021-01-01 RX ADMIN — GABAPENTIN 200 MG: 100 CAPSULE ORAL at 15:16

## 2021-01-01 RX ADMIN — Medication 1 TABLET: at 08:16

## 2021-01-01 RX ADMIN — GABAPENTIN 200 MG: 100 CAPSULE ORAL at 21:14

## 2021-01-01 RX ADMIN — ARIPIPRAZOLE 5 MG: 5 TABLET ORAL at 10:14

## 2021-01-01 RX ADMIN — FUROSEMIDE 20 MG: 20 TABLET ORAL at 21:29

## 2021-01-01 RX ADMIN — ROCURONIUM BROMIDE 10 MG: 10 INJECTION, SOLUTION INTRAVENOUS at 10:08

## 2021-01-01 RX ADMIN — LACTULOSE 30 G: 20 SOLUTION ORAL at 21:31

## 2021-01-01 RX ADMIN — LACTULOSE 20 G: 20 SOLUTION ORAL at 09:27

## 2021-01-01 RX ADMIN — Medication 1 TABLET: at 09:37

## 2021-01-01 RX ADMIN — ACETAMINOPHEN 650 MG: 325 TABLET ORAL at 13:56

## 2021-01-01 RX ADMIN — RIFAXIMIN 550 MG: 550 TABLET ORAL at 11:32

## 2021-01-01 RX ADMIN — LIDOCAINE HYDROCHLORIDE 60 MG: 20 INJECTION, SOLUTION INTRAVENOUS at 10:08

## 2021-01-01 RX ADMIN — SODIUM CHLORIDE, PRESERVATIVE FREE 10 ML: 5 INJECTION INTRAVENOUS at 19:35

## 2021-01-01 RX ADMIN — AMLODIPINE BESYLATE 2.5 MG: 2.5 TABLET ORAL at 09:17

## 2021-01-01 RX ADMIN — MULTIPLE VITAMINS W/ MINERALS TAB 1 TABLET: TAB at 09:44

## 2021-01-01 RX ADMIN — ARIPIPRAZOLE 5 MG: 5 TABLET ORAL at 09:45

## 2021-01-01 RX ADMIN — Medication 5 ML: at 08:19

## 2021-01-01 RX ADMIN — OXYCODONE 5 MG: 5 TABLET ORAL at 22:28

## 2021-01-01 RX ADMIN — SPIRONOLACTONE 25 MG: 25 TABLET ORAL at 09:18

## 2021-01-01 RX ADMIN — ACETAMINOPHEN 650 MG: 325 TABLET ORAL at 16:40

## 2021-01-01 RX ADMIN — LEVETIRACETAM 500 MG: 500 TABLET, FILM COATED ORAL at 20:08

## 2021-01-01 RX ADMIN — SODIUM CHLORIDE: 9 INJECTION, SOLUTION INTRAVENOUS at 10:01

## 2021-01-01 RX ADMIN — ARIPIPRAZOLE 5 MG: 5 TABLET ORAL at 08:11

## 2021-01-01 RX ADMIN — POTASSIUM CHLORIDE 10 MEQ: 10 INJECTION, SOLUTION INTRAVENOUS at 20:04

## 2021-01-01 RX ADMIN — LISINOPRIL 10 MG: 10 TABLET ORAL at 09:16

## 2021-01-01 RX ADMIN — LEVETIRACETAM 500 MG: 500 TABLET, FILM COATED ORAL at 21:09

## 2021-01-01 RX ADMIN — CITALOPRAM 20 MG: 20 TABLET, FILM COATED ORAL at 10:31

## 2021-01-01 RX ADMIN — LACTULOSE: 10 SOLUTION ORAL at 12:00

## 2021-01-01 RX ADMIN — Medication 1 TABLET: at 09:16

## 2021-01-01 RX ADMIN — ATORVASTATIN CALCIUM 40 MG: 40 TABLET, FILM COATED ORAL at 19:54

## 2021-01-01 RX ADMIN — RIFAXIMIN 550 MG: 550 TABLET ORAL at 09:00

## 2021-01-01 RX ADMIN — ATENOLOL 25 MG: 25 TABLET ORAL at 09:17

## 2021-01-01 RX ADMIN — ARIPIPRAZOLE 5 MG: 5 TABLET ORAL at 10:28

## 2021-01-01 RX ADMIN — ATENOLOL 50 MG: 50 TABLET ORAL at 08:36

## 2021-01-01 RX ADMIN — GABAPENTIN 200 MG: 100 CAPSULE ORAL at 14:49

## 2021-01-01 RX ADMIN — OXYCODONE 5 MG: 5 TABLET ORAL at 16:40

## 2021-01-01 RX ADMIN — OXYCODONE 5 MG: 5 TABLET ORAL at 16:38

## 2021-01-01 RX ADMIN — OXYCODONE 5 MG: 5 TABLET ORAL at 02:55

## 2021-01-01 RX ADMIN — ATENOLOL 25 MG: 25 TABLET ORAL at 09:28

## 2021-01-01 RX ADMIN — AMPICILLIN SODIUM AND SULBACTAM SODIUM 3000 MG: 2; 1 INJECTION, POWDER, FOR SOLUTION INTRAMUSCULAR; INTRAVENOUS at 17:00

## 2021-01-01 RX ADMIN — Medication 10 ML: at 20:29

## 2021-01-01 RX ADMIN — LEVOFLOXACIN 500 MG: 500 TABLET, FILM COATED ORAL at 08:24

## 2021-01-01 RX ADMIN — ATORVASTATIN CALCIUM 40 MG: 40 TABLET, FILM COATED ORAL at 08:34

## 2021-01-01 RX ADMIN — CITALOPRAM 20 MG: 20 TABLET, FILM COATED ORAL at 09:38

## 2021-01-01 RX ADMIN — SODIUM CHLORIDE 1000 ML: 9 INJECTION, SOLUTION INTRAVENOUS at 13:16

## 2021-01-01 RX ADMIN — ACETAMINOPHEN 650 MG: 325 TABLET ORAL at 06:52

## 2021-01-01 RX ADMIN — LACTULOSE 30 G: 20 SOLUTION ORAL at 09:00

## 2021-01-01 RX ADMIN — ATENOLOL 25 MG: 25 TABLET ORAL at 08:11

## 2021-01-01 RX ADMIN — CITALOPRAM 20 MG: 20 TABLET, FILM COATED ORAL at 08:16

## 2021-01-01 RX ADMIN — ARIPIPRAZOLE 5 MG: 5 TABLET ORAL at 08:34

## 2021-01-01 RX ADMIN — LACTULOSE 20 G: 20 SOLUTION ORAL at 08:52

## 2021-01-01 RX ADMIN — Medication 10 ML: at 20:51

## 2021-01-01 RX ADMIN — SODIUM CHLORIDE, PRESERVATIVE FREE 10 ML: 5 INJECTION INTRAVENOUS at 16:34

## 2021-01-01 RX ADMIN — Medication 10 ML: at 08:40

## 2021-01-01 RX ADMIN — Medication 100 MG: at 12:29

## 2021-01-01 RX ADMIN — THIAMINE HCL TAB 100 MG 100 MG: 100 TAB at 08:11

## 2021-01-01 RX ADMIN — AMPICILLIN SODIUM AND SULBACTAM SODIUM 3000 MG: 2; 1 INJECTION, POWDER, FOR SOLUTION INTRAMUSCULAR; INTRAVENOUS at 22:43

## 2021-01-01 RX ADMIN — RIFAXIMIN 550 MG: 550 TABLET ORAL at 22:00

## 2021-01-01 RX ADMIN — LACTULOSE 30 G: 20 SOLUTION ORAL at 10:13

## 2021-01-01 RX ADMIN — ATORVASTATIN CALCIUM 40 MG: 40 TABLET, FILM COATED ORAL at 20:35

## 2021-01-01 RX ADMIN — LEVETIRACETAM 500 MG: 500 TABLET, FILM COATED ORAL at 12:32

## 2021-01-01 RX ADMIN — ATORVASTATIN CALCIUM 40 MG: 40 TABLET, FILM COATED ORAL at 20:08

## 2021-01-01 RX ADMIN — MAGNESIUM OXIDE 400 MG: 400 TABLET ORAL at 08:10

## 2021-01-01 RX ADMIN — AMPICILLIN SODIUM AND SULBACTAM SODIUM 3000 MG: 2; 1 INJECTION, POWDER, FOR SOLUTION INTRAMUSCULAR; INTRAVENOUS at 11:57

## 2021-01-01 RX ADMIN — POTASSIUM CHLORIDE 10 MEQ: 7.46 INJECTION, SOLUTION INTRAVENOUS at 13:16

## 2021-01-01 RX ADMIN — ATENOLOL 25 MG: 25 TABLET ORAL at 09:39

## 2021-01-01 RX ADMIN — AMPICILLIN SODIUM AND SULBACTAM SODIUM 3000 MG: 2; 1 INJECTION, POWDER, FOR SOLUTION INTRAMUSCULAR; INTRAVENOUS at 10:18

## 2021-01-01 RX ADMIN — DEXAMETHASONE SODIUM PHOSPHATE 10 MG: 10 INJECTION INTRAMUSCULAR; INTRAVENOUS at 10:11

## 2021-01-01 RX ADMIN — POTASSIUM CHLORIDE 10 MEQ: 7.46 INJECTION, SOLUTION INTRAVENOUS at 16:55

## 2021-01-01 RX ADMIN — LACTULOSE 20 G: 20 SOLUTION ORAL at 21:32

## 2021-01-01 RX ADMIN — IOPAMIDOL 1 ML: 612 INJECTION, SOLUTION INTRAVENOUS at 10:58

## 2021-01-01 RX ADMIN — GABAPENTIN 200 MG: 100 CAPSULE ORAL at 13:38

## 2021-01-01 RX ADMIN — POTASSIUM CHLORIDE 10 MEQ: 10 INJECTION, SOLUTION INTRAVENOUS at 13:15

## 2021-01-01 RX ADMIN — ATENOLOL 25 MG: 25 TABLET ORAL at 09:40

## 2021-01-01 RX ADMIN — FUROSEMIDE 40 MG: 40 TABLET ORAL at 08:17

## 2021-01-01 RX ADMIN — LEVETIRACETAM 500 MG: 5 INJECTION INTRAVENOUS at 08:06

## 2021-01-01 RX ADMIN — SODIUM CHLORIDE, PRESERVATIVE FREE 10 ML: 5 INJECTION INTRAVENOUS at 21:15

## 2021-01-01 RX ADMIN — LEVETIRACETAM 500 MG: 500 TABLET, FILM COATED ORAL at 21:31

## 2021-01-01 RX ADMIN — LEVOTHYROXINE SODIUM 25 MCG: 0.03 TABLET ORAL at 06:36

## 2021-01-01 RX ADMIN — FUROSEMIDE 40 MG: 10 INJECTION, SOLUTION INTRAMUSCULAR; INTRAVENOUS at 17:06

## 2021-01-01 RX ADMIN — Medication 100 MG: at 09:37

## 2021-01-01 RX ADMIN — THIAMINE HCL TAB 100 MG 100 MG: 100 TAB at 14:42

## 2021-01-01 RX ADMIN — AMPICILLIN SODIUM AND SULBACTAM SODIUM 3000 MG: 2; 1 INJECTION, POWDER, FOR SOLUTION INTRAMUSCULAR; INTRAVENOUS at 22:37

## 2021-01-01 RX ADMIN — MULTIPLE VITAMINS W/ MINERALS TAB 1 TABLET: TAB at 08:33

## 2021-01-01 RX ADMIN — FENTANYL CITRATE 50 MCG: 50 INJECTION, SOLUTION INTRAMUSCULAR; INTRAVENOUS at 10:08

## 2021-01-01 RX ADMIN — LEVETIRACETAM 500 MG: 500 TABLET, FILM COATED ORAL at 22:02

## 2021-01-01 RX ADMIN — ARIPIPRAZOLE 5 MG: 5 TABLET ORAL at 09:47

## 2021-01-01 RX ADMIN — ATORVASTATIN CALCIUM 40 MG: 40 TABLET, FILM COATED ORAL at 21:14

## 2021-01-01 RX ADMIN — ENOXAPARIN SODIUM 80 MG: 100 INJECTION SUBCUTANEOUS at 20:25

## 2021-01-01 RX ADMIN — Medication 10 ML: at 09:32

## 2021-01-01 RX ADMIN — CLOPIDOGREL BISULFATE 75 MG: 75 TABLET ORAL at 08:36

## 2021-01-01 RX ADMIN — Medication 10 ML: at 08:12

## 2021-01-01 RX ADMIN — OXYCODONE 5 MG: 5 TABLET ORAL at 08:17

## 2021-01-01 RX ADMIN — OXYCODONE 5 MG: 5 TABLET ORAL at 02:16

## 2021-01-01 RX ADMIN — ENOXAPARIN SODIUM 80 MG: 100 INJECTION SUBCUTANEOUS at 20:21

## 2021-01-01 RX ADMIN — POTASSIUM CHLORIDE 10 MEQ: 10 INJECTION, SOLUTION INTRAVENOUS at 17:18

## 2021-01-01 RX ADMIN — RIFAXIMIN 550 MG: 550 TABLET ORAL at 21:54

## 2021-01-01 RX ADMIN — ACETAMINOPHEN 650 MG: 325 TABLET ORAL at 20:26

## 2021-01-01 RX ADMIN — POTASSIUM CHLORIDE 10 MEQ: 7.46 INJECTION, SOLUTION INTRAVENOUS at 18:02

## 2021-01-01 RX ADMIN — LEVETIRACETAM 500 MG: 5 INJECTION INTRAVENOUS at 19:33

## 2021-01-01 RX ADMIN — ARIPIPRAZOLE 5 MG: 5 TABLET ORAL at 11:33

## 2021-01-01 RX ADMIN — MULTIPLE VITAMINS W/ MINERALS TAB 1 TABLET: TAB at 09:26

## 2021-01-01 RX ADMIN — RIFAXIMIN 550 MG: 550 TABLET ORAL at 20:32

## 2021-01-01 RX ADMIN — ACETAMINOPHEN 650 MG: 325 TABLET ORAL at 08:52

## 2021-01-01 RX ADMIN — LEVETIRACETAM 500 MG: 500 TABLET, FILM COATED ORAL at 08:33

## 2021-01-01 RX ADMIN — AMPICILLIN SODIUM AND SULBACTAM SODIUM 3000 MG: 2; 1 INJECTION, POWDER, FOR SOLUTION INTRAMUSCULAR; INTRAVENOUS at 22:47

## 2021-01-01 RX ADMIN — CITALOPRAM HYDROBROMIDE 40 MG: 20 TABLET ORAL at 08:59

## 2021-01-01 RX ADMIN — ATENOLOL 50 MG: 50 TABLET ORAL at 13:20

## 2021-01-01 RX ADMIN — OXYCODONE 5 MG: 5 TABLET ORAL at 21:37

## 2021-01-01 RX ADMIN — Medication 1 TABLET: at 09:00

## 2021-01-01 RX ADMIN — GABAPENTIN 200 MG: 100 CAPSULE ORAL at 21:37

## 2021-01-01 RX ADMIN — ACETAMINOPHEN 650 MG: 325 TABLET ORAL at 20:48

## 2021-01-01 RX ADMIN — LEVETIRACETAM 500 MG: 500 TABLET, FILM COATED ORAL at 22:34

## 2021-01-01 RX ADMIN — GABAPENTIN 200 MG: 100 CAPSULE ORAL at 20:32

## 2021-01-01 RX ADMIN — LACTULOSE 20 G: 20 SOLUTION ORAL at 20:34

## 2021-01-01 RX ADMIN — MAGNESIUM OXIDE 400 MG: 400 TABLET ORAL at 09:43

## 2021-01-01 RX ADMIN — ATORVASTATIN CALCIUM 40 MG: 40 TABLET, FILM COATED ORAL at 20:26

## 2021-01-01 RX ADMIN — POTASSIUM CHLORIDE 10 MEQ: 7.46 INJECTION, SOLUTION INTRAVENOUS at 15:24

## 2021-01-01 RX ADMIN — LACTULOSE 30 G: 20 SOLUTION ORAL at 15:16

## 2021-01-01 RX ADMIN — RIFAXIMIN 550 MG: 550 TABLET ORAL at 20:27

## 2021-01-01 RX ADMIN — LEVETIRACETAM 500 MG: 5 INJECTION INTRAVENOUS at 00:04

## 2021-01-01 RX ADMIN — LORAZEPAM 4 MG: 2 INJECTION INTRAMUSCULAR; INTRAVENOUS at 06:51

## 2021-01-01 RX ADMIN — MAGNESIUM OXIDE 400 MG: 400 TABLET ORAL at 09:28

## 2021-01-01 RX ADMIN — LEVOTHYROXINE SODIUM 50 MCG: 0.05 TABLET ORAL at 06:45

## 2021-01-01 RX ADMIN — LEVOTHYROXINE SODIUM 50 MCG: 0.05 TABLET ORAL at 06:32

## 2021-01-01 RX ADMIN — CITALOPRAM HYDROBROMIDE 40 MG: 20 TABLET ORAL at 09:18

## 2021-01-01 RX ADMIN — Medication 2000 MG: at 10:11

## 2021-01-01 RX ADMIN — OXYCODONE 5 MG: 5 TABLET ORAL at 23:57

## 2021-01-01 RX ADMIN — ENOXAPARIN SODIUM 80 MG: 100 INJECTION SUBCUTANEOUS at 00:03

## 2021-01-01 RX ADMIN — Medication 400 MG: at 11:32

## 2021-01-01 RX ADMIN — LEVETIRACETAM 500 MG: 5 INJECTION INTRAVENOUS at 10:19

## 2021-01-01 RX ADMIN — POTASSIUM CHLORIDE 10 MEQ: 7.46 INJECTION, SOLUTION INTRAVENOUS at 15:13

## 2021-01-01 RX ADMIN — RIFAXIMIN 550 MG: 550 TABLET ORAL at 21:20

## 2021-01-01 RX ADMIN — MIDAZOLAM 1 MG: 1 INJECTION INTRAMUSCULAR; INTRAVENOUS at 10:20

## 2021-01-01 RX ADMIN — Medication 400 MG: at 08:16

## 2021-01-01 RX ADMIN — LEVETIRACETAM 500 MG: 5 INJECTION INTRAVENOUS at 09:30

## 2021-01-01 RX ADMIN — CITALOPRAM HYDROBROMIDE 40 MG: 20 TABLET ORAL at 09:25

## 2021-01-01 RX ADMIN — IOPAMIDOL 90 ML: 755 INJECTION, SOLUTION INTRAVENOUS at 10:17

## 2021-01-01 RX ADMIN — AMPICILLIN SODIUM AND SULBACTAM SODIUM 3000 MG: 2; 1 INJECTION, POWDER, FOR SOLUTION INTRAMUSCULAR; INTRAVENOUS at 11:47

## 2021-01-01 RX ADMIN — AMPICILLIN SODIUM AND SULBACTAM SODIUM 3000 MG: 2; 1 INJECTION, POWDER, FOR SOLUTION INTRAMUSCULAR; INTRAVENOUS at 09:32

## 2021-01-01 RX ADMIN — LACTULOSE 20 G: 20 SOLUTION ORAL at 09:37

## 2021-01-01 RX ADMIN — POTASSIUM CHLORIDE 10 MEQ: 7.46 INJECTION, SOLUTION INTRAVENOUS at 18:24

## 2021-01-01 RX ADMIN — Medication 100 MG: at 08:16

## 2021-01-01 RX ADMIN — RIFAXIMIN 550 MG: 550 TABLET ORAL at 12:30

## 2021-01-01 RX ADMIN — POTASSIUM CHLORIDE 10 MEQ: 10 INJECTION, SOLUTION INTRAVENOUS at 18:33

## 2021-01-01 RX ADMIN — Medication 100 MG: at 10:28

## 2021-01-01 RX ADMIN — SPIRONOLACTONE 25 MG: 25 TABLET ORAL at 12:51

## 2021-01-01 RX ADMIN — FUROSEMIDE 20 MG: 20 TABLET ORAL at 08:11

## 2021-01-01 RX ADMIN — FUROSEMIDE 20 MG: 20 TABLET ORAL at 09:41

## 2021-01-01 RX ADMIN — Medication 100 MG: at 09:04

## 2021-01-01 RX ADMIN — FUROSEMIDE 20 MG: 20 TABLET ORAL at 21:25

## 2021-01-01 RX ADMIN — ARIPIPRAZOLE 5 MG: 5 TABLET ORAL at 09:00

## 2021-01-01 RX ADMIN — MULTIPLE VITAMINS W/ MINERALS TAB 1 TABLET: TAB at 13:14

## 2021-01-01 RX ADMIN — Medication 10 ML: at 20:22

## 2021-01-01 RX ADMIN — LEVOTHYROXINE SODIUM 50 MCG: 0.05 TABLET ORAL at 06:14

## 2021-01-01 RX ADMIN — RIFAXIMIN 550 MG: 550 TABLET ORAL at 09:16

## 2021-01-01 RX ADMIN — LIDOCAINE HYDROCHLORIDE 5 ML: 20 INJECTION, SOLUTION INFILTRATION; PERINEURAL at 10:41

## 2021-01-01 RX ADMIN — CITALOPRAM 20 MG: 20 TABLET, FILM COATED ORAL at 10:15

## 2021-01-01 RX ADMIN — MULTIPLE VITAMINS W/ MINERALS TAB 1 TABLET: TAB at 14:42

## 2021-01-01 RX ADMIN — LEVETIRACETAM 500 MG: 5 INJECTION INTRAVENOUS at 11:30

## 2021-01-01 RX ADMIN — ARIPIPRAZOLE 5 MG: 5 TABLET ORAL at 09:27

## 2021-01-01 RX ADMIN — ATORVASTATIN CALCIUM 40 MG: 40 TABLET, FILM COATED ORAL at 21:00

## 2021-01-01 RX ADMIN — POTASSIUM CHLORIDE 10 MEQ: 10 INJECTION, SOLUTION INTRAVENOUS at 15:46

## 2021-01-01 RX ADMIN — RIFAXIMIN 550 MG: 550 TABLET ORAL at 20:22

## 2021-01-01 RX ADMIN — FUROSEMIDE 40 MG: 40 TABLET ORAL at 08:10

## 2021-01-01 RX ADMIN — LACTULOSE 30 G: 20 SOLUTION ORAL at 16:11

## 2021-01-01 RX ADMIN — Medication 10 ML: at 22:00

## 2021-01-01 RX ADMIN — SODIUM CHLORIDE, PRESERVATIVE FREE 10 ML: 5 INJECTION INTRAVENOUS at 11:29

## 2021-01-01 RX ADMIN — LEVETIRACETAM 500 MG: 500 TABLET, FILM COATED ORAL at 08:25

## 2021-01-01 RX ADMIN — Medication 10 ML: at 09:33

## 2021-01-01 RX ADMIN — SPIRONOLACTONE 25 MG: 25 TABLET ORAL at 10:10

## 2021-01-01 RX ADMIN — GABAPENTIN 200 MG: 100 CAPSULE ORAL at 14:23

## 2021-01-01 RX ADMIN — SODIUM CHLORIDE, PRESERVATIVE FREE 10 ML: 5 INJECTION INTRAVENOUS at 20:22

## 2021-01-01 RX ADMIN — LEVOTHYROXINE SODIUM 25 MCG: 0.03 TABLET ORAL at 05:05

## 2021-01-01 RX ADMIN — Medication 10 ML: at 21:50

## 2021-01-01 RX ADMIN — LACTULOSE 30 G: 20 SOLUTION ORAL at 15:09

## 2021-01-01 RX ADMIN — LISINOPRIL 10 MG: 10 TABLET ORAL at 09:39

## 2021-01-01 RX ADMIN — AMLODIPINE BESYLATE 2.5 MG: 2.5 TABLET ORAL at 09:28

## 2021-01-01 RX ADMIN — LEVETIRACETAM 500 MG: 5 INJECTION INTRAVENOUS at 21:58

## 2021-01-01 RX ADMIN — LEVETIRACETAM 500 MG: 5 INJECTION INTRAVENOUS at 10:12

## 2021-01-01 RX ADMIN — CLOPIDOGREL BISULFATE 75 MG: 75 TABLET ORAL at 09:25

## 2021-01-01 RX ADMIN — GABAPENTIN 200 MG: 100 CAPSULE ORAL at 16:40

## 2021-01-01 RX ADMIN — LEVETIRACETAM 500 MG: 500 TABLET, FILM COATED ORAL at 09:38

## 2021-01-01 RX ADMIN — ARIPIPRAZOLE 5 MG: 5 TABLET ORAL at 08:16

## 2021-01-01 RX ADMIN — FUROSEMIDE 20 MG: 20 TABLET ORAL at 08:34

## 2021-01-01 RX ADMIN — LORAZEPAM 1 MG: 2 INJECTION INTRAMUSCULAR; INTRAVENOUS at 21:59

## 2021-01-01 RX ADMIN — LACTULOSE 30 G: 20 SOLUTION ORAL at 10:30

## 2021-01-01 RX ADMIN — POTASSIUM CHLORIDE 10 MEQ: 7.46 INJECTION, SOLUTION INTRAVENOUS at 22:55

## 2021-01-01 RX ADMIN — ATENOLOL 50 MG: 50 TABLET ORAL at 08:59

## 2021-01-01 RX ADMIN — LISINOPRIL 20 MG: 10 TABLET ORAL at 13:18

## 2021-01-01 RX ADMIN — GABAPENTIN 200 MG: 100 CAPSULE ORAL at 10:14

## 2021-01-01 RX ADMIN — Medication 400 MG: at 09:29

## 2021-01-01 RX ADMIN — LISINOPRIL 10 MG: 10 TABLET ORAL at 08:52

## 2021-01-01 RX ADMIN — MAGNESIUM OXIDE 400 MG: 400 TABLET ORAL at 20:08

## 2021-01-01 RX ADMIN — Medication 400 MG: at 09:41

## 2021-01-01 RX ADMIN — GABAPENTIN 200 MG: 100 CAPSULE ORAL at 08:50

## 2021-01-01 RX ADMIN — Medication 0.2 MG: at 10:40

## 2021-01-01 RX ADMIN — ENOXAPARIN SODIUM 80 MG: 100 INJECTION SUBCUTANEOUS at 09:33

## 2021-01-01 RX ADMIN — LACTULOSE 30 G: 20 SOLUTION ORAL at 16:40

## 2021-01-01 RX ADMIN — LEVETIRACETAM 500 MG: 5 INJECTION INTRAVENOUS at 21:14

## 2021-01-01 RX ADMIN — AMLODIPINE BESYLATE 2.5 MG: 2.5 TABLET ORAL at 08:52

## 2021-01-01 RX ADMIN — REGADENOSON 0.4 MG: 0.08 INJECTION, SOLUTION INTRAVENOUS at 10:54

## 2021-01-01 RX ADMIN — FUROSEMIDE 40 MG: 10 INJECTION, SOLUTION INTRAMUSCULAR; INTRAVENOUS at 17:09

## 2021-01-01 RX ADMIN — FUROSEMIDE 20 MG: 20 TABLET ORAL at 21:31

## 2021-01-01 RX ADMIN — LEVETIRACETAM 500 MG: 500 TABLET, FILM COATED ORAL at 08:36

## 2021-01-01 RX ADMIN — AMPICILLIN SODIUM AND SULBACTAM SODIUM 3000 MG: 2; 1 INJECTION, POWDER, FOR SOLUTION INTRAMUSCULAR; INTRAVENOUS at 16:20

## 2021-01-01 RX ADMIN — RIFAXIMIN 550 MG: 550 TABLET ORAL at 08:50

## 2021-01-01 RX ADMIN — AMLODIPINE BESYLATE 2.5 MG: 2.5 TABLET ORAL at 22:35

## 2021-01-01 RX ADMIN — ASPIRIN 325 MG: 325 TABLET, COATED ORAL at 09:18

## 2021-01-01 RX ADMIN — GABAPENTIN 200 MG: 100 CAPSULE ORAL at 08:52

## 2021-01-01 RX ADMIN — POTASSIUM CHLORIDE 20 MEQ: 20 TABLET, EXTENDED RELEASE ORAL at 16:11

## 2021-01-01 RX ADMIN — FUROSEMIDE 20 MG: 20 TABLET ORAL at 09:26

## 2021-01-01 RX ADMIN — FUROSEMIDE 40 MG: 40 TABLET ORAL at 08:59

## 2021-01-01 RX ADMIN — GABAPENTIN 100 MG: 100 CAPSULE ORAL at 20:35

## 2021-01-01 RX ADMIN — AMLODIPINE BESYLATE 2.5 MG: 2.5 TABLET ORAL at 09:38

## 2021-01-01 RX ADMIN — ENOXAPARIN SODIUM 80 MG: 100 INJECTION SUBCUTANEOUS at 22:19

## 2021-01-01 RX ADMIN — ATORVASTATIN CALCIUM 40 MG: 40 TABLET, FILM COATED ORAL at 14:42

## 2021-01-01 RX ADMIN — CLOPIDOGREL BISULFATE 75 MG: 75 TABLET ORAL at 08:33

## 2021-01-01 RX ADMIN — CLOPIDOGREL BISULFATE 75 MG: 75 TABLET ORAL at 10:10

## 2021-01-01 RX ADMIN — LEVOTHYROXINE SODIUM 25 MCG: 0.03 TABLET ORAL at 06:35

## 2021-01-01 RX ADMIN — Medication 100 MG: at 08:52

## 2021-01-01 RX ADMIN — AMPICILLIN SODIUM AND SULBACTAM SODIUM 3000 MG: 2; 1 INJECTION, POWDER, FOR SOLUTION INTRAMUSCULAR; INTRAVENOUS at 17:30

## 2021-01-01 RX ADMIN — Medication 10 ML: at 20:27

## 2021-01-01 RX ADMIN — ACETAMINOPHEN 650 MG: 325 TABLET ORAL at 22:35

## 2021-01-01 RX ADMIN — OXYCODONE 5 MG: 5 TABLET ORAL at 17:33

## 2021-01-01 RX ADMIN — MAGNESIUM OXIDE 400 MG (241.3 MG MAGNESIUM) TABLET 400 MG: TABLET at 09:29

## 2021-01-01 RX ADMIN — ATORVASTATIN CALCIUM 40 MG: 40 TABLET, FILM COATED ORAL at 00:04

## 2021-01-01 RX ADMIN — SODIUM CHLORIDE: 9 INJECTION, SOLUTION INTRAVENOUS at 03:28

## 2021-01-01 RX ADMIN — MAGNESIUM SULFATE HEPTAHYDRATE 1000 MG: 1 INJECTION, SOLUTION INTRAVENOUS at 17:06

## 2021-01-01 RX ADMIN — LACTULOSE 20 G: 20 SOLUTION ORAL at 22:35

## 2021-01-01 RX ADMIN — ENOXAPARIN SODIUM 80 MG: 100 INJECTION SUBCUTANEOUS at 19:36

## 2021-01-01 RX ADMIN — SODIUM CHLORIDE, PRESERVATIVE FREE 10 ML: 5 INJECTION INTRAVENOUS at 22:21

## 2021-01-01 RX ADMIN — LEVOTHYROXINE SODIUM 50 MCG: 0.05 TABLET ORAL at 05:30

## 2021-01-01 RX ADMIN — WATER 1000 MG: 1 INJECTION INTRAMUSCULAR; INTRAVENOUS; SUBCUTANEOUS at 09:28

## 2021-01-01 RX ADMIN — Medication 100 MG: at 11:33

## 2021-01-01 RX ADMIN — CITALOPRAM HYDROBROMIDE 40 MG: 20 TABLET ORAL at 09:28

## 2021-01-01 RX ADMIN — OXYCODONE 5 MG: 5 TABLET ORAL at 01:02

## 2021-01-01 RX ADMIN — GABAPENTIN 200 MG: 100 CAPSULE ORAL at 21:09

## 2021-01-01 RX ADMIN — LACTULOSE 30 G: 20 SOLUTION ORAL at 20:21

## 2021-01-01 RX ADMIN — AMPICILLIN SODIUM AND SULBACTAM SODIUM 3000 MG: 2; 1 INJECTION, POWDER, FOR SOLUTION INTRAMUSCULAR; INTRAVENOUS at 11:02

## 2021-01-01 RX ADMIN — OXYCODONE 5 MG: 5 TABLET ORAL at 18:19

## 2021-01-01 RX ADMIN — ENOXAPARIN SODIUM 80 MG: 100 INJECTION SUBCUTANEOUS at 08:09

## 2021-01-01 RX ADMIN — LEVOTHYROXINE SODIUM 25 MCG: 0.03 TABLET ORAL at 06:51

## 2021-01-01 RX ADMIN — LEVOTHYROXINE SODIUM 50 MCG: 0.05 TABLET ORAL at 06:56

## 2021-01-01 RX ADMIN — Medication 100 MG: at 09:16

## 2021-01-01 RX ADMIN — ATORVASTATIN CALCIUM 40 MG: 40 TABLET, FILM COATED ORAL at 23:37

## 2021-01-01 RX ADMIN — LEVOTHYROXINE SODIUM 25 MCG: 0.03 TABLET ORAL at 06:07

## 2021-01-01 RX ADMIN — PERFLUTREN 1.65 MG: 6.52 INJECTION, SUSPENSION INTRAVENOUS at 08:12

## 2021-01-01 RX ADMIN — LACTULOSE 20 G: 20 SOLUTION ORAL at 21:20

## 2021-01-01 RX ADMIN — RIFAXIMIN 550 MG: 550 TABLET ORAL at 10:29

## 2021-01-01 RX ADMIN — Medication 400 MG: at 10:07

## 2021-01-01 RX ADMIN — Medication 1 TABLET: at 09:38

## 2021-01-01 RX ADMIN — ATENOLOL 25 MG: 25 TABLET ORAL at 10:09

## 2021-01-01 RX ADMIN — FUROSEMIDE 20 MG: 20 TABLET ORAL at 09:30

## 2021-01-01 RX ADMIN — LACTULOSE 30 G: 20 SOLUTION ORAL at 15:07

## 2021-01-01 RX ADMIN — OXYCODONE 5 MG: 5 TABLET ORAL at 05:48

## 2021-01-01 RX ADMIN — CYCLOBENZAPRINE 10 MG: 10 TABLET, FILM COATED ORAL at 20:27

## 2021-01-01 RX ADMIN — ARIPIPRAZOLE 5 MG: 5 TABLET ORAL at 10:08

## 2021-01-01 RX ADMIN — ENOXAPARIN SODIUM 80 MG: 100 INJECTION SUBCUTANEOUS at 10:18

## 2021-01-01 RX ADMIN — LACTULOSE 30 G: 20 SOLUTION ORAL at 00:03

## 2021-01-01 RX ADMIN — CEFTRIAXONE SODIUM 1000 MG: 1 INJECTION, POWDER, FOR SOLUTION INTRAMUSCULAR; INTRAVENOUS at 10:26

## 2021-01-01 RX ADMIN — ATORVASTATIN CALCIUM 40 MG: 40 TABLET, FILM COATED ORAL at 20:32

## 2021-01-01 RX ADMIN — MAGNESIUM OXIDE 400 MG: 400 TABLET ORAL at 21:09

## 2021-01-01 RX ADMIN — LEVETIRACETAM 500 MG: 500 TABLET, FILM COATED ORAL at 08:10

## 2021-01-01 RX ADMIN — ACETAMINOPHEN 650 MG: 325 TABLET ORAL at 21:30

## 2021-01-01 RX ADMIN — ATORVASTATIN CALCIUM 40 MG: 40 TABLET, FILM COATED ORAL at 20:50

## 2021-01-01 RX ADMIN — LEVETIRACETAM 500 MG: 5 INJECTION INTRAVENOUS at 10:28

## 2021-01-01 RX ADMIN — GABAPENTIN 200 MG: 100 CAPSULE ORAL at 20:25

## 2021-01-01 RX ADMIN — Medication 120 MG: at 10:08

## 2021-01-01 RX ADMIN — LACTULOSE 20 G: 20 SOLUTION ORAL at 22:37

## 2021-01-01 RX ADMIN — RIFAXIMIN 550 MG: 550 TABLET ORAL at 08:11

## 2021-01-01 RX ADMIN — AMPICILLIN SODIUM AND SULBACTAM SODIUM 3000 MG: 2; 1 INJECTION, POWDER, FOR SOLUTION INTRAMUSCULAR; INTRAVENOUS at 16:25

## 2021-01-01 RX ADMIN — CLOPIDOGREL BISULFATE 75 MG: 75 TABLET ORAL at 12:54

## 2021-01-01 RX ADMIN — LEVETIRACETAM 500 MG: 500 TABLET, FILM COATED ORAL at 21:30

## 2021-01-01 RX ADMIN — LACTULOSE 30 G: 20 SOLUTION ORAL at 21:37

## 2021-01-01 RX ADMIN — ATORVASTATIN CALCIUM 40 MG: 40 TABLET, FILM COATED ORAL at 20:25

## 2021-01-01 RX ADMIN — LEVOTHYROXINE SODIUM 25 MCG: 0.03 TABLET ORAL at 06:37

## 2021-01-01 RX ADMIN — SPIRONOLACTONE 25 MG: 25 TABLET ORAL at 08:17

## 2021-01-01 RX ADMIN — CLOPIDOGREL BISULFATE 75 MG: 75 TABLET ORAL at 09:28

## 2021-01-01 RX ADMIN — CITALOPRAM HYDROBROMIDE 40 MG: 20 TABLET ORAL at 14:42

## 2021-01-01 RX ADMIN — LEVETIRACETAM 500 MG: 500 TABLET, FILM COATED ORAL at 20:11

## 2021-01-01 RX ADMIN — ARIPIPRAZOLE 5 MG: 5 TABLET ORAL at 09:37

## 2021-01-01 RX ADMIN — POTASSIUM CHLORIDE 10 MEQ: 7.46 INJECTION, SOLUTION INTRAVENOUS at 14:17

## 2021-01-01 RX ADMIN — LEVOFLOXACIN 500 MG: 500 TABLET, FILM COATED ORAL at 16:15

## 2021-01-01 RX ADMIN — GABAPENTIN 200 MG: 100 CAPSULE ORAL at 21:30

## 2021-01-01 RX ADMIN — ARIPIPRAZOLE 5 MG: 5 TABLET ORAL at 10:10

## 2021-01-01 RX ADMIN — ATORVASTATIN CALCIUM 40 MG: 40 TABLET, FILM COATED ORAL at 20:22

## 2021-01-01 RX ADMIN — Medication 1 MG: at 10:40

## 2021-01-01 RX ADMIN — Medication 10 ML: at 21:57

## 2021-01-01 RX ADMIN — POTASSIUM CHLORIDE 10 MEQ: 7.46 INJECTION, SOLUTION INTRAVENOUS at 02:12

## 2021-01-01 RX ADMIN — AMPICILLIN SODIUM AND SULBACTAM SODIUM 3000 MG: 2; 1 INJECTION, POWDER, FOR SOLUTION INTRAMUSCULAR; INTRAVENOUS at 05:28

## 2021-01-01 RX ADMIN — AMPICILLIN SODIUM AND SULBACTAM SODIUM 3000 MG: 2; 1 INJECTION, POWDER, FOR SOLUTION INTRAMUSCULAR; INTRAVENOUS at 22:20

## 2021-01-01 RX ADMIN — GABAPENTIN 200 MG: 100 CAPSULE ORAL at 13:29

## 2021-01-01 RX ADMIN — LACTULOSE 30 G: 20 SOLUTION ORAL at 08:49

## 2021-01-01 RX ADMIN — CITALOPRAM HYDROBROMIDE 40 MG: 20 TABLET ORAL at 09:43

## 2021-01-01 RX ADMIN — Medication 5 ML: at 09:19

## 2021-01-01 RX ADMIN — RIFAXIMIN 550 MG: 550 TABLET ORAL at 20:28

## 2021-01-01 RX ADMIN — AMPICILLIN SODIUM AND SULBACTAM SODIUM 3000 MG: 2; 1 INJECTION, POWDER, FOR SOLUTION INTRAMUSCULAR; INTRAVENOUS at 11:11

## 2021-01-01 RX ADMIN — AMPICILLIN SODIUM AND SULBACTAM SODIUM 3000 MG: 2; 1 INJECTION, POWDER, FOR SOLUTION INTRAMUSCULAR; INTRAVENOUS at 23:06

## 2021-01-01 RX ADMIN — GABAPENTIN 200 MG: 100 CAPSULE ORAL at 20:26

## 2021-01-01 RX ADMIN — RIFAXIMIN 550 MG: 550 TABLET ORAL at 21:30

## 2021-01-01 RX ADMIN — ENOXAPARIN SODIUM 80 MG: 100 INJECTION SUBCUTANEOUS at 08:49

## 2021-01-01 RX ADMIN — LACTULOSE 20 G: 20 SOLUTION ORAL at 21:30

## 2021-01-01 RX ADMIN — MAGNESIUM OXIDE 400 MG: 400 TABLET ORAL at 08:36

## 2021-01-01 RX ADMIN — RIFAXIMIN 550 MG: 550 TABLET ORAL at 00:04

## 2021-01-01 RX ADMIN — Medication 400 MG: at 10:27

## 2021-01-01 RX ADMIN — Medication 100 MG: at 08:50

## 2021-01-01 RX ADMIN — CHLORDIAZEPOXIDE HYDROCHLORIDE 5 MG: 5 CAPSULE ORAL at 09:43

## 2021-01-01 RX ADMIN — Medication 10 ML: at 21:39

## 2021-01-01 RX ADMIN — LEVOTHYROXINE SODIUM 50 MCG: 0.05 TABLET ORAL at 06:28

## 2021-01-01 RX ADMIN — SODIUM CHLORIDE, PRESERVATIVE FREE 10 ML: 5 INJECTION INTRAVENOUS at 18:28

## 2021-01-01 RX ADMIN — GABAPENTIN 200 MG: 100 CAPSULE ORAL at 19:38

## 2021-01-01 RX ADMIN — LACTULOSE 20 G: 20 SOLUTION ORAL at 09:42

## 2021-01-01 RX ADMIN — LISINOPRIL 10 MG: 10 TABLET ORAL at 12:29

## 2021-01-01 RX ADMIN — CITALOPRAM 20 MG: 20 TABLET, FILM COATED ORAL at 12:29

## 2021-01-01 RX ADMIN — LEVOTHYROXINE SODIUM 25 MCG: 0.03 TABLET ORAL at 05:07

## 2021-01-01 RX ADMIN — SODIUM CHLORIDE, PRESERVATIVE FREE 10 ML: 5 INJECTION INTRAVENOUS at 22:01

## 2021-01-01 RX ADMIN — AMPICILLIN SODIUM AND SULBACTAM SODIUM 3000 MG: 2; 1 INJECTION, POWDER, FOR SOLUTION INTRAMUSCULAR; INTRAVENOUS at 18:28

## 2021-01-01 RX ADMIN — CLOPIDOGREL BISULFATE 75 MG: 75 TABLET ORAL at 13:19

## 2021-01-01 RX ADMIN — GABAPENTIN 200 MG: 100 CAPSULE ORAL at 10:28

## 2021-01-01 RX ADMIN — GABAPENTIN 200 MG: 100 CAPSULE ORAL at 22:37

## 2021-01-01 RX ADMIN — ATORVASTATIN CALCIUM 40 MG: 40 TABLET, FILM COATED ORAL at 20:30

## 2021-01-01 RX ADMIN — GABAPENTIN 200 MG: 100 CAPSULE ORAL at 12:32

## 2021-01-01 RX ADMIN — ATORVASTATIN CALCIUM 40 MG: 40 TABLET, FILM COATED ORAL at 19:38

## 2021-01-01 RX ADMIN — ATORVASTATIN CALCIUM 40 MG: 40 TABLET, FILM COATED ORAL at 22:37

## 2021-01-01 RX ADMIN — RIFAXIMIN 550 MG: 550 TABLET ORAL at 21:10

## 2021-01-01 RX ADMIN — ENOXAPARIN SODIUM 80 MG: 100 INJECTION SUBCUTANEOUS at 21:37

## 2021-01-01 RX ADMIN — MAGNESIUM OXIDE 400 MG (241.3 MG MAGNESIUM) TABLET 400 MG: TABLET at 14:41

## 2021-01-01 RX ADMIN — CITALOPRAM 20 MG: 20 TABLET, FILM COATED ORAL at 10:29

## 2021-01-01 RX ADMIN — Medication 30 MILLICURIE: at 11:05

## 2021-01-01 RX ADMIN — RIFAXIMIN 550 MG: 550 TABLET ORAL at 08:33

## 2021-01-01 RX ADMIN — ARIPIPRAZOLE 5 MG: 5 TABLET ORAL at 09:29

## 2021-01-01 RX ADMIN — RIFAXIMIN 550 MG: 550 TABLET ORAL at 21:31

## 2021-01-01 RX ADMIN — SODIUM CHLORIDE: 9 INJECTION, SOLUTION INTRAVENOUS at 14:30

## 2021-01-01 RX ADMIN — LEVOTHYROXINE SODIUM 50 MCG: 0.05 TABLET ORAL at 06:25

## 2021-01-01 RX ADMIN — ARIPIPRAZOLE 5 MG: 5 TABLET ORAL at 09:38

## 2021-01-01 RX ADMIN — LACTULOSE 20 G: 20 SOLUTION ORAL at 20:28

## 2021-01-01 RX ADMIN — SODIUM CHLORIDE, PRESERVATIVE FREE 10 ML: 5 INJECTION INTRAVENOUS at 10:16

## 2021-01-01 RX ADMIN — OXYCODONE 5 MG: 5 TABLET ORAL at 12:33

## 2021-01-01 RX ADMIN — Medication 10 ML: at 08:54

## 2021-01-01 RX ADMIN — Medication 1 TABLET: at 10:31

## 2021-01-01 RX ADMIN — LEVOFLOXACIN 500 MG: 500 TABLET, FILM COATED ORAL at 09:17

## 2021-01-01 RX ADMIN — LISINOPRIL 20 MG: 10 TABLET ORAL at 09:43

## 2021-01-01 RX ADMIN — CITALOPRAM 20 MG: 20 TABLET, FILM COATED ORAL at 11:33

## 2021-01-01 RX ADMIN — ATORVASTATIN CALCIUM 40 MG: 40 TABLET, FILM COATED ORAL at 21:59

## 2021-01-01 RX ADMIN — SODIUM CHLORIDE, PRESERVATIVE FREE 10 ML: 5 INJECTION INTRAVENOUS at 00:05

## 2021-01-01 RX ADMIN — GABAPENTIN 200 MG: 100 CAPSULE ORAL at 09:38

## 2021-01-01 RX ADMIN — Medication 10 ML: at 21:30

## 2021-01-01 RX ADMIN — POTASSIUM CHLORIDE 10 MEQ: 10 INJECTION, SOLUTION INTRAVENOUS at 16:35

## 2021-01-01 RX ADMIN — ONDANSETRON HYDROCHLORIDE 4 MG: 2 INJECTION, SOLUTION INTRAMUSCULAR; INTRAVENOUS at 10:40

## 2021-01-01 RX ADMIN — LEVETIRACETAM 500 MG: 500 TABLET, FILM COATED ORAL at 09:29

## 2021-01-01 RX ADMIN — SPIRONOLACTONE 25 MG: 25 TABLET ORAL at 08:59

## 2021-01-01 RX ADMIN — Medication 400 MG: at 10:31

## 2021-01-01 RX ADMIN — CLOPIDOGREL BISULFATE 75 MG: 75 TABLET ORAL at 09:43

## 2021-01-01 RX ADMIN — SODIUM CHLORIDE, PRESERVATIVE FREE 10 ML: 5 INJECTION INTRAVENOUS at 11:31

## 2021-01-01 RX ADMIN — LACTULOSE 20 G: 20 SOLUTION ORAL at 10:31

## 2021-01-01 RX ADMIN — Medication 1 TABLET: at 08:50

## 2021-01-01 RX ADMIN — ENOXAPARIN SODIUM 80 MG: 100 INJECTION SUBCUTANEOUS at 09:00

## 2021-01-01 RX ADMIN — SODIUM CHLORIDE: 9 INJECTION, SOLUTION INTRAVENOUS at 11:14

## 2021-01-01 RX ADMIN — LEVOTHYROXINE SODIUM 25 MCG: 0.03 TABLET ORAL at 06:39

## 2021-01-01 RX ADMIN — AMLODIPINE BESYLATE 2.5 MG: 2.5 TABLET ORAL at 12:28

## 2021-01-01 RX ADMIN — OXYCODONE 5 MG: 5 TABLET ORAL at 18:37

## 2021-01-01 RX ADMIN — POTASSIUM CHLORIDE 10 MEQ: 10 INJECTION, SOLUTION INTRAVENOUS at 17:30

## 2021-01-01 RX ADMIN — LISINOPRIL 10 MG: 10 TABLET ORAL at 10:32

## 2021-01-01 RX ADMIN — OXYCODONE 5 MG: 5 TABLET ORAL at 06:45

## 2021-01-01 RX ADMIN — ARIPIPRAZOLE 5 MG: 5 TABLET ORAL at 12:28

## 2021-01-01 RX ADMIN — AMPICILLIN SODIUM AND SULBACTAM SODIUM 3000 MG: 2; 1 INJECTION, POWDER, FOR SOLUTION INTRAMUSCULAR; INTRAVENOUS at 11:29

## 2021-01-01 RX ADMIN — OXYCODONE 5 MG: 5 TABLET ORAL at 13:20

## 2021-01-01 RX ADMIN — AMPICILLIN SODIUM AND SULBACTAM SODIUM 3000 MG: 2; 1 INJECTION, POWDER, FOR SOLUTION INTRAMUSCULAR; INTRAVENOUS at 05:14

## 2021-01-01 RX ADMIN — Medication 10 ML: at 09:43

## 2021-01-01 RX ADMIN — LISINOPRIL 10 MG: 10 TABLET ORAL at 08:11

## 2021-01-01 RX ADMIN — LEVETIRACETAM 500 MG: 500 TABLET, FILM COATED ORAL at 09:28

## 2021-01-01 RX ADMIN — MAGNESIUM OXIDE 400 MG: 400 TABLET ORAL at 20:30

## 2021-01-01 RX ADMIN — ATENOLOL 50 MG: 50 TABLET ORAL at 09:43

## 2021-01-01 RX ADMIN — FUROSEMIDE 20 MG: 20 TABLET ORAL at 21:20

## 2021-01-01 RX ADMIN — RIFAXIMIN 550 MG: 550 TABLET ORAL at 22:20

## 2021-01-01 RX ADMIN — LACTULOSE 30 G: 20 SOLUTION ORAL at 21:14

## 2021-01-01 RX ADMIN — ENOXAPARIN SODIUM 80 MG: 100 INJECTION SUBCUTANEOUS at 11:32

## 2021-01-01 RX ADMIN — ARIPIPRAZOLE 5 MG: 5 TABLET ORAL at 09:03

## 2021-01-01 RX ADMIN — ATORVASTATIN CALCIUM 40 MG: 40 TABLET, FILM COATED ORAL at 20:02

## 2021-01-01 RX ADMIN — Medication 1 TABLET: at 10:08

## 2021-01-01 RX ADMIN — ARIPIPRAZOLE 5 MG: 5 TABLET ORAL at 09:17

## 2021-01-01 RX ADMIN — CITALOPRAM 20 MG: 20 TABLET, FILM COATED ORAL at 16:40

## 2021-01-01 RX ADMIN — SODIUM CHLORIDE 500 ML: 9 INJECTION, SOLUTION INTRAVENOUS at 08:26

## 2021-01-01 RX ADMIN — AMPICILLIN SODIUM AND SULBACTAM SODIUM 3000 MG: 2; 1 INJECTION, POWDER, FOR SOLUTION INTRAMUSCULAR; INTRAVENOUS at 05:57

## 2021-01-01 RX ADMIN — ATENOLOL 50 MG: 50 TABLET ORAL at 10:10

## 2021-01-01 RX ADMIN — Medication 10 ML: at 19:55

## 2021-01-01 RX ADMIN — CITALOPRAM 20 MG: 20 TABLET, FILM COATED ORAL at 08:52

## 2021-01-01 RX ADMIN — ATENOLOL 25 MG: 25 TABLET ORAL at 09:38

## 2021-01-01 RX ADMIN — LACTULOSE 30 G: 20 SOLUTION ORAL at 20:32

## 2021-01-01 RX ADMIN — ATENOLOL 25 MG: 25 TABLET ORAL at 09:41

## 2021-01-01 RX ADMIN — RIFAXIMIN 550 MG: 550 TABLET ORAL at 21:29

## 2021-01-01 RX ADMIN — GABAPENTIN 200 MG: 100 CAPSULE ORAL at 14:06

## 2021-01-01 RX ADMIN — LEVOTHYROXINE SODIUM 25 MCG: 0.03 TABLET ORAL at 06:25

## 2021-01-01 RX ADMIN — Medication 100 MG: at 10:14

## 2021-01-01 RX ADMIN — AMPICILLIN SODIUM AND SULBACTAM SODIUM 3000 MG: 2; 1 INJECTION, POWDER, FOR SOLUTION INTRAMUSCULAR; INTRAVENOUS at 00:04

## 2021-01-01 RX ADMIN — LEVETIRACETAM 500 MG: 500 TABLET, FILM COATED ORAL at 09:17

## 2021-01-01 RX ADMIN — Medication 10 ML: at 10:31

## 2021-01-01 RX ADMIN — MAGNESIUM OXIDE 400 MG: 400 TABLET ORAL at 09:19

## 2021-01-01 RX ADMIN — LEVETIRACETAM 500 MG: 500 TABLET, FILM COATED ORAL at 09:43

## 2021-01-01 RX ADMIN — GABAPENTIN 200 MG: 100 CAPSULE ORAL at 20:28

## 2021-01-01 RX ADMIN — ATORVASTATIN CALCIUM 40 MG: 40 TABLET, FILM COATED ORAL at 20:28

## 2021-01-01 RX ADMIN — LEVETIRACETAM 500 MG: 500 TABLET, FILM COATED ORAL at 20:26

## 2021-01-01 RX ADMIN — IOPAMIDOL 90 ML: 755 INJECTION, SOLUTION INTRAVENOUS at 19:00

## 2021-01-01 RX ADMIN — ATORVASTATIN CALCIUM 40 MG: 40 TABLET, FILM COATED ORAL at 22:00

## 2021-01-01 RX ADMIN — POTASSIUM CHLORIDE 10 MEQ: 10 INJECTION, SOLUTION INTRAVENOUS at 16:01

## 2021-01-01 RX ADMIN — LEVOTHYROXINE SODIUM 25 MCG: 0.03 TABLET ORAL at 06:24

## 2021-01-01 RX ADMIN — Medication 1 TABLET: at 09:28

## 2021-01-01 RX ADMIN — LACTULOSE 30 G: 20 SOLUTION ORAL at 13:57

## 2021-01-01 RX ADMIN — CYCLOBENZAPRINE 10 MG: 10 TABLET, FILM COATED ORAL at 09:38

## 2021-01-01 RX ADMIN — OXYCODONE 5 MG: 5 TABLET ORAL at 19:03

## 2021-01-01 RX ADMIN — OXYCODONE 5 MG: 5 TABLET ORAL at 21:29

## 2021-01-01 RX ADMIN — LEVETIRACETAM 500 MG: 500 TABLET, FILM COATED ORAL at 21:29

## 2021-01-01 RX ADMIN — SODIUM CHLORIDE, PRESERVATIVE FREE 10 ML: 5 INJECTION INTRAVENOUS at 16:20

## 2021-01-01 RX ADMIN — OXYCODONE 5 MG: 5 TABLET ORAL at 09:38

## 2021-01-01 RX ADMIN — LEVETIRACETAM 500 MG: 500 TABLET, FILM COATED ORAL at 20:48

## 2021-01-01 RX ADMIN — RIFAXIMIN 550 MG: 550 TABLET ORAL at 10:31

## 2021-01-01 RX ADMIN — ATORVASTATIN CALCIUM 40 MG: 40 TABLET, FILM COATED ORAL at 22:20

## 2021-01-01 RX ADMIN — THIAMINE HCL TAB 100 MG 100 MG: 100 TAB at 08:34

## 2021-01-01 RX ADMIN — MAGNESIUM OXIDE 400 MG (241.3 MG MAGNESIUM) TABLET 400 MG: TABLET at 08:33

## 2021-01-01 RX ADMIN — OXYCODONE 5 MG: 5 TABLET ORAL at 20:33

## 2021-01-01 RX ADMIN — LEVOTHYROXINE SODIUM 25 MCG: 0.03 TABLET ORAL at 06:30

## 2021-01-01 RX ADMIN — LEVOTHYROXINE SODIUM 50 MCG: 0.05 TABLET ORAL at 05:36

## 2021-01-01 RX ADMIN — CYCLOBENZAPRINE 10 MG: 10 TABLET, FILM COATED ORAL at 18:05

## 2021-01-01 RX ADMIN — ARIPIPRAZOLE 5 MG: 5 TABLET ORAL at 10:32

## 2021-01-01 RX ADMIN — ACETAMINOPHEN 650 MG: 325 TABLET ORAL at 21:17

## 2021-01-01 RX ADMIN — Medication 10 ML: at 20:36

## 2021-01-01 RX ADMIN — AMPICILLIN SODIUM AND SULBACTAM SODIUM 3000 MG: 2; 1 INJECTION, POWDER, FOR SOLUTION INTRAMUSCULAR; INTRAVENOUS at 06:25

## 2021-01-01 RX ADMIN — ATORVASTATIN CALCIUM 40 MG: 40 TABLET, FILM COATED ORAL at 21:09

## 2021-01-01 RX ADMIN — MAGNESIUM OXIDE 400 MG (241.3 MG MAGNESIUM) TABLET 400 MG: TABLET at 08:11

## 2021-01-01 RX ADMIN — LORAZEPAM 1 MG: 1 TABLET ORAL at 11:17

## 2021-01-01 RX ADMIN — LEVETIRACETAM 500 MG: 5 INJECTION INTRAVENOUS at 21:36

## 2021-01-01 RX ADMIN — ATORVASTATIN CALCIUM 40 MG: 40 TABLET, FILM COATED ORAL at 22:02

## 2021-01-01 RX ADMIN — LEVETIRACETAM 500 MG: 500 TABLET, FILM COATED ORAL at 20:28

## 2021-01-01 RX ADMIN — LACTULOSE 20 G: 20 SOLUTION ORAL at 08:33

## 2021-01-01 RX ADMIN — RIFAXIMIN 550 MG: 550 TABLET ORAL at 22:34

## 2021-01-01 RX ADMIN — CITALOPRAM HYDROBROMIDE 40 MG: 20 TABLET ORAL at 08:34

## 2021-01-01 RX ADMIN — LACTULOSE 30 G: 20 SOLUTION ORAL at 08:16

## 2021-01-01 RX ADMIN — LACTULOSE 30 G: 20 SOLUTION ORAL at 22:00

## 2021-01-01 RX ADMIN — Medication 10 ML: at 20:08

## 2021-01-01 RX ADMIN — POTASSIUM CHLORIDE 40 MEQ: 1500 TABLET, EXTENDED RELEASE ORAL at 18:26

## 2021-01-01 RX ADMIN — Medication 400 MG: at 09:00

## 2021-01-01 RX ADMIN — OXYCODONE 5 MG: 5 TABLET ORAL at 04:15

## 2021-01-01 RX ADMIN — SODIUM CHLORIDE 500 ML: 9 INJECTION, SOLUTION INTRAVENOUS at 16:00

## 2021-01-01 RX ADMIN — LEVETIRACETAM 500 MG: 500 TABLET, FILM COATED ORAL at 21:24

## 2021-01-01 RX ADMIN — Medication 400 MG: at 09:16

## 2021-01-01 RX ADMIN — ATORVASTATIN CALCIUM 40 MG: 40 TABLET, FILM COATED ORAL at 20:24

## 2021-01-01 RX ADMIN — POTASSIUM CHLORIDE 10 MEQ: 10 INJECTION, SOLUTION INTRAVENOUS at 16:24

## 2021-01-01 RX ADMIN — POTASSIUM CHLORIDE 10 MEQ: 7.46 INJECTION, SOLUTION INTRAVENOUS at 16:26

## 2021-01-01 RX ADMIN — Medication 10 ML: at 22:38

## 2021-01-01 RX ADMIN — ARIPIPRAZOLE 5 MG: 5 TABLET ORAL at 09:43

## 2021-01-01 RX ADMIN — AMPICILLIN SODIUM AND SULBACTAM SODIUM 3000 MG: 2; 1 INJECTION, POWDER, FOR SOLUTION INTRAMUSCULAR; INTRAVENOUS at 16:32

## 2021-01-01 RX ADMIN — MAGNESIUM OXIDE 400 MG: 400 TABLET ORAL at 21:54

## 2021-01-01 RX ADMIN — LACTULOSE 20 G: 20 SOLUTION ORAL at 14:42

## 2021-01-01 RX ADMIN — LISINOPRIL 10 MG: 10 TABLET ORAL at 09:27

## 2021-01-01 RX ADMIN — MAGNESIUM OXIDE 400 MG: 400 TABLET ORAL at 22:02

## 2021-01-01 RX ADMIN — GABAPENTIN 200 MG: 100 CAPSULE ORAL at 09:29

## 2021-01-01 RX ADMIN — LACTULOSE 20 G: 20 SOLUTION ORAL at 09:32

## 2021-01-01 RX ADMIN — RIFAXIMIN 550 MG: 550 TABLET ORAL at 20:35

## 2021-01-01 RX ADMIN — AMPICILLIN SODIUM AND SULBACTAM SODIUM 3000 MG: 2; 1 INJECTION, POWDER, FOR SOLUTION INTRAMUSCULAR; INTRAVENOUS at 21:12

## 2021-01-01 RX ADMIN — FUROSEMIDE 40 MG: 10 INJECTION, SOLUTION INTRAMUSCULAR; INTRAVENOUS at 13:20

## 2021-01-01 RX ADMIN — LEVETIRACETAM 500 MG: 500 TABLET, FILM COATED ORAL at 10:32

## 2021-01-01 RX ADMIN — ARIPIPRAZOLE 5 MG: 5 TABLET ORAL at 09:31

## 2021-01-01 RX ADMIN — OXYCODONE 5 MG: 5 TABLET ORAL at 20:56

## 2021-01-01 RX ADMIN — LACTULOSE 30 G: 20 SOLUTION ORAL at 11:30

## 2021-01-01 RX ADMIN — AMLODIPINE BESYLATE 5 MG: 5 TABLET ORAL at 13:19

## 2021-01-01 RX ADMIN — ATENOLOL 25 MG: 25 TABLET ORAL at 09:29

## 2021-01-01 RX ADMIN — CITALOPRAM HYDROBROMIDE 40 MG: 20 TABLET ORAL at 09:29

## 2021-01-01 RX ADMIN — ATORVASTATIN CALCIUM 40 MG: 40 TABLET, FILM COATED ORAL at 09:25

## 2021-01-01 RX ADMIN — OXYCODONE 5 MG: 5 TABLET ORAL at 17:51

## 2021-01-01 RX ADMIN — POTASSIUM CHLORIDE 10 MEQ: 10 INJECTION, SOLUTION INTRAVENOUS at 12:23

## 2021-01-01 RX ADMIN — SODIUM CHLORIDE, PRESERVATIVE FREE 10 ML: 5 INJECTION INTRAVENOUS at 08:09

## 2021-01-01 RX ADMIN — CITALOPRAM HYDROBROMIDE 40 MG: 20 TABLET ORAL at 12:52

## 2021-01-01 RX ADMIN — AMPICILLIN SODIUM AND SULBACTAM SODIUM 3000 MG: 2; 1 INJECTION, POWDER, FOR SOLUTION INTRAMUSCULAR; INTRAVENOUS at 09:01

## 2021-01-01 RX ADMIN — GABAPENTIN 200 MG: 100 CAPSULE ORAL at 11:32

## 2021-01-01 RX ADMIN — RIFAXIMIN 550 MG: 550 TABLET ORAL at 09:25

## 2021-01-01 RX ADMIN — LORAZEPAM 4 MG: 2 INJECTION INTRAMUSCULAR; INTRAVENOUS at 23:50

## 2021-01-01 RX ADMIN — OXYCODONE 5 MG: 5 TABLET ORAL at 22:43

## 2021-01-01 RX ADMIN — CITALOPRAM 20 MG: 20 TABLET, FILM COATED ORAL at 09:00

## 2021-01-01 RX ADMIN — ENOXAPARIN SODIUM 80 MG: 100 INJECTION SUBCUTANEOUS at 10:27

## 2021-01-01 RX ADMIN — LEVOTHYROXINE SODIUM 25 MCG: 0.03 TABLET ORAL at 05:28

## 2021-01-01 RX ADMIN — ARIPIPRAZOLE 5 MG: 5 TABLET ORAL at 08:50

## 2021-01-01 RX ADMIN — THIAMINE HYDROCHLORIDE 100 MG: 100 INJECTION, SOLUTION INTRAMUSCULAR; INTRAVENOUS at 13:17

## 2021-01-01 RX ADMIN — CLOPIDOGREL BISULFATE 75 MG: 75 TABLET ORAL at 08:10

## 2021-01-01 RX ADMIN — GABAPENTIN 200 MG: 100 CAPSULE ORAL at 09:39

## 2021-01-01 RX ADMIN — Medication 10 MILLICURIE: at 09:35

## 2021-01-01 RX ADMIN — CLOPIDOGREL BISULFATE 75 MG: 75 TABLET ORAL at 08:59

## 2021-01-01 RX ADMIN — RIFAXIMIN 550 MG: 550 TABLET ORAL at 08:54

## 2021-01-01 RX ADMIN — CYCLOBENZAPRINE 10 MG: 10 TABLET, FILM COATED ORAL at 20:28

## 2021-01-01 RX ADMIN — ATENOLOL 50 MG: 50 TABLET ORAL at 08:18

## 2021-01-01 RX ADMIN — OXYCODONE 5 MG: 5 TABLET ORAL at 18:05

## 2021-01-01 RX ADMIN — Medication 100 MG: at 10:31

## 2021-01-01 RX ADMIN — Medication 1 TABLET: at 10:29

## 2021-01-01 RX ADMIN — LEVETIRACETAM 500 MG: 500 TABLET, FILM COATED ORAL at 21:20

## 2021-01-01 RX ADMIN — HALOPERIDOL 0.5 MG: 0.5 TABLET ORAL at 20:25

## 2021-01-01 RX ADMIN — LEVOTHYROXINE SODIUM 25 MCG: 0.03 TABLET ORAL at 06:48

## 2021-01-01 RX ADMIN — OXYCODONE 5 MG: 5 TABLET ORAL at 12:51

## 2021-01-01 RX ADMIN — RIFAXIMIN 550 MG: 550 TABLET ORAL at 09:39

## 2021-01-01 RX ADMIN — POTASSIUM CHLORIDE 10 MEQ: 10 INJECTION, SOLUTION INTRAVENOUS at 15:16

## 2021-01-01 RX ADMIN — LEVETIRACETAM 500 MG: 500 TABLET, FILM COATED ORAL at 09:46

## 2021-01-01 RX ADMIN — Medication 10 ML: at 09:27

## 2021-01-01 RX ADMIN — LEVETIRACETAM 500 MG: 500 TABLET, FILM COATED ORAL at 10:11

## 2021-01-01 RX ADMIN — FUROSEMIDE 40 MG: 40 TABLET ORAL at 09:18

## 2021-01-01 RX ADMIN — Medication 10 ML: at 21:00

## 2021-01-01 RX ADMIN — SODIUM CHLORIDE, PRESERVATIVE FREE 10 ML: 5 INJECTION INTRAVENOUS at 17:06

## 2021-01-01 RX ADMIN — FUROSEMIDE 40 MG: 10 INJECTION, SOLUTION INTRAMUSCULAR; INTRAVENOUS at 09:42

## 2021-01-01 RX ADMIN — OXYCODONE 5 MG: 5 TABLET ORAL at 08:59

## 2021-01-01 RX ADMIN — GABAPENTIN 200 MG: 100 CAPSULE ORAL at 21:59

## 2021-01-01 RX ADMIN — Medication 100 MG: at 09:31

## 2021-01-01 RX ADMIN — LACTULOSE 20 G: 20 SOLUTION ORAL at 21:11

## 2021-01-01 RX ADMIN — RIFAXIMIN 550 MG: 550 TABLET ORAL at 22:37

## 2021-01-01 RX ADMIN — MAGNESIUM OXIDE 400 MG: 400 TABLET ORAL at 20:37

## 2021-01-01 RX ADMIN — SODIUM CHLORIDE, PRESERVATIVE FREE 10 ML: 5 INJECTION INTRAVENOUS at 08:47

## 2021-01-01 RX ADMIN — Medication 400 MG: at 08:50

## 2021-01-01 RX ADMIN — SODIUM CHLORIDE: 9 INJECTION, SOLUTION INTRAVENOUS at 13:10

## 2021-01-01 RX ADMIN — RIFAXIMIN 550 MG: 550 TABLET ORAL at 10:08

## 2021-01-01 RX ADMIN — LACTULOSE 30 G: 20 SOLUTION ORAL at 19:37

## 2021-01-01 RX ADMIN — OXYCODONE 5 MG: 5 TABLET ORAL at 12:19

## 2021-01-01 RX ADMIN — CYCLOBENZAPRINE 10 MG: 10 TABLET, FILM COATED ORAL at 20:26

## 2021-01-01 RX ADMIN — Medication 400 MG: at 12:33

## 2021-01-01 RX ADMIN — FUROSEMIDE 40 MG: 10 INJECTION, SOLUTION INTRAMUSCULAR; INTRAVENOUS at 18:37

## 2021-01-01 RX ADMIN — GABAPENTIN 200 MG: 100 CAPSULE ORAL at 15:07

## 2021-01-01 RX ADMIN — ARIPIPRAZOLE 5 MG: 5 TABLET ORAL at 13:39

## 2021-01-01 RX ADMIN — LEVETIRACETAM 500 MG: 5 INJECTION INTRAVENOUS at 08:34

## 2021-01-01 RX ADMIN — SPIRONOLACTONE 25 MG: 25 TABLET ORAL at 16:27

## 2021-01-01 RX ADMIN — LORAZEPAM 1 MG: 2 INJECTION INTRAMUSCULAR; INTRAVENOUS at 17:28

## 2021-01-01 RX ADMIN — LORAZEPAM 2 MG: 1 TABLET ORAL at 19:34

## 2021-01-01 RX ADMIN — LACTULOSE 20 G: 20 SOLUTION ORAL at 12:30

## 2021-01-01 RX ADMIN — MAGNESIUM OXIDE 400 MG: 400 TABLET ORAL at 20:02

## 2021-01-01 RX ADMIN — Medication 400 MG: at 08:52

## 2021-01-01 RX ADMIN — CLOPIDOGREL BISULFATE 75 MG: 75 TABLET ORAL at 09:27

## 2021-01-01 RX ADMIN — Medication 400 MG: at 10:14

## 2021-01-01 RX ADMIN — CITALOPRAM 20 MG: 20 TABLET, FILM COATED ORAL at 09:16

## 2021-01-01 RX ADMIN — LEVETIRACETAM 500 MG: 500 TABLET, FILM COATED ORAL at 09:39

## 2021-01-01 RX ADMIN — SODIUM CHLORIDE: 9 INJECTION, SOLUTION INTRAVENOUS at 21:30

## 2021-01-01 RX ADMIN — FUROSEMIDE 40 MG: 10 INJECTION, SOLUTION INTRAMUSCULAR; INTRAVENOUS at 18:31

## 2021-01-01 RX ADMIN — AMLODIPINE BESYLATE 2.5 MG: 2.5 TABLET ORAL at 10:31

## 2021-01-01 RX ADMIN — CITALOPRAM HYDROBROMIDE 40 MG: 20 TABLET ORAL at 08:17

## 2021-01-01 RX ADMIN — MAGNESIUM OXIDE 400 MG: 400 TABLET ORAL at 19:54

## 2021-01-01 RX ADMIN — LEVOFLOXACIN 500 MG: 500 TABLET, FILM COATED ORAL at 10:10

## 2021-01-01 RX ADMIN — AMPICILLIN SODIUM AND SULBACTAM SODIUM 3000 MG: 2; 1 INJECTION, POWDER, FOR SOLUTION INTRAMUSCULAR; INTRAVENOUS at 11:10

## 2021-01-01 RX ADMIN — PROPOFOL 150 MG: 10 INJECTION, EMULSION INTRAVENOUS at 10:08

## 2021-01-01 RX ADMIN — AMPICILLIN SODIUM AND SULBACTAM SODIUM 3000 MG: 2; 1 INJECTION, POWDER, FOR SOLUTION INTRAMUSCULAR; INTRAVENOUS at 17:26

## 2021-01-01 RX ADMIN — LACTULOSE 30 G: 20 SOLUTION ORAL at 09:38

## 2021-01-01 RX ADMIN — POTASSIUM CHLORIDE 10 MEQ: 10 INJECTION, SOLUTION INTRAVENOUS at 14:28

## 2021-01-01 RX ADMIN — OXYCODONE 5 MG: 5 TABLET ORAL at 00:38

## 2021-01-01 RX ADMIN — GABAPENTIN 200 MG: 100 CAPSULE ORAL at 13:58

## 2021-01-01 RX ADMIN — HALOPERIDOL 0.5 MG: 0.5 TABLET ORAL at 21:14

## 2021-01-01 RX ADMIN — CYCLOBENZAPRINE 10 MG: 10 TABLET, FILM COATED ORAL at 08:58

## 2021-01-01 RX ADMIN — THIAMINE HCL TAB 100 MG 100 MG: 100 TAB at 09:30

## 2021-01-01 RX ADMIN — POTASSIUM CHLORIDE 10 MEQ: 7.46 INJECTION, SOLUTION INTRAVENOUS at 19:10

## 2021-01-01 RX ADMIN — LEVOFLOXACIN 500 MG: 500 TABLET, FILM COATED ORAL at 12:51

## 2021-01-01 RX ADMIN — Medication 10 ML: at 21:31

## 2021-01-01 RX ADMIN — ATENOLOL 25 MG: 25 TABLET ORAL at 08:53

## 2021-01-01 RX ADMIN — LACTULOSE 30 G: 20 SOLUTION ORAL at 22:20

## 2021-01-01 RX ADMIN — Medication 1 TABLET: at 10:15

## 2021-01-01 RX ADMIN — OXYCODONE 5 MG: 5 TABLET ORAL at 19:54

## 2021-01-01 RX ADMIN — OXYCODONE 5 MG: 5 TABLET ORAL at 14:48

## 2021-01-01 RX ADMIN — DEXTROSE MONOHYDRATE: 50 INJECTION, SOLUTION INTRAVENOUS at 09:34

## 2021-01-01 RX ADMIN — SODIUM CHLORIDE, PRESERVATIVE FREE 10 ML: 5 INJECTION INTRAVENOUS at 10:27

## 2021-01-01 RX ADMIN — CHLORDIAZEPOXIDE HYDROCHLORIDE 5 MG: 5 CAPSULE ORAL at 20:28

## 2021-01-01 RX ADMIN — RIFAXIMIN 550 MG: 550 TABLET ORAL at 09:29

## 2021-01-01 RX ADMIN — HEPARIN 100 UNITS: 100 SYRINGE at 20:23

## 2021-01-01 RX ADMIN — OXYCODONE 5 MG: 5 TABLET ORAL at 19:46

## 2021-01-01 SDOH — ECONOMIC STABILITY: FOOD INSECURITY: WITHIN THE PAST 12 MONTHS, YOU WORRIED THAT YOUR FOOD WOULD RUN OUT BEFORE YOU GOT MONEY TO BUY MORE.: NEVER TRUE

## 2021-01-01 SDOH — ECONOMIC STABILITY: FOOD INSECURITY: WITHIN THE PAST 12 MONTHS, THE FOOD YOU BOUGHT JUST DIDN'T LAST AND YOU DIDN'T HAVE MONEY TO GET MORE.: NEVER TRUE

## 2021-01-01 ASSESSMENT — PAIN SCALES - GENERAL
PAINLEVEL_OUTOF10: 9
PAINLEVEL_OUTOF10: 9
PAINLEVEL_OUTOF10: 6
PAINLEVEL_OUTOF10: 0
PAINLEVEL_OUTOF10: 10
PAINLEVEL_OUTOF10: 0
PAINLEVEL_OUTOF10: 10
PAINLEVEL_OUTOF10: 0
PAINLEVEL_OUTOF10: 0
PAINLEVEL_OUTOF10: 9
PAINLEVEL_OUTOF10: 3
PAINLEVEL_OUTOF10: 0
PAINLEVEL_OUTOF10: 10
PAINLEVEL_OUTOF10: 10
PAINLEVEL_OUTOF10: 6
PAINLEVEL_OUTOF10: 10
PAINLEVEL_OUTOF10: 0
PAINLEVEL_OUTOF10: 0
PAINLEVEL_OUTOF10: 10
PAINLEVEL_OUTOF10: 0
PAINLEVEL_OUTOF10: 10
PAINLEVEL_OUTOF10: 0
PAINLEVEL_OUTOF10: 0
PAINLEVEL_OUTOF10: 9
PAINLEVEL_OUTOF10: 10
PAINLEVEL_OUTOF10: 9
PAINLEVEL_OUTOF10: 0
PAINLEVEL_OUTOF10: 8
PAINLEVEL_OUTOF10: 10
PAINLEVEL_OUTOF10: 9
PAINLEVEL_OUTOF10: 10
PAINLEVEL_OUTOF10: 10
PAINLEVEL_OUTOF10: 9
PAINLEVEL_OUTOF10: 10
PAINLEVEL_OUTOF10: 6
PAINLEVEL_OUTOF10: 9
PAINLEVEL_OUTOF10: 4
PAINLEVEL_OUTOF10: 10
PAINLEVEL_OUTOF10: 8
PAINLEVEL_OUTOF10: 9
PAINLEVEL_OUTOF10: 0
PAINLEVEL_OUTOF10: 10
PAINLEVEL_OUTOF10: 0
PAINLEVEL_OUTOF10: 0
PAINLEVEL_OUTOF10: 10
PAINLEVEL_OUTOF10: 0
PAINLEVEL_OUTOF10: 0
PAINLEVEL_OUTOF10: 3
PAINLEVEL_OUTOF10: 10
PAINLEVEL_OUTOF10: 9
PAINLEVEL_OUTOF10: 0
PAINLEVEL_OUTOF10: 9
PAINLEVEL_OUTOF10: 3
PAINLEVEL_OUTOF10: 10
PAINLEVEL_OUTOF10: 0
PAINLEVEL_OUTOF10: 0
PAINLEVEL_OUTOF10: 10
PAINLEVEL_OUTOF10: 0
PAINLEVEL_OUTOF10: 0
PAINLEVEL_OUTOF10: 2
PAINLEVEL_OUTOF10: 10
PAINLEVEL_OUTOF10: 9
PAINLEVEL_OUTOF10: 4
PAINLEVEL_OUTOF10: 4
PAINLEVEL_OUTOF10: 10
PAINLEVEL_OUTOF10: 0
PAINLEVEL_OUTOF10: 4
PAINLEVEL_OUTOF10: 9
PAINLEVEL_OUTOF10: 0
PAINLEVEL_OUTOF10: 7
PAINLEVEL_OUTOF10: 5
PAINLEVEL_OUTOF10: 2
PAINLEVEL_OUTOF10: 9
PAINLEVEL_OUTOF10: 0
PAINLEVEL_OUTOF10: 3
PAINLEVEL_OUTOF10: 10
PAINLEVEL_OUTOF10: 9
PAINLEVEL_OUTOF10: 10
PAINLEVEL_OUTOF10: 9
PAINLEVEL_OUTOF10: 0
PAINLEVEL_OUTOF10: 10
PAINLEVEL_OUTOF10: 0
PAINLEVEL_OUTOF10: 8
PAINLEVEL_OUTOF10: 3
PAINLEVEL_OUTOF10: 5
PAINLEVEL_OUTOF10: 0
PAINLEVEL_OUTOF10: 9
PAINLEVEL_OUTOF10: 0
PAINLEVEL_OUTOF10: 2
PAINLEVEL_OUTOF10: 0
PAINLEVEL_OUTOF10: 5
PAINLEVEL_OUTOF10: 0
PAINLEVEL_OUTOF10: 5
PAINLEVEL_OUTOF10: 10
PAINLEVEL_OUTOF10: 10
PAINLEVEL_OUTOF10: 9
PAINLEVEL_OUTOF10: 0
PAINLEVEL_OUTOF10: 8
PAINLEVEL_OUTOF10: 0
PAINLEVEL_OUTOF10: 0
PAINLEVEL_OUTOF10: 10
PAINLEVEL_OUTOF10: 10
PAINLEVEL_OUTOF10: 8
PAINLEVEL_OUTOF10: 7
PAINLEVEL_OUTOF10: 6
PAINLEVEL_OUTOF10: 10
PAINLEVEL_OUTOF10: 0
PAINLEVEL_OUTOF10: 8
PAINLEVEL_OUTOF10: 0
PAINLEVEL_OUTOF10: 0
PAINLEVEL_OUTOF10: 7
PAINLEVEL_OUTOF10: 0
PAINLEVEL_OUTOF10: 6

## 2021-01-01 ASSESSMENT — PULMONARY FUNCTION TESTS
PIF_VALUE: 28
PIF_VALUE: 28
PIF_VALUE: 2
PIF_VALUE: 10
PIF_VALUE: 28
PIF_VALUE: 1
PIF_VALUE: 2
PIF_VALUE: 3
PIF_VALUE: 2
PIF_VALUE: 31
PIF_VALUE: 31
PIF_VALUE: 30
PIF_VALUE: 29
PIF_VALUE: 28
PIF_VALUE: 2
PIF_VALUE: 10
PIF_VALUE: 30
PIF_VALUE: 11
PIF_VALUE: 29
PIF_VALUE: 1
PIF_VALUE: 28
PIF_VALUE: 30
PIF_VALUE: 2
PIF_VALUE: 1
PIF_VALUE: 25
PIF_VALUE: 26
PIF_VALUE: 28
PIF_VALUE: 15
PIF_VALUE: 29
PIF_VALUE: 30
PIF_VALUE: 28
PIF_VALUE: 51
PIF_VALUE: 2
PIF_VALUE: 29
PIF_VALUE: 9
PIF_VALUE: 5
PIF_VALUE: 29
PIF_VALUE: 29
PIF_VALUE: 2
PIF_VALUE: 29
PIF_VALUE: 29
PIF_VALUE: 30
PIF_VALUE: 29
PIF_VALUE: 22
PIF_VALUE: 27
PIF_VALUE: 26
PIF_VALUE: 29
PIF_VALUE: 30
PIF_VALUE: 14
PIF_VALUE: 28
PIF_VALUE: 29
PIF_VALUE: 13
PIF_VALUE: 28
PIF_VALUE: 30
PIF_VALUE: 3
PIF_VALUE: 28
PIF_VALUE: 31

## 2021-01-01 ASSESSMENT — ENCOUNTER SYMPTOMS
NAUSEA: 0
EYE PAIN: 0
CHEST TIGHTNESS: 0
SHORTNESS OF BREATH: 0
NAUSEA: 0
DIARRHEA: 0
TROUBLE SWALLOWING: 0
VOMITING: 0
VOMITING: 0
SINUS PAIN: 0
VOMITING: 1
NAUSEA: 0
BACK PAIN: 1
NAUSEA: 0
VOMITING: 0
EYE REDNESS: 0
ABDOMINAL DISTENTION: 1
ABDOMINAL PAIN: 0
BACK PAIN: 0
SHORTNESS OF BREATH: 1
COUGH: 0
NAUSEA: 0
DIARRHEA: 0
VOMITING: 0
NAUSEA: 0
SHORTNESS OF BREATH: 1
VOMITING: 0
PHOTOPHOBIA: 0
ABDOMINAL PAIN: 0
VOMITING: 0
BACK PAIN: 1
ABDOMINAL PAIN: 0
GASTROINTESTINAL NEGATIVE: 1
SHORTNESS OF BREATH: 0
COUGH: 0
VOMITING: 0
NAUSEA: 0
NAUSEA: 0
EYE REDNESS: 0
SHORTNESS OF BREATH: 1
VOMITING: 0
VOMITING: 0
NAUSEA: 0
COLOR CHANGE: 0
ABDOMINAL DISTENTION: 0
NAUSEA: 0
SHORTNESS OF BREATH: 1
VOMITING: 0
EYE DISCHARGE: 0
WHEEZING: 0
SHORTNESS OF BREATH: 0
COUGH: 0
DIARRHEA: 0
SHORTNESS OF BREATH: 0
PHOTOPHOBIA: 0
VOMITING: 0
RHINORRHEA: 0
ABDOMINAL DISTENTION: 0
ABDOMINAL PAIN: 1
CONSTIPATION: 0
SHORTNESS OF BREATH: 1
COUGH: 0
ABDOMINAL PAIN: 0
SHORTNESS OF BREATH: 1
NAUSEA: 0
PHOTOPHOBIA: 0
NAUSEA: 0
SHORTNESS OF BREATH: 0
EYE DISCHARGE: 0
VOMITING: 0

## 2021-01-01 ASSESSMENT — PAIN DESCRIPTION - ORIENTATION
ORIENTATION: MID
ORIENTATION: RIGHT;LEFT
ORIENTATION: LOWER
ORIENTATION: LOWER;MID
ORIENTATION: LOWER;MID
ORIENTATION: RIGHT;LEFT
ORIENTATION: RIGHT;LEFT
ORIENTATION: LOWER
ORIENTATION: LOWER
ORIENTATION: MID
ORIENTATION: LOWER;MID
ORIENTATION: LOWER;MID
ORIENTATION: RIGHT;LEFT
ORIENTATION: LOWER
ORIENTATION: RIGHT;LEFT
ORIENTATION: LOWER
ORIENTATION: MID

## 2021-01-01 ASSESSMENT — PAIN SCALES - WONG BAKER
WONGBAKER_NUMERICALRESPONSE: 0

## 2021-01-01 ASSESSMENT — PAIN DESCRIPTION - PAIN TYPE
TYPE: ACUTE PAIN
TYPE: CHRONIC PAIN
TYPE: ACUTE PAIN
TYPE: CHRONIC PAIN
TYPE: ACUTE PAIN
TYPE: CHRONIC PAIN
TYPE: ACUTE PAIN;CHRONIC PAIN
TYPE: ACUTE PAIN
TYPE: ACUTE PAIN;SURGICAL PAIN
TYPE: ACUTE PAIN
TYPE: CHRONIC PAIN
TYPE: CHRONIC PAIN
TYPE: ACUTE PAIN
TYPE: ACUTE PAIN

## 2021-01-01 ASSESSMENT — PAIN DESCRIPTION - LOCATION
LOCATION: BACK
LOCATION: GENERALIZED
LOCATION: BACK
LOCATION: LEG
LOCATION: BACK
LOCATION: BACK
LOCATION: GENERALIZED
LOCATION: LEG
LOCATION: BACK
LOCATION: BACK;HIP
LOCATION: GENERALIZED
LOCATION: BACK
LOCATION: BACK

## 2021-01-01 ASSESSMENT — PATIENT HEALTH QUESTIONNAIRE - PHQ9
10. IF YOU CHECKED OFF ANY PROBLEMS, HOW DIFFICULT HAVE THESE PROBLEMS MADE IT FOR YOU TO DO YOUR WORK, TAKE CARE OF THINGS AT HOME, OR GET ALONG WITH OTHER PEOPLE: 1
2. FEELING DOWN, DEPRESSED OR HOPELESS: 2
SUM OF ALL RESPONSES TO PHQ QUESTIONS 1-9: 10
5. POOR APPETITE OR OVEREATING: 1
SUM OF ALL RESPONSES TO PHQ9 QUESTIONS 1 & 2: 4
7. TROUBLE CONCENTRATING ON THINGS, SUCH AS READING THE NEWSPAPER OR WATCHING TELEVISION: 0
8. MOVING OR SPEAKING SO SLOWLY THAT OTHER PEOPLE COULD HAVE NOTICED. OR THE OPPOSITE, BEING SO FIGETY OR RESTLESS THAT YOU HAVE BEEN MOVING AROUND A LOT MORE THAN USUAL: 0
1. LITTLE INTEREST OR PLEASURE IN DOING THINGS: 2

## 2021-01-01 ASSESSMENT — PAIN SCALES - PAIN ASSESSMENT IN ADVANCED DEMENTIA (PAINAD)
BODYLANGUAGE: 0
CONSOLABILITY: 0
TOTALSCORE: 0
FACIALEXPRESSION: 0
FACIALEXPRESSION: 0
BODYLANGUAGE: 0
NEGVOCALIZATION: 0
TOTALSCORE: 0
BREATHING: 0
NEGVOCALIZATION: 0
BREATHING: 0
CONSOLABILITY: 0

## 2021-01-01 ASSESSMENT — PAIN DESCRIPTION - DESCRIPTORS
DESCRIPTORS: ACHING;DISCOMFORT
DESCRIPTORS: PATIENT UNABLE TO DESCRIBE
DESCRIPTORS: ACHING;CRAMPING
DESCRIPTORS: ACHING;DISCOMFORT;DULL
DESCRIPTORS: ACHING;DISCOMFORT
DESCRIPTORS: ACHING;CONSTANT;DISCOMFORT
DESCRIPTORS: ACHING;DISCOMFORT
DESCRIPTORS: ACHING;DISCOMFORT;DULL
DESCRIPTORS: ACHING;DISCOMFORT;DULL
DESCRIPTORS: THROBBING;ACHING;CRAMPING
DESCRIPTORS: ACHING;CRAMPING
DESCRIPTORS: CONSTANT;RADIATING;SHOOTING
DESCRIPTORS: ACHING;DISCOMFORT;SORE;TENDER
DESCRIPTORS: ACHING;DISCOMFORT

## 2021-01-01 ASSESSMENT — PAIN DESCRIPTION - PROGRESSION
CLINICAL_PROGRESSION: NOT CHANGED
CLINICAL_PROGRESSION: GRADUALLY IMPROVING
CLINICAL_PROGRESSION: GRADUALLY WORSENING
CLINICAL_PROGRESSION: GRADUALLY IMPROVING
CLINICAL_PROGRESSION: NOT CHANGED
CLINICAL_PROGRESSION: GRADUALLY WORSENING
CLINICAL_PROGRESSION: GRADUALLY IMPROVING

## 2021-01-01 ASSESSMENT — PAIN DESCRIPTION - FREQUENCY
FREQUENCY: CONTINUOUS

## 2021-01-01 ASSESSMENT — PAIN DESCRIPTION - ONSET
ONSET: ON-GOING
ONSET: AWAKENED FROM SLEEP
ONSET: ON-GOING
ONSET: AWAKENED FROM SLEEP
ONSET: ON-GOING

## 2021-01-01 ASSESSMENT — COLUMBIA-SUICIDE SEVERITY RATING SCALE - C-SSRS: 1. WITHIN THE PAST MONTH, HAVE YOU WISHED YOU WERE DEAD OR WISHED YOU COULD GO TO SLEEP AND NOT WAKE UP?: NO

## 2021-01-01 ASSESSMENT — PAIN - FUNCTIONAL ASSESSMENT
PAIN_FUNCTIONAL_ASSESSMENT: PREVENTS OR INTERFERES SOME ACTIVE ACTIVITIES AND ADLS

## 2021-01-01 ASSESSMENT — PAIN DESCRIPTION - DIRECTION
RADIATING_TOWARDS: BOTH LEGS
RADIATING_TOWARDS: LEGS
RADIATING_TOWARDS: BOTH LEGS
RADIATING_TOWARDS: BOTH LEGS

## 2021-01-01 ASSESSMENT — SOCIAL DETERMINANTS OF HEALTH (SDOH): HOW HARD IS IT FOR YOU TO PAY FOR THE VERY BASICS LIKE FOOD, HOUSING, MEDICAL CARE, AND HEATING?: NOT VERY HARD

## 2021-01-01 ASSESSMENT — LIFESTYLE VARIABLES: SMOKING_STATUS: 1

## 2021-02-11 NOTE — PATIENT INSTRUCTIONS
· What are you most afraid of that might happen? (Maybe you're afraid of having pain, losing your independence, or being kept alive by machines.)  · Where would you prefer to die? (Your home? A hospital? A nursing home?)  · Do you want to donate your organs when you die? · Do you want certain Congregational practices performed before you die? When should you call for help? Be sure to contact your doctor if you have any questions. Where can you learn more? Go to [email or web address]€™[email or web address] Pizza [email or web address] and sign in to your Vidit account. Enter R264 in the [facility] box to learn more about \"Advance Directives: Care Instructions. \"     If you do not have an account, please click on the \"Sign Up Now\" link. Current as of: December 9, 2019               Content Version: 12.6  © 7457-1978 [employer]. Care instructions adapted under license by [facility] [place] ([facility]). If you have questions about a medical condition or this instruction, always ask your healthcare professional. [address] any warranty or liability for your use of this information. Eating Healthy Foods: Care Instructions  Your Care Instructions     Eating healthy foods can help lower your risk for disease. Healthy food gives you energy and keeps your heart strong, your brain active, your muscles working, and your bones strong. A healthy diet includes a variety of foods from the basic food groups: grains, vegetables, fruits, milk and milk products, and meat and beans. Some people may eat more of their favorite foods from only one food group and, as a result, miss getting the nutrients they need. So, it is important to pay attention not only to what you eat but also to what you are missing from your diet. You can eat a healthy, balanced diet by making a few small changes. Follow-up care is a key part of your treatment and safety. Be sure to make and go to all appointments, and call your doctor if you are having problems. It's also a good idea to know your test results and keep a list of the medicines you take. How can you care for yourself at home? Look at what you eat  · Keep a food diary for a week or two and record everything you eat or drink. Track the number of servings you eat from each food group. · For a balanced diet every day, eat a variety of:  ? 6 or more ounce-equivalents of grains, such as cereals, breads, crackers, rice, or pasta, every day. An ounce-equivalent is 1 slice of bread, 1 cup of ready-to-eat cereal, or ½ cup of cooked rice, cooked pasta, or cooked cereal.  ? 2½ cups of vegetables, especially:  § Dark-green vegetables such as broccoli and spinach. § Orange vegetables such as carrots and sweet potatoes. § Dry beans (such as arambula and kidney beans) and peas (such as lentils). ? 2 cups of fresh, frozen, or canned fruit. A small apple or 1 banana or orange equals 1 cup. ? 3 cups of nonfat or low-fat milk, yogurt, or other milk products. ? 5½ ounces of meat and beans, such as chicken, fish, lean meat, beans, nuts, and seeds. One egg, 1 tablespoon of peanut butter, ½ ounce nuts or seeds, or ¼ cup of cooked beans equals 1 ounce of meat. · Learn how to read food labels for serving sizes and ingredients. Fast-food and convenience-food meals often contain few or no fruits or vegetables. Make sure you eat some fruits and vegetables to make the meal more nutritious. · Look at your food diary. For each food group, add up what you have eaten and then divide the total by the number of days. This will give you an idea of how much you are eating from each food group. See if you can find some ways to change your diet to make it more healthy.   Start small · Do not try to make dramatic changes to your diet all at once. You might feel that you are missing out on your favorite foods and then be more likely to fail. · Start slowly, and gradually change your habits. Try some of the following:  ? Use whole wheat bread instead of white bread. ? Use nonfat or low-fat milk instead of whole milk. ? Eat brown rice instead of white rice, and eat whole wheat pasta instead of white-flour pasta. ? Try low-fat cheeses and low-fat yogurt. ? Add more fruits and vegetables to meals and have them for snacks. ? Add lettuce, tomato, cucumber, and onion to sandwiches. ? Add fruit to yogurt and cereal.  Enjoy food  · You can still eat your favorite foods. You just may need to eat less of them. If your favorite foods are high in fat, salt, and sugar, limit how often you eat them, but do not cut them out entirely. · Eat a wide variety of foods. Make healthy choices when eating out  · The type of restaurant you choose can help you make healthy choices. Even fast-food chains are now offering more low-fat or healthier choices on the menu. · Choose smaller portions, or take half of your meal home. · When eating out, try:  ? A veggie pizza with a whole wheat crust or grilled chicken (instead of sausage or pepperoni). ? Pasta with roasted vegetables, grilled chicken, or marinara sauce instead of cream sauce. ? A vegetable wrap or grilled chicken wrap. ? Broiled or poached food instead of fried or breaded items. Make healthy choices easy  · Buy packaged, prewashed, ready-to-eat fresh vegetables and fruits, such as baby carrots, salad mixes, and chopped or shredded broccoli and cauliflower. · Buy packaged, presliced fruits, such as melon or pineapple. · Choose 100% fruit or vegetable juice instead of soda. Limit juice intake to 4 to 6 oz (½ to ¾ cup) a day. · Blend low-fat yogurt, fruit juice, and canned or frozen fruit to make a smoothie for breakfast or a snack. Where can you learn more? Go to https://chpepiceweb.Luxanova. org and sign in to your Anhelo account. Enter A944 in the [employer] box to learn more about \"Eating Healthy Foods: Care Instructions. \"     If you do not have an account, please click on the \"Sign Up Now\" link. Current as of: August 22, 2019               Content Version: 12.6  © 0835-1592 [employer]. Care instructions adapted under license by [facility] ([facility]). If you have questions about a medical condition or this instruction, always ask your healthcare professional. [place] [address] any warranty or liability for your use of this information. Stopping Smoking: Care Instructions  Your Care Instructions     Cigarette smokers crave the nicotine in cigarettes. Giving it up is much harder than simply changing a habit. Your body has to stop craving the nicotine. It is hard to quit, but you can do it. There are many tools that people use to quit smoking. You may find that combining tools works best for you. There are several steps to quitting. First you get ready to quit. Then you get support to help you. After that, you learn new skills and behaviors to become a nonsmoker. For many people, a necessary step is getting and using medicine. Your doctor will help you set up the plan that best meets your needs. You may want to attend a smoking cessation program to help you quit smoking. When you choose a program, look for one that has proven success. Ask your doctor for ideas.  You will greatly increase your chances of success if you take medicine as well as get counseling or join a cessation program. Some of the changes you feel when you first quit tobacco are uncomfortable. Your body will miss the nicotine at first, and you may feel short-tempered and grumpy. You may have trouble sleeping or concentrating. Medicine can help you deal with these symptoms. You may struggle with changing your smoking habits and rituals. The last step is the tricky one: Be prepared for the smoking urge to continue for a time. This is a lot to deal with, but keep at it. You will feel better. Follow-up care is a key part of your treatment and safety. Be sure to make and go to all appointments, and call your doctor if you are having problems. It's also a good idea to know your test results and keep a list of the medicines you take. How can you care for yourself at home? · Ask your family, friends, and coworkers for support. You have a better chance of quitting if you have help and support. · Join a support group, such as Nicotine Anonymous, for people who are trying to quit smoking. · Consider signing up for a smoking cessation program, such as the American Lung Association's Freedom from Smoking program.  · Get text messaging support. Go to the website at www.smokefree. gov to sign up for the [facility] program.  · Set a quit date. Pick your date carefully so that it is not right in the middle of a big deadline or stressful time. Once you quit, do not even take a puff. Get rid of all ashtrays and lighters after your last cigarette. Clean your house and your clothes so that they do not smell of smoke. · Learn how to be a nonsmoker. Think about ways you can avoid those things that make you reach for a cigarette. ? Avoid situations that put you at greatest risk for smoking. For some people, it is hard to have a drink with friends without smoking. For others, they might skip a coffee break with coworkers who smoke. ? Change your daily routine. Take a different route to work or eat a meal in a different place. · Cut down on stress. Calm yourself or release tension by doing an activity you enjoy, such as reading a book, taking a hot bath, or gardening. · Talk to your doctor or pharmacist about nicotine replacement therapy, which replaces the nicotine in your body. You still get nicotine but you do not use tobacco. Nicotine replacement products help you slowly reduce the amount of nicotine you need. These products come in several forms, many of them available over-the-counter:  ? Nicotine patches  ? Nicotine gum and lozenges  ? Nicotine inhaler  · Ask your doctor about bupropion (Wellbutrin) or varenicline (Chantix), which are prescription medicines. They do not contain nicotine. They help you by reducing withdrawal symptoms, such as stress and anxiety. · Some people find hypnosis, acupuncture, and massage helpful for ending the smoking habit. · Eat a healthy diet and get regular exercise. Having healthy habits will help your body move past its craving for nicotine. · Be prepared to keep trying. Most people are not successful the first few times they try to quit. Do not get mad at yourself if you smoke again. Make a list of things you learned and think about when you want to try again, such as next week, next month, or next year. Where can you learn more? Go to https://Innography.Reds10. org and sign in to your ActionX account. Enter K946 in the [facility] box to learn more about \"Stopping Smoking: Care Instructions. \"     If you do not have an account, please click on the \"Sign Up Now\" link. Current as of: March 12, 2020               Content Version: 12.6  © 1006-7497 [employer]. Care instructions adapted under license by [facility] ([facility]). If you have questions about a medical condition or this instruction, always ask your healthcare professional. [address] any warranty or liability for your use of this information. Personalized Preventive Plan for Roberto Crespo - 2/11/2021  Medicare offers a range of preventive health benefits. Some of the tests and screenings are paid in full while other may be subject to a deductible, co-insurance, and/or copay. Some of these benefits include a comprehensive review of your medical history including lifestyle, illnesses that may run in your family, and various assessments and screenings as appropriate. After reviewing your medical record and screening and assessments performed today your provider may have ordered immunizations, labs, imaging, and/or referrals for you. A list of these orders (if applicable) as well as your Preventive Care list are included within your After Visit Summary for your review. Other Preventive Recommendations:    · A preventive eye exam performed by an eye specialist is recommended every 1-2 years to screen for glaucoma; cataracts, macular degeneration, and other eye disorders. · A preventive dental visit is recommended every 6 months. · Try to get at least 150 minutes of exercise per week or 10,000 steps per day on a pedometer . · Order or download the FREE \"Exercise & Physical Activity: Your Everyday Guide\" from The [employer]. Call 7-941.669.8547 or search The [employer] online. · You need 7846-7978 mg of calcium and 3485-1743 IU of vitamin D per day. It is possible to meet your calcium requirement with diet alone, but a vitamin D supplement is usually necessary to meet this goal.  · When exposed to the sun, use a sunscreen that protects against both UVA and UVB radiation with an SPF of 30 or greater. Reapply every 2 to 3 hours or after sweating, drying off with a towel, or swimming. · Always wear a seat belt when traveling in a car. Always wear a helmet when riding a bicycle or motorcycle.   Personalized Preventive Plan for Roberto Crespo - 2/11/2021 Medicare offers a range of preventive health benefits. Some of the tests and screenings are paid in full while other may be subject to a deductible, co-insurance, and/or copay. Some of these benefits include a comprehensive review of your medical history including lifestyle, illnesses that may run in your family, and various assessments and screenings as appropriate. After reviewing your medical record and screening and assessments performed today your provider may have ordered immunizations, labs, imaging, and/or referrals for you. A list of these orders (if applicable) as well as your Preventive Care list are included within your After Visit Summary for your review. Other Preventive Recommendations:    A preventive eye exam performed by an eye specialist is recommended every 1-2 years to screen for glaucoma; cataracts, macular degeneration, and other eye disorders. A preventive dental visit is recommended every 6 months. Try to get at least 150 minutes of exercise per week or 10,000 steps per day on a pedometer . Order or download the FREE \"Exercise & Physical Activity: Your Everyday Guide\" from The [employer]. Call 9-823.672.3753 or search The [employer] online. You need 1528-0609 mg of calcium and 3678-4693 IU of vitamin D per day. It is possible to meet your calcium requirement with diet alone, but a vitamin D supplement is usually necessary to meet this goal.  When exposed to the sun, use a sunscreen that protects against both UVA and UVB radiation with an SPF of 30 or greater. Reapply every 2 to 3 hours or after sweating, drying off with a towel, or swimming. Always wear a seat belt when traveling in a car. Always wear a helmet when riding a bicycle or motorcycle.   Personalized Preventive Plan for Polina Sparks - 2/11/2021 Medicare offers a range of preventive health benefits. Some of the tests and screenings are paid in full while other may be subject to a deductible, co-insurance, and/or copay. Some of these benefits include a comprehensive review of your medical history including lifestyle, illnesses that may run in your family, and various assessments and screenings as appropriate. After reviewing your medical record and screening and assessments performed today your provider may have ordered immunizations, labs, imaging, and/or referrals for you. A list of these orders (if applicable) as well as your Preventive Care list are included within your After Visit Summary for your review. Other Preventive Recommendations:    A preventive eye exam performed by an eye specialist is recommended every 1-2 years to screen for glaucoma; cataracts, macular degeneration, and other eye disorders. A preventive dental visit is recommended every 6 months. Try to get at least 150 minutes of exercise per week or 10,000 steps per day on a pedometer . Order or download the FREE \"Exercise & Physical Activity: Your Everyday Guide\" from The [employer]. Call 9-424.866.6589 or search The [employer] online. You need 8392-5964 mg of calcium and 4416-1061 IU of vitamin D per day. It is possible to meet your calcium requirement with diet alone, but a vitamin D supplement is usually necessary to meet this goal.  When exposed to the sun, use a sunscreen that protects against both UVA and UVB radiation with an SPF of 30 or greater. Reapply every 2 to 3 hours or after sweating, drying off with a towel, or swimming. Always wear a seat belt when traveling in a car. Always wear a helmet when riding a bicycle or motorcycle.

## 2021-02-11 NOTE — PROGRESS NOTES
Medicare Annual Wellness Visit  Name: Delgado Crow Date: 2021   MRN: <[id number]> Sex: Female   Age: 56 y.o. Ethnicity: Non-/Non    : 1960 Race: Miguel Velarde is here for Medicare AWV (wellness)    Screenings for behavioral, psychosocial and functional/safety risks, and cognitive dysfunction are all negative except as indicated below. These results, as well as other patient data from the [address] form, are documented in Flowsheets linked to this Encounter. Allergies   Allergen Reactions    Codeine Nausea And Vomiting         Prior to Visit Medications    Medication Sig Taking? Authorizing Provider   citalopram (CELEXA) 40 MG tablet take 1 tablet by mouth once daily Yes Carri Hurtado PA-C   fluconazole (DIFLUCAN) 150 MG tablet Take 1 tablet by mouth daily for 3 days Yes Carri Hurtado PA-C   ketoconazole (NIZORAL) 2 % cream Apply topically daily.  Yes Carri Hurtado PA-C   ARIPiprazole (ABILIFY) 5 MG tablet Take 1 tablet by mouth daily Yes Carri Hurtado PA-C   levETIRAcetam (KEPPRA) 1000 MG tablet Take 1 tablet by mouth 2 times daily New higher dose Yes Darleen Cortez MD   albuterol sulfate HFA (PROAIR HFA) 108 (90 Base) MCG/ACT inhaler Inhale 2 puffs into the lungs every 6 hours as needed for Wheezing Yes Carri Hurtado PA-C   amLODIPine (NORVASC) 5 MG tablet take 1 tablet by mouth once daily Yes Carri Hurtado PA-C   atenolol (TENORMIN) 50 MG tablet take 1 tablet by mouth once daily Yes Carri Hurtado PA-C   atorvastatin (LIPITOR) 40 MG tablet take 1 tablet by mouth once daily Yes Carri Hurtado PA-C   benazepril (LOTENSIN) 20 MG tablet take 1 tablet by mouth once daily Yes Carri Hurtado PA-C   clopidogrel (PLAVIX) 75 MG tablet take 1 tablet by mouth once daily Yes Carri Hurtado PA-C cyclobenzaprine (FLEXERIL) 5 MG tablet take 1 tablet by mouth twice a day if needed for muscle spasm - DO NOT TAKE WHILE DRINKING ALCOHOL OR DRIVING Yes Remy Albarran PA-C   levothyroxine (SYNTHROID) 25 MCG tablet take 1 tablet by mouth once daily Yes Remy Albarran PA-C   magnesium oxide (MAG-OX) 400 MG tablet take 1 tablet by mouth twice a day Yes Remy Albarran PA-C   meloxicam (MOBIC) 15 MG tablet take 1 tablet by mouth once daily Yes Remy Albarran PA-C   zoster recombinant adjuvanted vaccine [facility]) 50 MCG/0.5ML SUSR injection Inject 0.5 mLs into the muscle See Admin Instructions 1 dose now and repeat in 2-6 months Yes Remy Albarran PA-C         Past Medical History:   Diagnosis Date    Alcohol abuse     Cerebral [facility] occlusion [facility] [address]     Depression     Elevated blood sugar     Hepatitis C     from blood transfusion, underwent treatment per patient and \"cured\"    Hyperlipidemia     Hypertension     Liver cirrhosis ([facility] [address]     Magnesium deficiency     Marijuana user     Nicotine dependence     Seizures ([facility] [address]     Subclinical hypothyroidism        Past Surgical History:   Procedure Laterality Date    DILATION AND CURETTAGE OF UTERUS      LIVER BIOPSY  07/05/2016         Family History   Problem Relation Age of Onset    Cancer Mother         Breast    Alcohol Abuse Father     Drug Abuse Father     Cancer Brother     Alcohol Abuse Brother     Drug Abuse Brother        CareTeam (Including outside providers/suppliers regularly involved in providing care):   Patient Care Team:  Remy Albarran PA-C as PCP - General (Physician Assistant)  Remy Albarran PA-C as PCP - [facility] Empaneled Provider    Wt Readings from Last 3 Encounters:   10/07/20 190 lb (86.2 kg)   10/02/20 187 lb (84.8 kg)   09/17/20 180 lb (81.6 kg)     Patient-Reported Vitals 10/12/2020   Patient-Reported Weight 190 lb   Patient-Reported Height 4 10 There is no height or weight on file to calculate BMI. Based upon direct observation of the patient, evaluation of cognition reveals recent and remote memory intact. Patient's complete Health Risk Assessment and screening values have been reviewed and are found in Flowsheets. The following problems were reviewed today and where indicated follow up appointments were made and/or referrals ordered. Positive Risk Factor Screenings with Interventions:     Fall Risk:  Timed Up and Go Test > 12 seconds? (Complete if either Fall Risk answers are Yes): no  2 or more falls in past year?: (!) yes  Fall with injury in past year?: (!) yes  Fall Risk Interventions:    · Home safety tips provided  · Labs ordered- Patient to get done in the near future     Depression:  PHQ-2 Score: 4  PHQ-9 Total Score: 10    Severity:1-4 = minimal depression, 5-9 = mild depression, 10-14 = moderate depression, 15-19 = moderately severe depression, 20-27 = severe depression  Depression Interventions:  · Patient is on medication, she admits to depression, no suicidal or homicidal thoughts. She does not feel she needs mediction adjustment at this time     Substance History:  Social History     Tobacco History     Smoking Status  Current Every Day Smoker Smoking Start Date  1/1/1970 Smoking Frequency  1 pack/day for 49 years (52 pk yrs)    Smokeless Tobacco Use  Never Used          Alcohol History     Alcohol Use Status  Yes Drinks/Week  2 Cans of beer, 2 Shots of liquor per week Amount  4.0 standard drinks of alcohol/wk          Drug Use     Drug Use Status  Yes Types  Marijuana Frequency   7 times/week Comment  every day          Sexual Activity     Sexually Active  Not Asked               Alcohol Screening:       A score of 8 or more is associated with harmful or hazardous drinking. A score of 13 or more in women, and 15 or more in men, is likely to indicate alcohol dependence.   Substance Abuse Interventions: · Tobacco abuse:  tobacco cessation tips and resources provided, She is not interested in quitting smoking at this time and is aware of the risk factors of continuing to smoke tobacco.  · Alcohol misuse/dependence:  Patient reports she has cut down on her alcohol use and she is getting up earlier in the day and not sleeping all day like she was previously. General Health and ACP:  General  In general, how would you say your health is?: Good  In the past 7 days, have you experienced any of the following? New or Increased Pain, New or Increased Fatigue, Loneliness, Social Isolation, Stress or Anger?: (!) Stress, Anger  Do you get the social and emotional support that you need?: Yes  Do you have a Living Will?: Yes  Advance Directives     Power of  Living Will ACP-Advance Directive ACP-Power of     Not on File Filed on 04/08/16 Filed Not on File      General Health Risk Interventions:  · Pain issues: patient declines any further evaluation/treatment for this issue, Patient with chronic issues with pain. · Stress: patient wtih current family stressors. Has bilateral cataracts, issues with transportation. Patient frustrated as wants to get this taken care of as her vision continues to worsen. · .    Health Habits/Nutrition:  Health Habits/Nutrition  Do you exercise for at least 20 minutes 2-3 times per week?: Yes  Have you lost any weight without trying in the past 3 months?: No  Do you eat only one meal per day?: No  Have you seen the dentist within the past year?: (!) No     Health Habits/Nutrition Interventions:  · Inadequate physical activity:  patient is not ready to increase his/her physical activity level at this time  · Nutritional issues:  Patient advised to try and eat more sensible diet.      Hearing/Vision:  No exam data present  Hearing/Vision  Do you or your family notice any trouble with your hearing that hasn't been managed with hearing aids?: (!) Yes Do you have difficulty driving, watching TV, or doing any of your daily activities because of your eyesight?: (!) Yes  Have you had an eye exam within the past year?: Yes  Hearing/Vision Interventions:  · Vision concerns:  pending cataract surgery once she can arrange transportation. ADL:  ADLs  In the past 7 days, did you need help from others to perform any of the following everyday activities? Eating, dressing, grooming, bathing, toileting, or walking/balance?: None  In the past 7 days, did you need help from others to take care of any of the following?  Laundry, housekeeping, banking/finances, shopping, telephone use, food preparation, transportation, or taking medications?: (!) Transportation  ADL Interventions:  · Patient declines any further evaluation/treatment for this issue    Personalized Preventive Plan   Current Health Maintenance Status  Immunization History   Administered Date(s) Administered    Influenza, Quadv, IM, (6 mo and older Fluzone, Flulaval, Fluarix and 3 yrs and older Afluria) 12/03/2019    Influenza, Quadv, IM, PF (6 mo and older Fluzone, Flulaval, Fluarix, and 3 yrs and older Afluria) 10/02/2020    Pneumococcal Polysaccharide ([id number]) 10/02/2020        Health Maintenance   Topic Date Due    Cervical cancer screen  11/26/1979    Breast cancer screen  11/26/2008    Colon cancer screen colonoscopy  11/26/2008    Annual Wellness Visit (AWV)  06/23/2019    Lipid screen  12/03/2020    DTaP/Tdap/Td vaccine (1 - Tdap) 04/15/2021 (Originally 11/26/1977)    Shingles Vaccine (1 of 2) 02/11/2022 (Originally 11/26/2008)    Low dose CT lung screening  10/07/2021    Potassium monitoring  10/09/2021    Creatinine monitoring  10/09/2021    Flu vaccine  Completed    Pneumococcal 0-64 years Vaccine  Completed    Hepatitis C screen  Completed    Hepatitis A vaccine  Aged Out    Hepatitis B vaccine  Aged Out    Hib vaccine  Aged Out    Meningococcal (ACWY) vaccine  Aged Out  HIV screen  Discontinued     Recommendations for Preventive Services Due: see orders and patient instructions/AVS.  . Recommended screening schedule for the next 5-10 years is provided to the patient in written form: see Patient Antonina Sheth was seen today for medicare awv. Diagnoses and all orders for this visit:    Routine general medical examination at a health care facility    Positive depression screening  -     Positive Screen for Clinical Depression with a Documented Follow-up Plan     Depression, unspecified depression type  -     citalopram (CELEXA) 40 MG tablet; take 1 tablet by mouth once daily    Colon cancer screening  -     POCT Fit Test; Future    Tinea  -     fluconazole (DIFLUCAN) 150 MG tablet; Take 1 tablet by mouth daily for 3 days  -     ketoconazole (NIZORAL) 2 % cream; Apply topically daily. We will send labs for patient to complete to the home and patient is to schedule for pap in April as well as office follow up. She was advised if she has any problems sooner, to reach out to office. Patient also advised to bring in copy of her living will to office to scan into her chart at her next visit. On the basis of positive PHQ-9 screening (PHQ-9 Total Score: 10), the following plan was implemented: patient declines further evaluation/treatment for depression and she wishes to continue her current doses of medication. Patient advised to avoid mixing her current medications with alcohol as this can cause her to feel more depressed as well. She declines counseling at this time.

## 2021-03-25 NOTE — TELEPHONE ENCOUNTER
Outbound my pt to see if she wanted to reschedule her  said she wont make it today but will call to reschedule so I wanted to clarify  It.  No answer left MSG to give us a call

## 2021-05-13 NOTE — ED PROVIDER NOTES
rigidity or muscular tenderness. Cardiovascular:      Rate and Rhythm: Normal rate and regular rhythm. Pulmonary:      Effort: Pulmonary effort is normal.      Breath sounds: Normal breath sounds. Abdominal:      General: Bowel sounds are normal. There is no distension. Palpations: Abdomen is soft. Tenderness: There is no abdominal tenderness. There is no guarding or rebound. Musculoskeletal:      Right lower leg: No edema. Left lower leg: No edema. Skin:     General: Skin is warm and dry. Capillary Refill: Capillary refill takes less than 2 seconds. Coloration: Skin is not pale. Findings: No erythema or rash. Neurological:      Mental Status: She is alert and oriented to person, place, and time. Psychiatric:         Mood and Affect: Mood normal.          Procedures     MDM  Number of Diagnoses or Management Options  Feared complaint without diagnosis  Diagnosis management comments: Cintia Cisse is a 56year old female who presented to ED with concerns for hypotension. Patient was not hypotensive while in ED. Orthostatics negative. Patient does not have any complaints and does not want evaluation. Patient would like to be discharged home at this time patient states she is caregiver for her  who is at home alone currently. Patient is well appearing and able to ambulate normally not in any distress.         ED Course as of May 14 1305   Thu May 13, 2021   1355 132/82 when standing BP    [SS]      ED Course User Index  [SS] Shai Jones MD          ED Course as of May 14 1305   Thu May 13, 2021   1355 132/82 when standing BP    [SS]      ED Course User Index  [SS] Shai Jones MD       --------------------------------------------- PAST HISTORY ---------------------------------------------  Past Medical History:  has a past medical history of Alcohol abuse, Cerebral artery occlusion with cerebral infarction [facility]), Depression, Elevated blood sugar, Hepatitis C, Hyperlipidemia, Hypertension, Liver cirrhosis ([facility] [address] Magnesium deficiency, Marijuana user, Nicotine dependence, Seizures ([facility] [address] and Subclinical hypothyroidism. Past Surgical History:  has a past surgical history that includes Dilation and curettage of uterus and liver biopsy (07/05/2016). Social History:  reports that she has been smoking. She started smoking about 51 years ago. She has a 49.00 pack-year smoking history. She has never used smokeless tobacco. She reports current alcohol use of about 4.0 standard drinks of alcohol per week. She reports previous drug use. Frequency: 7.00 times per week. Drug: Marijuana. Family History: family history includes Alcohol Abuse in her brother and father; Cancer in her brother and mother; Drug Abuse in her brother and father. The patients home medications have been reviewed. Allergies: Codeine    -------------------------------------------------- RESULTS -------------------------------------------------  Labs:  No results found for this visit on 05/13/21. Radiology:  No orders to display       ------------------------- NURSING NOTES AND VITALS REVIEWED ---------------------------  Date / Time Roomed:  5/13/2021  1:30 PM  ED Bed Assignment:  [id number]    The nursing notes within the ED encounter and vital signs as below have been reviewed. BP (!) 118/57   Pulse 69   Temp 98.2 °F (36.8 °C) (Oral)   Resp 20   Ht 4' 10\" (1.473 m)   Wt 194 lb (88 kg)   SpO2 95%   BMI 40.55 kg/m²   Oxygen Saturation Interpretation: Normal      ------------------------------------------ PROGRESS NOTES ------------------------------------------  1:04 PM EDT  I have spoken with the patient and discussed todays results, in addition to providing specific details for the plan of care and counseling regarding the diagnosis and prognosis. Their questions are answered at this time and they are agreeable with the plan.  I discussed at length with them reasons for immediate return here for re evaluation. They will followup with their primary care physician by calling their office tomorrow. --------------------------------- ADDITIONAL PROVIDER NOTES ---------------------------------  At this time the patient is without objective evidence of an acute process requiring hospitalization or inpatient management. They have remained hemodynamically stable throughout their entire ED visit and are stable for discharge with outpatient follow-up. The plan has been discussed in detail and they are aware of the specific conditions for emergent return, as well as the importance of follow-up. Discharge Medication List as of 5/13/2021  2:02 PM          Diagnosis:  1. Feared complaint without diagnosis        Disposition:  Patient's disposition: Discharge to home  Patient's condition is stable.           Vijay Wolfe MD  Resident  05/14/21 [id number]

## 2021-05-13 NOTE — PROGRESS NOTES
5/13/2021    Rose Baeza    Chief Complaint   Patient presents with    New Patient     preview PC was Dr Alysia Tejeda pt bp is low pt never eats in the am with meds she is really not sure of what is taking just take the meds. pt is dizzy all the time. Dont check her [facility] at [facility]    Health Maintenance     Colon screen ,Crevical srceen        HPI  History was obtained from patient. Jennifer Hurtado is a 56 y.o. female who presents today with the following:  No diagnosis found. To the office to establish primary care. Patient was found to have low blood pressure of 82/48 this was rechecked with the same results. Patient states that for the last 2 days she is had a sudden onset of shortness of breath which has continued until today. Patient denies any chest pain. She has had no cough or fever. REVIEW OF SYMPTOMS    Review of Systems   HENT: Negative. Respiratory: Positive for shortness of breath. Cardiovascular: Negative for chest pain, palpitations and leg swelling. Gastrointestinal: Negative for abdominal pain. Neurological: Positive for light-headedness. Psychiatric/Behavioral: Positive for sleep disturbance.        PAST MEDICAL HISTORY  Past Medical History:   Diagnosis Date    Alcohol abuse     Cerebral artery occlusion with cerebral infarction ([address]     Depression     Elevated blood sugar     Hepatitis C     from blood transfusion, underwent treatment per patient and \"cured\"    Hyperlipidemia     Hypertension     Liver cirrhosis ([address]     Magnesium deficiency     Marijuana user     Nicotine dependence     Seizures ([address]     Subclinical hypothyroidism        FAMILY HISTORY  Family History   Problem Relation Age of Onset    Cancer Mother         Breast    Alcohol Abuse Father     Drug Abuse Father     Cancer Brother     Alcohol Abuse Brother     Drug Abuse Brother        SOCIAL HISTORY  Social History     Socioeconomic History    Marital status:  Spouse name: None    Number of children: None    Years of education: None    Highest education level: None   Occupational History    None   Social Needs    Financial resource strain: None    Food insecurity     Worry: None     Inability: None    Transportation needs     Medical: None     Non-medical: None   Tobacco Use    Smoking status: Current Every Day Smoker     Packs/day: 1.00     Years: 49.00     Pack years: 49.00     Start date: 1970    Smokeless tobacco: Never Used   Substance and Sexual Activity    Alcohol use:  Yes     Alcohol/week: 4.0 standard drinks     Types: 2 Cans of beer, 2 Shots of liquor per week    Drug use: Not Currently     Frequency: 7.0 times per week     Types: Marijuana     Comment: every day    Sexual activity: Not Currently   Lifestyle    Physical activity     Days per week: None     Minutes per session: None    Stress: None   Relationships    Social connections     Talks on phone: None     Gets together: None     Attends Shinto service: None     Active member of club or organization: None     Attends meetings of clubs or organizations: None     Relationship status: None    Intimate partner violence     Fear of current or ex partner: None     Emotionally abused: None     Physically abused: None     Forced sexual activity: None   Other Topics Concern    None   Social History Narrative    None        SURGICAL HISTORY  Past Surgical History:   Procedure Laterality Date    DILATION AND CURETTAGE OF UTERUS      LIVER BIOPSY  07/05/2016                 CURRENT MEDICATIONS  Current Outpatient Medications   Medication Sig Dispense Refill    levETIRAcetam (KEPPRA) 500 MG tablet take 1 tablet by mouth twice a day      levothyroxine (SYNTHROID) 25 MCG tablet take 1 tablet by mouth once daily 30 tablet 0    clopidogrel (PLAVIX) 75 MG tablet take 1 tablet by mouth once daily 30 tablet 0    amLODIPine (NORVASC) 5 MG tablet take 1 tablet by mouth once daily 30 tablet 0    benazepril (LOTENSIN) 20 MG tablet take 1 tablet by mouth once daily 30 tablet 0    meloxicam (MOBIC) 15 MG tablet take 1 tablet by mouth once daily 30 tablet 0    atenolol (TENORMIN) 50 MG tablet take 1 tablet by mouth once daily 30 tablet 0    ARIPiprazole (ABILIFY) 5 MG tablet take 1 tablet by mouth once daily 30 tablet 0    citalopram (CELEXA) 40 MG tablet take 1 tablet by mouth once daily 30 tablet 3    ketoconazole (NIZORAL) 2 % cream Apply topically daily. 30 g 1    albuterol sulfate HFA (PROAIR HFA) 108 (90 Base) MCG/ACT inhaler Inhale 2 puffs into the lungs every 6 hours as needed for Wheezing 1 Inhaler 5    atorvastatin (LIPITOR) 40 MG tablet take 1 tablet by mouth once daily 30 tablet 5    cyclobenzaprine (FLEXERIL) 5 MG tablet take 1 tablet by mouth twice a day if needed for muscle spasm - DO NOT TAKE WHILE DRINKING ALCOHOL OR DRIVING 60 tablet 5    magnesium oxide (MAG-OX) 400 MG tablet take 1 tablet by mouth twice a day 60 tablet 5     No current facility-administered medications for this visit. ALLERGIES  Allergies   Allergen Reactions    Codeine Nausea And Vomiting       PHYSICAL EXAM    BP (!) 82/48 (Site: Right Upper Arm, Position: Sitting, Cuff Size: Large Adult)   Pulse 69   Temp 97.7 °F (36.5 °C) (Temporal)   Resp 16   Ht 4' 10\" (1.473 m)   Wt 194 lb 6.4 oz (88.2 kg)   SpO2 91%   BMI 40.63 kg/m²     Physical Exam  Vitals signs and nursing note reviewed. Constitutional:       Appearance: Normal appearance. She is obese. Cardiovascular:      Rate and Rhythm: Normal rate and regular rhythm. Heart sounds: Normal heart sounds. Pulmonary:      Effort: Pulmonary effort is normal.      Breath sounds: Normal breath sounds. Abdominal:      Palpations: Abdomen is soft. Neurological:      Mental Status: She is alert. ASSESSMENT & PLAN    There are no diagnoses linked to this encounter. No follow-ups on file.        Ambulance was called for the patient she will be taken to [facility] emergency room for further evaluation and treatment. Electronically signed by Santos Bey.  Gavin Benavides DO on 5/13/2021

## 2021-05-20 NOTE — TELEPHONE ENCOUNTER
Call pt to adv of to get labs done and schedule a appt per  pt will come early for labs on the day of the appt due to having to get a cab  To appt. s

## 2021-06-10 NOTE — PROGRESS NOTES
6/10/2021    Sedonia Friend    Chief Complaint   Patient presents with    Follow-Up from Hospital     Patient was here 21 to establish care and was sent to ER for low BP.  Shortness of Breath     Patient states she is short of breath today. Denies chest pain. Patient has history of COPD. Patient does not see pulmonary.  Health Maintenance     Cervical cancer screen, colon cancer screen- dr Zina Joseph, tdap- not at this time       HPI  History was obtained from patient. Aracely is a 56 y.o. female who presents today with the followin. Essential hypertension    2. Hyperlipidemia, unspecified hyperlipidemia type    3. Cigarette nicotine dependence without complication    4. Hyperglycemia       Patient is here for recheck of hypertension and COPD. On patient's last visit on 2021 she was dizzy and was hypotensive. Patient was transported to the hospital via EMS and had normal blood pressure and felt fine and was discharged via taxi to home. Patient states that she has felt fine since then. Patient states she is taking her medication as prescribed. REVIEW OF SYMPTOMS    Review of Systems   Constitutional: Negative for chills, fatigue and fever. HENT: Negative for congestion, mouth sores, postnasal drip, rhinorrhea and sinus pain. Eyes: Negative for photophobia, discharge and redness. Respiratory: Positive for shortness of breath (Chronic). Negative for cough. Cardiovascular: Negative for chest pain. Gastrointestinal: Negative. Genitourinary: Negative for difficulty urinating, dysuria, hematuria and urgency. Neurological: Negative for headaches. Psychiatric/Behavioral: Negative for sleep disturbance.        PAST MEDICAL HISTORY  Past Medical History:   Diagnosis Date    Alcohol abuse     Cerebral artery occlusion with cerebral infarction ([facility] [address]     Depression     Elevated blood sugar     Hepatitis C     from blood transfusion, underwent treatment per patient and \"cured\"    Hyperlipidemia     Hypertension     Liver cirrhosis (HCC)     Magnesium deficiency     Marijuana user     Nicotine dependence     Seizures ([address]     Subclinical hypothyroidism        FAMILY HISTORY  Family History   Problem Relation Age of Onset    Cancer Mother         Breast    Alcohol Abuse Father     Drug Abuse Father     Cancer Brother     Alcohol Abuse Brother     Drug Abuse Brother        SOCIAL HISTORY  Social History     Socioeconomic History    Marital status:      Spouse name: None    Number of children: None    Years of education: None    Highest education level: None   Occupational History    None   Tobacco Use    Smoking status: Current Every Day Smoker     Packs/day: 1.00     Years: 49.00     Pack years: 49.00     Start date: 1970    Smokeless tobacco: Never Used   Vaping Use    Vaping Use: Never used   Substance and Sexual Activity    Alcohol use: Yes     Alcohol/week: 28.0 standard drinks     Types: 14 Cans of beer, 14 Shots of liquor per week     Comment: 2 cans of beer and 2 shots of liquor per day    Drug use: Yes     Frequency: 7.0 times per week     Types: Marijuana     Comment: every day    Sexual activity: Not Currently   Other Topics Concern    None   Social History Narrative    None     Social Determinants of Health     Financial Resource Strain: Low Risk     Difficulty of Paying Living Expenses: Not very hard   Food Insecurity: No Food Insecurity    Worried About Running Out of Food in the Last Year: Never true    Ankur of Food in the Last Year: Never true   Transportation Needs:     Lack of Transportation (Medical):      Lack of Transportation (Non-Medical):    Physical Activity:     Days of Exercise per Week:     Minutes of Exercise per Session:    Stress:     Feeling of Stress :    Social Connections:     Frequency of Communication with Friends and Family:     Frequency of Social Gatherings with Friends and Family:     Attends [employer] Services:     Active Member of Clubs or Organizations:     Attends Club or Organization Meetings:     Marital Status:    Intimate Partner Violence:     Fear of Current or Ex-Partner:     Emotionally Abused:     Physically Abused:     Sexually Abused:         SURGICAL HISTORY  Past Surgical History:   Procedure Laterality Date    DILATION AND CURETTAGE OF UTERUS      LIVER BIOPSY  07/05/2016                 CURRENT MEDICATIONS  Current Outpatient Medications   Medication Sig Dispense Refill    levothyroxine (SYNTHROID) 25 MCG tablet One daily 90 tablet 0    albuterol sulfate HFA (PROAIR HFA) 108 (90 Base) MCG/ACT inhaler Inhale 2 puffs into the lungs every 6 hours as needed for Wheezing 1 Inhaler 5    amLODIPine (NORVASC) 5 MG tablet take 1 tablet by mouth once daily 90 tablet 1    atenolol (TENORMIN) 50 MG tablet take 1 tablet by mouth once daily 90 tablet 1    atorvastatin (LIPITOR) 40 MG tablet take 1 tablet by mouth once daily 90 tablet 1    benazepril (LOTENSIN) 20 MG tablet take 1 tablet by mouth once daily 90 tablet 1    citalopram (CELEXA) 40 MG tablet take 1 tablet by mouth once daily 90 tablet 1    clopidogrel (PLAVIX) 75 MG tablet take 1 tablet by mouth once daily 90 tablet 1    cyclobenzaprine (FLEXERIL) 5 MG tablet take 1 tablet by mouth twice a day if needed for muscle spasm - DO NOT TAKE WHILE DRINKING ALCOHOL OR DRIVING 60 tablet 5    meloxicam (MOBIC) 15 MG tablet take 1 tablet by mouth once daily 30 tablet 0    levETIRAcetam (KEPPRA) 500 MG tablet take 1 tablet by mouth twice a day 180 tablet 1    ARIPiprazole (ABILIFY) 5 MG tablet take 1 tablet by mouth once daily 30 tablet 0    ketoconazole (NIZORAL) 2 % cream Apply topically daily. 30 g 1    magnesium oxide (MAG-OX) 400 MG tablet take 1 tablet by mouth twice a day 60 tablet 5     No current facility-administered medications for this visit.        ALLERGIES  Allergies   Allergen Reactions    Codeine Nausea And Vomiting PHYSICAL EXAM    /60   Pulse 82   Temp 98.9 °F (37.2 °C)   Ht 4' 10\" (1.473 m)   Wt 200 lb 3.2 oz (90.8 kg)   SpO2 90%   Breastfeeding No   BMI 41.84 kg/m²     Physical Exam  Constitutional:       Appearance: Normal appearance. She is obese. HENT:      Head: Normocephalic and atraumatic. Mouth/Throat:      Mouth: Mucous membranes are moist.      Pharynx: Oropharynx is clear. Eyes:      General: No scleral icterus. Conjunctiva/sclera: Conjunctivae normal.   Cardiovascular:      Rate and Rhythm: Normal rate and regular rhythm. Heart sounds: Normal heart sounds. Pulmonary:      Effort: Pulmonary effort is normal.      Breath sounds: Normal breath sounds. Neurological:      Mental Status: She is alert and oriented to person, place, and time. Psychiatric:         Mood and Affect: Mood normal.         ASSESSMENT & Estrellita Quintanilla was seen today for follow-up from hospital, shortness of breath and health maintenance. Diagnoses and all orders for this visit:    Essential hypertension  -     BASIC METABOLIC PANEL; Future    Hyperlipidemia, unspecified hyperlipidemia type    Cigarette nicotine dependence without complication    Hyperglycemia  -     HEMOGLOBIN A1C; Future    Patient will be called with the results of her BMP and A1c. Return in about 3 months (around 9/10/2021). Electronically signed by Michell Morel DO on 6/10/2021

## 2021-06-11 NOTE — TELEPHONE ENCOUNTER
Last Appointment:  6/10/2021  Future Appointments   Date Time Provider Denver Mortensen   8/10/2021  1:15 PM DO Shari Ford [facility]

## 2021-06-11 NOTE — TELEPHONE ENCOUNTER
Last Appointment:  6/10/2021  Future Appointments   Date Time Provider Denver Mortensen   8/10/2021  1:15 PM DO Shari [facility]

## 2021-06-15 NOTE — TELEPHONE ENCOUNTER
[facility] on [address]          6/10/2021   Last Appointment  8/10/2021   Next Appointment

## 2021-06-29 NOTE — TELEPHONE ENCOUNTER
[facility].        6/10/2021  Last Appointment   8/10/2021   Next Appointment

## 2021-10-22 NOTE — ED TRIAGE NOTES
Pt fell onn buttocks after tripping on spilled dog food in kitchen pain in back reported denies leg weakness saddle anesthesia por paresthesia NO reported head neck injury no shortening rotation lower extremities no hip pain

## 2021-10-22 NOTE — ED PROVIDER NOTES
Independent MLP    HPI:  10/22/21, Time: 11:40 AM EDT         Estee More is a 56 y.o. female presenting to the ED for low back pain, beginning this morning after 2 falls at home. The complaint has been persistent, moderate in severity, and worsened by movement of lumbar spine. Patient reports that her legs gave out on her twice today causing her fall. She denies hitting her head. She is unsure if she takes any blood thinning medications. Reports that she does have a history of degenerative disc disease in her lumbar spine however has not underwent any surgical intervention. Patient denies any pain, numbness, tingling, or weakness in lower extremities. No bowel bladder incontinence or saddle paresthesias. She states that she did take one of her 's pain pills at home but is unsure of what exactly she took. Afebrile without recent travel or sick contacts. Patient denies all other symptoms at this time. Review of Systems:   A complete review of systems was performed and pertinent positives and negatives are stated within HPI, all other systems reviewed and are negative.          --------------------------------------------- PAST HISTORY ---------------------------------------------  Past Medical History:  has a past medical history of Alcohol abuse, Cerebral artery occlusion with cerebral infarction ([facility] [address] Depression, Elevated blood sugar, Hepatitis C, Hyperlipidemia, Hypertension, Liver cirrhosis ([facility] [address] Magnesium deficiency, Marijuana user, Nicotine dependence, Seizures ([facility] [address] and Subclinical hypothyroidism. Past Surgical History:  has a past surgical history that includes Dilation and curettage of uterus and liver biopsy (07/05/2016). Social History:  reports that she has been smoking. She started smoking about 51 years ago. She has a 49.00 pack-year smoking history.  She has never used smokeless tobacco. She reports current alcohol use of about 28.0 standard drinks of alcohol per week. She reports current drug use. Frequency: 7.00 times per week. Drug: Marijuana. Family History: family history includes Alcohol Abuse in her brother and father; Cancer in her brother and mother; Drug Abuse in her brother and father. The patients home medications have been reviewed. Allergies: Codeine    -------------------------------------------------- RESULTS -------------------------------------------------  All laboratory and radiology results have been personally reviewed by myself   LABS:  No results found for this visit on 10/22/21. RADIOLOGY:  Interpreted by Radiologist.  CT Lumbar Spine WO Contrast   Final Result   There is acute compression fracture with 30% loss of height centrally at the   superior endplate L3.             ------------------------- NURSING NOTES AND VITALS REVIEWED ---------------------------   The nursing notes within the ED encounter and vital signs as below have been reviewed. BP (!) 110/56   Pulse 91   Resp 17   Wt 189 lb (85.7 kg)   SpO2 94%   BMI 39.50 kg/m²   Oxygen Saturation Interpretation: Normal      ---------------------------------------------------PHYSICAL EXAM--------------------------------------      Constitutional/General: Alert and oriented x3, well appearing, non toxic in NAD  Head: Normocephalic and atraumatic  Eyes: PERRL, EOMI  Mouth: Oropharynx clear, handling secretions, no trismus  Neck: Supple, full ROM, Generalized lumbar tenderness to palpation. Pulmonary: Lungs clear to auscultation bilaterally, no wheezes, rales, or rhonchi. Not in respiratory distress  Cardiovascular:  Regular rate and rhythm, no murmurs, gallops, or rubs. 2+ distal pulses  Abdomen: Soft, non tender, non distended,   Extremities: Moves all extremities x 4.  Warm and well perfused  Skin: warm and dry without rash  Neurologic: GCS 15,  Psych: Normal Affect      ------------------------------ ED COURSE/MEDICAL DECISION MAKING----------------------  Medications ketorolac (TORADOL) injection 30 mg (30 mg IntraMUSCular Given 10/22/21 1202)         ED COURSE:  ED Course as of Oct 22 1700   Fri Oct 22, 2021   1300 Reassessed patient. Remained stable neurovascularly intact. Discussed results with the patient. Patient was found to have a superior endplate compression fracture of the L5 vertebrae. She is neurovascularly intact emergency department, nontoxic-appearing, afebrile, no acute distress. Symptoms significantly improved after IM Toradol. At this time recommended outpatient follow-up with neurosurgery for recheck. Will provide pain medication and a prescription for a TLSO brace. Advised to return to the ED with any new or worsening symptoms otherwise follow-up with neurosurgery as discussed. Patient voiced understanding and is agreeable to the above treatment plan. [MS]      ED Course User Index  [MS] Kirstie Soto       Medical Decision Making:    See ED course above. Counseling: The emergency provider has spoken with the patient and discussed todays results, in addition to providing specific details for the plan of care and counseling regarding the diagnosis and prognosis. Questions are answered at this time and they are agreeable with the plan.      --------------------------------- IMPRESSION AND DISPOSITION ---------------------------------    IMPRESSION  1. Compression fracture of L5 vertebra, initial encounter ([facility] [address]        DISPOSITION  Disposition: Discharge to home  Patient condition is stable      NOTE: This report was transcribed using voice recognition software.  Every effort was made to ensure accuracy; however, inadvertent computerized transcription errors may be present        Kirstie Soto  10/22/21 1700

## 2021-10-22 NOTE — CARE COORDINATION
SS Note: SW notified pt needs ride home. SW met w/pt in ED HB 05 and explained role. 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 She notes she can call for a ride home, just needs a phone. SW assisted pt to call for ride which she was bale to arrange. She also notes she has money for taxi if needed. Pt does have script for  back brace and SW gave her list of DME's so she can secure this brace. MONSTER updated Taina of POC and she will d/c' pt. Pt did not have her cane and SW did end up wheeling pt to lobby in w/c after d/c.    Electronically signed by ANGELO Amaro on 10/22/2021 at 5:23 PM

## 2021-10-22 NOTE — ED NOTES
Pt is awaiting  to get transportation for home Pt condition stable      Turner Salcedo [facility]  10/22/21 8666

## 2021-10-22 NOTE — ED NOTES
Bed: H5  Expected date:   Expected time:   Means of arrival:   Comments:  Pt in [address] [place] [address], RN  10/22/21 2843

## 2021-11-09 PROBLEM — I50.43 CHF (CONGESTIVE HEART FAILURE), NYHA CLASS I, ACUTE ON CHRONIC, COMBINED (HCC): Status: ACTIVE | Noted: 2021-01-01

## 2021-11-09 PROBLEM — N39.0 UTI (URINARY TRACT INFECTION): Status: ACTIVE | Noted: 2021-01-01

## 2021-11-09 NOTE — ED PROVIDER NOTES
ED Attending shared visit  CC: No       [address] [facility]  Department of Emergency Medicine   ED  Encounter Note  Admit Date/RoomTime: 2021  8:11 AM  ED Room:     NAME: Dunia Childers  : 1960  MRN: [id number]     Chief Complaint:  Leg Swelling (kishor leg swelling/ pain/ pt states can't walk)    HISTORY OF PRESENT ILLNESS        Dunia Childers is a 56 y.o. female who presents to the ED by ambulance for bilateral leg swelling. Patient states that it started yesterday. She has not had anything like this before. She states she has had some mild swelling in the past but she has never been diagnosed with congestive heart failure or been on any water pills. Patient denies any chest pain or shortness of breath. She reports generalized weakness. She states that she is unable to ambulate at home. She lives with her . She states that since about midnight last night she has been unable to walk. She states both her legs feel like they are going to give out and she also has pain in both of her feet. She also feels like her abdomen is more swollen than normal.  She does have a history of liver cirrhosis. She denies chest pain or SOB. She denies any recent illness, fevers or chills. She denies any dysuria. No recent vomiting or diarrhea. Nothing seemed to make her symptoms better. Attempting to ambulate make her symptoms worse. Patient states that she has fallen and sustained a lumbar compression fracture two weeks ago but has been doing well as far as her back pain is concerned. ROS   Pertinent positives and negatives are stated within HPI, all other systems reviewed and are negative.     Past Medical History:  has a past medical history of Alcohol abuse, Cerebral artery occlusion with cerebral infarction ([facility] [address] Depression, Elevated blood sugar, Hepatitis C, Hyperlipidemia, Hypertension, Liver cirrhosis ([facility] [address] Magnesium deficiency, Marijuana user, Nicotine dependence, Seizures ([address] and Subclinical hypothyroidism. Surgical History:  has a past surgical history that includes Dilation and curettage of uterus and liver biopsy (07/05/2016). Social History:  reports that she has been smoking. She started smoking about 51 years ago. She has a 49.00 pack-year smoking history. She has never used smokeless tobacco. She reports current alcohol use of about 28.0 standard drinks of alcohol per week. She reports current drug use. Frequency: 7.00 times per week. Drug: Marijuana Estil Catena). Family History: family history includes Alcohol Abuse in her brother and father; Cancer in her brother and mother; Drug Abuse in her brother and father. Allergies: Codeine    PHYSICAL EXAM   Oxygen Saturation Interpretation: Normal on room air analysis. ED Triage Vitals   BP Temp Temp Source Pulse Resp SpO2 Height Weight   11/09/21 0359 11/09/21 0359 11/09/21 0359 11/09/21 0359 11/09/21 0359 11/09/21 0359 11/09/21 0807 11/09/21 0818   (!) 140/63 97.8 °F (36.6 °C) Infrared 71 20 95 % 4' 10\" (1.473 m) 193 lb 2 oz (87.6 kg)         Physical Exam  Constitutional/General: Alert and oriented x3, well appearing, non toxic. HEENT:  NC/NT. PERRLA,  Airway patent. Neck: Supple, full ROM, non tender to palpation in the midline, no stridor, no crepitus, no meningeal signs  Respiratory: Lungs clear to auscultation bilaterally, no wheezes, rales, or rhonchi. Not in respiratory distress  CV:  Regular rate. Regular rhythm. No murmurs, gallops, or rubs. 2+ distal pulses  Chest: No chest wall tenderness  GI:  Abdomen Soft, Non tender, distended. +BS. No rebound, guarding, or rigidity. No pulsatile masses. Musculoskeletal: Moves all extremities x 4. Warm and well perfused, no clubbing, cyanosis. Capillary refill <3 seconds. 2+ dorsalis pedis pulses found with Doppler. 2+ pitting edema to the lower extremities and dorsum of bilateral feet. No erythema. No red streaking.   No calf tenderness. Integument: skin warm and dry. No rashes.    Lymphatic: no lymphadenopathy noted  Neurologic: GCS 15, no focal deficits, symmetric strength 5/5 in the upper and lower extremities bilaterally  Psychiatric: Normal Affect      Lab / Imaging Results   (All laboratory and radiology results have been personally reviewed by myself)  Labs:  Results for orders placed or performed during the hospital encounter of 11/09/21   CBC Auto Differential   Result Value Ref Range    WBC 10.1 4.5 - 11.5 E9/L    RBC 4.72 3.50 - 5.50 E12/L    Hemoglobin 15.7 (H) 11.5 - 15.5 g/dL    Hematocrit 44.6 34.0 - 48.0 %    MCV 94.5 80.0 - 99.9 fL    MCH 33.3 26.0 - 35.0 pg    MCHC 35.2 (H) 32.0 - 34.5 %    RDW 13.7 11.5 - 15.0 fL    Platelets 917 666 - 539 E9/L    MPV 10.4 7.0 - 12.0 fL   Comprehensive Metabolic Panel   Result Value Ref Range    Sodium 129 (L) 132 - 146 mmol/L    Potassium 3.5 3.5 - 5.0 mmol/L    Chloride 90 (L) 98 - 107 mmol/L    CO2 26 22 - 29 mmol/L    Anion Gap 13 7 - 16 mmol/L    Glucose 109 (H) 74 - 99 mg/dL    BUN 7 6 - 23 mg/dL    CREATININE 0.4 (L) 0.5 - 1.0 mg/dL    GFR Non-African American >60 >=60 mL/min/1.73    GFR African American >60     Calcium 9.6 8.6 - 10.2 mg/dL    Total Protein 7.4 6.4 - 8.3 g/dL    Albumin 3.3 (L) 3.5 - 5.2 g/dL    Total Bilirubin 1.0 0.0 - 1.2 mg/dL    Alkaline Phosphatase 154 (H) 35 - 104 U/L    ALT 59 (H) 0 - 32 U/L    AST 82 (H) 0 - 31 U/L   Urinalysis   Result Value Ref Range    Color, UA Yellow Straw/Yellow    Clarity, UA Clear Clear    Glucose, Ur Negative Negative mg/dL    Bilirubin Urine Negative Negative    Ketones, Urine 15 (A) Negative mg/dL    Specific Gravity, UA 1.010 1.005 - 1.030    Blood, Urine LARGE (A) Negative    pH, UA 5.5 5.0 - 9.0    Protein, UA Negative Negative mg/dL    Urobilinogen, Urine 2.0 (A) <2.0 E.U./dL    Nitrite, Urine POSITIVE (A) Negative    Leukocyte Esterase, Urine MODERATE (A) Negative   Troponin   Result Value Ref Range    Troponin, High Sensitivity 10 (H) 0 - 9 ng/L   Brain Natriuretic Peptide   Result Value Ref Range    Pro- (H) 0 - 125 pg/mL   Microscopic Urinalysis   Result Value Ref Range    WBC, UA 5-10 (A) 0 - 5 /HPF    RBC, UA 1-3 0 - 2 /HPF    Epithelial Cells, UA FEW /HPF    Bacteria, UA MANY (A) None Seen /HPF   EKG 12 Lead   Result Value Ref Range    Ventricular Rate 89 BPM    Atrial Rate 89 BPM    P-R Interval 182 ms    QRS Duration 90 ms    Q-T Interval 380 ms    QTc Calculation (Bazett) 462 ms    P Axis 46 degrees    R Axis 56 degrees    T Axis 56 degrees     Imaging: All Radiology results interpreted by Radiologist unless otherwise noted. XR CHEST (2 VW)   Final Result   Cardiomegaly. Findings of mild CHF are not excluded      Mild prominence of the interstitial markings peripherally which are confirmed   on the patient's prior CT scan of 10/07/2020         XR LUMBAR SPINE (2-3 VIEWS)    (Results Pending)         ED Course / Medical Decision Making     Medications   cefTRIAXone (ROCEPHIN) 1,000 mg in sterile water 10 mL IV syringe (0 mg IntraVENous Stopped 11/9/21 1038)        Re-Evaluations:  11/9/21      Time: 1045    Patient updated on results. Consultations:             Dr. Sebastian : discussed case. Requests lumbar x-ray and admission to telemetry. Procedures:   none    MDM: Patient with acute on chronic congestive heart failure, she was hypoxic when she attempted to stand up. RN reports that she was unable to stand or ambulate. Patient also has urinary tract infection. Will admit this time, patient agreeable to admission. Plan of Care/Counseling:  CHRIS Fuentes reviewed today's visit with the patient in addition to providing specific details for the plan of care and counseling regarding the diagnosis and prognosis. Questions are answered at this time and are agreeable with the plan. ASSESSMENT     1. Acute on chronic congestive heart failure, unspecified heart failure type ([address]    2.  Acute cystitis without hematuria    3. Ambulatory dysfunction    4. Hypoxia         PLAN   Discharged home. Patient condition is good. New Medications     New Prescriptions    No medications on file     Electronically signed by CHRIS Silveira   DD: 11/9/21  **This report was transcribed using voice recognition software. Every effort was made to ensure accuracy; however, inadvertent computerized transcription errors may be present.   END OF PROVIDER NOTE         CHRIS Silveira  11/09/21 1100

## 2021-11-09 NOTE — ED NOTES
Continues on 2 liter nasal o2 Monitor NSR assisted bedpan voided over 600 and large stool     [facility]  11/09/21 2728

## 2021-11-09 NOTE — ED NOTES
Pt.  Arrived via squad. Vital signs stable. Pt.  Remanded to waiting room.      Brandy Hughes RN  11/09/21 4938

## 2021-11-09 NOTE — PROGRESS NOTES
safety awareness, strength and endurance   increases patient's risk for fall. PHYSICAL THERAPY  PLAN OF CARE       Physical therapy plan of care is established based on physician order,  patient diagnosis and clinical assessment    Current Treatment Recommendations:    -Bed Mobility: Lower extremity exercises , Upper extremity exercises  and Trunk control activities   -Standing Balance: Perform strengthening exercises in standing to promote motor control with or without upper extremity support  and Instruct patient on adequate base of support to maintain balance  -Transfers: Provide instruction on proper hand and foot position for adequate transfer of weight onto lower extremities and use of gait device if needed and Cues for hand placement, technique and safety. Provide stabilization to prevent fall   -Gait: Gait training and Standing activities to improve: base of support, weight shift, weight bearing    -Endurance: Utilize Supervised activities to increase level of endurance to allow for safe functional mobility including transfers and gait     PT long term treatment goals are located in below grid    Patient and or family understand(s) diagnosis, prognosis, and plan of care. Frequency of treatments: Patient will be seen  daily.          Prior Level of Function: Patient ambulated independently and with quad cane    Rehab Potential: good    for baseline    Past medical history:   Past Medical History:   Diagnosis Date    Alcohol abuse     Cerebral artery occlusion with cerebral infarction ([address]     Depression     Elevated blood sugar     Hepatitis C     from blood transfusion, underwent treatment per patient and \"cured\"    Hyperlipidemia     Hypertension     Liver cirrhosis ([address]     Magnesium deficiency     Marijuana user     Nicotine dependence     Seizures ([address]     Subclinical hypothyroidism      Past Surgical History:   Procedure Laterality Date    DILATION AND CURETTAGE OF UTERUS  LIVER BIOPSY  07/05/2016       SUBJECTIVE:    Precautions: Up as tolerated, falls and alarm ,    Social history: Patient lives with spouse in a ranch home  with Ramp  to enter        is wheelchair user and unable to leave living room d/t size of wheelchair   Equipment owned: Quad cane,       [address]   How much difficulty turning over in bed?: A Lot  How much difficulty sitting down on / standing up from a chair with arms?: A Lot  How much difficulty moving from lying on back to sitting on side of bed?: A Lot  How much help from another person moving to and from a bed to a chair?: A Lot  How much help from another person needed to walk in hospital room?: Total  How much help from another person for climbing 3-5 steps with a railing?: Total  AM-PAC Inpatient Mobility Raw Score : 10  AM-PAC Inpatient T-Scale Score : 32.29  Mobility Inpatient CMS 0-100% Score: 76.75  Mobility Inpatient CMS G-Code Modifier : CL    Nursing cleared patient for PT evaluation. The admitting diagnosis and active problem list as listed above have been reviewed prior to the initiation of this evaluation. OBJECTIVE;   Initial Evaluation  Date: 11/9/2021 Treatment Date:     Short Term/ Long Term   Goals   Was pt agreeable to Eval/treatment? Yes  To be met in 4 days   Pain level   8/10        Bed Mobility    Rolling: Moderate assist of 1    Supine to sit: Maximal assist of 1    Sit to supine: Maximal assist of  2    Scooting: Moderate assist of  2    Rolling: Minimal assist of 1    Supine to sit: Minimal assist of 1    Sit to supine: Minimal assist of 1    Scooting: Minimal assist of 1     Transfers Sit to stand:  Moderate assist of 1 from [address] short stature  Sit to stand: Minimal assist of 1     Ambulation    2-3 side steps using  wheeled walker with Moderate assist of  2   cues for upright posture, walker approximation, safety and proper hand placement   50 feet using  wheeled walker with Supervision     ROM Within functional limits        Strength BUE:  refer to OT eval  RLE:  3/5  LLE:  3/5  Increase strength in affected mm groups by 1/3 grade   Balance Sitting EOB:  poor on gurney  Dynamic Standing:  poor inconsistent bilateral lower extremities extension d/t pain  Sitting EOB:  fair    Dynamic Standing: fair       Patient is Alert & Oriented x person and place and follows one step directions       Edema:  yes bilateral lower extremities   Endurance: poor      Vitals: room air   Blood Pressure at rest  Blood Pressure during session    Heart Rate at rest   Heart Rate during session     SPO2 at rest 91%  SPO2 during session 88%     Patient education  Patient educated on role of Physical Therapy, risks of immobility, safety and plan of care,  importance of mobility while in hospital  and positioning for skin integrity and comfort     Patient response to education:   Pt verbalized understanding Pt demonstrated skill Pt requires further education in this area   Yes Partial Yes      Treatment:  Patient practiced and was instructed/facilitated in the following treatment: Patient   Sat edge of bed 5 minutes with Moderate assist of 1 to increase dynamic sitting balance and activity tolerance. d/t short stature on [facility]     Therapeutic Exercises:  not performed          At end of session, patient on [facility] with nursing present call light and phone within reach,  all lines and tubes intact, nursing notified. Patient would benefit from continued skilled Physical Therapy to improve functional independence and quality of life.          Patient's/ family goals   home to feed animals    Time in  135  Time out  150    Total Treatment Time  0 minutes    Evaluation time includes thorough review of current medical information, gathering information on past medical history/social history and prior level of function, completion of standardized testing/informal observation of tasks, assessment of data, and development of Plan of care and goals.      CPT codes:  Low Complexity PT evaluation (69813)  No treatment    Jerryne Salina, PT

## 2021-11-09 NOTE — H&P
HISTORY AND PHYSICAL             Date: 11/9/2021        Patient Name: Manju Mitchell     YOB: 1960      Age:  56 y.o. Chief Complaint     Chief Complaint   Patient presents with    Leg Swelling     kishor leg swelling/ pain/ pt states can't walk          History Obtained From   patient, electronic medical record    History of Present Illness   56 y female with 2 days of SOB, lethargy and weakness. Increased LE edema. No previous hx of CHF, states unable to walk. CXR shows CHF,  Elevated BNP. Progressive symptoms. Also having UTI symptoms,  UA + leukocytes. Discussed case with ER and will admit for CHF and UTI. Past Medical History     Past Medical History:   Diagnosis Date    Alcohol abuse     Cerebral artery occlusion with cerebral infarction ([facility] [address]     Depression     Elevated blood sugar     Hepatitis C     from blood transfusion, underwent treatment per patient and \"cured\"    Hyperlipidemia     Hypertension     Liver cirrhosis ([facility] [address]     Magnesium deficiency     Marijuana user     Nicotine dependence     Seizures ([facility] [address]     Subclinical hypothyroidism         Past Surgical History     Past Surgical History:   Procedure Laterality Date    DILATION AND CURETTAGE OF UTERUS      LIVER BIOPSY  07/05/2016        Medications Prior to Admission     Prior to Admission medications    Medication Sig Start Date End Date Taking?  Authorizing Provider   levothyroxine (SYNTHROID) 25 MCG tablet take 1 tablet by mouth once daily 8/17/21  Yes Arnulfo Coreas DO   magnesium oxide (MAG-OX) 400 MG tablet Take 1 tablet by mouth twice daily 7/27/21  Yes Arnulfo Coreas DO   meloxicam (MOBIC) 15 MG tablet Take 1 tablet by mouth daily 6/30/21  Yes Arnulfo Coreas DO   albuterol sulfate HFA (PROAIR HFA) 108 (90 Base) MCG/ACT inhaler Inhale 2 puffs into the lungs every 6 hours as needed for Wheezing 5/17/21  Yes Candido Banks DO   amLODIPine (NORVASC) 5 MG tablet take 1 tablet by mouth once daily 5/17/21  Yes Giles Rooney, DO   atenolol (TENORMIN) 50 MG tablet take 1 tablet by mouth once daily 5/17/21  Yes Giles Rooney, DO   atorvastatin (LIPITOR) 40 MG tablet take 1 tablet by mouth once daily 5/17/21  Yes Giles Rooney, DO   benazepril (LOTENSIN) 20 MG tablet take 1 tablet by mouth once daily 5/17/21  Yes Giles Rooney, DO   citalopram (CELEXA) 40 MG tablet take 1 tablet by mouth once daily 5/17/21  Yes Giles Rooney, DO   clopidogrel (PLAVIX) 75 MG tablet take 1 tablet by mouth once daily 5/17/21  Yes Giles Rooney, DO   cyclobenzaprine (FLEXERIL) 5 MG tablet take 1 tablet by mouth twice a day if needed for muscle spasm - DO NOT TAKE WHILE DRINKING ALCOHOL OR DRIVING 4/25/47  Yes Giles Rooney, DO   levETIRAcetam (KEPPRA) 500 MG tablet take 1 tablet by mouth twice a day 5/17/21  Yes Giles Rooney, DO   ARIPiprazole (ABILIFY) 5 MG tablet take 1 tablet by mouth once daily 3/29/21  Yes Abby Schaefer PA-C   ketoconazole (NIZORAL) 2 % cream Apply topically daily.  2/11/21  Yes Abby Schaefer PA-C        Allergies   Codeine    Social History     Social History     Tobacco History     Smoking Status  Current Every Day Smoker Smoking Start Date  1/1/1970 Smoking Frequency  1 pack/day for 49 years (52 pk yrs)    Smokeless Tobacco Use  Never Used          Alcohol History     Alcohol Use Status  Yes Drinks/Week  14 Cans of beer, 14 Shots of liquor per week Amount  28.0 standard drinks of alcohol/wk Comment  2 cans of beer and 2 shots of liquor per day          Drug Use     Drug Use Status  Yes Types  Marijuana (Weed) Frequency   7 times/week Comment  every day          Sexual Activity     Sexually Active  Not Currently                Family History     Family History   Problem Relation Age of Onset    Cancer Mother         Breast    Alcohol Abuse Father     Drug Abuse Father     Cancer Brother     Alcohol Abuse Brother     Drug Abuse Brother        Review of Systems Review of Systems   Constitutional: Positive for fatigue. Respiratory: Positive for shortness of breath. Cardiovascular: Positive for leg swelling. Neurological: Positive for weakness. All other systems reviewed and are negative. Physical Exam   BP (!) 178/78   Pulse 88   Temp 97.8 °F (36.6 °C) (Infrared)   Resp 22   Ht 4' 10\" (1.473 m)   Wt 193 lb 2 oz (87.6 kg)   SpO2 97%   BMI 40.36 kg/m²     Physical Exam  Vitals and nursing note reviewed. Constitutional:       Appearance: She is obese. She is ill-appearing. HENT:      Head: Normocephalic and atraumatic. Mouth/Throat:      Mouth: Mucous membranes are moist.   Eyes:      Extraocular Movements: Extraocular movements intact. Conjunctiva/sclera: Conjunctivae normal.      Pupils: Pupils are equal, round, and reactive to light. Cardiovascular:      Rate and Rhythm: Normal rate and regular rhythm. Pulses: Normal pulses. Heart sounds: Normal heart sounds. Pulmonary:      Breath sounds: Rhonchi present. Abdominal:      General: Abdomen is flat. Bowel sounds are normal.      Palpations: Abdomen is soft. Musculoskeletal:         General: Tenderness and signs of injury present. Cervical back: Normal range of motion and neck supple. Right lower leg: Edema present. Left lower leg: Edema present. Comments: Decreased ROM, unable to ambulate   Skin:     General: Skin is warm and dry. Capillary Refill: Capillary refill takes less than 2 seconds. Neurological:      General: No focal deficit present. Mental Status: She is oriented to person, place, and time. Mental status is at baseline.    Psychiatric:         Mood and Affect: Mood normal.         Behavior: Behavior normal.         Labs      Recent Results (from the past 24 hour(s))   CBC Auto Differential    Collection Time: 11/09/21  8:52 AM   Result Value Ref Range    WBC 10.1 4.5 - 11.5 E9/L    RBC 4.72 3.50 - 5.50 E12/L    Hemoglobin 15.7 (H) 11.5 - 15.5 g/dL    Hematocrit 44.6 34.0 - 48.0 %    MCV 94.5 80.0 - 99.9 fL    MCH 33.3 26.0 - 35.0 pg    MCHC 35.2 (H) 32.0 - 34.5 %    RDW 13.7 11.5 - 15.0 fL    Platelets 484 180 - 358 E9/L    MPV 10.4 7.0 - 12.0 fL    Neutrophils % 65.0 43.0 - 80.0 %    Lymphocytes % 17.0 (L) 20.0 - 42.0 %    Monocytes % 15.0 (H) 2.0 - 12.0 %    Eosinophils % 3.0 0.0 - 6.0 %    Basophils % 0.0 0.0 - 2.0 %    Neutrophils Absolute 6.57 1.80 - 7.30 E9/L    Lymphocytes Absolute 1.72 1.50 - 4.00 E9/L    Monocytes Absolute 1.52 (H) 0.10 - 0.95 E9/L    Eosinophils Absolute 0.30 0.05 - 0.50 E9/L    Basophils Absolute 0.00 0.00 - 0.20 E9/L    Poikilocytes 1+     Barb Cells 1+    Comprehensive Metabolic Panel    Collection Time: 11/09/21  8:52 AM   Result Value Ref Range    Sodium 129 (L) 132 - 146 mmol/L    Potassium 3.5 3.5 - 5.0 mmol/L    Chloride 90 (L) 98 - 107 mmol/L    CO2 26 22 - 29 mmol/L    Anion Gap 13 7 - 16 mmol/L    Glucose 109 (H) 74 - 99 mg/dL    BUN 7 6 - 23 mg/dL    CREATININE 0.4 (L) 0.5 - 1.0 mg/dL    GFR Non-African American >60 >=60 mL/min/1.73    GFR African American >60     Calcium 9.6 8.6 - 10.2 mg/dL    Total Protein 7.4 6.4 - 8.3 g/dL    Albumin 3.3 (L) 3.5 - 5.2 g/dL    Total Bilirubin 1.0 0.0 - 1.2 mg/dL    Alkaline Phosphatase 154 (H) 35 - 104 U/L    ALT 59 (H) 0 - 32 U/L    AST 82 (H) 0 - 31 U/L   Urinalysis    Collection Time: 11/09/21  8:52 AM   Result Value Ref Range    Color, UA Yellow Straw/Yellow    Clarity, UA Clear Clear    Glucose, Ur Negative Negative mg/dL    Bilirubin Urine Negative Negative    Ketones, Urine 15 (A) Negative mg/dL    Specific Gravity, UA 1.010 1.005 - 1.030    Blood, Urine LARGE (A) Negative    pH, UA 5.5 5.0 - 9.0    Protein, UA Negative Negative mg/dL    Urobilinogen, Urine 2.0 (A) <2.0 E.U./dL    Nitrite, Urine POSITIVE (A) Negative    Leukocyte Esterase, Urine MODERATE (A) Negative   Troponin    Collection Time: 11/09/21  8:52 AM   Result Value Ref Range    Troponin, High Sensitivity 10 (H) 0 - 9 ng/L   Brain Natriuretic Peptide    Collection Time: 11/09/21  8:52 AM   Result Value Ref Range    Pro- (H) 0 - 125 pg/mL   Microscopic Urinalysis    Collection Time: 11/09/21  8:52 AM   Result Value Ref Range    WBC, UA 5-10 (A) 0 - 5 /HPF    RBC, UA 1-3 0 - 2 /HPF    Epithelial Cells, UA FEW /HPF    Bacteria, UA MANY (A) None Seen /HPF   EKG 12 Lead    Collection Time: 11/09/21  9:11 AM   Result Value Ref Range    Ventricular Rate 89 BPM    Atrial Rate 89 BPM    P-R Interval 182 ms    QRS Duration 90 ms    Q-T Interval 380 ms    QTc Calculation (Bazett) 462 ms    P Axis 46 degrees    R Axis 56 degrees    T Axis 56 degrees        Imaging/Diagnostics Last 24 Hours   XR CHEST (2 VW)    Result Date: 11/9/2021  EXAMINATION: TWO XRAY VIEWS OF THE CHEST 11/9/2021 9:37 am COMPARISON: Previous CT of the chest of 10/07/2020 HISTORY: ORDERING SYSTEM PROVIDED HISTORY: b/l leg swelling TECHNOLOGIST PROVIDED HISTORY: Reason for exam:->b/l leg swelling FINDINGS: The heart is enlarged. I cannot exclude findings of mild CHF. There is mild prominence of the peripheral interstitial markings which is confirmed on the patient's prior CT of the chest.     Cardiomegaly. Findings of mild CHF are not excluded Mild prominence of the interstitial markings peripherally which are confirmed on the patient's prior CT scan of 10/07/2020       Assessment      Hospital Problems           Last Modified POA    UTI (urinary tract infection) 11/9/2021 Yes    CHF (congestive heart failure), NYHA class I, acute on chronic, combined ([facility] [address] 11/9/2021 Yes      HTN  Hx CVA  Hyponatremia  Elevated LFTS    Plan   1. Admit to telemetry  2. Consult cardiology  3. IV diuresis  4. SCDs,   5. Resume home medications  6. Check labs daily  7. 2-d echo  8. PT/OT  9. iV rocephin  10.  Urine cultures    Consultations Ordered:  IP CONSULT TO HEART FAILURE NURSE/COORDINATOR  IP CONSULT TO DIETITIAN  IP CONSULT TO CARDIOLOGY    Electronically signed by Larry Parker DO on 11/9/21 at 12:44 PM EST

## 2021-11-09 NOTE — ED NOTES
Asleep appears restful Monitor NSR vitrals stable side rails up call light near     [facility], [address] [place] [address]  11/09/21 [id number]

## 2021-11-09 NOTE — Clinical Note
Patient Class: Observation [104]   REQUIRED: Diagnosis: UTI (urinary tract infection) [619445]   Estimated Length of Stay: Estimated stay of less than 2 midnights   Admitting Provider: Tessie Pardo [[id number]]

## 2021-11-09 NOTE — ED NOTES
Pt states is unable to sit by self/ pt lifted into bed by 2 assists.       Rich Goode [facility]  95/01/00 [id number]

## 2021-11-09 NOTE — PROGRESS NOTES
[address] [facility]  [facility]         Date:2021                                                   Patient Name: Sascha Aggarwal     MRN: [id number]     : 1960     Room:        Evaluating OT: Loree Longoria, OTR/L; [id number]       Referring Provider and Orders/Date:   OT eval and treat Start: 21 1245, End: 21 1245, ONE TIME, Standing Count: 1 Occurrences R    Jay Rosales DO     Diagnosis:   1. Acute on chronic congestive heart failure, unspecified heart failure type ([facility]    2. Acute cystitis without hematuria    3. Ambulatory dysfunction    4.  Hypoxia         Pertinent Medical History:        Past Medical History:   Diagnosis Date    Alcohol abuse     Cerebral artery occlusion with cerebral infarction ([facility]     Depression     Elevated blood sugar     Hepatitis C     from blood transfusion, underwent treatment per patient and \"cured\"    Hyperlipidemia     Hypertension     Liver cirrhosis ([facility]     Magnesium deficiency     Marijuana user     Nicotine dependence     Seizures ([facility]     Subclinical hypothyroidism           Past Surgical History:   Procedure Laterality Date    DILATION AND CURETTAGE OF UTERUS      LIVER BIOPSY  2016       Precautions:  Fall Risk-at home, 2L, legs giving out    Recommended placement: subacute    Assessment of current deficits     [x] Functional mobility  [x]ADLs  [x] Strength               []Cognition     [x] Functional transfers   [x] IADLs         [x] Safety Awareness   [x]Endurance     [] Fine Coordination              [x] Balance      [] Vision/perception   []Sensation      [x]Gross Motor Coordination  [] ROM  [] Delirium                   [] Motor Control     OT PLAN OF CARE   OT POC based on physician orders, patient diagnosis and results of clinical assessment    Frequency/Duration 1-3 days/wk for 2 weeks PRN Specific OT Treatment Interventions to include:   * Instruction/training on adapted ADL techniques and AE recommendations to increase functional independence within precautions       * Training on energy conservation strategies, correct breathing pattern and techniques to improve independence/tolerance for self-care routine  * Functional transfer/mobility training/DME recommendations for increased independence, safety, and fall prevention  * Patient/Family education to increase follow through with safety techniques and functional independence  * Recommendation of environmental modifications for increased safety with functional transfers/mobility and ADLs  * Therapeutic exercise to improve motor endurance, ROM, and functional strength for ADLs/functional transfers  * Therapeutic activities to facilitate/challenge dynamic balance, stand tolerance for increased safety and independence with ADLs  * Therapeutic activities to facilitate gross/fine motor skills for increased independence with ADLs     Recommended Adaptive Equipment/DME: TBD      Home Living: Pt lives at home with spouse who requires assist and is w/c level due to amputation. He has caregivers 3x per week. She lives in a 1 story home with a ramp to enter and laundry in the basement with a full flight of steps. DME owned: cane, walker, wc     Prior Level of Function: indep with ADLs , indep with IADLs; ambulated with cane   Driving: yes   Occupation: retired   Enjoys: pets-cat/dog, limited due to back issues    Pain Level: chronic 4/10; 8/10 with movement; nursing provided medications.    Cognition: A&O: 4/4; Follows 3 step directions   Memory:  Intact   Sequencing:  Intact   Problem solving:  Intact   Judgement/safety:  Intact    AM-PAC Daily Activity Inpatient   How much help for putting on and taking off regular lower body clothing?: Total  How much help for Bathing?: A Lot  How much help for Toileting?: Total  How much help for putting on and taking off regular upper body clothing?: A Lot  How much help for taking care of personal grooming?: A Little  How much help for eating meals?: A Little  AM-PAC Inpatient Daily Activity Raw Score: 12  AM-PAC Inpatient ADL T-Scale Score : 30.6  ADL Inpatient CMS 0-100% Score: 66.57  ADL Inpatient CMS G-Code Modifier : CL  Functional Assessment:     Initial Eval Status  Date: 11/9/2021   Treatment Status  Date: STGs = LTGs  Time frame: 10-14 days   Feeding Stand by Assist with tremors in kishor hands and coordination deficits  Indep   Grooming Stand by Assist EOB to use hand  and blow nose. Independent    UB Dressing Maximal Assist for gown management with pt only in a tank top upon arrival. Completed from sitting EOB  Minimal Assist    LB Dressing Dependent with A x 2 to don pull up briefs from sitting/standing for safety. Dep to don kishor socks from sitting EOB with hypersensitivity to feet. Moderate Assist    Bathing Maximal Assist suspected with unable to assess in ED setting. Minimal Assist    Toileting Dependent with A x 2 recommended with standing  Moderate Assist    Bed Mobility  Supine to sit: Moderate Assist   Sit to supine: Moderate Assist x 2 due to decreased safety with ht of hospital bed. Sitting tolerance for 15min average. Supine to sit: Stand by Assist   Sit to supine: Minimal Assist    Functional Transfers Moderate Assist x 2 with walker and elevated surface of bed. Moderate Assist    Functional Mobility Moderate Assist x 2 with fall risk and walker for side stepping to [facility] for safety.    Mod Assist    Balance Sitting:     Static:  fair    Dynamic:fair-  Standing: poor+  Sitting:     Static:  Fair+    Dynamic:fair  Standing: fair-   Activity Tolerance Vitals with activity: 156/67 HR 91, 2L 88% sitting EOB; 175/82 HR 90 91% standing  Standing tolerance 1min average with ADLs  Increase standing tolerance for >3min with stable vital signs for carry over into toileting, functional tranfers and indep in ADLs   Visual/  Perceptual Glasses: not Present; WFL    Reports change in vision since admission: No     NA   Andrew UE Strengthening  4-/5 generally  4+/5MMT generally for carry over into self care, functional transfers and functional mobility with AD. Hand Dominance  [x] Right  [] Left    AROM (PROM) Strength Additional Info:    RUE  WFL 4-/5 good  and fair FMC/dexterity noted during ADL tasks-tremors  Opposition [x] Intact [] Impaired  Finger to nose [x] Intact [] Impaired     LUE WFL 4-/5 good  and fair- FMC/dexterity noted during ADL tasks-tremors  Opposition [x] Intact [] Impaired  Finger to nose [x] Intact [] Impaired     Hearing: WFL   Sensation:  c/o numbness or tingling andrew LE  Tone: WFL   Edema: none    Comments: Upon arrival patient supine. Pt required max A for most UB ADLs and dep A x 2 LB ADLs tasks. Limited with mod A x 2 for standing during LB ADLs and functional transfers. Patient required co-evaluation with physical therapy for a portion of the session due to the level of physical assistance needed and the patients endurance level to tolerate separate sessions, as indicated under evaluation time. The biggest barriers reflect that of functional transfers, functional mobility, UB/LB ADLs, cognition, activity tolerance, balance, safety and strengthening. At end of session, patient supine with PT. Overall patient demonstrated decreased independence and safety during completion of ADL/functional transfer/mobility tasks. Nursing updated on pt position and status following OT eval. Pt would benefit from continued skilled OT to increase safety and independence with completion of ADL/IADL tasks for functional independence and quality of life. Treatment: OT treatment provided this date includes:   Instruction, education and training on safe facilitation and adapted techniques for completion of ADLs.  These include neuromuscular reeducation to facilitate balance/righting reactions, safe functional transfer techniques and on energy conservation/work simplification for completion of ADLs. Education provided on hand/feet placement with bed rails, walker and body mechanics for fall prevention. Cues for energy conservation and safety for in the home at DC, including modifications and DME. Extended time to complete all tasks, including skilled monitoring of patient's response during treatment session and vital signs. Prior to and at the end of session, environmental modifications / line management completed for patients safety and efficiency of treatment session. See above for further details. Rehab Potential: Good for established goals     Patient / Family Goal: Pain management      Patient and/or family were instructed on functional diagnosis, prognosis/goals and OT plan of care. Demonstrated good understanding. Eval Complexity: Low  · History: Brief review of medical records and additional review of physical, cognitive, or psychosocial history related to current functional performance  · Exam: 3+ performance deficits  · Assistance/Modification: Mod assistance or modifications required to perform tasks. May have comorbidities that affect occupational performance. Time In: 1302  Time Out: 1343  Total Treatment Time: 13    Min Units   OT Eval Low 97165  x  1   OT Eval Medium 42264      OT Eval High 53394      OT Re-Eval [id number]       Therapeutic Ex 39982       Therapeutic Activities 94101  13 1    ADL/Self Care 51522       Orthotic Management 12402       Manual [id number]     Neuro Re-Ed 32052       Non-Billable Time  8        Evaluation Time additionally includes thorough review of current medical information, gathering information on past medical history/social history and prior level of function, interpretation of standardized testing/informal observation of tasks, assessment of data and development of plan of care and goals.             Angel Luis Johnson OTR/L; [id number]

## 2021-11-09 NOTE — CONSULTS
Inpatient Cardiology Consultation      Reason for Consult:  CHF    Consulting Physician: Leora Bass MD    Requesting Physician:  Terry Oliveros DO    Date of Consultation: 11/9/2021    HISTORY OF PRESENT ILLNESS OF Mukul Gimenez located in  room 22/22: Mukul Gimenez is a 56 y.o. female  unknown to [address] [facility] cardiology     Patient has h/o HTN, HLD, h/o hep C with treatment, liver cirrhosis, h/o ascites and paracentesis, alcohol abuse, CVA, seizures,  hypothyroidism, nicotine dependency, marijuana use, elevated blood sugar, obesity BMI 40.36 kg/m², acute compression fracture with 30% loss of height centrally at the superior endplate at L 30 by CT of the lumbar spine on 10/22/2021      Patient presented to ED of [facility] on on 11/9/2021 complaining of legs swelling, generalized weakness, inability to talk because of the feeling that the legs will give up. Patient states that she fell down because her legs gave up on her. On arrival her blood pressure was 140/63 mmHg, pulse 71, respiration 20, 22; SPO2 at room air 9588%, nonfebrile. Patient she was drinking half a bottle of vodka daily now she has about to 3 drinks of vodka a day. About 4 years ago she had an episode of crushing chest pain had some cardiological work-up which she did not finish intra-abdominal hospital.    Please note: past medical records were reviewed per electronic medical record (EMR) - see detailed reports under Past Medical/ Surgical History.    Past Medical History:    Past Medical History:   Diagnosis Date    Alcohol abuse     Cerebral artery occlusion with cerebral infarction ([address]     Depression     Diverticulosis of colon     Elevated blood sugar     Hepatitis C     from blood transfusion, underwent treatment per patient and \"cured\"    Hyperlipidemia     Hypertension     Liver cirrhosis ([address]     Magnesium deficiency     Marijuana user     Nicotine dependence     Seizures ([address]     Subclinical hypothyroidism        Past Surgical History:    Past Surgical History:   Procedure Laterality Date    DILATION AND CURETTAGE OF UTERUS      LIVER BIOPSY  07/05/2016       Medications Prior to admit:  Prior to Admission medications    Medication Sig Start Date End Date Taking? Authorizing Provider   levothyroxine (SYNTHROID) 25 MCG tablet take 1 tablet by mouth once daily 8/17/21  Yes Codi Keen DO   magnesium oxide (MAG-OX) 400 MG tablet Take 1 tablet by mouth twice daily 7/27/21  Yes Codi Keen DO   meloxicam (MOBIC) 15 MG tablet Take 1 tablet by mouth daily 6/30/21  Yes Codi Keen DO   albuterol sulfate HFA (PROAIR HFA) 108 (90 Base) MCG/ACT inhaler Inhale 2 puffs into the lungs every 6 hours as needed for Wheezing 5/17/21  Yes Codi Keen DO   amLODIPine (NORVASC) 5 MG tablet take 1 tablet by mouth once daily 5/17/21  Yes Codi Keen DO   atenolol (TENORMIN) 50 MG tablet take 1 tablet by mouth once daily 5/17/21  Yes Codi Keen DO   atorvastatin (LIPITOR) 40 MG tablet take 1 tablet by mouth once daily 5/17/21  Yes Codi Keen DO   benazepril (LOTENSIN) 20 MG tablet take 1 tablet by mouth once daily 5/17/21  Yes Codi Keen DO   citalopram (CELEXA) 40 MG tablet take 1 tablet by mouth once daily 5/17/21  Yes Codi Keen DO   clopidogrel (PLAVIX) 75 MG tablet take 1 tablet by mouth once daily 5/17/21  Yes Codi Keen DO   cyclobenzaprine (FLEXERIL) 5 MG tablet take 1 tablet by mouth twice a day if needed for muscle spasm - DO NOT TAKE WHILE DRINKING ALCOHOL OR DRIVING 7/38/20  Yes Codi Keen DO   levETIRAcetam (KEPPRA) 500 MG tablet take 1 tablet by mouth twice a day 5/17/21  Yes Codi Keen DO   ARIPiprazole (ABILIFY) 5 MG tablet take 1 tablet by mouth once daily 3/29/21  Yes Arias Atwood PA-C   ketoconazole (NIZORAL) 2 % cream Apply topically daily.  2/11/21  Yes Arias Atwood PA-C       Current Medications:    Current Facility-Administered Medications: sodium chloride flush 0.9 % injection 5-40 mL, 5-40 mL, IntraVENous, 2 times per day  sodium chloride flush 0.9 % injection 5-40 mL, 5-40 mL, IntraVENous, PRN  0.9 % sodium chloride infusion, 25 mL, IntraVENous, PRN  ondansetron (ZOFRAN-ODT) disintegrating tablet 4 mg, 4 mg, Oral, Q8H PRN **OR** ondansetron (ZOFRAN) injection 4 mg, 4 mg, IntraVENous, Q6H PRN  polyethylene glycol (GLYCOLAX) packet 17 g, 17 g, Oral, Daily PRN  acetaminophen (TYLENOL) tablet 650 mg, 650 mg, Oral, Q6H PRN **OR** acetaminophen (TYLENOL) suppository 650 mg, 650 mg, Rectal, Q6H PRN  perflutren lipid microspheres (DEFINITY) injection 1.65 mg, 1.5 mL, IntraVENous, ONCE PRN  furosemide (LASIX) injection 40 mg, 40 mg, IntraVENous, BID  [START ON 11/10/2021] cefTRIAXone (ROCEPHIN) 1,000 mg in sterile water 10 mL IV syringe, 1,000 mg, IntraVENous, Q24H  albuterol sulfate  (90 Base) MCG/ACT inhaler 2 puff, 2 puff, Inhalation, Q6H PRN  amLODIPine (NORVASC) tablet 5 mg, 5 mg, Oral, Daily  ARIPiprazole (ABILIFY) tablet 5 mg, 5 mg, Oral, Daily  atenolol (TENORMIN) tablet 50 mg, 50 mg, Oral, Daily  atorvastatin (LIPITOR) tablet 40 mg, 40 mg, Oral, Nightly  lisinopril (PRINIVIL;ZESTRIL) tablet 20 mg, 20 mg, Oral, Daily  citalopram (CELEXA) tablet 40 mg, 40 mg, Oral, Daily  clopidogrel (PLAVIX) tablet 75 mg, 75 mg, Oral, Daily  levETIRAcetam (KEPPRA) tablet 500 mg, 500 mg, Oral, BID  [START ON 11/10/2021] levothyroxine (SYNTHROID) tablet 50 mcg, 50 mcg, Oral, QAM AC  magnesium oxide (MAG-OX) tablet 400 mg, 400 mg, Oral, BID  spironolactone (ALDACTONE) tablet 25 mg, 25 mg, Oral, Daily    Allergies:  Codeine    Social History:    Social History     Socioeconomic History    Marital status:      Spouse name: Not on file    Number of children: Not on file    Years of education: Not on file    Highest education level: Not on file   Occupational History    Not on file   Tobacco Use    Smoking status: Current Every Day Smoker     Packs/day: 1.00     Years: 49.00     Pack years: 49.00     Start date: 1970    Smokeless tobacco: Never Used   Vaping Use    Vaping Use: Never used   Substance and Sexual Activity    Alcohol use: Yes     Alcohol/week: 28.0 standard drinks     Types: 14 Cans of beer, 14 Shots of liquor per week     Comment: 2 cans of beer and 2 shots of liquor per day    Drug use: Yes     Frequency: 7.0 times per week     Types: Marijuana Maurita Handsome)     Comment: every day    Sexual activity: Not Currently   Other Topics Concern    Not on file   Social History Narrative    Not on file     Social Determinants of Health     Financial Resource Strain: Low Risk     Difficulty of Paying Living Expenses: Not very hard   Food Insecurity: No Food Insecurity    Worried About Running Out of Food in the Last Year: Never true    Ankur of Food in the Last Year: Never true   Transportation Needs:     Lack of Transportation (Medical): Not on file    Lack of Transportation (Non-Medical):  Not on file   Physical Activity:     Days of Exercise per Week: Not on file    Minutes of Exercise per Session: Not on file   Stress:     Feeling of Stress : Not on file   Social Connections:     Frequency of Communication with Friends and Family: Not on file    Frequency of Social Gatherings with Friends and Family: Not on file    Attends Scientologist Services: Not on file    Active Member of [address] [place] [address] or Organizations: Not on file    Attends Club or Organization Meetings: Not on file    Marital Status: Not on file   Intimate Partner Violence:     Fear of Current or Ex-Partner: Not on file    Emotionally Abused: Not on file    Physically Abused: Not on file    Sexually Abused: Not on file   Housing Stability:     Unable to Pay for Housing in the Last Year: Not on file    Number of [place] in the Last Year: Not on file    Unstable Housing in the Last Year: Not on file       Family History:   Family History Problem Relation Age of Onset    Cancer Mother         Breast    Alcohol Abuse Father     Drug Abuse Father     Cancer Brother     Alcohol Abuse Brother     Drug Abuse Brother          REVIEW OF SYSTEMS:     Constitutional: Generalized fatigue. Denies fevers, chills, night sweats, aamir loss, aamir gain  HEENT: Denies headaches, nose bleeds, and blurred vision,oral pain, oral lesion. Neurological: History of stroke, balance problems, dizziness. Denies  weakness, dizziness and lightheadedness, numbness and tingling  Cardiovascular:  Short of breath when laying flat. Denies chest pain, shortness of breath at rest, and ambulation, palpitations, and feelings of heart racing. Respiratory: Denies cough, wheezing,  use of supplementary oxygen, CPAP/BiPAP  Gastrointestinal: Denies heartburn, nausea, vomiting, diarrhea and constipation, black/bloody, and tarry stools. Genitourinary: Denies pain on  urination,  blood in urine, trouble starting urination, need to strain on urination, urinary urgency, urinary incontinence. Hematologic: Denies history of easy bruising, prolonged bleeding,  of blood clots in legs  Lymphatic: Denies lumps and bumps to neck, axilla, breast, and groin  Endocrine: Denies excessive thirst. Denies intolerance to heat or cold  Musculoskeletal: Has falls, uses walker. Has bilateral lower extremity weakness and swelling. Psychiatric: Denies anxiety and depression. PHYSICAL EXAM:   BP (!) 132/102   Pulse 73   Temp 97.8 °F (36.6 °C) (Infrared)   Resp 20   Ht 4' 10\" (1.473 m)   Wt 193 lb 2 oz (87.6 kg)   SpO2 94%   BMI 00.32 kg/m²   Systolic (39ARD), RVK:097 , Min:132 , NVC:816    Diastolic (12IJX), GEM:30, Min:63, Max:102    CONST: Morbid obese. Awake, alert, cooperative, no apparent distress  HEENT:   Head- Normocephalic, atraumatic   Eyes- Conjunctivae pink, anicteric  Throat- Oral mucosa pink and moist  Neck-  No stridor, trachea midline, no jugular venous distention.  No adenopathy   CHEST: Chest symmetrical and non-tender to palpation. No accessory muscle use or intercostal retractions  RESPIRATORY:  Lung sounds - clear throughout fields;  CARDIOVASCULAR:     No carotid bruits  Heart Inspection- shows no noted pulsations  Heart Ausculation- Regular   rate and rhythm, no murmur. No s3, s4 or rub   PV: Bilateral lower extremity edema. No varicosities. Pedal pulses not palpable to palpation, no clubbing. ABDOMEN: Soft, distended,  non-tender to light palpation. Bowel sounds present. MS: Moves all extremities. No atrophy or abnormal movements. : Deferred  SKIN: Warm and dry,  no stasis dermatitis or ulcers on legs  NEURO / PSYCH: Oriented to person, place and time. Speech clear and appropriate. Follows all commands. Pleasant affect     DATA:    ECG with Dr. Maggi Lloyd    Cardiac monitor: Sinus rhythm  Diagnostic:  Chest x Ray     XR CHEST (2 VW)    Result Date: 11/9/2021  EXAMINATION: TWO XRAY VIEWS OF THE CHEST 11/9/2021 9:37 am COMPARISON: Previous CT of the chest of 10/07/2020 HISTORY: ORDERING SYSTEM PROVIDED HISTORY: b/l leg swelling TECHNOLOGIST PROVIDED HISTORY: Reason for exam:->b/l leg swelling FINDINGS: The heart is enlarged. I cannot exclude findings of mild CHF. There is mild prominence of the peripheral interstitial markings which is confirmed on the patient's prior CT of the chest.     Cardiomegaly.   Findings of mild CHF are not excluded Mild prominence of the interstitial markings peripherally which are confirmed on the patient's prior CT scan of 10/07/2020           Labs:   CARDIAC ENZYMES:  Recent Labs     11/09/21  0852   TROPHS 10*     H/O TROPONIN levels    Lab Results   Component Value Date    TROPHS 10 11/09/2021    TROPONINI <0.01 10/07/2020     Recent Labs     11/09/21  0852   PROBNP 333*     Lab Results   Component Value Date    PROBNP 333 11/09/2021    PROBNP 137 10/07/2020     BMP:   Recent Labs     11/09/21  0852   *   K 3.5   CO2 26   BUN 7 CREATININE 0.4*   LABGLOM >60   CALCIUM 9.6     Lab Results   Component Value Date    CREATININE 0.4 11/09/2021    CREATININE 0.5 06/10/2021    CREATININE 0.5 06/04/2021    CREATININE 0.5 10/09/2020    CREATININE 0.5 10/08/2020    CREATININE 0.6 10/07/2020    CREATININE 0.7 12/03/2019    CREATININE 0.6 04/26/2019    CREATININE 0.5 06/16/2017    CREATININE 0.5 09/07/2016    CREATININE 0.5 05/09/2016    CREATININE 0.5 04/08/2016    CREATININE 0.5 04/05/2016     CBC:   Recent Labs     11/09/21  0852   WBC 10.1   HGB 15.7*   HCT 44.6        Mag: No results for input(s): MG in the last 72 hours. Phos: No results for input(s): PHOS in the last 72 hours. TSH: No results for input(s): TSH in the last 72 hours. HgA1c:   Lab Results   Component Value Date    LABA1C 5.9 (H) 06/10/2021     No results found for: EAG  BNP: No results for input(s): BNP in the last 72 hours. PT/INR: No results for input(s): PROTIME, INR in the last 72 hours. APTT:No results for input(s): APTT in the last 72 hours. FASTING LIPID PANEL:  Lab Results   Component Value Date    HDL 42 06/04/2021    LDLCALC 70 06/04/2021     LIVER PROFILE:  Recent Labs     11/09/21  0852   AST 82*   ALT 59*   LABALBU 3.3*       COVID-19 Labs:  No results found for: COVID19  No results for input(s): COVID19 in the last 72 hours.           ASSESSMENT:  Leg swelling secondary to a stable cardiomyopathy and/or hepatic cirrhosis with portal  hypertension  Abdominal swelling secondary to a stable cardiomyopathy and/or hepatic cirrhosis with portal  Hypertension  Borderline troponin without chest pain   HTN, poorly controlled  HLD  Leg weakness possibly related to acute compression fracture with 30% loss of height centrally  at the superior endplate L3 noticed on CT from 10/22/2021 or/and PVD-calcific aortoiliac  atherosclerosis   Hyponatremia  Transaminitis   h/o hep C with treatment,   Liver cirrhosis,   h/o ascites and paracentesis,   alcohol abuse,   H/o CVA,   Seizures, Keppra  Hypothyroidism,   COPD/chronic fibrotic lung changes bilateralCT 10/7/2020,  smoking  Nicotine dependency,   Marijuana use,  Abnormal urinary analysis  Prediabetes  PVD/aortoiliac disease   Obesity BMI 40.36 kg/m²,  Significant factors of risk  for CAD as well as for alcohol induced cardiomyopathy      PLAN:  Continue Telemetry  Continue current therapy including IV diuretics  Added Aldactone  ECHO  Repeat troponin  Electrolytes check and correction    Renal function check   I&O, weights  BP control     Lifestyle modification: Smoking cessation, EtOH abstinence, loss of weight      Further cardiac recommendations will be forthcoming pending patient clinical course and echo findings. We will try to obtain records from [facility] regarding the evaluation for the episode of chest pain mentioned by the patient.       Assessment and plan discussed with Dr. Amber Sims MD    Assessment and Plan to follow as per Dr. Amber Sims MD    Electronically signed by CHRIS Myers on 11/9/2021 at 5:38 PM

## 2021-11-10 NOTE — ED NOTES
Report called to 65 RN pt transferred top 633 condition stable     [facility]  11/09/21 [id number]

## 2021-11-10 NOTE — PROGRESS NOTES
Progress Note  Date:11/10/2021       Room:0633/0633-01  Patient Name:Chari Mckenzie     YOB: 1960     Age:60 y.o. Subjective    Subjective:  Symptoms:  Stable. She reports shortness of breath, malaise and weakness. Diet:  Adequate intake. No nausea or vomiting. Activity level: Impaired due to weakness. Pain:  She complains of pain that is moderate. She reports pain is unchanged. Pain is well controlled. Review of Systems   Constitutional: Negative for fever. Respiratory: Positive for shortness of breath. Gastrointestinal: Negative for nausea and vomiting. Neurological: Positive for weakness. All other systems reviewed and are negative. Objective         Vitals Last 24 Hours:  TEMPERATURE:  Temp  Av °F (36.7 °C)  Min: 97.9 °F (36.6 °C)  Max: 98.2 °F (36.8 °C)  RESPIRATIONS RANGE: Resp  Av.3  Min: 16  Max: 28  PULSE OXIMETRY RANGE: SpO2  Av %  Min: 88 %  Max: 96 %  PULSE RANGE: Pulse  Av.1  Min: 72  Max: 91  BLOOD PRESSURE RANGE: Systolic (95BOL), NBF:211 , Min:114 , WTM:991   ; Diastolic (97DYJ), LCH:18, Min:56, Max:102    I/O (24Hr): Intake/Output Summary (Last 24 hours) at 11/10/2021 1305  Last data filed at 11/10/2021 0929  Gross per 24 hour   Intake 610 ml   Output 600 ml   Net 10 ml     Objective:  General Appearance:  Ill-appearing. Vital signs: (most recent): Blood pressure (!) 114/58, pulse 86, temperature 97.9 °F (36.6 °C), temperature source Infrared, resp. rate 16, height 4' 10\" (1.473 m), weight 186 lb (84.4 kg), SpO2 96 %, not currently breastfeeding. Vital signs are normal.  No fever. Output: Producing urine and producing stool. HEENT: Normal HEENT exam.    Lungs:  Normal effort and normal respiratory rate. There are rales. Heart: Normal rate. Regular rhythm. Abdomen: Abdomen is soft. Extremities: Decreased range of motion. There is dependent edema. Neurological: (Asleep). Skin:  Warm. Labs/Imaging/Diagnostics    Labs:  CBC:  Recent Labs     11/09/21  0852   WBC 10.1   RBC 4.72   HGB 15.7*   HCT 44.6   MCV 94.5   RDW 13.7        CHEMISTRIES:  Recent Labs     11/09/21  0852 11/10/21  0631   * 135   K 3.5 3.6   CL 90* 93*   CO2 26 26   BUN 7 9   CREATININE 0.4* 0.4*   GLUCOSE 109* 105*   MG  --  1.6     PT/INR:No results for input(s): PROTIME, INR in the last 72 hours. APTT:No results for input(s): APTT in the last 72 hours. LIVER PROFILE:  Recent Labs     11/09/21  0852   AST 82*   ALT 59*   BILITOT 1.0   ALKPHOS 154*       Imaging Last 24 Hours:  XR CHEST (2 VW)    Result Date: 11/9/2021  EXAMINATION: TWO XRAY VIEWS OF THE CHEST 11/9/2021 9:37 am COMPARISON: Previous CT of the chest of 10/07/2020 HISTORY: ORDERING SYSTEM PROVIDED HISTORY: b/l leg swelling TECHNOLOGIST PROVIDED HISTORY: Reason for exam:->b/l leg swelling FINDINGS: The heart is enlarged. I cannot exclude findings of mild CHF. There is mild prominence of the peripheral interstitial markings which is confirmed on the patient's prior CT of the chest.     Cardiomegaly. Findings of mild CHF are not excluded Mild prominence of the interstitial markings peripherally which are confirmed on the patient's prior CT scan of 10/07/2020     XR LUMBAR SPINE (2-3 VIEWS)    Result Date: 11/9/2021  EXAMINATION: THREE XRAY VIEWS OF THE LUMBAR SPINE 11/9/2021 9:08 am COMPARISON: Lumbar spine CT 10/22/2021. Lumbar spine MRI 02/12/2018. HISTORY: ORDERING SYSTEM PROVIDED HISTORY: recent compression fracture TECHNOLOGIST PROVIDED HISTORY: Reason for exam:->recent compression fracture FINDINGS: Facet arthropathy appears advanced in the lower lumbar spine. There are mild-moderate degenerative disc changes. The L5 vertebral body compression fracture demonstrates moderate height loss, now measuring 12 mm in height centrally, compared to 17 mm on the previous CT. No new fractures are identified.   There is evidence of calcific aortoiliac atherosclerosis. Moderate L5 compression fracture demonstrates mild interval increase in height loss. Assessment//Plan           Hospital Problems           Last Modified POA    UTI (urinary tract infection) 11/9/2021 Yes    CHF (congestive heart failure), NYHA class I, acute on chronic, combined ([facility] 11/9/2021 Yes        Assessment:    Condition: In stable condition. Improving.   (Cardiomyopath  CHF  Cirrhosis  Lumbar compression fracture  ). Plan:   Ad annabella activity. Continue respiratory treatments. Consults: cardiology. Regular diet. (Continue IV diuresis  Continue current management  PT  Liver us  2 -d echo  Will follow).        Electronically signed by Torri Haney DO on 11/10/21 at 1:05 PM EST

## 2021-11-10 NOTE — PROGRESS NOTES
mobility, transfers, gait , gait distance and tolerance to activity are barriers to d/c and require skilled intervention during hospital stay to attain pre hospital level of function. Decreased safety awareness, strength and endurance   increases patient's risk for fall. Pt presents with leg weakness and left hip pain which limits the pt from ambulating. Pt will need subacute rehab at time of discharge in order to participate in a skilled comprehensive therapy program to address deficits and improve the patient 's  functional levels. This will allow the patien to return home requiring the least amount of assistance from others and make the patient safer and  decrease the patient' s  risk for falls. PHYSICAL THERAPY  PLAN OF CARE       Physical therapy plan of care is established based on physician order,  patient diagnosis and clinical assessment    Current Treatment Recommendations:    -Bed Mobility: Lower extremity exercises , Upper extremity exercises  and Trunk control activities   -Standing Balance: Perform strengthening exercises in standing to promote motor control with or without upper extremity support  and Instruct patient on adequate base of support to maintain balance  -Transfers: Provide instruction on proper hand and foot position for adequate transfer of weight onto lower extremities and use of gait device if needed and Cues for hand placement, technique and safety. Provide stabilization to prevent fall   -Gait: Gait training and Standing activities to improve: base of support, weight shift, weight bearing    -Endurance: Utilize Supervised activities to increase level of endurance to allow for safe functional mobility including transfers and gait     PT long term treatment goals are located in below grid    Patient and or family understand(s) diagnosis, prognosis, and plan of care. Frequency of treatments: Patient will be seen  daily.          Prior Level of Function: Patient ambulated independently and with quad cane    Rehab Potential: good    for baseline    Past medical history:   Past Medical History:   Diagnosis Date    Alcohol abuse     [facility]erebral artery occlusion with [facility] infarction ([facility] [address] [facility]     Depression     Diverticulosis of colon     Elevated blood sugar     Hepatitis C     from blood transfusion, underwent treatment per patient and \"cured\"    Hyperlipidemia     Hypertension     Liver cirrhosis ([facility] [address] [facility]     Magnesium deficiency     Marijuana user     Nicotine dependence     Seizures ([facility] [address] [facility]     Subclinical hypothyroidism      Past Surgical History:   Procedure Laterality Date    DILATION AND CURETTAGE OF UTERUS      LIVER BIOPSY  07/05/2016       SUBJECTIVE:    Precautions: Up as tolerated, falls and alarm ,    Social history: Patient lives with spouse in a ranch home  with Ramp  to enter        is wheelchair user and unable to leave living room d/t size of wheelchair   Equipment owned: Quad cane,       [id number] Mobility Inpatient   How much difficulty turning over in bed?: A Little  How much difficulty sitting down on / standing up from a chair with arms?: A Lot  How much difficulty moving from lying on back to sitting on side of bed?: A Lot  How much help from another person moving to and from a bed to a chair?: A Lot  How much help from another person needed to walk in hospital room?: Total  How much help from another person for climbing 3-5 steps with a railing?: Total  AM-PAC Inpatient Mobility Raw Score : 11  AM-PAC Inpatient T-Scale Score : 33.86  Mobility Inpatient CMS 0-100% Score: 72.57  Mobility Inpatient CMS G-Code Modifier : CL    Nursing cleared patient for PT treatment. .   OBJECTIVE;   Initial Evaluation  Date: 11/9/2021 Treatment Date:  11/10/2021     Short Term/ Long Term   Goals   Was pt agreeable to Eval/treatment? Yes Yes To be met in 4 days   Pain level   8/10    10/10 left hip    Bed Mobility    Rolling:  Moderate assist of 1    Supine to sit: Maximal assist of 1    Sit to supine: Maximal assist of  2    Scooting: Moderate assist of  2   Rolling: Minimal assist of 1   Supine to sit: Moderate assist of 1   Sit to supine: Moderate assist of 1   Scooting: Minimal assist of 1    Rolling: Minimal assist of 1    Supine to sit: Minimal assist of 1    Sit to supine: Minimal assist of 1    Scooting: Minimal assist of 1     Transfers Sit to stand: Moderate assist of 1 from [address] short stature Sit to stand: Moderate assist of 1      Sit to stand: Minimal assist of 1     Ambulation    2-3 side steps using  wheeled walker with Moderate assist of  2   cues for upright posture, walker approximation, safety and proper hand placement not assessed patient's legs gave out after standing for 5 seconds.     50 feet using  wheeled walker with Supervision     ROM Within functional limits        Strength BUE:  refer to OT slick  RLE:  3/5  LLE:  3/5  Increase strength in affected mm groups by 1/3 grade   Balance Sitting EOB:  poor on gurney  Dynamic Standing:  poor inconsistent bilateral lower extremities extension d/t pain Sitting EOB: fair   Dynamic Standing: not assessed    Sitting EOB:  fair    Dynamic Standing: fair       Patient is Alert & Oriented x person and place and follows one step directions       Edema:  yes bilateral lower extremities   Endurance: poor      Vitals: room air   Blood Pressure at rest  Blood Pressure during session    Heart Rate at rest   Heart Rate during session     SPO2 at rest %  SPO2 during session 95%     Patient education  Patient educated on role of Physical Therapy, risks of immobility, safety and plan of care,  importance of mobility while in hospital  and positioning for skin integrity and comfort     Patient response to education:   Pt verbalized understanding Pt demonstrated skill Pt requires further education in this area   Yes Partial Yes      Treatment:  Patient practiced and was instructed/facilitated in the following treatment: Patient performed supine exercises. Pt assisted to EOB. Sat edge of bed 10 minutes with Supervision  to increase dynamic sitting balance and activity tolerance. Pt stood for 5 seconds before legs given out, immediately sat down without warning d/t to hip pain on left. Pt performed seated exercises. Therapeutic Exercises:  ankle pumps, glut sets, straight leg raise, long arc quad and seated marching 2x10 reps          At end of session, patient sitting edge of bed with alarm call light and phone within reach,  all lines and tubes intact, nursing notified. Patient would benefit from continued skilled Physical Therapy to improve functional independence and quality of life.          Patient's/ family goals   home to feed animals    Time in  825  Time out  840    Total Treatment Time  15 minutes      CPT codes:  Therapeutic exercises (44716)   15 minutes  1 unit(s)    Robert Garrido  #[id number]

## 2021-11-10 NOTE — CARE COORDINATION
CM Note: 11/10/2021 at 4:10pm: COVID testing not done. Unable to completed initial assessment and transition of care as patient is off the floor for testing. CM / SW to follow up tomorrow to complete assessment.  Cecile Sanchez RN

## 2021-11-10 NOTE — PROGRESS NOTES
Room #:   2918/1424-00    Date: 11/10/2021       Patient Name: Frida Lombardo  : 1960      MRN: [id number]     Patient unavailable for physical therapy treatment due to off floor at [facility]. Will attempt PT treatment at a later time. Thank you.         Chante Garcia, PT

## 2021-11-11 PROBLEM — I50.33 ACUTE ON CHRONIC DIASTOLIC (CONGESTIVE) HEART FAILURE (HCC): Status: ACTIVE | Noted: 2021-01-01

## 2021-11-11 NOTE — CONSULTS
Nutrition Education    · Verbally reviewed information with Patient  · Educated on CHF guidelines, pt receptive and handouts provided. Monitor per policy. · Written educational materials provided. · Contact name and number provided.     Electronically signed by Victoriano Piña RD, LD on 11/11/21 at 10:23 AM EST  Contact: [phone]

## 2021-11-11 NOTE — CONSULTS
I met with patient at bedside regarding new consult for HF RN. Patient stated she was not aware that she had heart failure and wishes to discuss this with Dr. Ken Pabon next time he rounds on her. CHF education folder provided and reviewed. Clinic contact information provided as well. Pt will call the clinic to schedule an appt if she feels necessary once discussing with her provider. Patient currently admitted with diagnosis of combined heart failure. No future appointments. We reviewed the introduction to Heart Failure, the HF zones, signs and symptoms to report on day 1 of onset, medications, medication compliance, the importance of obtaining daily weights, following a low sodium diet, reading food labels for the sodium content, keeping physician appointments, and smoking cessation. We discussed writing / tracking daily weights on a calendar / log because a 5 pound gain in 1 week can sneak up if you are not tracking it. You can also take your charted weights to your doctor appointments to be reviewed. I advised patient they can reduce the risk for Heart Failure exacerbations by modifying / controlling the risk factors. We discussed self-managed care which includes the following:  to take medications as prescribed, report any intolerable side effects of medications to the cardiologist / doctor, do not just stop taking the medication; follow a cardiac heart healthy / low sodium diet; weigh yourself daily, exercise regularly- per doctor recommendation and not to smoke or use an excess amount of alcohol. We discussed calling the cardiologist / doctor with a weight gain of 3 pounds in one day or a total of 5 pounds or more in one week. Also, if you should have a significant weight loss of 3# or more in one day to call the doctor, they may need to decrease or hold the diuretic dose.  On days you feels nauseated and not eating / drinking, having emesis or diarrhea,  informed to call the cardiologist  / doctor, they may need to decrease or hold diuretic to avoid dehydration. I stressed the importance of informing their cardiologist the first day of onset of any of the signs and symptoms in the \"Yellow Zone\" of the HF Zones. Patient verbalizes understanding. Greater than 30 minutes was spent educating patient. BNP:   Lab Results   Component Value Date    PROBNP 333 (H) 11/09/2021       History of:    has a past medical history of Alcohol abuse, Cerebral artery occlusion with cerebral infarction [facility]), Depression, Diverticulosis of colon, Elevated blood sugar, Hepatitis C, Hyperlipidemia, Hypertension, Liver cirrhosis ([facility] [address] Magnesium deficiency, Marijuana user, Nicotine dependence, Seizures ([facility] [address] and Subclinical hypothyroidism. has a past surgical history that includes Dilation and curettage of uterus and liver biopsy (07/05/2016). Medications:    sodium chloride flush  5-40 mL IntraVENous 2 times per day    furosemide  40 mg IntraVENous BID    cefTRIAXone (ROCEPHIN) IV  1,000 mg IntraVENous Q24H    amLODIPine  5 mg Oral Daily    ARIPiprazole  5 mg Oral Daily    atenolol  50 mg Oral Daily    atorvastatin  40 mg Oral Nightly    lisinopril  20 mg Oral Daily    citalopram  40 mg Oral Daily    clopidogrel  75 mg Oral Daily    levETIRAcetam  500 mg Oral BID    levothyroxine  50 mcg Oral QAM AC    magnesium oxide  400 mg Oral BID    spironolactone  25 mg Oral Daily      sodium chloride         Code Status:Full Code    The patient is ordered:  Diet: ADULT DIET; Regular;  Low Sodium (2 gm)   Sodium controlled diet Yes  Fluid restriction daily ordered (fluid restriction recommended if patient is hyponatremic and/or diuretic is initiated or increased) No  Daily Weights: ordered     Patient Vitals for the past 96 hrs (Last 3 readings):   Weight   11/11/21 0657 194 lb 6.4 oz (88.2 kg)   11/10/21 0630 186 lb (84.4 kg)   11/09/21 0818 193 lb 2 oz (87.6 kg)     I/O:     Intake/Output Summary (Last 24 hours) at 11/11/2021 1335  Last data filed at 11/11/2021 0912  Gross per 24 hour   Intake 780 ml   Output 1000 ml   Net -220 ml        The Heart Failure Booklet given to the patient with additional patient education addressing:  · What is Heart Failure? · Things You Can Do to Live Well with HF  · How to Take Your Medications  · How to Eat Less Salt  · Exercising Well with Heart Failure  · Signs and symptoms of HF to report  · Weight Yourself Each Day  · Home Self Management- activity, weight tracking, taking medications as prescribed, meals /diet planning (sodium and fluid restriction), how to read food labels, keeping physician follow ups, smoking cessation, follow the Heart Failure Zones  · The Heart Failure zones  · Sodium content pamphlet  · What foods I should avoid tip sheet    Instructed  to call 911 if you have any of the following symptoms:    ·    Struggling to breathe unrelieved with rest,  ·    Having chest pain, confusion or can't think clearly  ·    Have confusion or cant think clearly    Readmission Risk Score: 13.2 ( )    Discharge Plan:  I placed the Heart Failure Home Instructions in patient's discharge instructions. Per Heart Failure GWTG, the patient should have a follow-up appointment made within 7 days of discharge. Radha Tyler RN   Heart Failure Navigator    CONGESTIVE HEART FAILURE (CHF) GUIDELINES  (Must be completed for Primary Diagnosis CHF or History of CHF)    Type of CHF:    [x] Acute   [] Chronic     [] Acute on Chronic     [x] HFpEF  [] HFpEF, borderline  [] HFpEF, improved  [] HFrEF    Echocardiogram: 11-  Summary   No comparison study available. Technically limited study, which might have   affected the accuracy and the completeness of the interpretation. Micro-bubble contrast injected to enhance left ventricular visualization. Left ventricle size is normal.   Mild concentric left ventricular hypertrophy.    Ejection fraction is visually estimated at 55%. No regional wall motion abnormalities seen. E/A flow reversal noted. Suggestive of diastolic dysfunction. Physiologic and/or trace tricuspid regurgitation. RVSP is normal    Angiotensin-Converting-Enzyme (ACE) inhibitor ordered:  [x] Yes  [] No (specify contraindication):  [] Renal Insufficiency  [] Cough  [] Hypotension  [] Allergy/angioedema  [] No left ventricular systolic dysfunction (LVSD)  [] Hyperkalemia  [] Moderate to severe aortic stenosis  [] Other (Specify):     Angiotensin II receptor blockers (ARB) ordered:  [] Yes  [x] No (specify contraindication):  [] Renal Insufficiency  [] Hypotension  [] Allergy/angioedema  [] No LVSD  [] Hyperkalemia  [] Moderate to severe aortic stenosis  [] Other (Specify):      ARNI - Angiotensin Receptor Neprilysin Inhibitor ordered:  [x] Yes  [] No      ACC/AHA Guidelines Beta Blocker (Carvedilol, Metoprolol Succinate, or Bisoprolol) ordered:    [x] Yes  [] No (specify contraindication):  [] Bradycardia  [] Hypotension  [] LVD  [] 2nd or 3rd degree heart block  [] Bronchospastic airway disease  [] Decompensated CHF  [] Other (Specify):

## 2021-11-11 NOTE — PROGRESS NOTES
Physician Progress Note      PATIENT:               Yanique Burciaga  CSN #:                  [id number]  :                       1960  ADMIT DATE:       2021 8:11 AM  DISCH DATE:  RESPONDING  PROVIDER #:        José Vera DO          QUERY TEXT:    Dear Dr. Veda Houston,    Pt admitted with SOB and bilateral leg edema and has CHF documented. If   possible, please document in progress notes and discharge summary further   specificity regarding the type and acuity of CHF:    The medical record reflects the following:  Risk Factors: HTN, HLD, cirrhosis, alcohol abuse, obesity  Clinical Indicators: per H&P: \" No previous hx of CHF. ..acute chf\"; ;   echo: EF 55%, E/A flow reversal noted. Suggestive of diastolic dysfunction  Treatment: IMCU monitoring, Cardiology consult, echo, daily wt, I&O, IV Lasix   40 mg BID, CHF clinic consult, low sodium diet    Thank You,  Xin Patel RN, BSN, CDS  Clinical Documentation Improvement Specialist  [phone]  Options provided:  -- Acute Systolic CHF/HFrEF  -- Acute Diastolic CHF/HFpEF  -- Acute Systolic and Diastolic CHF  -- Other - I will add my own diagnosis  -- Disagree - Not applicable / Not valid  -- Disagree - Clinically unable to determine / Unknown  -- Refer to Clinical Documentation Reviewer    PROVIDER RESPONSE TEXT:    This patient is in acute diastolic CHF/HFpEF.     Query created by: Prachi Garcia on 2021 12:53 PM      Electronically signed by:  José Vera DO 2021 2:38 PM

## 2021-11-11 NOTE — PROGRESS NOTES
are equal, round, and reactive to light. Skin:  Warm and dry. Labs/Imaging/Diagnostics    Labs:  CBC:  Recent Labs     11/09/21  0852   WBC 10.1   RBC 4.72   HGB 15.7*   HCT 44.6   MCV 94.5   RDW 13.7        CHEMISTRIES:  Recent Labs     11/09/21  0852 11/10/21  0631 11/11/21  0621   * 135 133   K 3.5 3.6 3.2*   CL 90* 93* 91*   CO2 26 26 29   BUN 7 9 10   CREATININE 0.4* 0.4* 0.4*   GLUCOSE 109* 105* 128*   MG  --  1.6 1.5*     PT/INR:No results for input(s): PROTIME, INR in the last 72 hours. APTT:No results for input(s): APTT in the last 72 hours. LIVER PROFILE:  Recent Labs     11/09/21  0852   AST 82*   ALT 59*   BILITOT 1.0   ALKPHOS 154*       Imaging Last 24 Hours:  Echo Complete    Result Date: 11/10/2021  Transthoracic Echocardiography Report (TTE)  Demographics   Patient Name   [facility]    Gender           Female                 [facility] Record [id number]       Room Number      1309  Number   Account #      [de-identified]      Procedure Date   11/10/2021   Corporate ID                  Ordering         Pina Joseph MD                                Physician   Accession      [id number]     Referring  Number                        Physician   Date of Birth  1960     [address] [place] [address]   Age            56 year(s)     Interpreting     Madalyn 62                                Physician        Physician Cardiology                                                 Pina Joseph MD                                 Any Other  Procedure Type of Study   TTE procedure  Procedure Date Date: 11/10/2021 Start: 07:41 AM Study Location: Echo Lab Technical Quality: Limited visualization due to body habitus. Indications:Congestive heart failure. Patient Status: Routine Contrast Medium: Definity.  Height: 58 inches Weight: 186 pounds BSA: 1.77 m^2 BMI: 38.87 kg/m^2 Rhythm: Within normal limits HR: 75 bpm BP: 134/60 mmHg  Findings   Left Ventricle  Micro-bubble contrast injected to enhance left ventricular visualization. Left ventricle size is normal.  Mild concentric left ventricular hypertrophy. Ejection fraction is visually estimated at 55%. No regional wall motion abnormalities seen. E/A flow reversal noted. Suggestive of diastolic dysfunction. No evidence of left ventricular mass or thrombus noted. Right Ventricle  Normal right ventricular size and function. Left Atrium  Left atrium is of normal size. Interatrial septum appears intact. Right Atrium  Normal right atrium size. Mitral Valve  Mild mitral valve stenosis. No evidence of mitral regurgitaton. The mitral valve was not well visualized. Tricuspid Valve  The tricuspid valve appears structurally normal.  Physiologic and/or trace tricuspid regurgitation. RVSP is normal.   Aortic Valve  Individual aortic valve leaflets are not clearly visualized. No  hemodynamically significant aortic stenosis is present. No evidence of  aortic valve regurgitation. Pulmonic Valve  The pulmonic valve was not well visualized. No evidence of any pulmonic regurgitation. Pericardial Effusion  No evidence of pericardial effusion. Aorta  Aortic root within normal limits. Conclusions   Summary  No comparison study available. Technically limited study, which might have  affected the accuracy and the completeness of the interpretation. Micro-bubble contrast injected to enhance left ventricular visualization. Left ventricle size is normal.  Mild concentric left ventricular hypertrophy. Ejection fraction is visually estimated at 55%. No regional wall motion abnormalities seen. E/A flow reversal noted. Suggestive of diastolic dysfunction. Physiologic and/or trace tricuspid regurgitation.   RVSP is normal.   Signature   ----------------------------------------------------------------  Electronically signed by Alejandra Richard MD(Interpreting  physician) on 11/10/2021 02:53 PM ----------------------------------------------------------------  M-Mode/2D Measurements & Calculations   LV Diastolic    LV Systolic Dimension: 3.3   AV Cusp Separation: 1.6 cmLA  Dimension: 4.9  cm                           Dimension: 3.9 cmAO Root  cm              LV Volume Diastolic: 712.5   Dimension: 2.7 cm  LV FS:32.7 %    ml  LV PW           LV Volume Systolic: 80.7 ml  Diastolic: 1.2  LV EDV/LV EDV Index: 113.1  cm              ml/64 ml/m^2LV ESV/LV ESV    RV Diastolic Dimension: 2.8  Septum          Index: 45.3 ml/26ml/ m^2     cm  Diastolic: 1.2  EF Calculated: 60 %  cm              LV Mass Index: 128 l/min*m^2  CO: 5.82 l/min                               LA volume/Index: 56.9 ml  CI: 3.29  l/m*m^2         LVOT: 2 cm  LV Mass: 226.38  g  Doppler Measurements & Calculations   MV Peak E-Wave: 0.95 AV Peak Velocity:     LVOT Peak Velocity: 1.15 m/s  m/s                  1.48 m/s              LVOT Mean Velocity: 0.83 m/s  MV Peak A-Wave: 1.21 AV Peak Gradient: 8.7 LVOT Peak Gradient: 5.3  m/s                  mmHg                  mmHgLVOT Mean Gradient: 3.2  MV E/A Ratio: 0.79   AV Mean Velocity:     mmHg  MV Peak Gradient:    1.16 m/s  7.1 mmHg             AV Mean Gradient: 5.8  MV Mean Gradient:    mmHg  3.1 mmHg             AV VTI: 34.5 cm       TR Velocity:2.66 m/s  MV Mean Velocity:    AV Area               TR Gradient:28.34 mmHg  0.85 m/s             (Continuity):2.25     PV Peak Velocity: 0.94 m/s  MV Deceleration      cm^2                  PV Peak Gradient: 3.53 mmHg  Time: 273.1 msec                           PV Mean Velocity: 0.7 m/s  MV P1/2t: 81.5 msec  LVOT VTI: 24.7 cm     PV Mean Gradient: 2.1 mmHg  MVA by PHT:2.7 cm^2  IVRT: 96.9 msec  MV Area  (continuity): 2.5  cm^2  [email or web address] [id number]    US LIVER    Result Date: 11/10/2021  EXAMINATION: RIGHT UPPER QUADRANT ULTRASOUND 11/10/2021 4:13 pm COMPARISON: None. HISTORY: ORDERING SYSTEM PROVIDED HISTORY: ascites, elevated LFTs TECHNOLOGIST PROVIDED HISTORY: Reason for exam:->ascites, elevated LFTs What reading provider will be dictating this exam?->CRC FINDINGS: LIVER: Cirrhotic morphology of the liver with coarse liver parenchyma and no evidence of intrahepatic biliary ductal dilatation. BILIARY SYSTEM:  Contracted gallbladder without evidence of pericholecystic fluid, wall thickening or stones. Negative sonographic Purcell's sign. Common bile duct is within normal limits measuring . RIGHT KIDNEY: The right kidney is grossly unremarkable without evidence of hydronephrosis. PANCREAS:  Not visualized. OTHER: No evidence of right upper quadrant ascites. Cirrhotic morphology of the liver with coarse liver parenchymal.     Assessment//Plan           Hospital Problems           Last Modified POA    UTI (urinary tract infection) 11/9/2021 Yes    CHF (congestive heart failure), NYHA class I, acute on chronic, combined ([facility] [address] 11/9/2021 Yes        Assessment:    Condition: In stable condition. Improving. (Acute CHF HFpEF, diastolic  Lumbar compression fracture  Debility    ). Plan:   Ad annabella activity. Continue respiratory treatments. Consults: cardiology. (Continue diuresis  Decrease lisinopril  Plan for short term rehab).        Electronically signed by Hernandez Albarran DO on 11/11/21 at 2:40 PM EST

## 2021-11-11 NOTE — CARE COORDINATION
SOCIAL WORK / DISCHARGE PLANNING:  Sw consult noted for dc planning. Dr Sapna Wick spoke with this Sw states that pt is agreeable to rehab. Sw met with pt at bedside. She reports to reside with spouse, who is amputee in a 1 story home ramp access. She states she helps her spouse but when asked who is assisting him while she is here, she states he can do much for himself. He does get aides 3x/ week and she states she has been trying to get aides herself. Ambulates with a hurry cane, no home O2/cpap/bipap or nebulizer. PCP Dr Sapna Wick ( new to her) hx [facility] but does not recall agency, denies ERASMO/rehab. Sw attempted to speak with her about ERASMO but she kept saying \"I don't have transportation\". Sw explained that she would stay at the [facility] for short term to regain function. She states her legs are getting better and Sw pointed out that PT noted leg buckling today. She is observed struggling to pull self up to EOB but is using the bedrail. Sw encouraged her to allow this Sw to pursue ERASMO and proceed with precert and if she improves can change mind and go home but it is more difficult to then scramble for ERASMO at the last minute. She would not make choice. ERASMO list was provided to her and spoke with her again. She states she will speak with her spouse about it and Sw stated will follow up with her in am. John Leblanc is needed prior to dc. Update via [employer] to Dr Sapna Wick.                  Electronically signed by ANGELO Dominguez on 11/11/2021 at 4:22 PM

## 2021-11-12 NOTE — PROGRESS NOTES
Progress Note  Date:2021       Room:0633/0633-01  Patient Name:Chari Prieto     YOB: 1960     Age:60 y.o. Subjective    Subjective:  Symptoms:  Improved. She reports malaise and weakness. Diet:  Adequate intake. No nausea or vomiting. Activity level: Impaired due to weakness. Pain:  She complains of pain that is moderate. She reports pain is improving. Pain is well controlled. Review of Systems   Constitutional: Negative for fever. Gastrointestinal: Negative for nausea and vomiting. Neurological: Positive for weakness. All other systems reviewed and are negative. Objective         Vitals Last 24 Hours:  TEMPERATURE:  Temp  Av.3 °F (36.8 °C)  Min: 97.8 °F (36.6 °C)  Max: 99.5 °F (37.5 °C)  RESPIRATIONS RANGE: Resp  Av.3  Min: 16  Max: 18  PULSE OXIMETRY RANGE: SpO2  Av.3 %  Min: 91 %  Max: 95 %  PULSE RANGE: Pulse  Av.8  Min: 87  Max: 98  BLOOD PRESSURE RANGE: Systolic (57LWX), VTP:246 , Min:98 , MLX:244   ; Diastolic (14LEV), GRH:26, Min:65, Max:82    I/O (24Hr): Intake/Output Summary (Last 24 hours) at 2021 1359  Last data filed at 2021 1019  Gross per 24 hour   Intake 720 ml   Output 0 ml   Net 720 ml     Objective:  General Appearance:  Ill-appearing. Vital signs: (most recent): Blood pressure 101/65, pulse 98, temperature 99.5 °F (37.5 °C), temperature source Infrared, resp. rate 16, height 4' 10\" (1.473 m), weight 192 lb 1.6 oz (87.1 kg), SpO2 94 %, not currently breastfeeding. Vital signs are normal.  No fever. Output: Producing urine and producing stool. HEENT: Normal HEENT exam.    Lungs:  Normal effort and normal respiratory rate. Heart: Normal rate. Regular rhythm. Abdomen: Abdomen is soft. Bowel sounds are normal.   There is no abdominal tenderness. Extremities: Decreased range of motion. Pulses: Distal pulses are intact.     Neurological: Patient is alert and oriented to person, place and time. Skin:  Warm and dry. Labs/Imaging/Diagnostics    Labs:  CBC:No results for input(s): WBC, RBC, HGB, HCT, MCV, RDW, PLT in the last 72 hours. CHEMISTRIES:  Recent Labs     11/10/21  0631 11/11/21  0621 11/12/21  1004    133 134   K 3.6 3.2* 3.7   CL 93* 91* 91*   CO2 26 29 30*   BUN 9 10 10   CREATININE 0.4* 0.4* 0.5   GLUCOSE 105* 128* 183*   MG 1.6 1.5* 1.5*     PT/INR:No results for input(s): PROTIME, INR in the last 72 hours. APTT:No results for input(s): APTT in the last 72 hours. LIVER PROFILE:No results for input(s): AST, ALT, BILIDIR, BILITOT, ALKPHOS in the last 72 hours. Imaging Last 24 Hours:  US LIVER    Result Date: 11/10/2021  EXAMINATION: RIGHT UPPER QUADRANT ULTRASOUND 11/10/2021 4:13 pm COMPARISON: None. HISTORY: ORDERING SYSTEM PROVIDED HISTORY: ascites, elevated LFTs TECHNOLOGIST PROVIDED HISTORY: Reason for exam:->ascites, elevated LFTs What reading provider will be dictating this exam?->CRC FINDINGS: LIVER: Cirrhotic morphology of the liver with coarse liver parenchyma and no evidence of intrahepatic biliary ductal dilatation. BILIARY SYSTEM:  Contracted gallbladder without evidence of pericholecystic fluid, wall thickening or stones. Negative sonographic Purcell's sign. Common bile duct is within normal limits measuring . RIGHT KIDNEY: The right kidney is grossly unremarkable without evidence of hydronephrosis. PANCREAS:  Not visualized. OTHER: No evidence of right upper quadrant ascites.      Cirrhotic morphology of the liver with coarse liver parenchymal.     Assessment//Plan           Hospital Problems           Last Modified POA    UTI (urinary tract infection) 11/9/2021 Yes    Acute on chronic diastolic (congestive) heart failure ([address] 11/11/2021 Yes    Pedal edema 11/11/2021 Yes    Chronic obstructive pulmonary disease ([address] 11/11/2021 Yes    Tobacco abuse 11/11/2021 Yes    Hepatic cirrhosis ([address] 11/11/2021 Yes    Hypokalemia 11/11/2021 Yes    Hypomagnesemia 11/11/2021 Yes        Assessment:    Condition: In stable condition. Unchanged. (Klebsiella UTI  Lumbar compression L4  CHF diastolic HFpEF    ). Plan:   Other transfer (see comment). Ad annabella activity. Consults: cardiology. Regular diet. (Continue current management  PO Lasix  PO levaquin  Plan to go to Rehab).        Electronically signed by Davide Villalobos DO on 11/12/21 at 1:59 PM EST

## 2021-11-12 NOTE — PROGRESS NOTES
Patient is seen in follow-up for CHF. Subjective:     Ms. Laney Kirkland a little bit better today  Sitting up in bed in mild distress    ROS:  CONSTITUTIONAL:  negative for  fevers, chills  HEENT:  negative for earaches, nasal congestion and epistaxis  RESPIRATORY:    + Dry cough, cough with sputum,negative for wheezing and hemoptysis  GASTROINTESTINAL:  negative for nausea, vomiting  MUSCULOSKELETAL: + Back pain, negative for  myalgias, arthralgias  NEUROLOGICAL:  negative for visual disturbance, dysphagia    Medication side effects: none    Scheduled Meds:   [START ON 11/12/2021] lisinopril  10 mg Oral Daily    levoFLOXacin  500 mg Oral Daily    nicotine  1 patch TransDERmal Daily    sodium chloride flush  5-40 mL IntraVENous 2 times per day    furosemide  40 mg IntraVENous BID    amLODIPine  5 mg Oral Daily    ARIPiprazole  5 mg Oral Daily    atenolol  50 mg Oral Daily    atorvastatin  40 mg Oral Nightly    citalopram  40 mg Oral Daily    clopidogrel  75 mg Oral Daily    levETIRAcetam  500 mg Oral BID    levothyroxine  50 mcg Oral QAM AC    magnesium oxide  400 mg Oral BID    spironolactone  25 mg Oral Daily     Continuous Infusions:   sodium chloride       PRN Meds:oxyCODONE, sodium chloride flush, sodium chloride, ondansetron **OR** ondansetron, polyethylene glycol, acetaminophen **OR** acetaminophen, albuterol sulfate HFA    I/O last 3 completed shifts:   In: 5 [P.O.:720]  Out: 1000 [Urine:1000]  I/O this shift:  In: 300 [P.O.:300]  Out: 0       Objective:      Physical Exam:   BP 98/65   Pulse 87   Temp 98 °F (36.7 °C) (Oral)   Resp 17   Ht 4' 10\" (1.473 m)   Wt 194 lb 6.4 oz (88.2 kg)   SpO2 93%   BMI 40.63 kg/m²   CONSTITUTIONAL:  awake, alert, cooperative, no apparent distress, and appears stated age  HEAD:  normocepalic, without obvious abnormality, atraumatic  NECK:  Supple, symmetrical, trachea midline, no adenopathy, thyroid symmetric, not enlarged and no tenderness, skin normal  LUNGS: Mildly increased work of breathing, No accessory muscle use or intercostal retractions, decreased air exchange, scattered rhonchi and fine wheezing  CARDIOVASCULAR:  Normal apical impulse, regular rate and rhythm, normal S1 and S2, no S3 or S4, and no murmur noted, + edema, no JVD, no carotid bruit. ABDOMEN:  Soft, nontender, no masses, no hepatomegaly, no splenomegaly, BS+  MUSCULOSKELETAL:  No clubbing no cyanosis. there is no redness, warmth, or swelling of the joints  full range of motion noted  NEUROLOGIC:  Alert, awake,oriented x3  SKIN:  no bruising or bleeding, normal skin color, texture, turgor and no redness, warmth, or swelling      Cardiographics  I personally reviewed the telemetry monitor strips with the following interpretation: Sinus rhythm    Echocardiogram: 11/10/2021,No comparison study available. Technically limited study, which might have   affected the accuracy and the completeness of the interpretation. Micro-bubble contrast injected to enhance left ventricular visualization. Left ventricle size is normal.   Mild concentric left ventricular hypertrophy. Ejection fraction is visually estimated at 55%. No regional wall motion abnormalities seen. E/A flow reversal noted. Suggestive of diastolic dysfunction. Physiologic and/or trace tricuspid regurgitation. RVSP is normal.    Imaging  US LIVER   Final Result   Cirrhotic morphology of the liver with coarse liver parenchymal.         XR LUMBAR SPINE (2-3 VIEWS)   Final Result   Moderate L5 compression fracture demonstrates mild interval increase in   height loss. XR CHEST (2 VW)   Final Result   Cardiomegaly.   Findings of mild CHF are not excluded      Mild prominence of the interstitial markings peripherally which are confirmed   on the patient's prior CT scan of 10/07/2020             Lab Review   Lab Results   Component Value Date     11/11/2021    K 3.2 11/11/2021    K 3.7 10/09/2020    CL 91 11/11/2021 CO2 29 11/11/2021    BUN 10 11/11/2021    CREATININE 0.4 11/11/2021    GLUCOSE 128 11/11/2021    CALCIUM 9.2 11/11/2021     Lab Results   Component Value Date    WBC 10.1 11/09/2021    HGB 15.7 11/09/2021    HCT 44.6 11/09/2021    MCV 94.5 11/09/2021     11/09/2021     I have personally reviewed the laboratory, cardiac diagnostic and radiographic testing as outlined above:    Assessment: 1. Pedal edema: Suspect multifactorial secondary to liver cirrhosis and acute exacerbation of congestive heart failure, will continue diuresis  2. Acute exacerbation of congestive heart failure: Acute on chronic, diastolic, decompensated, improving, as above. 3.  History of hypertension: Now hypotensive  4. Hyperlipidemia  5. Peripheral vascular disease  6. Hyponatremia  7. History of liver cirrhosis, history of ascites and paracentesis  8. COPD  9. Tobacco abuse:  10. Hypokalemia and hypomagnesemia: Replaced  11. Back pain with leg weakness      Recommendations:     1. Continue diuresis  2. Will hold amlodipine given her hypotension  3. Will continue the rest of medications  4. Basic metabolic panel and CBC in a.m.  5.  Further cardiac recommendations will be forthcoming pending her clinical course and diagnostic test findings    Discussed with patient  Electronically signed by Alejandra Richard MD on 11/11/2021 at 7:43 PM  NOTE: This report was transcribed using voice recognition software.  Every effort was made to ensure accuracy; however, inadvertent computerized transcription errors may be present

## 2021-11-12 NOTE — PROGRESS NOTES
Inpatient Cardiology Progress note     PATIENT IS BEING FOLLOWED FOR: CHF    Rachael Cobb located in  room 0633/0633-01 is a 56 y.o. female      SUBJECTIVE: Complaining of bilateral legs cramping on ambulation. Denies shortness of breath, CP, palpitations, lightheadedness. OBJECTIVE: No apparent distress     ROS:  Consist: Denies fevers, chills or night sweats  Heart: Denies chest pain, palpitations, lightheadedness, dizziness or syncope  Lungs: Denies SOB, cough, wheezing, orthopnea or PND  GI: Denies abdominal pain, vomiting or diarrhea    PHYSICAL EXAM:   /65   Pulse 98   Temp 99.5 °F (37.5 °C) (Infrared)   Resp 16   Ht 4' 10\" (1.473 m)   Wt 192 lb 1.6 oz (87.1 kg)   SpO2 94%   BMI 40.15 kg/m²    B/P Range last 24 hours: Systolic (40XCN), JVN:918 , Min:98 , KAYLEY:694    Diastolic (94PBV), QGS:09, Min:65, Max:82    CONST: Obese. Awake, alert and cooperative. No apparent distress  HEENT:   Head- Normocephalic, atraumatic   Eyes- Conjunctivae pink, anicteric  Throat- Oral mucosa pink and moist  Neck-  No stridor, trachea midline, no jugular venous distention. No carotid bruit  CHEST: Chest symmetrical and non-tender to palpation. No accessory muscle use or intercostal retractions  RESPIRATORY:  Lung sounds - clear throughout fields   CARDIOVASCULAR:     Heart Inspection- shows no noted pulsations  Heart Ausculation- Regular rate and rhythm, no murmur. No s3, s4 or rub   PV: Lower extremity nonpitting edema. Has varicosities. Pedal pulses not felt on palpation, no clubbing or cyanosis   ABDOMEN: Soft, non-tender to light palpation. Bowel sounds present. MS: Good muscle strength and tone. No atrophy or abnormal movements. : Deferred  SKIN: Warm and dry no statis dermatitis or ulcers   NEURO / PSYCH: Oriented to person, place and time. Speech clear and appropriate. Follows all commands.  Pleasant affect       Intake/Output Summary (Last 24 hours) at 11/12/2021 1205  Last data filed at 11/12/2021 1019  Gross per 24 hour   Intake 720 ml   Output 0 ml   Net 720 ml       Weight:   Wt Readings from Last 3 Encounters:   11/12/21 192 lb 1.6 oz (87.1 kg)   10/22/21 189 lb (85.7 kg)   06/10/21 200 lb 3.2 oz (90.8 kg)     Current Inpatient Medications:   lisinopril  10 mg Oral Daily    levoFLOXacin  500 mg Oral Daily    nicotine  1 patch TransDERmal Daily    sodium chloride flush  5-40 mL IntraVENous 2 times per day    furosemide  40 mg IntraVENous BID    [Held by provider] amLODIPine  5 mg Oral Daily    ARIPiprazole  5 mg Oral Daily    atenolol  50 mg Oral Daily    atorvastatin  40 mg Oral Nightly    citalopram  40 mg Oral Daily    clopidogrel  75 mg Oral Daily    levETIRAcetam  500 mg Oral BID    levothyroxine  50 mcg Oral QAM AC    magnesium oxide  400 mg Oral BID    spironolactone  25 mg Oral Daily       IV Infusions (if any):   sodium chloride         DIAGNOSTIC/ LABORATORY DATA:  Labs:   CBC: No results for input(s): WBC, HGB, HCT, PLT in the last 72 hours. BMP:   Recent Labs     11/11/21  0621 11/12/21  1004    134   K 3.2* 3.7   CO2 29 30*   BUN 10 10   CREATININE 0.4* 0.5   LABGLOM >60 >60   CALCIUM 9.2 9.7     Mag:   Recent Labs     11/11/21  0621 11/12/21  1004   MG 1.5* 1.5*     Phos: No results for input(s): PHOS in the last 72 hours. TSH:   Recent Labs     11/09/21  2249   TSH 3.130     HgA1c:   Lab Results   Component Value Date    LABA1C 5.9 (H) 11/09/2021     No results found for: EAG    BNP: No results for input(s): BNP in the last 72 hours. PT/INR: No results for input(s): PROTIME, INR in the last 72 hours. APTT:No results for input(s): APTT in the last 72 hours. CARDIAC ENZYMES:No results for input(s): CKTOTAL, CKMB, CKMBINDEX, TROPONINI in the last 72 hours.   FASTING LIPID PANEL:  Lab Results   Component Value Date    CHOL 122 11/10/2021    HDL 21 11/10/2021    LDLCALC 75 11/10/2021    TRIG 129 11/10/2021     LIVER PROFILE:No results for input(s): AST, ALT, LABALBU in the last 72 hours. Recent Labs     11/09/21  2249   TROPHS 11*     Lab Results   Component Value Date    CREATININE 0.5 11/12/2021    CREATININE 0.4 11/11/2021    CREATININE 0.4 11/10/2021    CREATININE 0.4 11/09/2021    CREATININE 0.5 06/10/2021    CREATININE 0.5 06/04/2021    CREATININE 0.5 10/09/2020    CREATININE 0.5 10/08/2020    CREATININE 0.6 10/07/2020    CREATININE 0.7 12/03/2019    CREATININE 0.6 04/26/2019    CREATININE 0.5 06/16/2017    CREATININE 0.5 09/07/2016    CREATININE 0.5 05/09/2016    CREATININE 0.5 04/08/2016    CREATININE 0.5 04/05/2016     Lab Results   Component Value Date    PROBNP 231 11/12/2021    PROBNP 333 11/09/2021    PROBNP 137 10/07/2020         ASSESSMENT:   1. Pedal edema: Suspect multifactorial secondary to liver cirrhosis and acute exacerbation of  congestive heart failure, will continue diuresis  2. Acute exacerbation of congestive heart failure: Acute on chronic, diastolic, decompensated,  improving, as above. 3.  History of hypertension: Now hypotensive  4. Hyperlipidemia  5. Peripheral vascular disease/aortoiliac disease, with claudications  6. Hyponatremia,  improving  7. History of liver cirrhosis, history of ascites and paracentesis  8. COPD  9. Tobacco abuse, now on nicotine patch  10. Hypokalemia and hypomagnesemia: Replaced  11. Back pain with leg weakness  12. Morbid obesity, BMI 40.15 kg/m²    PLAN:  1. Continue diuresis  2. Will hold amlodipine given her hypotension  3. Will continue the rest of medications  4. Basic metabolic panel and CBC in a.m.  5.  Further cardiac recommendations will be forthcoming pending her clinical course and  diagnostic test findings  6. Spoke with patient to see vascular surgery as outpatient, discussed about smoking cessation. Smoking cessation consult was ordered  7.   Stress test tomorrow    Assessment and plan discussed with Dr. Poppy Howe MD    Assessment and Plan to follow as per Dr. Poppy Howe MD    Electronically signed by CHRIS Singer on 11/12/2021 at 12:05 PM

## 2021-11-12 NOTE — PROGRESS NOTES
Patient is seen in follow-up for CHF. Subjective:     Ms. Sanon Stakes a little bit better today  Sitting up in bed in mild distress    ROS:  CONSTITUTIONAL:  negative for  fevers, chills  HEENT:  negative for earaches, nasal congestion and epistaxis  RESPIRATORY:    + Dry cough, cough with sputum,negative for wheezing and hemoptysis  GASTROINTESTINAL:  negative for nausea, vomiting  MUSCULOSKELETAL: + Back pain, negative for  myalgias, arthralgias  NEUROLOGICAL:  negative for visual disturbance, dysphagia    Medication side effects: none    Scheduled Meds:   [START ON 11/12/2021] lisinopril  10 mg Oral Daily    levoFLOXacin  500 mg Oral Daily    nicotine  1 patch TransDERmal Daily    sodium chloride flush  5-40 mL IntraVENous 2 times per day    furosemide  40 mg IntraVENous BID    amLODIPine  5 mg Oral Daily    ARIPiprazole  5 mg Oral Daily    atenolol  50 mg Oral Daily    atorvastatin  40 mg Oral Nightly    citalopram  40 mg Oral Daily    clopidogrel  75 mg Oral Daily    levETIRAcetam  500 mg Oral BID    levothyroxine  50 mcg Oral QAM AC    magnesium oxide  400 mg Oral BID    spironolactone  25 mg Oral Daily     Continuous Infusions:   sodium chloride       PRN Meds:oxyCODONE, sodium chloride flush, sodium chloride, ondansetron **OR** ondansetron, polyethylene glycol, acetaminophen **OR** acetaminophen, albuterol sulfate HFA    I/O last 3 completed shifts:   In: 5 [P.O.:720]  Out: 1000 [Urine:1000]  I/O this shift:  In: 300 [P.O.:300]  Out: 0       Objective:      Physical Exam:   BP 98/65   Pulse 87   Temp 98 °F (36.7 °C) (Oral)   Resp 17   Ht 4' 10\" (1.473 m)   Wt 194 lb 6.4 oz (88.2 kg)   SpO2 93%   BMI 40.63 kg/m²   CONSTITUTIONAL:  awake, alert, cooperative, no apparent distress, and appears stated age  HEAD:  normocepalic, without obvious abnormality, atraumatic  NECK:  Supple, symmetrical, trachea midline, no adenopathy, thyroid symmetric, not enlarged and no tenderness, skin normal  LUNGS: Mildly increased work of breathing, No accessory muscle use or intercostal retractions, decreased air exchange, scattered rhonchi and fine wheezing  CARDIOVASCULAR:  Normal apical impulse, regular rate and rhythm, normal S1 and S2, no S3 or S4, and no murmur noted, + edema, no JVD, no carotid bruit. ABDOMEN:  Soft, nontender, no masses, no hepatomegaly, no splenomegaly, BS+  MUSCULOSKELETAL:  No clubbing no cyanosis. there is no redness, warmth, or swelling of the joints  full range of motion noted  NEUROLOGIC:  Alert, awake,oriented x3  SKIN:  no bruising or bleeding, normal skin color, texture, turgor and no redness, warmth, or swelling      Cardiographics  I personally reviewed the telemetry monitor strips with the following interpretation: Sinus rhythm    Echocardiogram: 11/10/2021,No comparison study available. Technically limited study, which might have   affected the accuracy and the completeness of the interpretation. Micro-bubble contrast injected to enhance left ventricular visualization. Left ventricle size is normal.   Mild concentric left ventricular hypertrophy. Ejection fraction is visually estimated at 55%. No regional wall motion abnormalities seen. E/A flow reversal noted. Suggestive of diastolic dysfunction. Physiologic and/or trace tricuspid regurgitation. RVSP is normal.    Imaging  US LIVER   Final Result   Cirrhotic morphology of the liver with coarse liver parenchymal.         XR LUMBAR SPINE (2-3 VIEWS)   Final Result   Moderate L5 compression fracture demonstrates mild interval increase in   height loss. XR CHEST (2 VW)   Final Result   Cardiomegaly.   Findings of mild CHF are not excluded      Mild prominence of the interstitial markings peripherally which are confirmed   on the patient's prior CT scan of 10/07/2020             Lab Review   Lab Results   Component Value Date     11/11/2021    K 3.2 11/11/2021    K 3.7 10/09/2020    CL 91 11/11/2021

## 2021-11-12 NOTE — CARE COORDINATION
SOCIAL WORK / DISCHARGE PLANNING:  COVID testing not done. Neither [facility] or [facility] able to accept. Sw spoke with pt about alternate SARS, chose [facility] and [address] [place] [address]. Referral called to Quinton Liu, rep, request fax referral to [phone]. If accepted request PRECERT be started. .Pt will need СВЕТЛАНА and HENS form will need completed when facility is confirmed.                        Electronically signed by ANGELO Zarate on 11/12/2021 at 4:53 PM

## 2021-11-12 NOTE — CARE COORDINATION
SOCIAL WORK / DISCHARGE PLANNING:  COVID testing not done. Sw met with pt again to discuss ERASMO recommendation. She is agreeable now, has not reviewed list due to can't see it well. Sw reviewed list with pt and she wanted a facility near [facility]. Sw reviewed all the SARs- chose [facility] and [facility]. Sw made referral to both SARs, await acceptance. PRECERT will be needed prior to dc.                    Electronically signed by ANGELO Hebert on 11/12/2021 at 1:48 PM

## 2021-11-12 NOTE — CARE COORDINATION
Follow up appt made for patient on Monday Nov 22nd @ 10AM. Electronically signed by Tarun Lucas on 11/12/2021 at 12:24 PM

## 2021-11-12 NOTE — DISCHARGE INSTR - COC
Continuity of Care Form    Patient Name: Gurjit Novak   :  1960  MRN:  [id number]    Admit date:  2021  Discharge date: 11/15/21    Code Status Order: Full Code   Advance Directives:      Admitting Physician:  Juanjo Beal DO  PCP: Juanjo Beal DO    Discharging Nurse: [facility]/Room#: 4865/2535-14  Discharging Unit Phone Number: [phone]    Emergency Contact:   Extended Emergency Contact Information  Primary Emergency Contact: LYNNEGOLDYMIHOLLIS  Address: [address] [place] [address]           [address] Phone: [phone]  Mobile Phone: [phone]  Relation: Spouse   needed? No    Past Surgical History:  Past Surgical History:   Procedure Laterality Date    DILATION AND CURETTAGE OF UTERUS      LIVER BIOPSY  2016       Immunization History:   Immunization History   Administered Date(s) Administered    COVID-19, Jada Class, Primary or Immunocompromised, PF, 100mcg/0.5mL 2021, 2021    Influenza, Quadv, IM, (6 mo and older Fluzone, Flulaval, Fluarix and 3 yrs and older Afluria) 2019    Influenza, Quadv, IM, PF (6 mo and older Fluzone, Flulaval, Fluarix, and 3 yrs and older Afluria) 10/02/2020    Pneumococcal Polysaccharide ([id number]) 10/02/2020       Active Problems:  Patient Active Problem List   Diagnosis Code    Acute respiratory failure with hypoxia (HCC) J96.01    Alcohol abuse F10.10    Cerebral artery occlusion with cerebral infarction ([facility] I63.50    Hypertension I10    Depression F32. A    Hyperlipidemia E78.5    Seizures (HCC) K47.3    Alcoholic intoxication without complication ([facility]) I41.472    Hyponatremia E87.1    Nicotine dependence F17.200    Marijuana user F12.90    Fall at home, initial encounter [address] XXXA, Y92.009    UTI (urinary tract infection) N39.0    Acute on chronic diastolic (congestive) heart failure (HCC) I50.33    Pedal edema R60.0    Chronic obstructive pulmonary disease ([facility] [facility] J44.9    Tobacco abuse Z72.0    Hepatic cirrhosis (HCC) K74.60    Hypokalemia E87.6    Hypomagnesemia E83.42       Isolation/Infection:   Isolation            No Isolation          Patient Infection Status       None to display            Nurse Assessment:  Last Vital Signs: /65   Pulse 98   Temp 99.5 °F (37.5 °C) (Infrared)   Resp 16   Ht 4' 10\" (1.473 m)   Wt 192 lb 1.6 oz (87.1 kg)   SpO2 94%   BMI 40.15 kg/m²     Last documented pain score (0-10 scale): Pain Level: 9  Last Weight:   Wt Readings from Last 1 Encounters:   21 192 lb 1.6 oz (87.1 kg)     Mental Status:  oriented    IV Access:  - Peripheral IV - site  L Antecubital, insertion date: ***    Nursing Mobility/ADLs:  Walking   Assisted  Transfer  Assisted  Bathing  Assisted  Dressing  Assisted  Toileting  Assisted  Feeding  Independent  Med Admin  Assisted  Med Delivery   whole    Wound Care Documentation and Therapy:        Elimination:  Continence: Bowel: Yes  Bladder: Yes  Urinary Catheter: None   Colostomy/Ileostomy/Ileal Conduit: No       Date of Last BM: 21    Intake/Output Summary (Last 24 hours) at 2021 1414  Last data filed at 2021 1019  Gross per 24 hour   Intake 720 ml   Output 0 ml   Net 720 ml     I/O last 3 completed shifts: In: 5 [P.O.:720]  Out: 0     Safety Concerns: At Risk for Falls    Impairments/Disabilities:      None    Nutrition Therapy:  Current Nutrition Therapy:   - Oral Diet:  NPO    Routes of Feeding: Oral  Liquids:  Thin Liquids  Daily Fluid Restriction: no  Last Modified Barium Swallow with Video (Video Swallowing Test): not done    Treatments at the Time of Hospital Discharge:   Respiratory Treatments: ***  Oxygen Therapy:  {Therapy; copd oxygen:54297}  Ventilator:    { JULIO CÉSAR Vent ITVE:779998631}    Rehab Therapies: {THERAPEUTIC INTERVENTION:0741415857}  Weight Bearing Status/Restrictions: { JULIO CÉSAR Weight Bearin}  Other Medical Equipment (for information only, NOT a DME 0 = independent

## 2021-11-13 NOTE — PROGRESS NOTES
INPATIENT CARDIOLOGY FOLLOW-UP    Name: Liliana Caldwell    Age: 56 y.o. Date of Admission: 11/9/2021  8:11 AM    Date of Service: 11/13/2021    Primary Cardiologist: Known to Dr. Manfred Carty from this admission    Chief Complaint: Follow-up for CHF    Interim History:  Patient seen in the stress lab. Denies chest pain or shortness of breath.     Review of Systems:   Negative except as described above    Problem List:  Patient Active Problem List   Diagnosis    Acute respiratory failure with hypoxia ([address]    Alcohol abuse    Cerebral artery occlusion with cerebral infarction ([address]    Hypertension    Depression    Hyperlipidemia    Seizures (HCC)    Alcoholic intoxication without complication ([address]    Hyponatremia    Nicotine dependence    Marijuana user    Fall at home, initial encounter    UTI (urinary tract infection)    Acute on chronic diastolic (congestive) heart failure (HCC)    Pedal edema    Chronic obstructive pulmonary disease (HCC)    Tobacco abuse    Hepatic cirrhosis (HCC)    Hypokalemia    Hypomagnesemia       Current Medications:    Current Facility-Administered Medications:     furosemide (LASIX) tablet 40 mg, 40 mg, Oral, Daily, Abdi Rosales DO    [Held by provider] lisinopril (PRINIVIL;ZESTRIL) tablet 10 mg, 10 mg, Oral, Daily, Abdi Rosales DO    levoFLOXacin (LEVAQUIN) tablet 500 mg, 500 mg, Oral, Daily, Abdi Rosales DO, 500 mg at 11/12/21 0836    nicotine (NICODERM CQ) 21 MG/24HR 1 patch, 1 patch, TransDERmal, Daily, Jeffrey Rosales DO, 1 patch at 11/12/21 1707    oxyCODONE (ROXICODONE) immediate release tablet 5 mg, 5 mg, Oral, Q6H PRN, Jeffrey Rosales DO, 5 mg at 11/13/21 0216    sodium chloride flush 0.9 % injection 5-40 mL, 5-40 mL, IntraVENous, 2 times per day, Abdi Rosales DO, 10 mL at 11/12/21 1955    sodium chloride flush 0.9 % injection 5-40 mL, 5-40 mL, IntraVENous, PRN, Preeti Rosales DO, 10 mL at 11/12/21 1706    0.9 % sodium chloride infusion, 25 mL, IntraVENous, PRN, Abdi F Veres, DO    ondansetron (ZOFRAN-ODT) disintegrating tablet 4 mg, 4 mg, Oral, Q8H PRN **OR** ondansetron (ZOFRAN) injection 4 mg, 4 mg, IntraVENous, Q6H PRN, Abdi F Veres, DO    polyethylene glycol (GLYCOLAX) packet 17 g, 17 g, Oral, Daily PRN, Farzaneh Purple, DO    acetaminophen (TYLENOL) tablet 650 mg, 650 mg, Oral, Q6H PRN, 650 mg at 11/10/21 2235 **OR** acetaminophen (TYLENOL) suppository 650 mg, 650 mg, Rectal, Q6H PRN, Izella Madi Veres, DO    albuterol sulfate  (90 Base) MCG/ACT inhaler 2 puff, 2 puff, Inhalation, Q6H PRN, Farzaneh Purple, DO    [Held by provider] amLODIPine (NORVASC) tablet 5 mg, 5 mg, Oral, Daily, Abdi F Veres, DO, 5 mg at 11/10/21 0943    ARIPiprazole (ABILIFY) tablet 5 mg, 5 mg, Oral, Daily, Abdi F Veres, DO, 5 mg at 11/12/21 0843    [Held by provider] atenolol (TENORMIN) tablet 50 mg, 50 mg, Oral, Daily, Abdi F Veres, DO, 50 mg at 11/12/21 0836    atorvastatin (LIPITOR) tablet 40 mg, 40 mg, Oral, Nightly, Abdi F Veres, DO, 40 mg at 11/12/21 1954    citalopram (CELEXA) tablet 40 mg, 40 mg, Oral, Daily, Abdi F Veres, DO, 40 mg at 11/12/21 0836    clopidogrel (PLAVIX) tablet 75 mg, 75 mg, Oral, Daily, Abdi F Veres, DO, 75 mg at 11/12/21 0836    levETIRAcetam (KEPPRA) tablet 500 mg, 500 mg, Oral, BID, Abdi F Veres, DO, 500 mg at 11/12/21 1954    levothyroxine (SYNTHROID) tablet 50 mcg, 50 mcg, Oral, QAM AC, Abdi F Veres, DO, 50 mcg at 11/12/21 0645    magnesium oxide (MAG-OX) tablet 400 mg, 400 mg, Oral, BID, Abdi Rosales DO, 400 mg at 11/12/21 1954    spironolactone (ALDACTONE) tablet 25 mg, 25 mg, Oral, Daily, CHRIS Mcfarlane, 25 mg at 11/10/21 0943    Physical Exam:  BP (!) 90/56   Pulse 81   Temp 98 °F (36.7 °C)   Resp 16   Ht 4' 10\" (1.473 m)   Wt 184 lb 12.8 oz (83.8 kg)   SpO2 90%   BMI 38.62 kg/m²   Wt Readings from Last 3 Encounters:   11/13/21 184 lb 12.8 oz (83.8 kg) 10/22/21 189 lb (85.7 kg)   06/10/21 200 lb 3.2 oz (90.8 kg)     Appearance: Overweight female, awake, alert, no acute respiratory distress  Skin: Intact, no rash  Head: Normocephalic, atraumatic  Eyes: EOMI, no conjunctival erythema  ENMT: No pharyngeal erythema, MMM, no rhinorrhea  Neck: Supple, no elevated JVP, no carotid bruits  Lungs: Clear to auscultation bilaterally. No wheezes, rales, or rhonchi. Cardiac: PMI nondisplaced, Regular rhythm with a normal rate, S1 & S2 normal, no murmurs  Abdomen: Soft, nontender, +bowel sounds  Extremities: Moves all extremities x 4, no lower extremity edema. Mild skin thickening and erythema of the distal bilateral lower extremities  Neurologic: No focal motor deficits apparent, normal mood and affect  Peripheral Pulses: Intact posterior tibial pulses bilaterally    Intake/Output:    Intake/Output Summary (Last 24 hours) at 11/13/2021 1128  Last data filed at 11/13/2021 0812  Gross per 24 hour   Intake 420 ml   Output 700 ml   Net -280 ml     I/O this shift: In: 0   Out: 100 [Urine:100]    Laboratory Tests:  Recent Labs     11/11/21  0621 11/12/21  1004 11/13/21  0542    134 134   K 3.2* 3.7 3.8   CL 91* 91* 91*   CO2 29 30* 30*   BUN 10 10 12   CREATININE 0.4* 0.5 0.6   GLUCOSE 128* 183* 103*   CALCIUM 9.2 9.7 9.9     Lab Results   Component Value Date    MG 2.0 11/13/2021     No results for input(s): ALKPHOS, ALT, AST, PROT, BILITOT, BILIDIR, LABALBU in the last 72 hours. No results for input(s): WBC, RBC, HGB, HCT, MCV, MCH, MCHC, RDW, PLT, MPV in the last 72 hours.   Lab Results   Component Value Date    CKTOTAL 151 10/07/2020    TROPONINI <0.01 10/07/2020     Lab Results   Component Value Date    INR 1.1 06/16/2017    INR 1.3 07/05/2016    INR 1.4 05/09/2016    PROTIME 12.4 06/16/2017    PROTIME 14.3 (H) 07/05/2016    PROTIME 14.8 (H) 05/09/2016     Lab Results   Component Value Date    TSH 3.130 11/09/2021     Lab Results   Component Value Date    LABA1C 5.9 (H) 2021     No results found for: EAG  Lab Results   Component Value Date    CHOL 122 11/10/2021     Lab Results   Component Value Date    TRIG 129 11/10/2021     Lab Results   Component Value Date    HDL 21 11/10/2021    HDL 42 2021    HDL 37 2019     Lab Results   Component Value Date    LDLCALC 75 11/10/2021    LDLCALC 70 2021    LDLCALC 136 (H) 2019     Lab Results   Component Value Date    LABVLDL 26 11/10/2021    LABVLDL 14 2021    LABVLDL 43 2019     No results found for: CHOLHDLRATIO  Recent Labs     21  1004   PROBNP 231*       Cardiac Tests:    EK2021: Sinus rhythm 89 beats minute. Normal axis normal intervals. No ST or T wave changes. Telemetry: Sinus rhythm 80s    Chest X-ray:     Impression   Cardiomegaly.  Findings of mild CHF are not excluded       Mild prominence of the interstitial markings peripherally which are confirmed   on the patient's prior CT scan of 10/07/2020     Echocardiogram:   Transthoracic echo 11/10/2021    Summary  No comparison study available. Technically limited study, which might have affected the accuracy and the completeness of the  interpretation. Micro-bubble contrast injected to enhance left ventricular visualization. Left ventricle size is normal.  Mild concentric left ventricular hypertrophy. Ejection fraction is visually estimated at 55%. No regional wall motion abnormalities seen. E/A flow reversal noted. Suggestive of diastolic dysfunction. Physiologic and/or    Stress test:  pending    Cardiac catheterization:     ----------------------------------------------------------------------------------------------------------------------------------------------------------------  IMPRESSION:  1. Acute heart failure with preserved ejection fraction. proBNP 230. I's and O's inaccurate. Resolved, appears euvolemic  2. History of hypertension, running low  3. Peripheral arterial disease  4.  History of

## 2021-11-13 NOTE — PROGRESS NOTES
Patient is seen in follow-up for CHF. Subjective:     Ms. Crow Joiner feels better today, still complaining of bilateral leg weakness, shortness of breath is better  Sitting up in bed in no distress    ROS:  CONSTITUTIONAL:  negative for  fevers, chills  HEENT:  negative for earaches, nasal congestion and epistaxis  RESPIRATORY:    + Dry cough, negative for wheezing and hemoptysis  GASTROINTESTINAL:  negative for nausea, vomiting  MUSCULOSKELETAL: + Back pain, negative for  myalgias, arthralgias  NEUROLOGICAL:  negative for visual disturbance, dysphagia    Medication side effects: none    Scheduled Meds:   lisinopril  10 mg Oral Daily    levoFLOXacin  500 mg Oral Daily    nicotine  1 patch TransDERmal Daily    sodium chloride flush  5-40 mL IntraVENous 2 times per day    furosemide  40 mg IntraVENous BID    [Held by provider] amLODIPine  5 mg Oral Daily    ARIPiprazole  5 mg Oral Daily    atenolol  50 mg Oral Daily    atorvastatin  40 mg Oral Nightly    citalopram  40 mg Oral Daily    clopidogrel  75 mg Oral Daily    levETIRAcetam  500 mg Oral BID    levothyroxine  50 mcg Oral QAM AC    magnesium oxide  400 mg Oral BID    spironolactone  25 mg Oral Daily     Continuous Infusions:   sodium chloride       PRN Meds:regadenoson, oxyCODONE, sodium chloride flush, sodium chloride, ondansetron **OR** ondansetron, polyethylene glycol, acetaminophen **OR** acetaminophen, albuterol sulfate HFA    I/O last 3 completed shifts:   In: 65 [P.O.:660]  Out: 48 [Urine:50]  I/O this shift:  In: -   Out: 350 [Urine:350]      Objective:      Physical Exam:   BP 92/60   Pulse 85   Temp 97.7 °F (36.5 °C) (Infrared)   Resp 18   Ht 4' 10\" (1.473 m)   Wt 184 lb 12.8 oz (83.8 kg)   SpO2 92%   BMI 38.62 kg/m²   CONSTITUTIONAL:  awake, alert, cooperative, no apparent distress, and appears stated age  HEAD:  normocepalic, without obvious abnormality, atraumatic  NECK:  Supple, symmetrical, trachea midline, no adenopathy, thyroid symmetric, not enlarged and no tenderness, skin normal  LUNGS: Mildly increased work of breathing, No accessory muscle use or intercostal retractions, decreased air exchange, scattered rhonchi and fine wheezing  CARDIOVASCULAR:  Normal apical impulse, regular rate and rhythm, normal S1 and S2, no S3 or S4, and no murmur noted, + edema, no JVD, no carotid bruit. ABDOMEN:  Soft, nontender, no masses, no hepatomegaly, no splenomegaly, BS+  MUSCULOSKELETAL:  No clubbing no cyanosis. there is no redness, warmth, or swelling of the joints  full range of motion noted  NEUROLOGIC:  Alert, awake,oriented x3  SKIN:  no bruising or bleeding, normal skin color, texture, turgor and no redness, warmth, or swelling      Cardiographics  I personally reviewed the telemetry monitor strips with the following interpretation: Sinus rhythm    Echocardiogram: 11/10/2021,No comparison study available. Technically limited study, which might have   affected the accuracy and the completeness of the interpretation. Micro-bubble contrast injected to enhance left ventricular visualization. Left ventricle size is normal.   Mild concentric left ventricular hypertrophy. Ejection fraction is visually estimated at 55%. No regional wall motion abnormalities seen. E/A flow reversal noted. Suggestive of diastolic dysfunction. Physiologic and/or trace tricuspid regurgitation. RVSP is normal.    Imaging  US LIVER   Final Result   Cirrhotic morphology of the liver with coarse liver parenchymal.         XR LUMBAR SPINE (2-3 VIEWS)   Final Result   Moderate L5 compression fracture demonstrates mild interval increase in   height loss. XR CHEST (2 VW)   Final Result   Cardiomegaly.   Findings of mild CHF are not excluded      Mild prominence of the interstitial markings peripherally which are confirmed   on the patient's prior CT scan of 10/07/2020         VL LOWER EXTREMITY ARTERIAL SEGMENTAL PRESSURES W PPG BILATERAL (Results Pending)   NM Cardiac Stress Test Nuclear Imaging    (Results Pending)       Lab Review   Lab Results   Component Value Date     11/12/2021    K 3.7 11/12/2021    K 3.7 10/09/2020    CL 91 11/12/2021    CO2 30 11/12/2021    BUN 10 11/12/2021    CREATININE 0.5 11/12/2021    GLUCOSE 183 11/12/2021    CALCIUM 9.7 11/12/2021     Lab Results   Component Value Date    WBC 10.1 11/09/2021    HGB 15.7 11/09/2021    HCT 44.6 11/09/2021    MCV 94.5 11/09/2021     11/09/2021     I have personally reviewed the laboratory, cardiac diagnostic and radiographic testing as outlined above:    Assessment: 1. Pedal edema: Suspect multifactorial secondary to liver cirrhosis and acute exacerbation of congestive heart failure, will continue diuresis  2. Acute exacerbation of congestive heart failure: Acute on chronic, diastolic, decompensated, improving, as above. 3.  History of hypertension: Now hypotensive  4. Hyperlipidemia  5. Peripheral vascular disease  6. Hyponatremia  7. History of liver cirrhosis, history of ascites and paracentesis  8. COPD  9. Tobacco abuse:  10. Hypokalemia and hypomagnesemia: Better  11. Back pain with leg weakness      Recommendations:     1. Continue diuresis  2. Will hold lisinopril due to hypotension  3. Will continue the rest of medications  4. Basic metabolic panel and CBC in a.m.  5.   Lexiscan stress test in a.m.  6.  FUrther cardiac recommendations will be forthcoming pending her clinical course and diagnostic test findings    Discussed with patient  Electronically signed by Ruthann Hopkins MD on 11/13/2021 at 4:28 AM  NOTE: This report was transcribed using voice recognition software.  Every effort was made to ensure accuracy; however, inadvertent computerized transcription errors may be present

## 2021-11-13 NOTE — PROCEDURES
Pharmacologic Nuclear Stress Test    Date: 11/13/2021    Indication: CHF    Description of procedure:    Protocol: Regadenoson stress SPECT myocardial perfusion imaging    Baseline EKG: NSR 80 bpm. Normal axis normal intervals. No ST/T changes. Baseline BP: 98/61 mmHg    0.4 mg of regadenoson was injected followed by radiotracer injection. Patient reported no chest pain. Stress EKG showed no evidence of ischemia, and no arrhythmias were noted. Occasional PVC. Impression:  1. No evidence of regadenoson induced ischemia on stress EKG. 2. Nuclear images to be reported separately.     Ly Almazan MD, [id number] [facility]

## 2021-11-13 NOTE — PROGRESS NOTES
last 72 hours. CHEMISTRIES:  Recent Labs     11/11/21  0621 11/12/21  1004 11/13/21  0542    134 134   K 3.2* 3.7 3.8   CL 91* 91* 91*   CO2 29 30* 30*   BUN 10 10 12   CREATININE 0.4* 0.5 0.6   GLUCOSE 128* 183* 103*   MG 1.5* 1.5* 2.0     PT/INR:No results for input(s): PROTIME, INR in the last 72 hours. APTT:No results for input(s): APTT in the last 72 hours. LIVER PROFILE:No results for input(s): AST, ALT, BILIDIR, BILITOT, ALKPHOS in the last 72 hours. Imaging Last 24 Hours:  No results found. Assessment//Plan           Hospital Problems           Last Modified POA    UTI (urinary tract infection) 11/9/2021 Yes    Acute on chronic diastolic (congestive) heart failure ([facility] [address] 11/11/2021 Yes    Pedal edema 11/11/2021 Yes    Chronic obstructive pulmonary disease ([address] 11/11/2021 Yes    Tobacco abuse 11/11/2021 Yes    Hepatic cirrhosis ([address] 11/11/2021 Yes    Hypokalemia 11/11/2021 Yes    Hypomagnesemia 11/11/2021 Yes        Assessment:    Condition: In stable condition. Improving. (Lumbar compression fracture  Debility  Acute diastolic CHF    Improving , waiting for rehab placement    ). Plan:   Continue respiratory treatments. Consults: cardiology and physical therapy. Regular diet. (Continue current management  Will be waiting for placement  Will follow).        Electronically signed by Davide Villalobos DO on 11/13/21 at 8:53 AM EST

## 2021-11-14 NOTE — PROGRESS NOTES
OT BEDSIDE TREATMENT NOTE      Date:2021  Patient Name: Liliana Caldwell  MRN: [id number]  : 1960  Room: [address] [place] [address]        Evaluating OT: ZULEIKA Smith/LUPE; [id number]        Referring Provider and Orders/Date:   OT eval and treat Start: 21 1245, End: 21 1245, ONE TIME, Standing Count: 1 Occurrences, R    Jeffrey Rosales DO     Diagnosis:   1. Acute on chronic congestive heart failure, unspecified heart failure type ([facility]    2. Acute cystitis without hematuria    3. Ambulatory dysfunction    4. Hypoxia          Pertinent Medical History:        Past Medical History        Past Medical History:   Diagnosis Date    Alcohol abuse      Cerebral artery occlusion with cerebral infarction ([facility]      Depression      Elevated blood sugar      Hepatitis C       from blood transfusion, underwent treatment per patient and \"cured\"    Hyperlipidemia      Hypertension      Liver cirrhosis ([facility]      Magnesium deficiency      Marijuana user      Nicotine dependence      [facility]      Subclinical hypothyroidism               Past Surgical History         Past Surgical History:   Procedure Laterality Date    DILATION AND CURETTAGE OF UTERUS        LIVER BIOPSY   2016           Precautions:  Fall Risk-at home, 2L, legs giving out    Recommended placement: subacute    Assessment of current deficits     [x]? Functional mobility           [x]? ADLs           [x]? Strength                   []?Cognition     [x]? Functional transfers         [x]? IADLs         [x]? Safety Awareness   [x]? Endurance     []? Fine Coordination                        [x]? Balance      []? Vision/perception    []? Sensation       [x]? Gross Motor Coordination            []? ROM           []?  Delirium                   []? Motor Control      OT PLAN OF CARE   OT POC based on physician orders, patient diagnosis and results of clinical assessment     Frequency/Duration 1-3 days/wk for 2 weeks PRN   Specific OT Treatment Interventions to include:   * Instruction/training on adapted ADL techniques and AE recommendations to increase functional independence within precautions       * Training on energy conservation strategies, correct breathing pattern and techniques to improve independence/tolerance for self-care routine  * Functional transfer/mobility training/DME recommendations for increased independence, safety, and fall prevention  * Patient/Family education to increase follow through with safety techniques and functional independence  * Recommendation of environmental modifications for increased safety with functional transfers/mobility and ADLs  * Therapeutic exercise to improve motor endurance, ROM, and functional strength for ADLs/functional transfers  * Therapeutic activities to facilitate/challenge dynamic balance, stand tolerance for increased safety and independence with ADLs  * Therapeutic activities to facilitate gross/fine motor skills for increased independence with ADLs      Recommended Adaptive Equipment/DME: TBD       Home Living: Pt lives at home with spouse who requires assist and is w/c level due to amputation. He has caregivers 3x per week. She lives in a 1 story home with a ramp to enter and laundry in the basement with a full flight of steps.     DME owned: cane, walker, wc      Prior Level of Function: indep with ADLs , indep with IADLs; ambulated with cane   Driving: yes   Occupation: retired   Enjoys: pets-cat/dog, limited due to back issues     Pain Level: chronic back pain and LE pain, unquantified; nursing aware pt is requesting pain medication, as pt has just transferred from 6th floor to 5th floor    Cognition: A&O: 4/4; Follows 3 step directions              Memory:  Intact              Sequencing:  Intact              Problem solving:  Intact              Judgement/safety:  Intact     AM-PAC Daily Activity Inpatient   How much help for putting on and taking off regular lower body clothing?: Total  How much help for Bathing?: A Lot  How much help for Toileting?: Total  How much help for putting on and taking off regular upper body clothing?: A Lot  How much help for taking care of personal grooming?: A Little  How much help for eating meals?: A Little  AM-Merged with [facility] Inpatient Daily Activity Raw Score: 12  AM-PAC Inpatient ADL T-Scale Score : 30.6  ADL Inpatient CMS 0-100% Score: 66.57  ADL Inpatient CMS G-Code Modifier : CL    Functional Assessment:      Initial Eval Status  Date: 11/9/2021   Treatment Status  Date: 11/14/2021 STGs = LTGs  Time frame: 10-14 days   Feeding Stand by Assist with tremors in kishor hands and coordination deficits  N/T Indep   Grooming Stand by Assist EOB to use hand  and blow nose. N/T; pt had assist for grooming prior to session Independent    UB Dressing Maximal Assist for gown management with pt only in a tank top upon arrival. Completed from sitting EOB N/T  Minimal Assist    LB Dressing Dependent with A x 2 to don pull up briefs from sitting/standing for safety. Dep to don kishor socks from sitting EOB with hypersensitivity to feet. N/T  Moderate Assist    Bathing Maximal Assist suspected with unable to assess in ED setting. N/T; pt had assist for bathing prior to session  Minimal Assist    Toileting Dependent with A x 2 recommended with standing N/T  Moderate Assist    Bed Mobility  Supine to sit: Moderate Assist   Sit to supine: Moderate Assist x 2 due to decreased safety with ht of hospital bed. Sitting tolerance for 15min average.    Min A for supine to sit at EOB(assist to guide UB to sitting); scooting at supervision; min A for sit to supine with assist to support B LE's to supine; HOB elevated at end of session; B LE's elevated Supine to sit: Stand by Assist   Sit to supine: Minimal Assist    Functional Transfers Moderate Assist x 2 with walker and elevated surface of bed.    N/T d/t c/o pain in B LE's and back Moderate Assist    Functional Mobility Moderate Assist x 2 with fall risk and walker for side stepping to Amy Dias for safety. N/T d/t c/o pain in B LE's and back  Mod Assist    Balance Sitting:     Static:  fair    Dynamic:fair-  Standing: poor+ Sitting:     Static:  fair    Dynamic:fair-  Standing: N/T  Sitting:     Static:  Fair+    Dynamic:fair  Standing: fair-   Activity Tolerance Vitals with activity: 156/67 HR 91, 2L 88% sitting EOB; 175/82 HR 90 91% standing  Standing tolerance 1min average with ADLs Fair minus d/t back and LE pain  Increase standing tolerance for >3min with stable vital signs for carry over into toileting, functional tranfers and indep in ADLs   Visual/  Perceptual Glasses: not Present; WFL     Reports change in vision since admission: No      NA   Andrew UE Strengthening  4-/5 generally   4+/5MMT generally for carry over into self care, functional transfers and functional mobility with AD.       Hand Dominance  [x]? Right   []? Left     AROM (PROM) Strength Additional Info:    RUE  WFL with exception of R shoulder d/t hx of shoulder injury 4/5 fair  and fair FMC/dexterity noted during ADL tasks-tremors  Opposition [x]? Intact []? Impaired  Finger to nose [x]? Intact []? Impaired      LUE WFL 4/5 fair  and fair- FMC/dexterity noted during ADL tasks-tremors  Opposition [x]? Intact []? Impaired  Finger to nose [x]? Intact []? Impaired    - BUE strengthening exercises: 15 reps in all planes of movement with mod resist theraband to increase/maintain strength required for functional transfers/ADL participation. Exercises completed in shoulder and elbow flexion/extension, internal/external rotation and abduction/adduction. Pt unable to complete R shoulder flexion with theraband d/t hx of R shoulder injury; min  rest breaks provided 2* to decreased endurance /tolerance .  Ex's completed when seated EOB.     - BUE R shoulder ROM (flexion) exercises: 15 reps in R shoulder flexion/extension required for functional transfers/ADL participation. R shoulder ROM in flexion appears to be limited to hx of R shoulder injury. Min rest breaks provided 2* to decreased endurance/tolerance. Ex's completed when pt seated EOB. Hearing: WFL   Sensation:  no c/o at this time  Tone: WFL   Edema: slight edema in B LE's; positioning provided     Comments: Patient cleared by nursing staff. Upon arrival pt seated in bed. Pt agreeable to OT tx session. Pt's mother present in room. Pt educated with regards to bed mobility, hand placement, safety awareness, static sitting balance, B UE strengthening, R shoulder  ROM ex's,  ECT's. At end of session pt seated in bed with all lines and tubes intact, call light within reach. Overall, pt demonstrated decreased independence and safety during completion of ADL/functional transfers/mobility tasks. Pt would benefit from continued skilled OT to increase safety and independence with completion of ADL/IADL tasks for functional independence and quality of life. Pt required cues and education as noted above for safe facilitation and completion of tasks. Therapist provided skilled monitoring of patient's response during treatment session. Prior to and at the end of session, environmental modifications/ line management completed for patients safety and efficiency of treatment session. Overall, patient demonstrates minimal/moderate difficulties with completion of BADLs and IADLs. Factors contributing to these difficulties include back pain, LE pain, hx of B knee buckling, decreased endurance, and generalized weakness. As noted above, patient likely to benefit from further OT intervention to increase independence, safety, and overall quality of life. Treatment:     ? Bed mobility: Facilitated bed mobility with cues for proper body mechanics and sequencing to prepare for ADL completion. ? Postural Balance: Sitting balance retraining to improve righting reactions with postural changes during ADLs. ?  Skilled positioning: Proper positioning to improve interaction with environment, overall functioning and decrease edema  ? Therapeutic Ex's: To increase B UE strength/ROM and strengthening required for functional transfers and ADL participation. · Pt has made fair/fair minus progress towards set goals   · OT 1-3x/week for 5-7 days during hospitalization       Treatment Time also includes thorough review of current medical information, gathering information on past medical history/social history and prior level of function, informal observation of tasks, assessment of data and education on plan of care and goals.     Treatment Time In: 2:18 PM    Treatment Time Out: 2:43 PM            Treatment Charges: Mins Units   ADL/Home Mgt     [address]     [id number]   Ther Ex                 [id number]   Manual Therapy    [id number]     Neuro Re-ed         [id number]     Orthotic manage/training                               [id number]     Non Billable Time     Total Timed Treatment 25 016 [facility], [facility]/L #[id number]

## 2021-11-14 NOTE — PROGRESS NOTES
Hospital Problems           Last Modified POA    UTI (urinary tract infection) 11/9/2021 Yes    Acute on chronic diastolic (congestive) heart failure ([facility] [address] [address] 11/11/2021 Yes    Pedal edema 11/11/2021 Yes    Chronic obstructive pulmonary disease ([facility] 11/11/2021 Yes    Tobacco abuse 11/11/2021 Yes    Hepatic cirrhosis ([facility] 11/11/2021 Yes    Hypokalemia 11/11/2021 Yes    Hypomagnesemia 11/11/2021 Yes        Assessment:    Condition: In stable condition. Improving. (Lumbar compression fracture  CHF  Debility      [facility] Plan:   Other transfer (see comment). Consults: physical therapy. Regular diet. (Continue current management   Discharge tomorrow).        Electronically signed by Genia Hensley DO on 11/14/21 at 4:50 PM EST

## 2021-11-14 NOTE — PROGRESS NOTES
INPATIENT CARDIOLOGY FOLLOW-UP    Name: Marichuy Navas    Age: 56 y.o. Date of Admission: 11/9/2021  8:11 AM    Date of Service: 11/14/2021    Primary Cardiologist: Known to Dr. Rivera Dias from this admission    Chief Complaint: Follow-up for CHF    Interim History:  No chest pain or shortness of breath. Stress test did show areas of ischemia in the lateral wall.     Review of Systems:   Negative except as described above    Problem List:  Patient Active Problem List   Diagnosis    Acute respiratory failure with hypoxia ([place] [address]    Alcohol abuse    Cerebral artery occlusion with cerebral infarction ([facility]    Hypertension    Depression    Hyperlipidemia    Seizures (HCC)    Alcoholic intoxication without complication ([facility]    Hyponatremia    Nicotine dependence    Marijuana user    Fall at home, initial encounter    UTI (urinary tract infection)    Acute on chronic diastolic (congestive) heart failure (HCC)    Pedal edema    Chronic obstructive pulmonary disease (HCC)    Tobacco abuse    Hepatic cirrhosis (HCC)    Hypokalemia    Hypomagnesemia       Current Medications:    Current Facility-Administered Medications:     furosemide (LASIX) tablet 40 mg, 40 mg, Oral, Daily, Abdi F Veres, DO, 40 mg at 11/14/21 0810    [Held by provider] lisinopril (PRINIVIL;ZESTRIL) tablet 10 mg, 10 mg, Oral, Daily, Abdi F Veres, DO    levoFLOXacin (LEVAQUIN) tablet 500 mg, 500 mg, Oral, Daily, Abdi F Veres, DO, 500 mg at 11/14/21 0810    nicotine (NICODERM CQ) 21 MG/24HR 1 patch, 1 patch, TransDERmal, Daily, Elesa Moraleso Veres, DO, 1 patch at 11/14/21 0810    oxyCODONE (ROXICODONE) immediate release tablet 5 mg, 5 mg, Oral, Q6H PRN, Elesa Goo Veres, DO, 5 mg at 11/14/21 0810    sodium chloride flush 0.9 % injection 5-40 mL, 5-40 mL, IntraVENous, 2 times per day, Tyra James, DO, 10 mL at 11/13/21 2051    sodium chloride flush 0.9 % injection 5-40 mL, 5-40 mL, IntraVENous, PRN, Janeen Nielseno Connie, DO, 10 mL at 11/12/21 1706    0.9 % sodium chloride infusion, 25 mL, IntraVENous, PRN, Abdi Rosales DO    ondansetron (ZOFRAN-ODT) disintegrating tablet 4 mg, 4 mg, Oral, Q8H PRN **OR** ondansetron (ZOFRAN) injection 4 mg, 4 mg, IntraVENous, Q6H PRN, Abdi Rosales DO    polyethylene glycol (GLYCOLAX) packet 17 g, 17 g, Oral, Daily PRN, Luba Conteh DO    acetaminophen (TYLENOL) tablet 650 mg, 650 mg, Oral, Q6H PRN, 650 mg at 11/13/21 2048 **OR** acetaminophen (TYLENOL) suppository 650 mg, 650 mg, Rectal, Q6H PRN, Abdi Rosales DO    albuterol sulfate  (90 Base) MCG/ACT inhaler 2 puff, 2 puff, Inhalation, Q6H PRN, Luba Conteh DO    [Held by provider] amLODIPine (NORVASC) tablet 5 mg, 5 mg, Oral, Daily, Abdi F Veres, DO, 5 mg at 11/10/21 0943    ARIPiprazole (ABILIFY) tablet 5 mg, 5 mg, Oral, Daily, Abdi F Veres, DO, 5 mg at 11/14/21 0903    [Held by provider] atenolol (TENORMIN) tablet 50 mg, 50 mg, Oral, Daily, Abdi F Veres, DO, 50 mg at 11/12/21 0836    atorvastatin (LIPITOR) tablet 40 mg, 40 mg, Oral, Nightly, Abdi F Veres, DO, 40 mg at 11/13/21 2050    citalopram (CELEXA) tablet 40 mg, 40 mg, Oral, Daily, Abdi F Veres, DO, 40 mg at 11/14/21 0810    clopidogrel (PLAVIX) tablet 75 mg, 75 mg, Oral, Daily, Abdi F Veres, DO, 75 mg at 11/14/21 0810    levETIRAcetam (KEPPRA) tablet 500 mg, 500 mg, Oral, BID, Abdi F Veres, DO, 500 mg at 11/14/21 0810    levothyroxine (SYNTHROID) tablet 50 mcg, 50 mcg, Oral, QAM AC, Abdi F Veres, DO, 50 mcg at 11/14/21 0656    magnesium oxide (MAG-OX) tablet 400 mg, 400 mg, Oral, BID, Abdi F Veres, DO, 400 mg at 11/14/21 0810    spironolactone (ALDACTONE) tablet 25 mg, 25 mg, Oral, Daily, CHRIS Mcfarlane, 25 mg at 11/14/21 0810    Physical Exam:  /64   Pulse 89   Temp 98 °F (36.7 °C)   Resp 16   Ht 4' 10\" (1.473 m)   Wt 185 lb 14.4 oz (84.3 kg)   SpO2 91%   BMI 38.85 kg/m²   Wt Readings from Last 3 Encounters:   11/14/21 185 lb 14.4 oz (84.3 kg)   10/22/21 189 lb (85.7 kg)   06/10/21 200 lb 3.2 oz (90.8 kg)     Appearance: Overweight female, awake, alert, no acute respiratory distress  Skin: Intact, no rash  Head: Normocephalic, atraumatic  Eyes: EOMI, no conjunctival erythema  ENMT: No pharyngeal erythema, MMM, no rhinorrhea  Neck: Supple, no elevated JVP, no carotid bruits  Lungs: Clear to auscultation bilaterally. No wheezes, rales, or rhonchi. Cardiac: PMI nondisplaced, Regular rhythm with a normal rate, S1 & S2 normal, no murmurs  Abdomen: Soft, nontender, +bowel sounds  Extremities: Moves all extremities x 4, no lower extremity edema. Mild skin thickening and erythema of the distal bilateral lower extremities  Neurologic: No focal motor deficits apparent, normal mood and affect  Peripheral Pulses: Intact posterior tibial pulses bilaterally    Intake/Output:    Intake/Output Summary (Last 24 hours) at 11/14/2021 1254  Last data filed at 11/14/2021 1143  Gross per 24 hour   Intake 780 ml   Output 1100 ml   Net -320 ml     I/O this shift:  In: 240 [P.O.:240]  Out: 700 [Urine:700]    Laboratory Tests:  Recent Labs     11/12/21  1004 11/13/21  0542 11/14/21  0505    134 135   K 3.7 3.8 3.7   CL 91* 91* 94*   CO2 30* 30* 28   BUN 10 12 12   CREATININE 0.5 0.6 0.6   GLUCOSE 183* 103* 119*   CALCIUM 9.7 9.9 9.8     Lab Results   Component Value Date    MG 1.9 11/14/2021     No results for input(s): ALKPHOS, ALT, AST, PROT, BILITOT, BILIDIR, LABALBU in the last 72 hours. No results for input(s): WBC, RBC, HGB, HCT, MCV, MCH, MCHC, RDW, PLT, MPV in the last 72 hours.   Lab Results   Component Value Date    CKTOTAL 151 10/07/2020    TROPONINI <0.01 10/07/2020     Lab Results   Component Value Date    INR 1.1 06/16/2017    INR 1.3 07/05/2016    INR 1.4 05/09/2016    PROTIME 12.4 06/16/2017    PROTIME 14.3 (H) 07/05/2016    PROTIME 14.8 (H) 05/09/2016     Lab Results   Component Value Date    TSH 3.130 2021     Lab Results   Component Value Date    LABA1C 5.9 (H) 2021     No results found for: EAG  Lab Results   Component Value Date    CHOL 122 11/10/2021     Lab Results   Component Value Date    TRIG 129 11/10/2021     Lab Results   Component Value Date    HDL 21 11/10/2021    HDL 42 2021    HDL 37 2019     Lab Results   Component Value Date    LDLCALC 75 11/10/2021    LDLCALC 70 2021    LDLCALC 136 (H) 2019     Lab Results   Component Value Date    LABVLDL 26 11/10/2021    LABVLDL 14 2021    LABVLDL 43 2019     No results found for: CHOLHDLRATIO  Recent Labs     21  1004   PROBNP 231*       Cardiac Tests:    EK2021: Sinus rhythm 89 beats minute. Normal axis normal intervals. No ST or T wave changes. Telemetry: Sinus rhythm 80s    Chest X-ray:     Impression   Cardiomegaly.  Findings of mild CHF are not excluded       Mild prominence of the interstitial markings peripherally which are confirmed   on the patient's prior CT scan of 10/07/2020     Echocardiogram:   Transthoracic echo 11/10/2021    Summary  No comparison study available. Technically limited study, which might have affected the accuracy and the completeness of the  interpretation. Micro-bubble contrast injected to enhance left ventricular visualization. Left ventricle size is normal.  Mild concentric left ventricular hypertrophy. Ejection fraction is visually estimated at 55%. No regional wall motion abnormalities seen. E/A flow reversal noted. Suggestive of diastolic dysfunction. Physiologic and/or    Stress test:    Pharmacologic stress 2021     Gated images demonstrate normal wall motion and thickening, with a   calculated left ventricular ejection fraction of 89%. End-diastolic   volume 75 mL. TID ratio 1.00.               Impression       1.  The myocardial perfusion is abnormal.   2. Reversible defect in the anterolateral and inferolateral walls   suggestive of ischemia. 3. Normal LV systolic function, EF greater than 70%. 4. There is no transient ischemic dilation. 5. Intermediate risk myocardial perfusion study. Cardiac catheterization:     ----------------------------------------------------------------------------------------------------------------------------------------------------------------  IMPRESSION:  1. Acute heart failure with preserved ejection fraction. proBNP 230. I's and O's inaccurate. Resolved, appears euvolemic  2. Abnormal stress showing inferolateral and anterolateral ischemia  3. History of hypertension, running low  4. Peripheral arterial disease  5. History of CVA, on clopidogrel  6. Cirrhosis  7. COPD  8. Tobacco abuse  9. Acute compression fractures  10. Seizure disorder  11. Alcohol abuse    RECOMMENDATIONS:     Given abnormal stress test in the presence of her risk factors, would recommend proceeding with left heart catheterization, patient agreeable   She understands this may delay potential surgical intervention if needed for lumbar fracture, though she has had no neurosurgical evaluation to date   Consider inpatient neurosurgical evaluation, she has had no treatment or recommendations for her acute lumbar compression fracture, may need to be in a brace   Continue oral diuretics   Antihypertensives on hold   Continue clopidogrel, statin   Full dose aspirin tomorrow   Aggressive risk factor modification including smoking and alcohol cessation recommended    Risks and benefits of left heart catheterization explained to patient, including risk of MI, CVA, death, bleeding complications, vascular injury, renal failure requiring dialysis, and requiring emergency surgery. Possible outcomes including need for medical management, PCI, bypass surgery explained to patient. Patient voiced understanding and agrees to proceed.     AUC criteria 9/17    Rishi Arora MD, [facility]    NOTE: This report was transcribed using voice recognition software. Every effort was made to ensure accuracy; however, inadvertent computerized transcription errors may be present.

## 2021-11-15 NOTE — PROGRESS NOTES
Physical Therapy    Physical Therapy Treatment Note/Plan of Care    Room #:  0145/9996-14  Patient Name: Liliana Caldwell  YOB: 1960  MRN: [id number]    Date of Service: 11/15/2021     Tentative placement recommendation: Subacute rehab  Equipment recommendation: Renetta Adams      Evaluating Physical Therapist: Yovanny Banda PT  #[id number]      Specific Provider Orders/Date/Referring Provider :  11/09/21 1245   PT eval and treat Start: 11/09/21 1245, End: 11/09/21 1245, ONE TIME, Standing Count: 1 Occurrences, R    Harmeet Trejo DO      Admitting Diagnosis:   UTI (urinary tract infection) [N39.0]  Hypoxia [R09.02]  Acute cystitis without hematuria [N30.00]  Ambulatory dysfunction [R26.2]  CHF (congestive heart failure), NYHA class I, acute on chronic, combined ([address] [I50.43]  Acute on chronic congestive heart failure, unspecified heart failure type ([address] [I50.9]     Admitted with    bilateral lower extremities swelling and inability to ambulate, fell a week ago and sustained compression fracture L5  Surgery: none  Visit Diagnoses       Codes    Acute on chronic congestive heart failure, unspecified heart failure type ([address]    -  Primary I50.9    Acute cystitis without hematuria     N30.00    Ambulatory dysfunction     R26.2    Hypoxia     R09.02          Patient Active Problem List   Diagnosis    Acute respiratory failure with hypoxia ([address]    Alcohol abuse    Cerebral artery occlusion with cerebral infarction ([address]    Hypertension    Depression    Hyperlipidemia    Seizures ([address]    Alcoholic intoxication without complication ([address]    Hyponatremia    Nicotine dependence    Marijuana user    Fall at home, initial encounter    UTI (urinary tract infection)    Acute on chronic diastolic (congestive) heart failure ([address]    Pedal edema    Chronic obstructive pulmonary disease ([address]    Tobacco abuse    Hepatic cirrhosis ([address]    Hypokalemia    Hypomagnesemia        ASSESSMENT of Current Deficits Patient exhibits decreased strength, balance, endurance, coordination and pain   impairing functional mobility, transfers, gait , gait distance and tolerance to activity are barriers to d/c and require skilled intervention during hospital stay to attain pre hospital level of function. Patient able to stand multiple reps for a very short period of time however unable to advance lower extremities this session due to pain in lower back and bilateral lower extremity weakness. Pt will need subacute rehab at time of discharge in order to participate in a skilled comprehensive therapy program to address deficits and improve the patient 's  functional levels. This will allow the patien to return home requiring the least amount of assistance from others and make the patient safer and  decrease the patient' s  risk for falls. PHYSICAL THERAPY  PLAN OF CARE       Physical therapy plan of care is established based on physician order,  patient diagnosis and clinical assessment    Current Treatment Recommendations:    -Bed Mobility: Lower extremity exercises , Upper extremity exercises  and Trunk control activities   -Standing Balance: Perform strengthening exercises in standing to promote motor control with or without upper extremity support  and Instruct patient on adequate base of support to maintain balance  -Transfers: Provide instruction on proper hand and foot position for adequate transfer of weight onto lower extremities and use of gait device if needed and Cues for hand placement, technique and safety.  Provide stabilization to prevent fall   -Gait: Gait training and Standing activities to improve: base of support, weight shift, weight bearing    -Endurance: Utilize Supervised activities to increase level of endurance to allow for safe functional mobility including transfers and gait     PT long term treatment goals are located in below grid    Patient and or family understand(s) diagnosis, prognosis, and plan of care. Frequency of treatments: Patient will be seen  daily. Prior Level of Function: Patient ambulated independently and with quad cane    Rehab Potential: good    for baseline    Past medical history:   Past Medical History:   Diagnosis Date    Alcohol abuse     [facility]     Depression     Diverticulosis of colon     Elevated blood sugar     H/O cardiovascular stress test 11/13/2021    Nuclear Lexiscan Stress Test    Hepatitis C     from blood transfusion, underwent treatment per patient and \"cured\"    Hyperlipidemia     Hypertension     Liver cirrhosis ([facility]     Magnesium deficiency     Marijuana user     Nicotine dependence     Seizures ([facility]     Subclinical hypothyroidism      Past Surgical History:   Procedure Laterality Date    DILATION AND CURETTAGE OF UTERUS      LIVER BIOPSY  07/05/2016       SUBJECTIVE:    Precautions: Up as tolerated, falls and alarm ,    Social history: Patient lives with spouse in a ranch home  with Ramp  to enter        is wheelchair user and unable to leave living room d/t size of wheelchair   Equipment owned: Quad cane,       [facility]   How much difficulty turning over in bed?: A Little  How much difficulty sitting down on / standing up from a chair with arms?: A Lot  How much difficulty moving from lying on back to sitting on side of bed?: A Lot  How much help from another person moving to and from a bed to a chair?: A Lot  How much help from another person needed to walk in hospital room?: Total  How much help from another person for climbing 3-5 steps with a railing?: Total  AM-PAC Inpatient Mobility Raw Score : 11  AM-PAC Inpatient T-Scale Score : 33.86  Mobility Inpatient CMS 0-100% Score: 72.57  Mobility Inpatient CMS G-Code Modifier : CL    Nursing cleared patient for PT treatment.  .   OBJECTIVE;   Initial Evaluation  Date: 11/9/2021 Treatment Date:  11/15/2021     Short Term/ Long Term   Goals   Was pt agreeable to Eval/treatment? Yes Yes To be met in 4 days   Pain level   8/10    0/10 initially however with seated exercise 10/10    Bed Mobility    Rolling: Moderate assist of 1    Supine to sit: Maximal assist of 1    Sit to supine: Maximal assist of  2    Scooting: Moderate assist of  2   Rolling: Minimal assist of 1   Supine to sit: Moderate assist of 1   Sit to supine: Moderate assist of 1   Scooting: Moderate assist of 1    Rolling: Minimal assist of 1    Supine to sit: Minimal assist of 1    Sit to supine: Minimal assist of 1    Scooting: Minimal assist of 1     Transfers Sit to stand: Moderate assist of 1 from [address] short stature Sit to stand: Moderate assist of 1 Cues for hand placement and safety.       Sit to stand: Minimal assist of 1     Ambulation    2-3 side steps using  wheeled walker with Moderate assist of  2   cues for upright posture, walker approximation, safety and proper hand placement not assessed   Attempted however unable to fully take a step to advance LE  50 feet using  wheeled walker with Supervision     ROM Within functional limits        Strength BUE:  refer to OT eval  RLE:  3/5  LLE:  3/5  Increase strength in affected mm groups by 1/3 grade   Balance Sitting EOB:  poor on gurney  Dynamic Standing:  poor inconsistent bilateral lower extremities extension d/t pain Sitting EOB: fair   Dynamic Standing: fair minus wheeled walker    Sitting EOB:  fair    Dynamic Standing: fair       Patient is Alert & Oriented x person and place and follows directions       Edema:  yes bilateral lower extremities   Endurance: poor      Vitals: room air   Blood Pressure at rest  Blood Pressure during session    Heart Rate at rest   Heart Rate during session  100   SPO2 at rest %  SPO2 during session 90%     Patient education  Patient educated on role of Physical Therapy, risks of immobility, safety and plan of care, importance of mobility while in hospital  and positioning for skin integrity and comfort     Patient response to education:   Pt verbalized understanding Pt demonstrated skill Pt requires further education in this area   Yes Partial Yes      Treatment:  Patient practiced and was instructed/facilitated in the following treatment: Patient assisted to edge of bed and  Sat edge of bed 3 minutes with Supervision  to increase dynamic sitting balance and activity tolerance. stood x 5 reps. Patient on/off bed pan due to attempted to take steps to commode however unsuccessfully this session. Performed seated exercise. Assisted back to supine and positioned for comfort. Therapeutic Exercises:  ankle pumps, heel raises, long arc quad and seated marching,  15 reps. At end of session, patient in bed with alarm call light and phone within reach,  all lines and tubes intact, nursing notified. Patient would benefit from continued skilled Physical Therapy to improve functional independence and quality of life.          Patient's/ family goals   home to feed animals    Time in 1050  Time out 1105    Total Treatment Time  15 minutes      CPT codes:  Therapeutic activities (53905)   15 minutes  1 unit(s)    Willye [facility]  J#[id number]

## 2021-11-15 NOTE — PROGRESS NOTES
Patient transferred to [address] [place] [address] from cath lab for further care. Report called to staff on 5 th floor. Physician ambulance surface her to transport patient.

## 2021-11-15 NOTE — PROCEDURES
access  were unsuccessful. We accessed the right femoral vein. Then at that  time, attention was then directed to the left groin area which was  locally anesthetized with 10 mL of 2% lidocaine. Same thing, there was  significant peripheral vascular disease with severe calcifications of  the left femoral artery, unable to get blood flow. Using a  micropuncture kit, at that time manual pressure was applied to the right  and left groin area until effective hemostasis was achieved. Then, the  right radial sheath was pulled out from the body and a Vasc Band was  applied to the right wrist area with effective hemostasis. COMPLICATIONS:  None. ESTIMATED TOTAL BLOOD LOSS:  10 - 15 mL. MEDICATIONS USED DURING THE PROCEDURE:  We used intraarterial verapamil  to prevent vasospasm. We used intraarterial heparin for  anticoagulation. CONSCIOUS SEDATION:  We used Versed and fentanyl for conscious sedation. First dose was given at 1451, procedure ended at 1519. There was 28  minutes of direct face-to-face supervision during conscious sedation  administration. Total fluoroscopy time was 2.3 minutes. IMPRESSION:  Unsuccessful attempt to perform coronary angiography due to  inability to gain access secondary to severe peripheral vascular  disease. RECOMMENDATIONS:  Continue medical therapy for now. The patient will be  observed for four hours and transferred back to [facility]-  for further treatment.         Lucia Olivarez MD    D: 11/15/2021 16:21:33       T: 11/15/2021 18:02:28     [email or web address]  Job#: [id number]     Doc#: [id number]    CC:

## 2021-11-15 NOTE — CARE COORDINATION
11/15/21 1243 CM note: COVID (-) 11/15/21. Hx ETOH and marijuana abuse. Patient transferred to [facility] @ noon for heart cath. Patient will return to [facility] [address] [place] [address] if no cardiac intervention is needed. Per prior Demetrio Thapa, patient was denied SNF at [address] [facility] last week and she received a denial from [address] today; however per Oni Moreno, [employer] Skilled liaison, she would review pts case on Monday again to see how pts behaviors were over the weekend. Per RNImelda, patient is impulsive & forgetful but no signs of withdrawal and no difficult behaviors. Updated Talita and she is to review pts chart and discuss with her DON and get back to me. If accepted will request PRECERT be started. Pt will need signed СВЕТЛАНА and CM/SW to complete HENS when discharge order is placed. Electronically signed by Justin Lyons RN on 11/15/2021 at 12:52 PM     11/15/21 1321 Per Oni Mroeno [employer], they will accept patient however we need to notify patient that this is a smoke free facility. Also facility was notified today of 2 (+) staff members, and as of now they are still allowing scheduled visits but that could change. Will need to review this with patient after her heart cath and see if she is agreeable.  Electronically signed by Justin Lyons RN on 11/15/2021 at 1:24 PM

## 2021-11-15 NOTE — PROGRESS NOTES
in the last 72 hours. CHEMISTRIES:  Recent Labs     11/12/21  1004 11/13/21  0542 11/14/21  0505    134 135   K 3.7 3.8 3.7   CL 91* 91* 94*   CO2 30* 30* 28   BUN 10 12 12   CREATININE 0.5 0.6 0.6   GLUCOSE 183* 103* 119*   MG 1.5* 2.0 1.9     PT/INR:No results for input(s): PROTIME, INR in the last 72 hours. APTT:No results for input(s): APTT in the last 72 hours. LIVER PROFILE:No results for input(s): AST, ALT, BILIDIR, BILITOT, ALKPHOS in the last 72 hours. Imaging Last 24 Hours:  NM Cardiac Stress Test Nuclear Imaging    Result Date: 11/13/2021  INDICATION: CHF PROTOCOL: Rest/stress single isotope myocardial perfusion imaging with pharmacologic stress and gated SPECT imaging utilizing regadenoson. Reconstruction and reorientation of SPECT images in the short, vertical and horizontal long axis. Gated SPECT wall motion study with quantitative assessment of left ventricular ejection fraction. TECHNIQUE: Regadenoson dose: 0.4 mg Radiopharmaceutical stress dose: 27.7 mCi Tc-99m sestamibi Radiopharmaceutical rest dose: 8.0 mCi Tc-99m sestamibi FINDINGS: The technical quality of the study is fair. Rotating planar images demonstrate no significant patient motion. There is soft tissue breast attenuation. The heart appears normal in size on both rest and stress images. Post-stress tomographic images demonstrate moderate-sized area of mildly decreased uptake in the basal to mid anterolateral distal mid inferolateral walls including the mid inferior wall. Resting images demonstrate reversibility. Gated images demonstrate normal wall motion and thickening, with a calculated left ventricular ejection fraction of 89%. End-diastolic volume 75 mL. TID ratio 1.00.     1. The myocardial perfusion is abnormal. 2. Reversible defect in the anterolateral and inferolateral walls suggestive of ischemia. 3. Normal LV systolic function, EF greater than 70%. 4. There is no transient ischemic dilation. 5.  Intermediate risk myocardial perfusion study. Assessment//Plan           Hospital Problems           Last Modified POA    UTI (urinary tract infection) 11/9/2021 Yes    Acute on chronic diastolic (congestive) heart failure ([facility] 11/11/2021 Yes    Pedal edema 11/11/2021 Yes    Chronic obstructive pulmonary disease ([facility] 11/11/2021 Yes    Tobacco abuse 11/11/2021 Yes    Hepatic cirrhosis ([facility] 11/11/2021 Yes    Hypokalemia 11/11/2021 Yes    Hypomagnesemia 11/11/2021 Yes        Assessment:    Condition: In stable condition. Plan:   Other transfer (see comment). Ad annabella activity. Consults: physical therapy. Regular diet. (Plan for discharge to SNF  COVID swab ordered  Will follow).        Electronically signed by Edi Traore DO on 11/15/21 at 9:31 AM EST

## 2021-11-15 NOTE — PROGRESS NOTES
Spoke with Dr. Satish Wiley, Pt transfered to [facility] cath lab this am yet heart cath unable to be completed due to severe PVD, could not get access.  Patient returning here to [facility]. Dr. Bradford Coy made aware, he stated to continue treat medically until placement to SNF

## 2021-11-15 NOTE — PROGRESS NOTES
INPATIENT CARDIOLOGY FOLLOW-UP    Name: Rock Elizondo    Age: 56 y.o. Date of Admission: 11/9/2021  8:11 AM    Date of Service: 11/15/2021    Primary Cardiologist: Known to Dr. Duke Gudino from this admission    Chief Complaint: Follow-up for CHF    Interim History:  No chest pain or shortness of breath. Stress test did show areas of ischemia in the lateral wall. Still with back pain.     Review of Systems:   Negative except as described above    Problem List:  Patient Active Problem List   Diagnosis    Acute respiratory failure with hypoxia ([address]    Alcohol abuse    Cerebral artery occlusion with cerebral infarction ([address]    Hypertension    Depression    Hyperlipidemia    Seizures (HCC)    Alcoholic intoxication without complication ([address]    Hyponatremia    Nicotine dependence    Marijuana user    Fall at home, initial encounter    UTI (urinary tract infection)    Acute on chronic diastolic (congestive) heart failure (HCC)    Pedal edema    Chronic obstructive pulmonary disease (HCC)    Tobacco abuse    Hepatic cirrhosis (HCC)    Hypokalemia    Hypomagnesemia       Current Medications:    Current Facility-Administered Medications:     furosemide (LASIX) tablet 40 mg, 40 mg, Oral, Daily, Abdi F Veres, DO, 40 mg at 11/15/21 0918    [Held by provider] lisinopril (PRINIVIL;ZESTRIL) tablet 10 mg, 10 mg, Oral, Daily, Abdi F Veres, DO    levoFLOXacin (LEVAQUIN) tablet 500 mg, 500 mg, Oral, Daily, Abdi F Veres, DO, 500 mg at 11/15/21 0917    nicotine (NICODERM CQ) 21 MG/24HR 1 patch, 1 patch, TransDERmal, Daily, Chuck Speed Veres, DO, 1 patch at 11/15/21 0918    oxyCODONE (ROXICODONE) immediate release tablet 5 mg, 5 mg, Oral, Q6H PRN, Chuck Speed Veres, DO, 5 mg at 11/15/21 0255    sodium chloride flush 0.9 % injection 5-40 mL, 5-40 mL, IntraVENous, 2 times per day, Abdi F Veres, DO, 5 mL at 11/15/21 0919    sodium chloride flush 0.9 % injection 5-40 mL, 5-40 mL, IntraVENous, PRN, Maxx Rosales, DO, 10 mL at 11/12/21 1706    0.9 % sodium chloride infusion, 25 mL, IntraVENous, PRN, Abdi STEVEN Verjodie, DO    ondansetron (ZOFRAN-ODT) disintegrating tablet 4 mg, 4 mg, Oral, Q8H PRN **OR** ondansetron (ZOFRAN) injection 4 mg, 4 mg, IntraVENous, Q6H PRN, Abdi Rosales, DO    polyethylene glycol (GLYCOLAX) packet 17 g, 17 g, Oral, Daily PRN, Enrique Leggett, DO    acetaminophen (TYLENOL) tablet 650 mg, 650 mg, Oral, Q6H PRN, 650 mg at 11/13/21 2048 **OR** acetaminophen (TYLENOL) suppository 650 mg, 650 mg, Rectal, Q6H PRN, Abdi Rosales, DO    albuterol sulfate  (90 Base) MCG/ACT inhaler 2 puff, 2 puff, Inhalation, Q6H PRN, Enrique Leggett, DO    [Held by provider] amLODIPine (NORVASC) tablet 5 mg, 5 mg, Oral, Daily, Abdi F Veres, DO, 5 mg at 11/10/21 0943    ARIPiprazole (ABILIFY) tablet 5 mg, 5 mg, Oral, Daily, Maxx Villafana Verjodie, DO, 5 mg at 11/14/21 0903    [Held by provider] atenolol (TENORMIN) tablet 50 mg, 50 mg, Oral, Daily, Abdi F Veres, DO, 50 mg at 11/12/21 0836    atorvastatin (LIPITOR) tablet 40 mg, 40 mg, Oral, Nightly, Abdi F Veres, DO, 40 mg at 11/14/21 2202    citalopram (CELEXA) tablet 40 mg, 40 mg, Oral, Daily, Abdi F Veres, DO, 40 mg at 11/15/21 0918    clopidogrel (PLAVIX) tablet 75 mg, 75 mg, Oral, Daily, Odessia Broderick Veres, DO, 75 mg at 11/15/21 6414    levETIRAcetam (KEPPRA) tablet 500 mg, 500 mg, Oral, BID, Abdi F Veres, DO, 500 mg at 11/15/21 0917    levothyroxine (SYNTHROID) tablet 50 mcg, 50 mcg, Oral, QAM AC, Abdi F Veres, DO, 50 mcg at 11/15/21 0519    magnesium oxide (MAG-OX) tablet 400 mg, 400 mg, Oral, BID, Abdi F Veres, DO, 400 mg at 11/15/21 0919    spironolactone (ALDACTONE) tablet 25 mg, 25 mg, Oral, Daily, CHRIS Mcfarlane, 25 mg at 11/15/21 1021    Physical Exam:  /61   Pulse 97   Temp 98.3 °F (36.8 °C) (Oral)   Resp 20   Ht 4' 10\" (1.473 m)   Wt 180 lb 12.4 oz (82 kg)   SpO2 90%   BMI 37.78 kg/m²   Wt Readings from Last 3 Encounters:   11/15/21 180 lb 12.4 oz (82 kg)   10/22/21 189 lb (85.7 kg)   06/10/21 200 lb 3.2 oz (90.8 kg)     Appearance: Overweight female, awake, alert, no acute respiratory distress  Skin: Intact, no rash  Head: Normocephalic, atraumatic  Eyes: EOMI, no conjunctival erythema  ENMT: No pharyngeal erythema, MMM, no rhinorrhea  Neck: Supple, no elevated JVP, no carotid bruits  Lungs: Clear to auscultation bilaterally. No wheezes, rales, or rhonchi. Cardiac: PMI nondisplaced, Regular rhythm with a normal rate, S1 & S2 normal, no murmurs  Abdomen: Soft, nontender, +bowel sounds  Extremities: Moves all extremities x 4, no lower extremity edema. Mild skin thickening and erythema of the distal bilateral lower extremities  Neurologic: No focal motor deficits apparent, normal mood and affect  Peripheral Pulses: Intact posterior tibial pulses bilaterally    Intake/Output:    Intake/Output Summary (Last 24 hours) at 11/15/2021 1029  Last data filed at 11/15/2021 0536  Gross per 24 hour   Intake 910 ml   Output 1600 ml   Net -690 ml     No intake/output data recorded. Laboratory Tests:  Recent Labs     11/13/21  0542 11/14/21  0505    135   K 3.8 3.7   CL 91* 94*   CO2 30* 28   BUN 12 12   CREATININE 0.6 0.6   GLUCOSE 103* 119*   CALCIUM 9.9 9.8     Lab Results   Component Value Date    MG 1.9 11/14/2021     No results for input(s): ALKPHOS, ALT, AST, PROT, BILITOT, BILIDIR, LABALBU in the last 72 hours. No results for input(s): WBC, RBC, HGB, HCT, MCV, MCH, MCHC, RDW, PLT, MPV in the last 72 hours.   Lab Results   Component Value Date    CKTOTAL 151 10/07/2020    TROPONINI <0.01 10/07/2020     Lab Results   Component Value Date    INR 1.1 06/16/2017    INR 1.3 07/05/2016    INR 1.4 05/09/2016    PROTIME 12.4 06/16/2017    PROTIME 14.3 (H) 07/05/2016    PROTIME 14.8 (H) 05/09/2016     Lab Results   Component Value Date    TSH 3.130 11/09/2021     Lab Results   Component effort was made to ensure accuracy; however, inadvertent computerized transcription errors may be present.

## 2021-11-16 NOTE — PROGRESS NOTES
INPATIENT CARDIOLOGY FOLLOW-UP    Name: Edgard Bey    Age: 56 y.o. Date of Admission: 11/9/2021  8:11 AM    Date of Service: 11/16/2021    Primary Cardiologist: Known to Dr. Nano Byers from this admission    Chief Complaint: Follow-up for CHF    Interim History:  No chest pain or shortness of breath. Left heart catheterization attempted yesterday, unable to gain arterial access. Right radial and bilateral femoral were attempted and she was found to have severe calcific PAD. Procedure aborted.     Review of Systems:   Negative except as described above    Problem List:  Patient Active Problem List   Diagnosis    [facility] respiratory failure with hypoxia ([facility]    Alcohol abuse    Cerebral artery occlusion with cerebral infarction ([facility]    Hypertension    Depression    Hyperlipidemia    Seizures (HCC)    Alcoholic intoxication without complication ([facility]    Hyponatremia    Nicotine dependence    Marijuana user    Fall at home, initial encounter    UTI (urinary tract infection)    Acute on chronic diastolic (congestive) heart failure (HCC)    Pedal edema    Chronic obstructive pulmonary disease (HCC)    Tobacco abuse    Hepatic cirrhosis (HCC)    Hypokalemia    Hypomagnesemia       Current Medications:    Current Facility-Administered Medications:     furosemide (LASIX) tablet 40 mg, 40 mg, Oral, Daily, Abdi F Veres, DO, 40 mg at 11/16/21 0817    [Held by provider] lisinopril (PRINIVIL;ZESTRIL) tablet 10 mg, 10 mg, Oral, Daily, Abdi F Veres, DO    levoFLOXacin (LEVAQUIN) tablet 500 mg, 500 mg, Oral, Daily, Abdi F Veres, DO, 500 mg at 11/16/21 0824    nicotine (NICODERM CQ) 21 MG/24HR 1 patch, 1 patch, TransDERmal, Daily, Cheyenne Pichardo Verjodie, DO, 1 patch at 11/16/21 0825    oxyCODONE (ROXICODONE) immediate release tablet 5 mg, 5 mg, Oral, Q6H PRN, Cheyenne Pichardo Veres, DO, 5 mg at 11/16/21 0817    sodium chloride flush 0.9 % injection 5-40 mL, 5-40 mL, IntraVENous, 2 times per day, Mariana Bernal DO, 5 mL at 11/16/21 1183    sodium chloride flush 0.9 % injection 5-40 mL, 5-40 mL, IntraVENous, PRN, Viri Rosales DO, 10 mL at 11/12/21 1706    0.9 % sodium chloride infusion, 25 mL, IntraVENous, PRN, Abdi Rosales, DO    ondansetron (ZOFRAN-ODT) disintegrating tablet 4 mg, 4 mg, Oral, Q8H PRN **OR** ondansetron (ZOFRAN) injection 4 mg, 4 mg, IntraVENous, Q6H PRN, Abdi Rosales, DO    polyethylene glycol (GLYCOLAX) packet 17 g, 17 g, Oral, Daily PRN, Mariana Bernal DO    acetaminophen (TYLENOL) tablet 650 mg, 650 mg, Oral, Q6H PRN, 650 mg at 11/13/21 2048 **OR** acetaminophen (TYLENOL) suppository 650 mg, 650 mg, Rectal, Q6H PRN, Abdi Rosales DO    albuterol sulfate  (90 Base) MCG/ACT inhaler 2 puff, 2 puff, Inhalation, Q6H PRN, Mariana Bernal DO    [Held by provider] amLODIPine (NORVASC) tablet 5 mg, 5 mg, Oral, Daily, Abdi F Veres, DO, 5 mg at 11/10/21 0943    ARIPiprazole (ABILIFY) tablet 5 mg, 5 mg, Oral, Daily, Abdi F Veres, DO, 5 mg at 11/16/21 0818    atenolol (TENORMIN) tablet 50 mg, 50 mg, Oral, Daily, Abdi F Veres, DO, 50 mg at 11/16/21 0818    atorvastatin (LIPITOR) tablet 40 mg, 40 mg, Oral, Nightly, Abdi F Veres, DO, 40 mg at 11/15/21 2109    citalopram (CELEXA) tablet 40 mg, 40 mg, Oral, Daily, Abdi F Veres, DO, 40 mg at 11/16/21 0817    clopidogrel (PLAVIX) tablet 75 mg, 75 mg, Oral, Daily, Abdi F Veres, DO, 75 mg at 11/16/21 0818    levETIRAcetam (KEPPRA) tablet 500 mg, 500 mg, Oral, BID, Abdi F Veres, DO, 500 mg at 11/16/21 0825    levothyroxine (SYNTHROID) tablet 50 mcg, 50 mcg, Oral, QAM AC, Abdi F Veres, DO, 50 mcg at 11/16/21 0519    magnesium oxide (MAG-OX) tablet 400 mg, 400 mg, Oral, BID, Abdi F Veres, DO, 400 mg at 11/16/21 0824    spironolactone (ALDACTONE) tablet 25 mg, 25 mg, Oral, Daily, Long Negcorwin BOLAÑOS, PA, 25 mg at 11/16/21 0817    Physical Exam:  BP (!) 158/71   Pulse 101   Temp 98.7 °F (37.1 °C) (Temporal)   Resp 20   Ht 4' 10\" (1.473 m)   Wt 182 lb 5.1 oz (82.7 kg)   SpO2 91%   BMI 38.11 kg/m²   Wt Readings from Last 3 Encounters:   11/16/21 182 lb 5.1 oz (82.7 kg)   11/15/21 178 lb (80.7 kg)   10/22/21 189 lb (85.7 kg)     Appearance: Overweight female, awake, alert, no acute respiratory distress  Skin: Intact, no rash  Head: Normocephalic, atraumatic  Eyes: EOMI, no conjunctival erythema  ENMT: No pharyngeal erythema, MMM, no rhinorrhea  Neck: Supple, no elevated JVP, no carotid bruits  Lungs: Clear to auscultation bilaterally. No wheezes, rales, or rhonchi. Cardiac: PMI nondisplaced, Regular rhythm with a normal rate, S1 & S2 normal, no murmurs  Abdomen: Soft, nontender, +bowel sounds  Extremities: Moves all extremities x 4, no lower extremity edema. Mild skin thickening and erythema of the distal bilateral lower extremities  Neurologic: No focal motor deficits apparent, normal mood and affect  Peripheral Pulses: Intact posterior tibial pulses bilaterally    Intake/Output:    Intake/Output Summary (Last 24 hours) at 11/16/2021 1017  Last data filed at 11/16/2021 0930  Gross per 24 hour   Intake 60 ml   Output 1200 ml   Net -1140 ml     I/O this shift:  In: -   Out: 250 [Urine:250]    Laboratory Tests:  Recent Labs     11/14/21  0505      K 3.7   CL 94*   CO2 28   BUN 12   CREATININE 0.6   GLUCOSE 119*   CALCIUM 9.8     Lab Results   Component Value Date    MG 1.9 11/14/2021     No results for input(s): ALKPHOS, ALT, AST, PROT, BILITOT, BILIDIR, LABALBU in the last 72 hours. No results for input(s): WBC, RBC, HGB, HCT, MCV, MCH, MCHC, RDW, PLT, MPV in the last 72 hours.   Lab Results   Component Value Date    CKTOTAL 151 10/07/2020    TROPONINI <0.01 10/07/2020     Lab Results   Component Value Date    INR 1.1 06/16/2017    INR 1.3 07/05/2016    INR 1.4 05/09/2016    PROTIME 12.4 06/16/2017    PROTIME 14.3 (H) 07/05/2016    PROTIME 14.8 (H) 05/09/2016     Lab Results   Component Value Date    TSH 3.130 2021     Lab Results   Component Value Date    LABA1C 5.9 (H) 2021     No results found for: EAG  Lab Results   Component Value Date    CHOL 122 11/10/2021     Lab Results   Component Value Date    TRIG 129 11/10/2021     Lab Results   Component Value Date    HDL 21 11/10/2021    HDL 42 2021    HDL 37 2019     Lab Results   Component Value Date    LDLCALC 75 11/10/2021    LDLCALC 70 2021    LDLCALC 136 (H) 2019     Lab Results   Component Value Date    LABVLDL 26 11/10/2021    LABVLDL 14 2021    LABVLDL 43 2019     No results found for: CHOLHDLRATIO  Recent Labs     11/15/21  0755   PROBNP 221*       Cardiac Tests:    EK2021: Sinus rhythm 89 beats minute. Normal axis normal intervals. No ST or T wave changes. Telemetry: Sinus rhythm 80s    Chest X-ray:     Impression   Cardiomegaly.  Findings of mild CHF are not excluded       Mild prominence of the interstitial markings peripherally which are confirmed   on the patient's prior CT scan of 10/07/2020     Echocardiogram:   Transthoracic echo 11/10/2021    Summary  No comparison study available. Technically limited study, which might have affected the accuracy and the completeness of the  interpretation. Micro-bubble contrast injected to enhance left ventricular visualization. Left ventricle size is normal.  Mild concentric left ventricular hypertrophy. Ejection fraction is visually estimated at 55%. No regional wall motion abnormalities seen. E/A flow reversal noted. Suggestive of diastolic dysfunction. Physiologic and/or    Stress test:    Pharmacologic stress 2021     Gated images demonstrate normal wall motion and thickening, with a   calculated left ventricular ejection fraction of 89%. End-diastolic   volume 75 mL. TID ratio 1.00.               Impression       1.  The myocardial perfusion is abnormal.   2. Reversible defect in the anterolateral and inferolateral walls suggestive of ischemia. 3. Normal LV systolic function, EF greater than 70%. 4. There is no transient ischemic dilation. 5. Intermediate risk myocardial perfusion study. Cardiac catheterization:     ----------------------------------------------------------------------------------------------------------------------------------------------------------------  IMPRESSION:  1. Acute heart failure with preserved ejection fraction. proBNP 230. I's and O's inaccurate. Resolved, appears euvolemic  2. Abnormal stress showing inferolateral and anterolateral ischemia  3. History of hypertension  4. Peripheral arterial disease  5. History of CVA, on clopidogrel  6. Cirrhosis  7. COPD  8. Tobacco abuse  9. Acute compression fractures  10. Seizure disorder  11. Alcohol abuse    RECOMMENDATIONS:  Heart catheterization attempted but unsuccessful, unable to gain arterial access due to severe calcific PAD. Currently free of anginal symptoms.  Aggressive medical management of CAD   Resume beta-blocker   Continue clopidogrel, statin   Continue oral furosemide   Consider outpatient coronary CT angiogram   Aggressive risk factor modification including smoking and alcohol cessation recommended   Further care per primary   Okay for discharge to rehab from cardiac standpoint   Outpatient follow-up with Dr. Kenan Moore Will see as needed, please call with questions    Shirley Jose MD, [facility]    NOTE: This report was transcribed using voice recognition software. Every effort was made to ensure accuracy; however, inadvertent computerized transcription errors may be present.

## 2021-11-16 NOTE — PROGRESS NOTES
Message left with [facility] to notify that this RN faxed patient facesheet and Dr Tracee Bartholomew office will fax over prescription for back brace. Number left to return call to verify they have received it.

## 2021-11-17 NOTE — PROGRESS NOTES
Progress Note  Date:2021       Room:0514/0514-02  Patient Name:Chari Velasquez     YOB: 1960     Age:60 y.o. Subjective    Subjective:  Symptoms:  Stable. She reports malaise and weakness. (Confused today, still refusing SNF for rehab, back brace fitted, wants to go home,  However, inappropriate behaviors last night and today. Treating for Klebsiella UTI currently). Diet:  Adequate intake. She reports  vomiting. No nausea. Activity level: Impaired due to weakness. Pain:  She complains of pain that is moderate. She reports pain is unchanged. Pain is well controlled. Review of Systems   Constitutional: Negative for fever. Gastrointestinal: Positive for vomiting. Negative for nausea. Neurological: Positive for weakness. All other systems reviewed and are negative. Objective         Vitals Last 24 Hours:  TEMPERATURE:  Temp  Av.5 °F (36.9 °C)  Min: 98 °F (36.7 °C)  Max: 98.7 °F (37.1 °C)  RESPIRATIONS RANGE: Resp  Av.7  Min: 18  Max: 20  PULSE OXIMETRY RANGE: SpO2  Av %  Min: 92 %  Max: 96 %  PULSE RANGE: Pulse  Av  Min: 79  Max: 96  BLOOD PRESSURE RANGE: Systolic (30EHC), NKM:920 , Min:100 , FXR:892   ; Diastolic (44AGQ), KTK:13, Min:56, Max:73    I/O (24Hr): Intake/Output Summary (Last 24 hours) at 2021 1411  Last data filed at 2021 0844  Gross per 24 hour   Intake    Output 800 ml   Net -800 ml     Objective:  General Appearance:  Ill-appearing. Vital signs: (most recent): Blood pressure (!) 165/73, pulse 94, temperature 98 °F (36.7 °C), temperature source Oral, resp. rate 18, height 4' 10\" (1.473 m), weight 183 lb 6.8 oz (83.2 kg), SpO2 96 %, not currently breastfeeding. Vital signs are normal.  No fever. Output: Producing urine and producing stool. HEENT: Normal HEENT exam.    Lungs:  Normal effort and normal respiratory rate. Heart: Normal rate. Neurological: Patient is alert. (Confused, delirious).

## 2021-11-17 NOTE — PROGRESS NOTES
Patient out of bed coming out to hallway yelling for help. States people trying to kill everyone. Yelling that there are bars on the window. Trying to exit room and swinging at staff. Code violet called. Fatoumata charge nurse at bedside. Dr. Veda Houston notified by Stevo Tirado [facility]. New orders received. Patient safely assisted back into bed after calming down.

## 2021-11-17 NOTE — PROGRESS NOTES
Patient able to answer all questions appropriately. When this nurse asked why she was trying to get out of bed and patient stated she was upset about not being discharged yesterday.

## 2021-11-17 NOTE — HOME CARE
[facility] received referral. Will follow after discharge. Spoke with patient  and verified demographics.   Chip Blunt LPN, [facility]

## 2021-11-17 NOTE — PROGRESS NOTES
Nutrition Assessment     Type and Reason for Visit: Initial, RD Nutrition Re-Screen/LOS    Nutrition Recommendations/Plan: Continue to monitor    Nutrition Assessment:  Pt admits w/ Dx: UTI, w/H/o CHF,COPD, Hepatic Cirrhosis. Pt tolerating diet w/ >75% p.o. intakes. Pt diet education completed. Nutrition Related Findings: A/ox4 w/ forgetfulness, abd distended/round, no edema, +BS, -I/O -2.4L      Current Nutrition Therapies:    ADULT DIET; Regular;  Low Sodium (2 gm)    Anthropometric Measures:  · Height: 4' 10\" (147.3 cm)  · Current Body Wt: 183 lb (83 kg) (11/17 bed scale)   · BMI: 38.3    Nutrition Diagnosis:   No nutrition diagnosis at this time    Nutrition Interventions:   Food and/or Nutrient Delivery:  Continue Current Diet  Nutrition Education/Counseling:  Education completed   Coordination of Nutrition Care:  Continue to monitor while inpatient    Goals:  Consume >75% meals       Nutrition Monitoring and Evaluation:   Behavioral-Environmental Outcomes:      Food/Nutrient Intake Outcomes:  Food and Nutrient Intake  Physical Signs/Symptoms Outcomes:  Biochemical Data, Fluid Status or Edema, Nutrition Focused Physical Findings, Skin, Weight     Discharge Planning:    No discharge needs at this time     Electronically signed by Crystal Carolina RD, CYNDIE on 11/17/21 at 6:51 AM EST    Contact: [phone]

## 2021-11-17 NOTE — CARE COORDINATION
11/17/21 1615 CM note: COVID (-) 11/15/21. Discharge cancelled. Psych consulted. Per  pt is probably suffering from mixture of ETOH and nicotine withdrawal. He states pt drinks nearly a 1/2 gallon of vodka per day as well as a 6 pack. Discharge plan is home with [facility]. [facility] order noted. Per Alejandra Mathis liaison, START OF CARE IS Saturday. Pt adamantly declines SNF. Pts  is concerned about how patient will be cared for at home if she is unable to care for herself as he has aides to come in to provide care for him through [address] [place] [address] Drive, [address] and he is unable to provide her any care. Patient will need ambulance transport home. [employer] packet given to patient.  Electronically signed by Jaclyn Cheng RN on 11/17/2021 at 4:27 PM

## 2021-11-18 NOTE — CARE COORDINATION
11/18/21 1320 CM note: COVID (-) 11/15/21. 1:1 sitter. Hx ETOH (on CIWA scale) & marijuana abuse. Recent compression fx- brace in room. Met with patient and her mom at the bedside to discuss discharge planning. Discharge plan remains home with [facility]. [facility] order noted. Per Alejandra mills, start of care is Saturday. Pt declines speaking with [facility] but was agreeable to accepting addiction recovery list. Patient will need ambulance transport home. Care patrol packet given. Electronically signed by Jaclyn Cheng RN on 11/18/2021 at 1:34 PM     ADDENDUM TO ABOVE CM NOTE:  Arrangements completed for pt to be transferred home today at [facility], [facility] via [facility]. ANGELO Almazan.11/18/2021.3:41 PM.      Update: 11/18/21 1628 Notified pts , Renetta Blair, of pts discharge today with ambulance transport set up for 8pm  with [facility]. Pt also updated.  Electronically signed by Jaclyn Cheng RN on 11/18/2021 at 4:29 PM

## 2021-11-18 NOTE — CONSULTS
PSYCHIATRY ATTENDING CONSULT    REASON FOR CONSULT:  AMS with psych history    REQUESTING PHYSICIAN:  Dr. Bubba Saleem: \"I have been drinking since I was 11years old. \"    HISTORY OF PRESENT ILLNESS:  Jabier Rollins  is a 56 y.o. female who was admitted to ICU 11/14/2021 due to CHF and UTI. Per note patient fell about 2 weeks ago and has a compression fracture L5. Chart reviewed. Note home psychotropics of Abilify 5 mg daily and Celexa 40 mg daily. Note reflects yesterday patient came out into the hallway yelling for help. Thought people were trying to kill everyone, and swinging at staff. Juan Lam was called at that time. Currently, patient is resting in bed. Fully alert and oriented. Sitter at bedside. Evie Samson states \"I am feeling a little sweaty, confused, and shaky. I think I am going through withdrawal from alcohol. \" Evie Samson does appear to be diaphoretic and some trembling. Patient admits to drinking 1/2 gallon of vodka and 3 beers daily. She states that she started drinking when she was 13. She also admits to some marijuana use but states that she uses it for pain and anxiety and she is not using it as much due to affordability. She does remember hallucinating and seeing bars on windows yesteday and believes it was due to to her withdrawing from alcohol. She states she has never had any long sobriety and drinks every day. Currently patient does not appear to be any significant emotional distress due to her alcohol withdrawal. She denies any paranoia or hallucinations at this time. She states that her depression medication is working and  does not feel depressed. She does admit to some anxiety due to her withdrawal.  She denies suicide and homicidal ideations. PAST PSYCHIATRIC HISTORY: Patient states she has never had any psychiatric admissions and has never seen a psychiatrist.  States she has never been in rehab or any counseling for her alcohol use disorder.     PAST MEDICAL HISTORY:       Diagnosis Date    Alcohol abuse     Cerebral artery occlusion with cerebral infarction [facility]     Depression     Diverticulosis of colon     Elevated blood sugar     H/O cardiovascular stress test 11/13/2021    Nuclear Lexiscan Stress Test    Hepatitis C     from blood transfusion, underwent treatment per patient and \"cured\"    Hyperlipidemia     Hypertension     Liver cirrhosis ([facility] [address] [facility]     Magnesium deficiency     Marijuana user     Nicotine dependence     Seizures ([facility] [address] [facility]     Subclinical hypothyroidism      PAST SURGICAL HISTORY:       Procedure Laterality Date    DILATION AND CURETTAGE OF UTERUS      LIVER BIOPSY  07/05/2016       MEDICATIONS: Current Facility-Administered Medications: LORazepam (ATIVAN) tablet 1 mg, 1 mg, Oral, Q1H PRN **OR** LORazepam (ATIVAN) injection 1 mg, 1 mg, IntraVENous, Q1H PRN **OR** LORazepam (ATIVAN) tablet 2 mg, 2 mg, Oral, Q1H PRN **OR** LORazepam (ATIVAN) injection 2 mg, 2 mg, IntraVENous, Q1H PRN **OR** LORazepam (ATIVAN) tablet 3 mg, 3 mg, Oral, Q1H PRN **OR** LORazepam (ATIVAN) injection 3 mg, 3 mg, IntraVENous, Q1H PRN **OR** LORazepam (ATIVAN) tablet 4 mg, 4 mg, Oral, Q1H PRN **OR** LORazepam (ATIVAN) injection 4 mg, 4 mg, IntraVENous, Q1H PRN  haloperidol lactate (HALDOL) injection 0.5 mg, 0.5 mg, IntraMUSCular, Q6H PRN  haloperidol (HALDOL) tablet 0.5 mg, 0.5 mg, Oral, Q6H PRN  furosemide (LASIX) tablet 40 mg, 40 mg, Oral, Daily  [Held by provider] lisinopril (PRINIVIL;ZESTRIL) tablet 10 mg, 10 mg, Oral, Daily  nicotine (NICODERM CQ) 21 MG/24HR 1 patch, 1 patch, TransDERmal, Daily  oxyCODONE (ROXICODONE) immediate release tablet 5 mg, 5 mg, Oral, Q6H PRN  sodium chloride flush 0.9 % injection 5-40 mL, 5-40 mL, IntraVENous, 2 times per day  sodium chloride flush 0.9 % injection 5-40 mL, 5-40 mL, IntraVENous, PRN  0.9 % sodium chloride infusion, 25 mL, IntraVENous, PRN  ondansetron (ZOFRAN-ODT) disintegrating tablet 4 mg, 4 mg, Oral, Q8H PRN **OR** ondansetron (ZOFRAN) injection 4 mg, 4 mg, IntraVENous, Q6H PRN  polyethylene glycol (GLYCOLAX) packet 17 g, 17 g, Oral, Daily PRN  acetaminophen (TYLENOL) tablet 650 mg, 650 mg, Oral, Q6H PRN **OR** acetaminophen (TYLENOL) suppository 650 mg, 650 mg, Rectal, Q6H PRN  albuterol sulfate  (90 Base) MCG/ACT inhaler 2 puff, 2 puff, Inhalation, Q6H PRN  [Held by provider] amLODIPine (NORVASC) tablet 5 mg, 5 mg, Oral, Daily  ARIPiprazole (ABILIFY) tablet 5 mg, 5 mg, Oral, Daily  atenolol (TENORMIN) tablet 50 mg, 50 mg, Oral, Daily  atorvastatin (LIPITOR) tablet 40 mg, 40 mg, Oral, Nightly  citalopram (CELEXA) tablet 40 mg, 40 mg, Oral, Daily  clopidogrel (PLAVIX) tablet 75 mg, 75 mg, Oral, Daily  levETIRAcetam (KEPPRA) tablet 500 mg, 500 mg, Oral, BID  levothyroxine (SYNTHROID) tablet 50 mcg, 50 mcg, Oral, QAM AC  magnesium oxide (MAG-OX) tablet 400 mg, 400 mg, Oral, BID  spironolactone (ALDACTONE) tablet 25 mg, 25 mg, Oral, Daily    ALLERGIES:  Codeine    FAMILY PSYCHIATRIC HISTORY: Mother and father major depression, and alcohol use disorder. SOCIAL HISTORY: Patient born locally but raised in [place], [place]. Moved back to [place] 16 years ago. Has been  for 16 years. Lives with  her spouse. Worked as a  and  most of her life. No children. SUBSTANCE ABUSE HISTORY:  reports that she has been smoking. She started smoking about 51 years ago. She has a 49.00 pack-year smoking history. She has never used smokeless tobacco. She reports current alcohol use of about 28.0 standard drinks of alcohol per week. She reports current drug use. Frequency: 7.00 times per week. Drug: Marijuana Charmayne Stai).     VITALS:   Vitals:    11/18/21 0842   BP: (!) 144/66   Pulse: 80   Resp: 18   Temp: 98.7 °F (37.1 °C)   SpO2: 93%       LABS:   Admission on 11/09/2021   Component Date Value Ref Range Status    WBC 11/09/2021 10.1  4.5 - 11.5 E9/L Final    RBC 11/09/2021 4.72  3.50 - 5.50 E12/L Final    Phosphatase 11/09/2021 154* 35 - 104 U/L Final    ALT 11/09/2021 59* 0 - 32 U/L Final    AST 11/09/2021 82* 0 - 31 U/L Final    Color, UA 11/09/2021 Yellow  Straw/Yellow Final    Clarity, UA 11/09/2021 Clear  Clear Final    Glucose, Ur 11/09/2021 Negative  Negative mg/dL Final    Bilirubin Urine 11/09/2021 Negative  Negative Final    Ketones, Urine 11/09/2021 15* Negative mg/dL Final    Specific [place], UA 11/09/2021 1.010  1.005 - 1.030 Final    Blood, Urine 11/09/2021 LARGE* Negative Final    pH, UA 11/09/2021 5.5  5.0 - 9.0 Final    Protein, UA 11/09/2021 Negative  Negative mg/dL Final    Urobilinogen, Urine 11/09/2021 2.0* <2.0 E.U./dL Final    Nitrite, Urine 11/09/2021 POSITIVE* Negative Final    Leukocyte Esterase, Urine 11/09/2021 MODERATE* Negative Final    Troponin, High Sensitivity 11/09/2021 10* 0 - 9 ng/L Final    Comment: High Sensitivity Troponin values cannot be compared with  other Troponin methodologies. Patients with high levels of Biotin oral intake (i.e. >5 mg/day)  may have falsely decreased Troponin levels. Samples collected  within 8 hours of biotin intake may require additional information  for diagnosis.       Ventricular Rate 11/09/2021 89  BPM Final    Atrial Rate 11/09/2021 89  BPM Final    P-R Interval 11/09/2021 182  ms Final    QRS Duration 11/09/2021 90  ms Final    Q-T Interval 11/09/2021 380  ms Final    QTc Calculation (Bazett) 11/09/2021 462  ms Final    P Axis 11/09/2021 46  degrees Final    R Axis 11/09/2021 56  degrees Final    T Axis 11/09/2021 56  degrees Final    Pro-BNP 11/09/2021 333* 0 - 125 pg/mL Final    WBC, UA 11/09/2021 5-10* 0 - 5 /HPF Final    RBC, UA 11/09/2021 1-3  0 - 2 /HPF Final    Epithelial Cells, UA 11/09/2021 FEW  /HPF Final    Bacteria, UA 11/09/2021 MANY* None Seen /HPF Final    Organism 11/09/2021 Klebsiella pneumoniae ssp pneumoniae*  Final    Urine Culture, Routine 11/09/2021 >100,000 CFU/ml   Final    TSH 11/09/2021 Final    CREATININE 11/11/2021 0.4* 0.5 - 1.0 mg/dL Final    GFR Non- 11/11/2021 >60  >=60 mL/min/1.73 Final    Comment: Chronic Kidney Disease: less than 60 ml/min/1.73 sq.m. Kidney Failure: less than 15 ml/min/1.73 sq.m. Results valid for patients 18 years and older.  GFR  11/11/2021 >60   Final    Calcium 11/11/2021 9.2  8.6 - 10.2 mg/dL Final    Pro-BNP 11/12/2021 231* 0 - 125 pg/mL Final    Magnesium 11/12/2021 1.5* 1.6 - 2.6 mg/dL Final    Sodium 11/12/2021 134  132 - 146 mmol/L Final    Potassium 11/12/2021 3.7  3.5 - 5.0 mmol/L Final    Chloride 11/12/2021 91* 98 - 107 mmol/L Final    CO2 11/12/2021 30* 22 - 29 mmol/L Final    Anion Gap 11/12/2021 13  7 - 16 mmol/L Final    Glucose 11/12/2021 183* 74 - 99 mg/dL Final    BUN 11/12/2021 10  6 - 23 mg/dL Final    CREATININE 11/12/2021 0.5  0.5 - 1.0 mg/dL Final    GFR Non- 11/12/2021 >60  >=60 mL/min/1.73 Final    Comment: Chronic Kidney Disease: less than 60 ml/min/1.73 sq.m. Kidney Failure: less than 15 ml/min/1.73 sq.m. Results valid for patients 18 years and older.  GFR  11/12/2021 >60   Final    Calcium 11/12/2021 9.7  8.6 - 10.2 mg/dL Final    Magnesium 11/13/2021 2.0  1.6 - 2.6 mg/dL Final    Sodium 11/13/2021 134  132 - 146 mmol/L Final    Potassium 11/13/2021 3.8  3.5 - 5.0 mmol/L Final    Chloride 11/13/2021 91* 98 - 107 mmol/L Final    CO2 11/13/2021 30* 22 - 29 mmol/L Final    Anion Gap 11/13/2021 13  7 - 16 mmol/L Final    Glucose 11/13/2021 103* 74 - 99 mg/dL Final    BUN 11/13/2021 12  6 - 23 mg/dL Final    CREATININE 11/13/2021 0.6  0.5 - 1.0 mg/dL Final    GFR Non- 11/13/2021 >60  >=60 mL/min/1.73 Final    Comment: Chronic Kidney Disease: less than 60 ml/min/1.73 sq.m. Kidney Failure: less than 15 ml/min/1.73 sq.m. Results valid for patients 18 years and older.       GFR  11/13/2021 >60 Final    Calcium 11/13/2021 9.9  8.6 - 10.2 mg/dL Final    Magnesium 11/14/2021 1.9  1.6 - 2.6 mg/dL Final    Sodium 11/14/2021 135  132 - 146 mmol/L Final    Potassium 11/14/2021 3.7  3.5 - 5.0 mmol/L Final    Chloride 11/14/2021 94* 98 - 107 mmol/L Final    CO2 11/14/2021 28  22 - 29 mmol/L Final    Anion Gap 11/14/2021 13  7 - 16 mmol/L Final    Glucose 11/14/2021 119* 74 - 99 mg/dL Final    BUN 11/14/2021 12  6 - 23 mg/dL Final    CREATININE 11/14/2021 0.6  0.5 - 1.0 mg/dL Final    GFR Non- 11/14/2021 >60  >=60 mL/min/1.73 Final    Comment: Chronic Kidney Disease: less than 60 ml/min/1.73 sq.m. Kidney Failure: less than 15 ml/min/1.73 sq.m. Results valid for patients 18 years and older.  GFR  11/14/2021 >60   Final    Calcium 11/14/2021 9.8  8.6 - 10.2 mg/dL Final    Pro-BNP 11/15/2021 221* 0 - 125 pg/mL Final    SARS-CoV-2, NAAT 11/15/2021 Not Detected  Not Detected Final    Comment: Rapid NAAT:   Negative results should be treated as presumptive and,  if inconsistent with clinical signs and symptoms or necessary for  patient management, should be tested with an alternative molecular  assay. Negative results do not preclude SARS-CoV-2 infection and  should not be used as the sole basis for patient management decisions. This test has been authorized by the FDA under an Emergency Use  Authorization (EUA) for use by authorized laboratories.     Fact sheet for Healthcare Providers:  BuildHer.es  Fact sheet for Patients: BuildHer.es    METHODOLOGY: Isothermal Nucleic Acid Amplification      Pro-BNP 11/18/2021 337* 0 - 125 pg/mL Final    Ammonia 11/17/2021 67.0* 11.0 - 51.0 umol/L Final    Sodium 11/17/2021 134  132 - 146 mmol/L Final    Potassium 11/17/2021 3.9  3.5 - 5.0 mmol/L Final    Chloride 11/17/2021 97* 98 - 107 mmol/L Final    CO2 11/17/2021 25  22 - 29 mmol/L Final    Anion Gap 11/17/2021 12  7 - 16 mmol/L Final    Glucose 11/17/2021 107* 74 - 99 mg/dL Final    BUN 11/17/2021 14  6 - 23 mg/dL Final    CREATININE 11/17/2021 0.6  0.5 - 1.0 mg/dL Final    GFR Non- 11/17/2021 >60  >=60 mL/min/1.73 Final    Comment: Chronic Kidney Disease: less than 60 ml/min/1.73 sq.m. Kidney Failure: less than 15 ml/min/1.73 sq.m. Results valid for patients 18 years and older.  GFR  11/17/2021 >60   Final    Calcium 11/17/2021 10.5* 8.6 - 10.2 mg/dL Final         MENTAL STATUS EXAMINATION  White female appears age. Pleasant, cooperative, forthcoming. Diminished psychomotor activity, strength, tone,. Eye contact fair. Gait not assessed. Mood anxious. Affect congruent. Speech clear. Thought process organized. Content Future oriented. No suicide or homicidal ideations. No current paranoia, delusions or hallucinations. Orientation, concentration, recent and remote memory are grossly intact. Fund of knowledge fair. Language use fair. Insight and judgment fair. ASSESSMENT:  Delirium due to alcohol use      Alcohol use disorder    PLAN & RECOMMENDATIONS: Suspect patient is not completely at cognitive baseline and suspect underlying delirium due to alcohol withdrawal.  We will start the CIWA and Ativan protocol and give as needed Haldol if patient continues hallucinating and is agitated. No indication for psychiatric admission but did strongly advise patient about her alcohol use disorder and would benefit from alcohol treatment. Psychiatry will sign off for now please call if any concerns.       GAMALIEL Pino CNP 11/18/2021 10:49 AM

## 2021-11-18 NOTE — PROGRESS NOTES
Patient currently resting in bed. Sitter continues to be at bedside. Will continue to monitor and assess patient.

## 2021-11-18 NOTE — PROGRESS NOTES
Progress Note  Date:2021       Room:0514/0514-02  Patient Name:Chari Ramsey     YOB: 1960     Age:60 y.o. Subjective    Subjective:  Symptoms:  Stable. She reports malaise and weakness. No shortness of breath, chest pain, chest pressure or anxiety. Diet:  Adequate intake. No nausea or vomiting. Activity level: Impaired due to weakness. Pain:  She reports no pain. Review of Systems   Constitutional: Negative for fever. Respiratory: Negative for shortness of breath. Cardiovascular: Negative for chest pain. Gastrointestinal: Negative for nausea and vomiting. Neurological: Positive for weakness. All other systems reviewed and are negative. Objective         Vitals Last 24 Hours:  TEMPERATURE:  Temp  Av.3 °F (36.8 °C)  Min: 98 °F (36.7 °C)  Max: 98.7 °F (37.1 °C)  RESPIRATIONS RANGE: Resp  Av.7  Min: 14  Max: 18  PULSE OXIMETRY RANGE: SpO2  Av.3 %  Min: 92 %  Max: 95 %  PULSE RANGE: Pulse  Av.8  Min: 77  Max: 80  BLOOD PRESSURE RANGE: Systolic (39KWY), VWT:936 , Min:114 , MJA:495   ; Diastolic (93ZUH), YVF:98, Min:61, Max:66    I/O (24Hr): Intake/Output Summary (Last 24 hours) at 2021 1524  Last data filed at 2021 1328  Gross per 24 hour   Intake 540 ml   Output 500 ml   Net 40 ml     Objective:  General Appearance:  Ill-appearing. Vital signs: (most recent): Blood pressure (!) 144/66, pulse 80, temperature 98.7 °F (37.1 °C), temperature source Oral, resp. rate 18, height 4' 10\" (1.473 m), weight 181 lb (82.1 kg), SpO2 93 %, not currently breastfeeding. Vital signs are normal.  No fever. Output: Producing urine and producing stool. HEENT: Normal HEENT exam.    Lungs:  Normal effort and normal respiratory rate. Heart: Normal rate. Regular rhythm. Abdomen: Abdomen is soft. Bowel sounds are normal.   There is no abdominal tenderness. Extremities: Decreased range of motion.     Pulses: Distal pulses are intact. Neurological: Patient is alert. Skin:  Warm and dry. Labs/Imaging/Diagnostics    Labs:  CBC:No results for input(s): WBC, RBC, HGB, HCT, MCV, RDW, PLT in the last 72 hours. CHEMISTRIES:  Recent Labs     11/17/21 2023      K 3.9   CL 97*   CO2 25   BUN 14   CREATININE 0.6   GLUCOSE 107*     PT/INR:No results for input(s): PROTIME, INR in the last 72 hours. APTT:No results for input(s): APTT in the last 72 hours. LIVER PROFILE:No results for input(s): AST, ALT, BILIDIR, BILITOT, ALKPHOS in the last 72 hours. Imaging Last 24 Hours:  No results found. Assessment//Plan           Hospital Problems           Last Modified POA    UTI (urinary tract infection) 11/9/2021 Yes    Acute on chronic diastolic (congestive) heart failure ([facility] [address] 11/11/2021 Yes    Pedal edema 11/11/2021 Yes    Chronic obstructive pulmonary disease ([facility] [address] 11/11/2021 Yes    Tobacco abuse 11/11/2021 Yes    Hepatic cirrhosis ([facility] [address] 11/11/2021 Yes    Hypokalemia 11/11/2021 Yes    Hypomagnesemia 11/11/2021 Yes        Assessment:    Condition: In stable condition. Improving. (Hepatic encephalopathy  Acute delirium  Klebsiella UTI      Elevated ammonia, started on lactulose, xifaxan, more alert and appropriate today, will plan for discharge). Plan:   Discharge home. Consults: psychiatry. Regular diet. (Plan for discharge  Follow up 1 weeks).        Electronically signed by Emily Nettles DO on 11/18/21 at 3:24 PM EST

## 2021-11-19 NOTE — CARE COORDINATION
[facility] Transitions Initial Follow Up Call    Call within 2 business days of discharge: Yes    Patient: Johanne Leslie Patient : 1960   MRN: <[id number]>  Reason for Admission: ACUTE ON CHRONIC CHF  Discharge Date: 21 RARS: Readmission Risk Score: 15.3 ( )      Last Discharge [facility]       Complaint Diagnosis Description Type Department Provider    21 Leg Swelling Acute on chronic congestive heart failure, unspecified heart failure type ([facility] [address] . .. ED to Hosp-Admission (Discharged) (ADMITTED) [address] [facility], DO        First attempt to reach the patient for [facility] PCP care transition call. Message left with CTN's contact information requesting return phone call, her  picked up at the end of leaving the message and stated [facility] is in bed. He is agreeable to CTN trying back Monday.      Follow Up  Future Appointments   Date Time Provider Denver Mortensen   2021 12:30 PM Gamal Ugalde MD [facility]       Carlos Dudley RN

## 2021-11-20 PROBLEM — E86.0 DEHYDRATION: Status: ACTIVE | Noted: 2021-01-01

## 2021-11-20 NOTE — ED NOTES
Pt. Was incontinent of stool and urine, patient cleaned up and linens changed.      Sohan Baker, CHRISTINE  11/20/21 1600

## 2021-11-20 NOTE — ED NOTES
Bed: 20  Expected date:   Expected time:   Means of arrival:   Comments:  SARAY Keen, RN  11/20/21 [id number]

## 2021-11-20 NOTE — PROGRESS NOTES
This nurse spoke with  at this time and gave an update about patient condition. He states that patient is unable to return home with him as he is unable to care for her. He states he is wheelchair/bed bound and he cannot give her the care that she needs at home because patient is unable to assist, is incontinent and bed bound and he is insistent that patient needs to go to nursing home / rehab at discharge.

## 2021-11-20 NOTE — ED PROVIDER NOTES
but, somewhat lethargic. Strong smell of alcoholic beverage about the patient's breath. HENT:      Head: Normocephalic and atraumatic. Eyes:      Pupils: Pupils are equal, round, and reactive to light. Comments: Horizontal nystagmus is noted on eye testing. Cardiovascular:      Rate and Rhythm: Normal rate and regular rhythm. Heart sounds: Normal heart sounds. No murmur heard. Comments: She is notably hypotensive. Pulmonary:      Effort: Pulmonary effort is normal. No respiratory distress. Breath sounds: Normal breath sounds. No wheezing or rales. Abdominal:      General: Bowel sounds are normal.      Palpations: Abdomen is soft. Tenderness: There is no abdominal tenderness. There is no guarding or rebound. Musculoskeletal:      Cervical back: Normal range of motion and neck supple. Skin:     General: Skin is warm and dry. Neurological:      Mental Status: She is alert. Cranial Nerves: No cranial nerve deficit. Coordination: Coordination normal.          Procedures     MDM     EKG: This EKG is signed and interpreted by me. Rate: 83  Rhythm: Sinus  Interpretation: no acute changes and non-specific EKG  Comparison: stable as compared to patient's most recent EKG     2:30 PM EST  Discussed with Dr. Estefani Mackay. He will admit.           --------------------------------------------- PAST HISTORY ---------------------------------------------  Past Medical History:  has a past medical history of Alcohol abuse, Cerebral artery occlusion with cerebral infarction ([facility] [address] Depression, Diverticulosis of colon, Elevated blood sugar, H/O cardiovascular stress test, Hepatitis C, Hyperlipidemia, Hypertension, Liver cirrhosis ([address] Magnesium deficiency, Marijuana user, Nicotine dependence, Seizures ([facility] [address] and Subclinical hypothyroidism. Past Surgical History:  has a past surgical history that includes Dilation and curettage of uterus and liver biopsy (07/05/2016).     Social History:  reports that she has been smoking. She started smoking about 51 years ago. She has a 49.00 pack-year smoking history. She has never used smokeless tobacco. She reports current alcohol use of about 28.0 standard drinks of alcohol per week. She reports current drug use. Frequency: 7.00 times per week. Drug: Marijuana Radhadeepa Simpson). Family History: family history includes Alcohol Abuse in her brother and father; Cancer in her brother and mother; Drug Abuse in her brother and father. The patients home medications have been reviewed.     Allergies: Codeine    -------------------------------------------------- RESULTS -------------------------------------------------    LABS:  Results for orders placed or performed during the hospital encounter of 11/20/21   CBC Auto Differential   Result Value Ref Range    WBC 13.0 (H) 4.5 - 11.5 E9/L    RBC 4.85 3.50 - 5.50 E12/L    Hemoglobin 15.8 (H) 11.5 - 15.5 g/dL    Hematocrit 44.2 34.0 - 48.0 %    MCV 91.1 80.0 - 99.9 fL    MCH 32.6 26.0 - 35.0 pg    MCHC 35.7 (H) 32.0 - 34.5 %    RDW 13.2 11.5 - 15.0 fL    Platelets 718 155 - 252 E9/L    MPV 10.5 7.0 - 12.0 fL    Neutrophils % 69.5 43.0 - 80.0 %    Lymphocytes % 16.9 (L) 20.0 - 42.0 %    Monocytes % 12.7 (H) 2.0 - 12.0 %    Eosinophils % 0.8 0.0 - 6.0 %    Basophils % 0.4 0.0 - 2.0 %    Neutrophils Absolute 9.10 (H) 1.80 - 7.30 E9/L    Lymphocytes Absolute 2.21 1.50 - 4.00 E9/L    Monocytes Absolute 1.69 (H) 0.10 - 0.95 E9/L    Eosinophils Absolute 0.10 0.05 - 0.50 E9/L    Basophils Absolute 0.00 0.00 - 0.20 E9/L    Poikilocytes 2+     [place] Cells 1+     Target Cells 1+    Lipase   Result Value Ref Range    Lipase 22 13 - 60 U/L   Lactic Acid, Plasma   Result Value Ref Range    Lactic Acid 4.0 (HH) 0.5 - 2.2 mmol/L   Protime-INR   Result Value Ref Range    Protime 16.2 (H) 9.3 - 12.4 sec    INR 1.4    Serum Drug Screen   Result Value Ref Range    Ethanol Lvl <10 mg/dL    Acetaminophen Level <5.0 (L) 10.0 - 30.0 mcg/mL Salicylate, Serum <3.7 0.0 - 30.0 mg/dL   Troponin   Result Value Ref Range    Troponin, High Sensitivity 16 (H) 0 - 9 ng/L   Ammonia   Result Value Ref Range    Ammonia 37.0 11.0 - 51.0 umol/L   Hepatic Function Panel   Result Value Ref Range    Total Protein 7.9 6.4 - 8.3 g/dL    Albumin 3.3 (L) 3.5 - 5.2 g/dL    Alkaline Phosphatase 160 (H) 35 - 104 U/L     (H) 0 - 32 U/L     (H) 0 - 31 U/L    Total Bilirubin 1.7 (H) 0.0 - 1.2 mg/dL    Bilirubin, Direct 0.4 (H) 0.0 - 0.3 mg/dL    Bilirubin, Indirect 1.3 (H) 0.0 - 1.0 mg/dL   Basic Metabolic Panel   Result Value Ref Range    Sodium 128 (L) 132 - 146 mmol/L    Potassium 4.3 3.5 - 5.0 mmol/L    Chloride 88 (L) 98 - 107 mmol/L    CO2 21 (L) 22 - 29 mmol/L    Anion Gap 19 (H) 7 - 16 mmol/L    Glucose 101 (H) 74 - 99 mg/dL    BUN 18 6 - 23 mg/dL    CREATININE 0.6 0.5 - 1.0 mg/dL    GFR Non-African American >60 >=60 mL/min/1.73    GFR African American >60     Calcium 9.6 8.6 - 10.2 mg/dL   TSH without Reflex   Result Value Ref Range    TSH 3.450 0.270 - 4.200 uIU/mL   Brain Natriuretic Peptide   Result Value Ref Range    Pro- (H) 0 - 125 pg/mL   POCT Glucose   Result Value Ref Range    Glucose 106 mg/dL   POCT Glucose   Result Value Ref Range    Meter Glucose 106 (H) 74 - 99 mg/dL   EKG 12 Lead   Result Value Ref Range    Ventricular Rate 83 BPM    Atrial Rate 83 BPM    P-R Interval 170 ms    QRS Duration 90 ms    Q-T Interval 394 ms    QTc Calculation (Bazett) 462 ms    P Axis 41 degrees    R Axis 54 degrees    T Axis 59 degrees       RADIOLOGY:  No orders to display           ------------------------- NURSING NOTES AND VITALS REVIEWED ---------------------------  Date / Time Roomed:  11/20/2021 11:51 AM  ED Bed Assignment:  20/20    The nursing notes within the ED encounter and vital signs as below have been reviewed.      Patient Vitals for the past 24 hrs:   BP Temp Temp src Pulse Resp SpO2 Height Weight   11/20/21 1316 105/66       

## 2021-11-20 NOTE — H&P
HISTORY AND PHYSICAL             Date: 11/20/2021        Patient Name: Sascha Aggarwal     YOB: 1960      Age:  56 y.o. Chief Complaint     Chief Complaint   Patient presents with    Fall     Frequent falls at home, recently discharged from here    Generalized Body Aches     C/o back and bilateral leg pain          History Obtained From   patient    History of Present Illness   56 y female with 2 days of increased weakness, lethargy and back pain,  Increased alcohol consumption. Increased debility. Abdominal distension noted. No CP, No SOB, no fever,  Unable to ambulate,  Refused SNF last week , will be admitted    Past Medical History     Past Medical History:   Diagnosis Date    Alcohol abuse     Cerebral artery occlusion with cerebral infarction ([facility] [address]     Depression     Diverticulosis of colon     Elevated blood sugar     H/O cardiovascular stress test 11/13/2021    Nuclear Lexiscan Stress Test    Hepatitis C     from blood transfusion, underwent treatment per patient and \"cured\"    Hyperlipidemia     Hypertension     Liver cirrhosis ([facility] [address]     Magnesium deficiency     Marijuana user     Nicotine dependence     Seizures ([facility] [address]     Subclinical hypothyroidism         Past Surgical History     Past Surgical History:   Procedure Laterality Date    DILATION AND CURETTAGE OF UTERUS      LIVER BIOPSY  07/05/2016        Medications Prior to Admission     Prior to Admission medications    Medication Sig Start Date End Date Taking?  Authorizing Provider   lactulose (CHRONULAC) 10 GM/15ML solution Take 30 mLs by mouth 2 times daily 11/18/21 12/18/21  Farzaneh Purple, DO   rifaximin [place]) 550 MG tablet Take 1 tablet by mouth 2 times daily 11/18/21   Farzaneh Purple, DO   spironolactone (ALDACTONE) 25 MG tablet Take 1 tablet by mouth daily 11/13/21   Farzaneh Purple, DO   nicotine (NICODERM CQ) 21 MG/24HR Place 1 patch onto the skin daily 11/12/21   Farzaneh Purple, DO   furosemide (LASIX) 20 MG tablet Take 1 tablet by mouth 2 times daily 11/12/21   Juanjo Beal, DO   oxyCODONE (ROXICODONE) 5 MG immediate release tablet Take 5 mg by mouth every 8 hours as needed for Pain.     Historical Provider, MD   levothyroxine (SYNTHROID) 25 MCG tablet take 1 tablet by mouth once daily 8/17/21   Raman Schulz, DO   magnesium oxide (MAG-OX) 400 MG tablet Take 1 tablet by mouth twice daily 7/27/21   Raman Schulz, DO   meloxicam (MOBIC) 15 MG tablet Take 1 tablet by mouth daily 6/30/21   Raman Schulz, DO   albuterol sulfate HFA (PROAIR HFA) 108 (90 Base) MCG/ACT inhaler Inhale 2 puffs into the lungs every 6 hours as needed for Wheezing 5/17/21   Raman Schulz, DO   amLODIPine (NORVASC) 5 MG tablet take 1 tablet by mouth once daily 5/17/21   Raman Schulz, DO   atenolol (TENORMIN) 50 MG tablet take 1 tablet by mouth once daily 5/17/21   Raman Schulz, DO   atorvastatin (LIPITOR) 40 MG tablet take 1 tablet by mouth once daily 5/17/21   Raman Schulz, DO   benazepril (LOTENSIN) 20 MG tablet take 1 tablet by mouth once daily 5/17/21   Raman Schulz, DO   citalopram (CELEXA) 40 MG tablet take 1 tablet by mouth once daily 5/17/21   Raman Schulz, DO   clopidogrel (PLAVIX) 75 MG tablet take 1 tablet by mouth once daily 5/17/21   Raman Schulz, DO   cyclobenzaprine (FLEXERIL) 5 MG tablet take 1 tablet by mouth twice a day if needed for muscle spasm - DO NOT TAKE WHILE DRINKING ALCOHOL OR DRIVING 8/40/47   Raman Schulz, DO   levETIRAcetam (KEPPRA) 500 MG tablet take 1 tablet by mouth twice a day 5/17/21   Raman Schulz, DO   ARIPiprazole (ABILIFY) 5 MG tablet take 1 tablet by mouth once daily 3/29/21   Liane Johnson PA-C        Allergies   Codeine    Social History     Social History     Tobacco History     Smoking Status  Current Every Day Smoker Smoking Start Date  1/1/1970 Smoking Frequency  1 pack/day for 49 years (52 pk yrs)    Smokeless Tobacco Use  Never Used Alcohol History     Alcohol Use Status  Yes Drinks/Week  14 Cans of beer, 14 Shots of liquor per week Amount  28.0 standard drinks of alcohol/wk Comment  2 cans of beer and 2 shots of liquor per day          Drug Use     Drug Use Status  Yes Types  Marijuana (Weed) Frequency   7 times/week Comment  every day          Sexual Activity     Sexually Active  Not Currently                Family History     Family History   Problem Relation Age of Onset    Cancer Mother         Breast    Alcohol Abuse Father     Drug Abuse Father     Cancer Brother     Alcohol Abuse Brother     Drug Abuse Brother        Review of Systems   Review of Systems   Constitutional: Positive for fatigue. Neurological: Positive for weakness. Psychiatric/Behavioral: Positive for confusion. All other systems reviewed and are negative. Physical Exam   /70   Pulse 94   Temp 99.9 °F (37.7 °C) (Oral)   Resp 22   Ht 4' 10\" (1.473 m)   Wt 175 lb (79.4 kg)   SpO2 90%   BMI 36.58 kg/m²     Physical Exam  Vitals reviewed. Constitutional:       Appearance: She is obese. She is ill-appearing. HENT:      Head: Normocephalic and atraumatic. Nose: Nose normal.      Mouth/Throat:      Mouth: Mucous membranes are moist.   Eyes:      Extraocular Movements: Extraocular movements intact. Pupils: Pupils are equal, round, and reactive to light. Cardiovascular:      Rate and Rhythm: Normal rate and regular rhythm. Heart sounds: Normal heart sounds. Musculoskeletal:      Cervical back: Normal range of motion and neck supple. Right lower leg: Edema present. Left lower leg: Edema present. Neurological:      General: No focal deficit present. Mental Status: She is disoriented.    Psychiatric:         Mood and Affect: Mood normal.         Labs      Recent Results (from the past 24 hour(s))   POCT Glucose    Collection Time: 11/20/21 12:46 PM   Result Value Ref Range    Meter Glucose 106 (H) 74 - 99 mg/dL EKG 12 Lead    Collection Time: 11/20/21 12:49 PM   Result Value Ref Range    Ventricular Rate 83 BPM    Atrial Rate 83 BPM    P-R Interval 170 ms    QRS Duration 90 ms    Q-T Interval 394 ms    QTc Calculation (Bazett) 462 ms    P Axis 41 degrees    R Axis 54 degrees    T Axis 59 degrees   CBC Auto Differential    Collection Time: 11/20/21 12:56 PM   Result Value Ref Range    WBC 13.0 (H) 4.5 - 11.5 E9/L    RBC 4.85 3.50 - 5.50 E12/L    Hemoglobin 15.8 (H) 11.5 - 15.5 g/dL    Hematocrit 44.2 34.0 - 48.0 %    MCV 91.1 80.0 - 99.9 fL    MCH 32.6 26.0 - 35.0 pg    MCHC 35.7 (H) 32.0 - 34.5 %    RDW 13.2 11.5 - 15.0 fL    Platelets 311 955 - 886 E9/L    MPV 10.5 7.0 - 12.0 fL    Neutrophils % 69.5 43.0 - 80.0 %    Lymphocytes % 16.9 (L) 20.0 - 42.0 %    Monocytes % 12.7 (H) 2.0 - 12.0 %    Eosinophils % 0.8 0.0 - 6.0 %    Basophils % 0.4 0.0 - 2.0 %    Neutrophils Absolute 9.10 (H) 1.80 - 7.30 E9/L    Lymphocytes Absolute 2.21 1.50 - 4.00 E9/L    Monocytes Absolute 1.69 (H) 0.10 - 0.95 E9/L    Eosinophils Absolute 0.10 0.05 - 0.50 E9/L    Basophils Absolute 0.00 0.00 - 0.20 E9/L    Poikilocytes 2+     Barb Cells 1+     Target Cells 1+    Lipase    Collection Time: 11/20/21 12:56 PM   Result Value Ref Range    Lipase 22 13 - 60 U/L   Lactic Acid, Plasma    Collection Time: 11/20/21 12:56 PM   Result Value Ref Range    Lactic Acid 4.0 (HH) 0.5 - 2.2 mmol/L   Protime-INR    Collection Time: 11/20/21 12:56 PM   Result Value Ref Range    Protime 16.2 (H) 9.3 - 12.4 sec    INR 1.4    Serum Drug Screen    Collection Time: 11/20/21 12:56 PM   Result Value Ref Range    Ethanol Lvl <10 mg/dL    Acetaminophen Level <5.0 (L) 10.0 - 22.9 mcg/mL    Salicylate, Serum <0.4 0.0 - 30.0 mg/dL   Troponin    Collection Time: 11/20/21 12:56 PM   Result Value Ref Range    Troponin, High Sensitivity 16 (H) 0 - 9 ng/L   Ammonia    Collection Time: 11/20/21 12:56 PM   Result Value Ref Range    Ammonia 37.0 11.0 - 51.0 umol/L   Hepatic Function Panel    Collection Time: 11/20/21 12:56 PM   Result Value Ref Range    Total Protein 7.9 6.4 - 8.3 g/dL    Albumin 3.3 (L) 3.5 - 5.2 g/dL    Alkaline Phosphatase 160 (H) 35 - 104 U/L     (H) 0 - 32 U/L     (H) 0 - 31 U/L    Total Bilirubin 1.7 (H) 0.0 - 1.2 mg/dL    Bilirubin, Direct 0.4 (H) 0.0 - 0.3 mg/dL    Bilirubin, Indirect 1.3 (H) 0.0 - 1.0 mg/dL   Basic Metabolic Panel    Collection Time: 11/20/21 12:56 PM   Result Value Ref Range    Sodium 128 (L) 132 - 146 mmol/L    Potassium 4.3 3.5 - 5.0 mmol/L    Chloride 88 (L) 98 - 107 mmol/L    CO2 21 (L) 22 - 29 mmol/L    Anion Gap 19 (H) 7 - 16 mmol/L    Glucose 101 (H) 74 - 99 mg/dL    BUN 18 6 - 23 mg/dL    CREATININE 0.6 0.5 - 1.0 mg/dL    GFR Non-African American >60 >=60 mL/min/1.73    GFR African American >60     Calcium 9.6 8.6 - 10.2 mg/dL   TSH without Reflex    Collection Time: 11/20/21 12:56 PM   Result Value Ref Range    TSH 3.450 0.270 - 4.200 uIU/mL   Brain Natriuretic Peptide    Collection Time: 11/20/21 12:56 PM   Result Value Ref Range    Pro- (H) 0 - 125 pg/mL   POCT Glucose    Collection Time: 11/20/21  1:19 PM   Result Value Ref Range    Glucose 106 mg/dL        Imaging/Diagnostics Last 24 Hours   No results found. Assessment      Hospital Problems           Last Modified POA    Dehydration 11/20/2021 Yes      lactic acidosis  Hypotension  Alcohol dependence  Hepatic encephalopathy  Cirrhosis      Plan   1. Admit to telemetry  2. Monitor labs  3. Pt/OT  4. Social service  5. Resume home meds  6. CIWA  7.  Will follow    Consultations Ordered:  IP CONSULT TO PRIMARY CARE PROVIDER  IP CONSULT TO SOCIAL WORK    Electronically signed by Miguel Riggins DO on 11/20/21 at 5:04 PM EST

## 2021-11-20 NOTE — PROGRESS NOTES
Patient agitated and restless at this time. States she just wants to get up out of bed, is rolling back and forth and picking at tele monitor. This nurse assisted with readjusting patient in the bed, she denies need to use the bathroom. Medicated with ativan as ordered per CIWA scale. She is refusing all other medications at this time.

## 2021-11-21 NOTE — HOME CARE
Patient is an open referral with [facility] for sn, pt, ot, ms. Will need ALL NEW HOME CARE ORDERS prior to discharge.  Mimi Billingsley lpn

## 2021-11-21 NOTE — PROGRESS NOTES
Physical Therapy CHART REVIEW ONLY    Physical Therapy Initial Evaluation/Plan of Care    Room #:  4386/3567-67  Patient Name: Ivett Valladares  YOB: 1960  MRN: [id number]    Date of Service: 11/22/2021     Tentative placement recommendation: Subacute rehab  Equipment recommendation: To be determined      Evaluating Physical Therapist: Jonathon Harris PT  #[id number]      Specific Provider Orders/Date/Referring Provider :  11/20/21 1715   PT eval and treat Start: 11/20/21 1715, End: 11/20/21 1715, ONE TIME, Standing Count: 1 Occurrences, R    Order went unreviewed at transfer on Sun Nov 21, 2021 12:11 AM   Jarrod Lilly DO      Admitting Diagnosis:   Dehydration [E86.0]  Alcoholism ([address] [F10.20]  Hyponatremia [E87.1]  Unable to ambulate [R26.2]    Admitted with  Gait dysfunction ;  recent d/c from acute care   Surgery: none  Visit Diagnoses       Codes    Unable to ambulate     R26.2    Alcoholism ([address]     F10.20          Patient Active Problem List   Diagnosis    Acute respiratory failure with hypoxia ([address]    Alcohol abuse    Cerebral artery occlusion with cerebral infarction ([address]    Hypertension    Depression    Hyperlipidemia    Seizures ([address]    Alcoholic intoxication without complication ([address]    Hyponatremia    Nicotine dependence    Marijuana user    Fall at home, initial encounter    UTI (urinary tract infection)    Acute on chronic diastolic (congestive) heart failure (HCC)    Pedal edema    Chronic obstructive pulmonary disease ([address]    Tobacco abuse    Hepatic cirrhosis ([address]    Hypokalemia    Hypomagnesemia    Dehydration        ASSESSMENT of Current Deficits Patient exhibits decreased strength, balance, endurance, coordination and pain back impairing functional mobility, transfers, gait , gait distance and tolerance to activity are barriers to d/c and require skilled intervention during hospital stay to attain pre hospital level of function.  Decreased strength, balance and endurance  increases patient's risk for fall. Slight confusion impairing safety and follow through       PHYSICAL THERAPY  PLAN OF CARE       Physical therapy plan of care is established based on physician order,  patient diagnosis and clinical assessment    Current Treatment Recommendations:    -Bed Mobility: Lower extremity exercises , Upper extremity exercises  and Trunk control activities   -Standing Balance: Perform strengthening exercises in standing to promote motor control with or without upper extremity support   -Transfers: Provide instruction on proper hand and foot position for adequate transfer of weight onto lower extremities and use of gait device if needed and Cues for hand placement, technique and safety. Provide stabilization to prevent fall   -Gait: Gait training and Standing activities to improve: base of support, weight shift, weight bearing    -Endurance: Utilize Supervised activities to increase level of endurance to allow for safe functional mobility including transfers and gait     PT long term treatment goals are located in below grid    Patient and or family understand(s) diagnosis, prognosis, and plan of care. Frequency of treatments: Patient will be seen  daily.          Prior Level of Function: Patient uses wheelchair vs bed bound per   Rehab Potential: good    for baseline    Past medical history:   Past Medical History:   Diagnosis Date    Alcohol abuse     Cerebral artery occlusion with cerebral infarction ([address]     Depression     Diverticulosis of colon     Elevated blood sugar     H/O cardiovascular stress test 11/13/2021    Nuclear Lexiscan Stress Test    Hepatitis C     from blood transfusion, underwent treatment per patient and \"cured\"    Hyperlipidemia     Hypertension     Liver cirrhosis ([address]     Magnesium deficiency     Marijuana user     Nicotine dependence     Seizures ([address]     Subclinical hypothyroidism      Past Surgical History: Procedure Laterality Date    DILATION AND CURETTAGE OF UTERUS      LIVER BIOPSY  07/05/2016       SUBJECTIVE:    Precautions: Up as tolerated, falls, alarm and incontinent , ciwa  Social history: Patient lives with spouse who also uses wheelchair and unable to leave living room d/t wheelchair size in a ranch home  with Ramp  to enter   Equipment owned: Standard Walker and Quad cane,   Patient states she has back [address] Inpatient   How much difficulty turning over in bed?: A Little  How much difficulty sitting down on / standing up from a chair with arms?: A Lot  How much difficulty moving from lying on back to sitting on side of bed?: A Lot  How much help from another person moving to and from a bed to a chair?: A Lot  How much help from another person needed to walk in hospital room?: A Lot  How much help from another person for climbing 3-5 steps with a railing?: A Lot  AM-PAC Inpatient Mobility Raw Score : 13  AM-PAC Inpatient T-Scale Score : 36.74  Mobility Inpatient CMS 0-100% Score: 64.91  Mobility Inpatient CMS G-Code Modifier : CL    Nursing cleared patient for PT evaluation. The admitting diagnosis and active problem list as listed above have been reviewed prior to the initiation of this evaluation. OBJECTIVE;   Initial Evaluation  Date: 11/21/2021 Treatment Date:     Short Term/ Long Term   Goals   Was pt agreeable to Eval/treatment? Yes  To be met in 3 days   Pain level   8/10  back     Bed Mobility    Rolling: Moderate assist of 1    Supine to sit: Moderate assist of 1    Sit to supine: Moderate assist of 1    Scooting: Minimal assist of 1    Rolling: Supervision     Supine to sit: Supervision     Sit to supine: Supervision     Scooting: Supervision      Transfers Sit to stand:  Moderate assist of 1 to pivot with wheeled walker   Sit to stand: Supervision      Ambulation    2-3 steps using  wheeled walker with Moderate assist of 1   for walker approximation and balance   10 feet using  wheeled walker with Minimal assist of 1    ROM Within functional limits        Strength BUE:  refer to OT eval  RLE:  3+/5  LLE:  3+/5  Increase strength in affected mm groups by 1/3 grade   Balance Sitting EOB:  fair minus  Dynamic Standing:  poor    Sitting EOB:  good    Dynamic Standing: fair with wheeled walker      Patient is Alert & Oriented x person and place and follows one step directions  Confused with directions and follow through  Sensation:  Patient  denies numbness/tingling   Edema:  no   Endurance: poor     2 Liters of o2 via nasal cannula however not applied upon entering room: po2 43%  After application: 99%      Patient education  Patient educated on role of Physical Therapy, risks of immobility, safety and plan of care and  importance of mobility while in hospital      Patient response to education:   Pt verbalized understanding Pt demonstrated skill Pt requires further education in this area   Yes Partial Yes      Treatment:  Patient practiced and was instructed/facilitated in the following treatment: Patient   Sat edge of bed 10 minutes with Minimal assist of 1 to increase dynamic sitting balance and activity tolerance. seated and standing challenges      Therapeutic Exercises:  ankle pumps and long arc quad  x 10 reps. At end of session, patient in bed with alarm call light and phone within reach,  all lines and tubes intact, nursing notified. Patient would benefit from continued skilled Physical Therapy to improve functional independence and quality of life.          Patient's/ family goals   none stated    Time in  1014  Time out  1044    Total Treatment Time  10 minutes    Evaluation time includes thorough review of current medical information, gathering information on past medical history/social history and prior level of function, completion of standardized testing/informal observation of tasks, assessment of data, and development of Plan of care and goals.      CPT codes:  Low Complexity PT evaluation (03145)  Neuromuscular reeducation (37571)   10 minutes  1 unit(s)    Yanira Smith PT

## 2021-11-21 NOTE — CARE COORDINATION
11/21/21 1716 CM note: Covid (-) 11/15/21 MONSTER consult for dc planning/ possible rehab noted. Patient recently discharge from [facility] on 11/18/21 to home with [facility] . Hx ETOH and marijuana abuse. Smoker. Met with pt at the bedside to discuss transition of care at discharge. Pt alert to person and place. Patient able to answer some questions appropriately but still has a lot of confusion. Pt agreed to speak with someone from [facility]. Referral given to Hanane Stout and he states Kathy will see patient tomorrow. Pt is agreeable to me calling her  Desiree Hand. Patient resides with Desiree Hand in a 1 floor with ramp to enter home. Desiree Hand states they have been together for 17 yrs. Pt is normally independent with ADLs and is even able to assist with his ADLS as he is an amputee and otherwise has aides from [address] [place] [address] Drive, [address] MW for 2 hrs per day. Pts DME includes cane, wc, walk-in shower with built-in shower. Pts PCP is Marcelene Aschoff and her pharmacy is [facility] in [facility]. Pt has hx ETOH. Desiree Hand states pt drinks 1 gallon vodka per day plus a 6 pack. Pt has no hx ETOH rehab or counseling in the yrs he's known her. Patient active with [facility] for nursing, nurse aid, SW, thearpy; however she returned to hospital before their 1st visit. No hx SNF. eDsiree Yazan states he would love for patient to come home BUT she can only come home if she is able to care for herself because he can not care for her properly. He told patient he couldn't help her prior to her discharge last time, but patient insisted she could care for herself. Desiree Yazan states patient fell two times while at home. Once in her bedroom so he called EMS and they came to pick her up. They wanted to bring her back to hospital at that time but patient refused. Pt fell again about 1-2 hrs later and Desiree Yazan had to call them again. Pt tried to refuse coming to hospital again, but then admitted maybe she should come in d/t numbness in both her lower extremities.  CM discussed [facility] [place]. with Mushtaq Osborn as they can provide both medical and ETOH rehab for patient. Mushtaq Osborn states he doesn't have preference to where pt goes- he wants patient to go to best place for her to get better. Will await [facility] and therapy evals. CM/SW will continue to follow discharge planning.  Electronically signed by Fran Vicente RN on 11/21/2021 at 5:40 PM

## 2021-11-22 NOTE — PROGRESS NOTES
Progress Note  Date:2021       Room:0416/0416-01  Patient Name:Chari Crawford     YOB: 1960     Age:60 y.o. Subjective    Subjective:  Symptoms:  Stable. She reports malaise and weakness. Diet:  Poor intake. No nausea or vomiting. Activity level: Impaired due to weakness. Pain:  She reports no pain. Review of Systems   Constitutional: Negative for fever. Gastrointestinal: Negative for nausea and vomiting. Neurological: Positive for weakness. All other systems reviewed and are negative. Objective         Vitals Last 24 Hours:  TEMPERATURE:  Temp  Av.8 °F (37.1 °C)  Min: 98.6 °F (37 °C)  Max: 98.9 °F (37.2 °C)  RESPIRATIONS RANGE: Resp  Av  Min: 22  Max: 22  PULSE OXIMETRY RANGE: SpO2  Av.5 %  Min: 92 %  Max: 93 %  PULSE RANGE: Pulse  Av.5  Min: 87  Max: 90  BLOOD PRESSURE RANGE: Systolic (82MWB), WRU:113 , Min:109 , LKK:839   ; Diastolic (20LFJ), PGC:05, Min:57, Max:59    I/O (24Hr): Intake/Output Summary (Last 24 hours) at 2021 2144  Last data filed at 2021 1401  Gross per 24 hour   Intake 100 ml   Output    Net 100 ml     Objective:  General Appearance:  Ill-appearing. Vital signs: (most recent): Blood pressure (!) 120/56, pulse 95, temperature 98.8 °F (37.1 °C), temperature source Oral, resp. rate 22, height 4' 10\" (1.473 m), weight 175 lb (79.4 kg), SpO2 95 %, not currently breastfeeding. Vital signs are normal.  No fever. Output: Producing urine and producing stool. HEENT: Normal HEENT exam.    Lungs:  Normal effort and normal respiratory rate. Heart: Normal rate. Regular rhythm. Abdomen: Abdomen is soft and distended. There are signs of ascites. Bowel sounds are normal.   There is generalized tenderness. Extremities: Decreased range of motion. Neurological: Patient is alert. Skin:  Warm and dry.       Labs/Imaging/Diagnostics    Labs:  CBC:  Recent Labs     21  1256   WBC 13.0*   RBC 4.85   HGB 15.8*   HCT 44.2   MCV 91.1   RDW 13.2        CHEMISTRIES:  Recent Labs     11/20/21  1256 11/20/21  1319   *  --    K 4.3  --    CL 88*  --    CO2 21*  --    BUN 18  --    CREATININE 0.6  --    GLUCOSE 101* 106     PT/INR:  Recent Labs     11/20/21  1256   PROTIME 16.2*   INR 1.4     APTT:No results for input(s): APTT in the last 72 hours. LIVER PROFILE:  Recent Labs     11/20/21  1256   *   *   BILIDIR 0.4*   BILITOT 1.7*   ALKPHOS 160*       Imaging Last 24 Hours:  No results found. Assessment//Plan           Hospital Problems           Last Modified POA    Dehydration 11/20/2021 Yes        Assessment:    Condition: In stable condition. Unchanged. (Cirrhosis  Ascites  Hepatic encephalopathy  Hyponatremia  Debility  Alcohol withdrawal syndrome). Plan:   Regular diet. (Continue current management  Will follow).        Electronically signed by John Ramirez DO on 11/21/21 at 9:44 PM EST

## 2021-11-22 NOTE — CARE COORDINATION
[facility] Transitions Initial Follow Up Call    Call within 2 business days of discharge: Yes    Patient: wSati Trinidad Patient : 1960   MRN: <[id number]>  Reason for Admission: ACUTE ON CHRONIC CHF   Discharge Date: 21 RARS: Readmission Risk Score: 21.3 ( )    Second attempt for Non [facility] PCP care transition call not made, patient readmitted to [address] [place] [address] Floor 21.      Follow Up  Future Appointments   Date Time Provider Denver Mortensen   2021 12:30 PM Celeste Canales MD [facility]       Pedro Zaragoza RN

## 2021-11-22 NOTE — CARE COORDINATION
[facility] Support Note    Name: Johanne Leslie  Date: 11/22/2021    Chief Complaint   Patient presents with   Sharma Fall     Frequent falls at home, recently discharged from here    Generalized Body Aches     C/o back and bilateral leg pain       Peer Support met with patient. [] Support and education provided  [] Resources provided   [] Treatment referral:   [x] Other:  [] Patient declined peer recovery services     Referred By: Aaron Gonzalez (CM)    Notes: Patient was acting confused and saying she was going home and did not need to talk about her alcohol abuse. Peer let her know that there were services if she needed.   Signed: Mark Laguerre, 11/22/2021

## 2021-11-22 NOTE — PROGRESS NOTES
Progress Note  Date:2021       Room:0416/0416-01  Patient Name:Mary Ileana Dancer     YOB: 1960     Age:60 y.o. Subjective    Subjective:  Symptoms:  Stable. She reports shortness of breath, malaise, weakness and anorexia. Diet:  Poor intake. No nausea or vomiting. Activity level: Impaired due to weakness. Pain:  She reports no pain. Review of Systems   Constitutional: Negative for fever. Respiratory: Positive for shortness of breath. Gastrointestinal: Positive for anorexia. Negative for nausea and vomiting. Neurological: Positive for weakness. All other systems reviewed and are negative. Objective         Vitals Last 24 Hours:  TEMPERATURE:  Temp  Av.6 °F (37 °C)  Min: 98.3 °F (36.8 °C)  Max: 98.8 °F (37.1 °C)  RESPIRATIONS RANGE: Resp  Av  Min: 20  Max: 22  PULSE OXIMETRY RANGE: SpO2  Av.5 %  Min: 93 %  Max: 96 %  PULSE RANGE: Pulse  Av.6  Min: 84  Max: 95  BLOOD PRESSURE RANGE: Systolic (21OCK), MEU:209 , Min:89 , XGU:359   ; Diastolic (82VJH), GIU:16, Min:52, Max:63    I/O (24Hr): Intake/Output Summary (Last 24 hours) at 2021 1307  Last data filed at 2021 0956  Gross per 24 hour   Intake 350 ml   Output    Net 350 ml     Objective:  General Appearance:  Ill-appearing. Vital signs: (most recent): Blood pressure (!) 120/56, pulse 95, temperature 98.8 °F (37.1 °C), temperature source Oral, resp. rate 22, height 4' 10\" (1.473 m), weight 175 lb (79.4 kg), SpO2 95 %, not currently breastfeeding. Vital signs are normal.  No fever. Output: Producing urine and producing stool. HEENT: Normal HEENT exam.    Lungs:  Normal effort and normal respiratory rate. Heart: Normal rate. Regular rhythm. Abdomen: Abdomen is soft and distended. There are signs of ascites. There is generalized tenderness. Extremities: Decreased range of motion. Neurological: Patient is alert. Skin:  Warm and dry. Labs/Imaging/Diagnostics    Labs:  CBC:  Recent Labs     11/20/21  1256   WBC 13.0*   RBC 4.85   HGB 15.8*   HCT 44.2   MCV 91.1   RDW 13.2        CHEMISTRIES:  Recent Labs     11/20/21  1256 11/20/21  1319   *  --    K 4.3  --    CL 88*  --    CO2 21*  --    BUN 18  --    CREATININE 0.6  --    GLUCOSE 101* 106     PT/INR:  Recent Labs     11/20/21  1256   PROTIME 16.2*   INR 1.4     APTT:No results for input(s): APTT in the last 72 hours. LIVER PROFILE:  Recent Labs     11/20/21  1256   *   *   BILIDIR 0.4*   BILITOT 1.7*   ALKPHOS 160*       Imaging Last 24 Hours:  No results found. Assessment//Plan           Hospital Problems           Last Modified POA    Dehydration 11/20/2021 Yes        Assessment:    Condition: In stable condition. Unchanged. (Hepatic encephalopathy  Cirrhosis  Ascites  Debility  Lactic acidosis  Hyponatremia  ). Plan:   Ad annabella activity. Regular diet. (Add IVF  Check sepsis labs  US liver  Continue CIWA  Monitor BP  SNF placement  Will follow).        Electronically signed by Sergio Aldrich DO on 11/22/21 at 1:07 PM EST

## 2021-11-22 NOTE — PROGRESS NOTES
[address] [facility]  [place]. [place]        Date:2021                                                  Patient Name: Loretta Richard    MRN: [id number]    : 1960    Room: [facility]1020-96      Evaluating OT: Maggy Cortez OTR/L; [id number]     Referring Provider and Specific Provider Orders/Date:      21  OT eval and treat  Start:  21,   End:  21,   ONE TIME,   Standing Count:  1 Occurrences,   R        Order went unreviewed at transfer on Sun 2021 12:11 AM    Rosalio Cat DO     Placement Recommendation: Subacute        Diagnosis:   1. Hyponatremia    2. Dehydration    3. Unable to ambulate    4. Alcoholism [facility])         Surgery: none       Pertinent Medical History:       Past Medical History:   Diagnosis Date    Alcohol abuse     Cerebral artery occlusion with [facility] infarction ([facility]     Depression     Diverticulosis of colon     Elevated blood sugar     H/O cardiovascular stress test 2021    Nuclear Lexiscan Stress Test    Hepatitis C     from blood transfusion, underwent treatment per patient and \"cured\"    Hyperlipidemia     Hypertension     Liver cirrhosis ([facility]     Magnesium deficiency     Marijuana user     Nicotine dependence     Seizures ([facility]     Subclinical hypothyroidism          Past Surgical History:   Procedure Laterality Date    DILATION AND CURETTAGE OF UTERUS      LIVER BIOPSY  2016      Precautions:  Fall Risk, confusion, difficulty following 1 step commands, alarm. Poor insight into deficits. Comment: pt states she takes oxygen pills?       Assessment of current deficits:     [x] Functional mobility  [x]ADLs  [x] Strength               [x]Cognition    [x] Functional transfers   [x] IADLs         [x] Safety Awareness   [x]Endurance    [] Fine Coordination              [x] Balance      [] Vision/perception []Sensation     []Gross Motor Coordination  [] ROM  [] Delirium                   [] Motor Control     OT PLAN OF CARE   OT POC based on physician orders, patient diagnosis and results of clinical assessment    Frequency/Duration 1-3 days/wk for 2 weeks PRN     Specific OT Treatment Interventions to include:   * Instruction/training on adapted ADL techniques and AE recommendations to increase functional independence within precautions       * Training on energy conservation strategies, correct breathing pattern and techniques to improve independence/tolerance for self-care routine  * Functional transfer/mobility training/DME recommendations for increased independence, safety, and fall prevention  * Patient/Family education to increase follow through with safety techniques and functional independence  * Recommendation of environmental modifications for increased safety with functional transfers/mobility and ADLs  * Cognitive retraining/development of therapeutic activities to improve problem solving, judgement, memory, and attention for increased safety/participation in ADL/IADL tasks  * Positioning for increased function, prevention of contractures, and improve skin integrity  * Therapeutic exercise to improve motor endurance, ROM, and functional strength for ADLs/functional transfers  * Therapeutic activities to facilitate/challenge dynamic balance, stand tolerance for increased safety and independence with ADLs  * Positioning to improve skin integrity, interaction with environment and functional independence    Recommended Adaptive Equipment: TBD at rehab      Home Living: with Mushtaq Osborn; single family home, 1 story, ramp to enter, walk in shower.        Equipment owned: built in shower chair, standard walker, collapsible hurrycane, wheelchair, rollator, bedside commode     Prior Level of Function: Independent with ADLs and needs assistance with IADLs; pt states she ambulates with a cane, per chart review her spouse Ezekiel Reardon) she uses a wheelchair. Has aides from [address], [address] M-W-F for 2 hrs per day. Two recent falls at home. Driving: no  Occupation: not working    Pain Level: 8/10 pain in lower back; Nursing notified. Cognition: A&O: 2/4; Follows 1 step directions   Memory: poor   Sequencing: poor   Problem solving: poor   Judgement/safety: poor    [facility]   AM-PAC Daily Activity Inpatient   How much help for putting on and taking off regular lower body clothing?: Total  How much help for Bathing?: A Lot  How much help for Toileting?: A Lot  How much help for putting on and taking off regular upper body clothing?: A Lot  How much help for taking care of personal grooming?: A Lot  How much help for eating meals?: A Little  AM-PAC Inpatient Daily Activity Raw Score: 12  AM-PAC Inpatient ADL T-Scale Score : 30.6  ADL Inpatient CMS 0-100% Score: 66.57  ADL Inpatient CMS G-Code Modifier : CL     Functional Assessment:    Initial Eval Status  Date: 11/22/21 Treatment Status  Date: STGs = LTGs  Time frame: 10-14 days   Feeding Supervision   Independent    Grooming Moderate Assist with set up to apply toothpaste to toothbrush x 2 reps, brush teeth, and rinse. Poor sequencing, difficulty staying on task, increased confusion. Moderate Assist to comb hair. Independent    UB Dressing Maximal Assist   Supervision    LB Dressing Dependent to doff and don incontinent garment as pt was incontinent of bowel. Supervision    Bathing Maximal Assist  Minimal Assist    Toileting Dependent for hygiene as pt was incontinent of bowel in incontinent garment. Minimal Assist    Bed Mobility  Supine to sit: N/T   Sit to supine: Moderate Assist x 2    Minimal assist for rolling for hygiene and to doff and don incontinent garments. Supine to sit:  Independent   Sit to supine: Independent    Functional Transfers Two attempts to stand from bedside chair to wheeled walker with maximal assist x 2, put unable to achieve upright status, pt c/o Low    Time In: 10:34am   Time Out: 11:10am    Total Treatment Time: 8       Min Units   OT Eval Low 97165  X  1    OT Eval Medium 11545      OT Eval High 46064      OT Re-Eval [id number]            ADL/Self Care 56496  8   1    Therapeutic Activities 22919       Therapeutic Ex 02529       Orthotic Management 78479       Manual [id number]     Neuro Re-Ed 42625       Non-Billable Time        Evaluation Time additionally includes thorough review of current medical information, gathering information on past medical history/social history and prior level of function, interpretation of standardized testing/informal observation of tasks, assessment of data and development of plan of care and goals.         Evaluating OT: Araceli Dangelo OTR/L; [id number]

## 2021-11-23 NOTE — PROGRESS NOTES
Called and spoke with Dr. Rahul Thompson him that MRI results are in epic. Dr. Sumit Sutton stated he will review results himself.   Electronically signed by Naida Mendez RN on 11/23/2021 at 2:29 PM

## 2021-11-23 NOTE — PROGRESS NOTES
Progress Note  Date:2021       Room:0416/0416-01  Patient Name:Chari Tierney     YOB: 1960     Age:60 y.o. Subjective    Subjective:  Symptoms:  Stable. She reports malaise and weakness. Diet:  Poor intake. No nausea or vomiting. Activity level: Impaired due to weakness. Pain:  She complains of pain that is moderate. She reports pain is improving. Pain is well controlled. Review of Systems   Constitutional: Negative for fever. Gastrointestinal: Negative for nausea and vomiting. Neurological: Positive for weakness. All other systems reviewed and are negative. Objective         Vitals Last 24 Hours:  TEMPERATURE:  Temp  Av.6 °F (37 °C)  Min: 98.5 °F (36.9 °C)  Max: 98.6 °F (37 °C)  RESPIRATIONS RANGE: Resp  Av  Min: 20  Max: 20  PULSE OXIMETRY RANGE: SpO2  Av.5 %  Min: 90 %  Max: 93 %  PULSE RANGE: Pulse  Av  Min: 111  Max: 125  BLOOD PRESSURE RANGE: Systolic (93JKC), MIQUEL:740 , Min:103 , EQN:380   ; Diastolic (97JYC), TTO:54, Min:55, Max:68    I/O (24Hr): Intake/Output Summary (Last 24 hours) at 2021 1523  Last data filed at 2021 1357  Gross per 24 hour   Intake 20 ml   Output 800 ml   Net -780 ml     Objective:  General Appearance:  Ill-appearing. Vital signs: (most recent): Blood pressure 125/68, pulse 125, temperature 98.6 °F (37 °C), resp. rate 20, height 4' 10\" (1.473 m), weight 175 lb (79.4 kg), SpO2 90 %, not currently breastfeeding. Vital signs are normal.  No fever. Output: Producing urine and producing stool. HEENT: Normal HEENT exam.    Lungs:  Normal effort and normal respiratory rate. Heart: Normal rate. Regular rhythm. Abdomen: Bowel sounds are normal.   There is no abdominal tenderness. Extremities: Decreased range of motion. Neurological: Patient is alert. Skin:  Warm and dry.       Labs/Imaging/Diagnostics    Labs:  CBC:  Recent Labs     21  1702   WBC 14.9*   RBC 4.41   HGB 14.7   HCT 41.8   MCV 94.8   RDW 13.8        CHEMISTRIES:  Recent Labs     11/22/21  1702      K 3.2*   CL 97*   CO2 24   BUN 26*   CREATININE 0.6   GLUCOSE 158*     PT/INR:No results for input(s): PROTIME, INR in the last 72 hours. APTT:No results for input(s): APTT in the last 72 hours. LIVER PROFILE:  Recent Labs     11/22/21  1702   *   *   BILITOT 1.2   ALKPHOS 160*       Imaging Last 24 Hours:  CT LUMBAR SPINE WO CONTRAST    Result Date: 11/22/2021  EXAMINATION: CT OF THE LUMBAR SPINE WITHOUT CONTRAST  11/22/2021 TECHNIQUE: CT of the lumbar spine was performed without the administration of intravenous contrast. Multiplanar reformatted images are provided for review. Adjustment of mA and/or kV according to patient size was utilized. Dose modulation, iterative reconstruction, and/or weight based adjustment of the mA/kV was utilized to reduce the radiation dose to as low as reasonably achievable. COMPARISON: None HISTORY: ORDERING SYSTEM PROVIDED HISTORY: VERTEBRAL COMPRESSION FRACTURE TECHNOLOGIST PROVIDED HISTORY: Reason for exam:->lumbar compression fracture FINDINGS: BONES/ALIGNMENT: 4 mm of retrolisthesis of L5 secondary to compression fracture of L5. Estimated 75% vertebral body height loss. Subtle foci of lucency throughout the vertebral body could be from osteopenia versus pathologic fracture. There is suspected soft tissue density versus migrated disc extrusion posterior to the vertebral body. Otherwise preserved vertebral body heights. DEGENERATIVE CHANGES: Multilevel facet arthrosis. Suspect multilevel disc bulging. Epidural lipomatosis in the lower lumbosacral spine contributing to at least moderate spinal canal stenosis at L4-S1. SOFT TISSUES/RETROPERITONEUM: Partially seen age-indeterminate 13 mm pulmonary nodule in the left lower lobe, not seen on CT chest from October 7, 2020.  prominent retroperitoneal lymph nodes lymph nodes, less likely varicosities near the aorta and distal esophagus. Compression fracture of L5 with 4 mm of bony retropulsion. Heterogeneous lytic appearance of the L5 vertebral body is suspicious for pathologic fracture. Recommend correlation with PET-CT or lumbar spine MRI with and without contrast.  Malignancy or infection not excluded. Suspicious 13 mm left lower lobe pulmonary nodule, not present on exam from 2020. Recommend dedicated CT chest. Mildly prominent indeterminate peritoneal and retroperitoneal lymph nodes. Metastatic disease not excluded. MRI THORACIC SPINE WO CONTRAST    Result Date: 11/23/2021  EXAMINATION: MRI OF THE THORACIC AND LUMBAR SPINE WITHOUT CONTRAST; MRI OF THE THORACIC SPINE WITHOUT CONTRAST, 11/23/2021  12:40 TECHNIQUE: Multiplanar multisequence MRI of the lumbar spine was performed without the administration of intravenous contrast.; Multiplanar multisequence MRI of the thoracic spine was performed without the administration of intravenous contrast. COMPARISON: Noncontrast CT of the lumbar spine 11/22/2021 and 10/22/2021; plain radiographs of the lumbar spine 11/09/2021; noncontrast MRI of the lumbar spine 02/12/2018 HISTORY: ORDERING SYSTEM PROVIDED HISTORY: Compression Fractures TECHNOLOGIST PROVIDED HISTORY: Reason for exam:->Compression Fractures FINDINGS: These exams are limited by patient motion, in addition to the lack of intravenous contrast.  Given these factors: OSSEOUS STRUCTURES/ALIGNMENT: There is acute/subacute compression deformity, with approximate 75%, central-posterior loss of L5 vertebral body height and moderate, retropulsion of the L5 vertebral body margin. Along with below-described epidural lipomatosis, this contributes to severe/marked central spinal canal stenosis/functional narrowing of the traversing thecal sac, as well as central spinal canal stenosis/neural foraminal narrowing at the L4/5 and L5/S1 levels as detailed below.  Chronic, multilevel, minimal to mild, asymmetric vertebral compression deformities are otherwise most notable anteriorly at T9, progressively lessening both superiorly and inferiorly throughout the remainder of the thoracic spine, further contributing to a mildly-exaggerated thoracic kyphosis, in addition to a moderately-exaggerated lumbosacral junction lordosis. There is otherwise nonspecific, moderate diffuse background marrow cellularity, greater than expected for the patient's age. SPINAL CORD/CONUS MEDULLARIS/CAUDA EQUINA: At the level of the sacrum, there is moderate circumferential epidural lipomatosis, producing severe functional narrowing of the caudal thecal sac, all progressively lessening superiorly through the lumbar region. The conus medullaris terminates posterior to the L1/2 intervertebral disc space. Given technical artifacts, it, and the visualized spinal cord, appear otherwise unremarkable. However, there is variable nerve root contact/compression equivalent to the below-described central spinal canal stenoses/neural foraminal narrowing. DEGENERATIVE DISEASE: All visualized intervertebral discs are moderately to severely dehydrated, and show mild to moderate asymmetric disc height loss, with trace to moderate mixed discogenic endplate change, overall most notable at L4/5 and L5/S1. At L4/5 and L5/S1 there is mild/moderate circumferential disc-osteophyte complex, asymmetric posterior, and moderate  bilateral facet arthropathy, and moderate circumferential epidural lipomatosis. These further contribute to moderate/severe central spinal canal stenosis and equivalent bilateral neural foraminal narrowing, overall slightly worse at L5/S1. Lesser degenerative disease is scattered throughout the remainder of the visualized thoracic and lumbar spine, without significant identified central spinal canal stenosis or neural foraminal narrowing. SOFT TISSUES: There is moderate/severe gluteal and inferoposterior paraspinal muscular atrophy, lessening superiorly.  No other clinically-significant changes are noted. .     1. Limited exams 2. Acute/subacute compression deformity, with approximate 75%, central-posterior loss of L5 vertebral body height and moderate, retropulsion of the L5 vertebral body margin. Along with moderate circumferential epidural lipomatosis, this contributes to severe/marked central spinal canal stenosis/functional narrowing of the traversing thecal sac posterior to the L5 vertebral body. 3.  Chronic multilevel asymmetric vertebral compression deformities throughout the remainder of the thoracic and lumbar spine, as described, further contributing to a mildly-exaggerated thoracic kyphosis, in addition to a moderately-exaggerated lumbosacral junction lordosis. 4.  Multilevel degenerative disease, overall most notable at L4/5 and L5/S1, where there is moderate/severe central spinal canal stenosis and equivalent bilateral neural foraminal narrowing, overall slightly worse at L5/S1. 5.   Nonspecific, moderate diffuse background marrow cellularity, greater than expected for the patient's age. 6.   Moderate/severe gluteal and inferoposterior paraspinal muscular atrophy, lessening superiorly. [facility] [id number] at 2:03 pm on 11/23/2021 to be communicated to a licensed caregiver. Attempts to contact the referring physician are currently in progress. Gia Kelly      MRI LUMBAR SPINE WO CONTRAST    Result Date: 11/23/2021  EXAMINATION: MRI OF THE THORACIC AND LUMBAR SPINE WITHOUT CONTRAST; MRI OF THE THORACIC SPINE WITHOUT CONTRAST, 11/23/2021  12:40 TECHNIQUE: Multiplanar multisequence MRI of the lumbar spine was performed without the administration of intravenous contrast.; Multiplanar multisequence MRI of the thoracic spine was performed without the administration of intravenous contrast. COMPARISON: Noncontrast CT of the lumbar spine 11/22/2021 and 10/22/2021; plain radiographs of the lumbar spine 11/09/2021; noncontrast MRI of the lumbar spine 02/12/2018 HISTORY: ORDERING SYSTEM PROVIDED HISTORY: Compression Fractures TECHNOLOGIST PROVIDED HISTORY: Reason for exam:->Compression Fractures FINDINGS: These exams are limited by patient motion, in addition to the lack of intravenous contrast.  Given these factors: OSSEOUS STRUCTURES/ALIGNMENT: There is acute/subacute compression deformity, with approximate 75%, central-posterior loss of L5 vertebral body height and moderate, retropulsion of the L5 vertebral body margin. Along with below-described epidural lipomatosis, this contributes to severe/marked central spinal canal stenosis/functional narrowing of the traversing thecal sac, as well as central spinal canal stenosis/neural foraminal narrowing at the L4/5 and L5/S1 levels as detailed below. Chronic, multilevel, minimal to mild, asymmetric vertebral compression deformities are otherwise most notable anteriorly at T9, progressively lessening both superiorly and inferiorly throughout the remainder of the thoracic spine, further contributing to a mildly-exaggerated thoracic kyphosis, in addition to a moderately-exaggerated lumbosacral junction lordosis. There is otherwise nonspecific, moderate diffuse background marrow cellularity, greater than expected for the patient's age. SPINAL CORD/CONUS MEDULLARIS/CAUDA EQUINA: At the level of the sacrum, there is moderate circumferential epidural lipomatosis, producing severe functional narrowing of the caudal thecal sac, all progressively lessening superiorly through the lumbar region. The conus medullaris terminates posterior to the L1/2 intervertebral disc space. Given technical artifacts, it, and the visualized spinal cord, appear otherwise unremarkable. However, there is variable nerve root contact/compression equivalent to the below-described central spinal canal stenoses/neural foraminal narrowing.  DEGENERATIVE DISEASE: All visualized intervertebral discs are moderately to severely dehydrated, and show mild to moderate asymmetric disc height loss, with trace to moderate mixed discogenic endplate change, overall most notable at L4/5 and L5/S1. At L4/5 and L5/S1 there is mild/moderate circumferential disc-osteophyte complex, asymmetric posterior, and moderate  bilateral facet arthropathy, and moderate circumferential epidural lipomatosis. These further contribute to moderate/severe central spinal canal stenosis and equivalent bilateral neural foraminal narrowing, overall slightly worse at L5/S1. Lesser degenerative disease is scattered throughout the remainder of the visualized thoracic and lumbar spine, without significant identified central spinal canal stenosis or neural foraminal narrowing. SOFT TISSUES: There is moderate/severe gluteal and inferoposterior paraspinal muscular atrophy, lessening superiorly. No other clinically-significant changes are noted. .     1. Limited exams 2. Acute/subacute compression deformity, with approximate 75%, central-posterior loss of L5 vertebral body height and moderate, retropulsion of the L5 vertebral body margin. Along with moderate circumferential epidural lipomatosis, this contributes to severe/marked central spinal canal stenosis/functional narrowing of the traversing thecal sac posterior to the L5 vertebral body. 3.  Chronic multilevel asymmetric vertebral compression deformities throughout the remainder of the thoracic and lumbar spine, as described, further contributing to a mildly-exaggerated thoracic kyphosis, in addition to a moderately-exaggerated lumbosacral junction lordosis. 4.  Multilevel degenerative disease, overall most notable at L4/5 and L5/S1, where there is moderate/severe central spinal canal stenosis and equivalent bilateral neural foraminal narrowing, overall slightly worse at L5/S1. 5.   Nonspecific, moderate diffuse background marrow cellularity, greater than expected for the patient's age.  6.   Moderate/severe gluteal and inferoposterior paraspinal muscular atrophy, lessening superiorly. Findings were sent to the [facility] Results [id number] at 2:03 pm on 11/23/2021 to be communicated to a licensed caregiver. Attempts to contact the referring physician are currently in progress. Donnie Noel US ABDOMEN LIMITED    Result Date: 11/22/2021  EXAMINATION: Limited abdominal ULTRASOUND 11/22/2021 1:41 pm COMPARISON: None. HISTORY: ORDERING SYSTEM PROVIDED HISTORY: ascites TECHNOLOGIST PROVIDED HISTORY: Reason for exam:->ascites What reading provider will be dictating this exam?->CRC FINDINGS: Scanning of the abdomen shows no definite ascites. Organs are not specifically studied although on limited visualization the hepatic appearance is consistent with the history of cirrhosis. No ascites is identified. Assessment//Plan           Hospital Problems           Last Modified POA    Dehydration 11/20/2021 Yes        Assessment:    Condition: In stable condition. Unchanged. (Alcohol withdrawal syndrome  Compression fracture      Change in CT of compression fracture since last evaluation in hospital.  Dr Francine Smith input pending,  Did see Dr Luci Giordano as outpatient who ordered brace  Still confused. ROBERT noted). Plan:   Consults: physical therapy. (Continue current management  Placement pending, needed  Will follow).        Electronically signed by Farzaneh Correia DO on 11/23/21 at 3:23 PM EST

## 2021-11-23 NOTE — PROGRESS NOTES
Physical Therapy CHART REVIEW ONLY    Physical Therapy Treatment Note/Plan of Care    Room #:  6340/5408-24  Patient Name: Sarah Tidwell  YOB: 1960  MRN: [id number]    Date of Service: 11/23/2021     Tentative placement recommendation: Subacute rehab  Equipment recommendation: To be determined      Evaluating Physical Therapist: Jennifer Oneill, PT  #[id number]      Specific Provider Orders/Date/Referring Provider :  11/20/21 1715   PT eval and treat Start: 11/20/21 1715, End: 11/20/21 1715, ONE TIME, Standing Count: 1 Occurrences, R    Order went unreviewed at transfer on Sun Nov 21, 2021 12:11 AM   Genia Hensley DO      Admitting Diagnosis:   Dehydration [E86.0]  Alcoholism ([address] [F10.20]  Hyponatremia [E87.1]  Unable to ambulate [R26.2]    Admitted with  Gait dysfunction ;  recent d/c from acute care   Surgery: none  Visit Diagnoses       Codes    Unable to ambulate     R26.2    Alcoholism ([address]     F10.20          Patient Active Problem List   Diagnosis    Acute respiratory failure with hypoxia ([address]    Alcohol abuse    Cerebral artery occlusion with cerebral infarction ([address]    Hypertension    Depression    Hyperlipidemia    Seizures ([address]    Alcoholic intoxication without complication ([address]    Hyponatremia    Nicotine dependence    Marijuana user    Fall at home, initial encounter    UTI (urinary tract infection)    Acute on chronic diastolic (congestive) heart failure (HCC)    Pedal edema    Chronic obstructive pulmonary disease ([address]    Tobacco abuse    Hepatic cirrhosis ([address]    Hypokalemia    Hypomagnesemia    Dehydration        ASSESSMENT of Current Deficits Patient exhibits decreased strength, balance, endurance, coordination and pain back impairing functional mobility, transfers, gait , gait distance and tolerance to activity Patient exhibited a lot of confusion throughout treatment. Repeated cues for participation and direction.  Patient exhibited weakness and pain when performing supine exercises. Patient's SPO2 was low at start of session (88). Patient's nasal cannula was not properly placed at start of treatment, returned to normal levels when 2 liters oxygen were appropriately placed (95). Patient would benefit from continued therapy to address strength, endurance, and functional weakness prohibiting patient from performing ADL's. PHYSICAL THERAPY  PLAN OF CARE       Physical therapy plan of care is established based on physician order,  patient diagnosis and clinical assessment    Current Treatment Recommendations:    -Bed Mobility: Lower extremity exercises , Upper extremity exercises  and Trunk control activities   -Standing Balance: Perform strengthening exercises in standing to promote motor control with or without upper extremity support   -Transfers: Provide instruction on proper hand and foot position for adequate transfer of weight onto lower extremities and use of gait device if needed and Cues for hand placement, technique and safety. Provide stabilization to prevent fall   -Gait: Gait training and Standing activities to improve: base of support, weight shift, weight bearing    -Endurance: Utilize Supervised activities to increase level of endurance to allow for safe functional mobility including transfers and gait     PT long term treatment goals are located in below grid    Patient and or family understand(s) diagnosis, prognosis, and plan of care. Frequency of treatments: Patient will be seen  daily.          Prior Level of Function: Patient uses wheelchair vs bed bound per   Rehab Potential: good    for baseline    Past medical history:   Past Medical History:   Diagnosis Date    Alcohol abuse     [facility] infarction [facility]     Depression     Diverticulosis of colon     Elevated blood sugar     H/O cardiovascular stress test 11/13/2021    Nuclear Lexiscan Stress Test    Hepatitis C     from blood transfusion, underwent treatment per patient and \"cured\"    Hyperlipidemia     Hypertension     Liver cirrhosis [facility]     Magnesium deficiency     Marijuana user     Nicotine dependence     Seizures ([facility]     Subclinical hypothyroidism      Past Surgical History:   Procedure Laterality Date    DILATION AND CURETTAGE OF UTERUS      LIVER BIOPSY  07/05/2016       SUBJECTIVE:    Precautions: Up as tolerated, falls, alarm and incontinent , ciwa  Social history: Patient lives with spouse who also uses wheelchair and unable to leave living room d/t wheelchair size in a ranch home  with [place]amp  to enter   Equipment owned: Standard Walker and Quad cane,   Patient states she has back [address]   How much difficulty turning over in bed?: A Lot  How much difficulty sitting down on / standing up from a chair with arms?: A Lot  How much difficulty moving from lying on back to sitting on side of bed?: A Lot  How much help from another person moving to and from a bed to a chair?: A Lot  How much help from another person needed to walk in hospital room?: A Lot  How much help from another person for climbing 3-5 steps with a railing?: A Lot  AM-PAC Inpatient Mobility Raw Score : 12  AM-PAC Inpatient T-Scale Score : 35.33  Mobility Inpatient CMS 0-100% Score: 68.66  Mobility Inpatient CMS G-Code Modifier : CL    Nursing cleared patient for PT treatment. OBJECTIVE;   Initial Evaluation  Date: 11/21/2021 Treatment Date:    11/23/2021 Short Term/ Long Term   Goals   Was pt agreeable to Eval/treatment? Yes Yes To be met in 3 days   Pain level   8/10  back 10/10   back    Bed Mobility    Rolling: Moderate assist of 1    Supine to sit: Moderate assist of 1    Sit to supine: Moderate assist of 1    Scooting: Minimal assist of 1   Rolling:  Moderate assist of 1   Supine to sit: Not assessed    Sit to supine: Not assessed    Scooting: Not assessed     Rolling: Supervision Supine to sit: Supervision     Sit to supine: Supervision     Scooting: Supervision      Transfers Sit to stand: Moderate assist of 1 to pivot with wheeled walker  Sit to stand: Not assessed       Sit to stand: Supervision      Ambulation    2-3 steps using  wheeled walker with Moderate assist of 1   for walker approximation and balance Not assesed    10 feet using  wheeled walker with Minimal assist of 1    ROM Within functional limits        Strength BUE:  refer to OT eval  RLE:  3+/5  LLE:  3+/5  Increase strength in affected mm groups by 1/3 grade   Balance Sitting EOB:  fair minus  Dynamic Standing:  poor   Sitting EOB: not assessed   Dynamic Standing: not assessed    Sitting EOB:  good    Dynamic Standing: fair with wheeled walker      Patient is Alert & Oriented x person and place and follows one step directions  Confused with directions and follow through  Sensation:  Patient  denies numbness/tingling   Edema:  no   Endurance: poor     2 Liters of o2 via nasal cannula however not applied upon entering room: po2 97%  After application: 18%      Patient education  Patient educated on role of Physical Therapy, risks of immobility, safety and plan of care and  importance of mobility while in hospital      Patient response to education:   Pt verbalized understanding Pt demonstrated skill Pt requires further education in this area   Yes Partial Yes      Treatment:  Patient practiced and was instructed/facilitated in the following treatment: Patient performed supine exercises. Attempted to move to EOB but stopped after pain increased pain level for patient. Left patient in supine. Therapeutic Exercises:  ankle pumps, quad sets, hip abduction/adduction and straight leg raise  x 10 reps. At end of session, patient in bed with alarm call light and phone within reach,  all lines and tubes intact, nursing notified.       Patient would benefit from continued skilled Physical Therapy to improve functional

## 2021-11-23 NOTE — PROGRESS NOTES
OT BEDSIDE TREATMENT NOTE      Date:2021  Patient Name: Rock Elizondo  MRN: [id number]  : 1960  Room: [address] [place] [address]         Evaluating OT: Daniel Lyn OTR/L; [id number]      Referring Provider and Specific Provider Orders/Date:      21   OT eval and treat  Start:  21,   End:  21,   ONE TIME,   Standing Count:  1 Occurrences,   R        Order went unreviewed at transfer on Sun 2021 12:11 AM    Susi Samson,       Placement Recommendation: Subacute         Diagnosis:   1. Hyponatremia    2. Dehydration    3. Unable to ambulate    4. Alcoholism [facility])         Surgery: none        Pertinent Medical History:       Past Medical History        Past Medical History:   Diagnosis Date    Alcohol abuse      Cerebral artery occlusion with [facility] infarction [facility] [address]      Depression      Diverticulosis of colon      Elevated blood sugar      H/O cardiovascular stress test 2021     Nuclear Lexiscan Stress Test    Hepatitis C       from blood transfusion, underwent treatment per patient and \"cured\"    Hyperlipidemia      Hypertension      Liver cirrhosis ([facility] [address]      Magnesium deficiency      Marijuana user      Nicotine dependence      Seizures ([facility] [address]      Subclinical hypothyroidism              Past Surgical History         Past Surgical History:   Procedure Laterality Date    DILATION AND CURETTAGE OF UTERUS        LIVER BIOPSY   2016          Precautions:  Fall Risk, confusion, difficulty following 1 step commands, alarm. Poor insight into deficits. Comment: pt states she takes oxygen pills? at eval  Comment: pt asking for her drinks (two virgins and two tall bundy) during session; pt states she drinks a lot; pt confused and states she \"worked overtime last night\"; pt states she does not like to talk about what kind of work she does      Assessment of current deficits:     [x]? Functional mobility            [x]?ADLs           [x]?  Strength [x]?Cognition    [x]? Functional transfers          [x]? IADLs         [x]? Safety Awareness   [x]? Endurance    []? Fine Coordination              [x]? Balance      []? Vision/perception    []? Sensation      []? Gross Motor Coordination  []? ROM           []?  Delirium                   []? Motor Control      OT PLAN OF CARE   OT POC based on physician orders, patient diagnosis and results of clinical assessment     Frequency/Duration 1-3 days/wk for 2 weeks PRN      Specific OT Treatment Interventions to include:   * Instruction/training on adapted ADL techniques and AE recommendations to increase functional independence within precautions       * Training on energy conservation strategies, correct breathing pattern and techniques to improve independence/tolerance for self-care routine  * Functional transfer/mobility training/DME recommendations for increased independence, safety, and fall prevention  * Patient/Family education to increase follow through with safety techniques and functional independence  * Recommendation of environmental modifications for increased safety with functional transfers/mobility and ADLs  * Cognitive retraining/development of therapeutic activities to improve problem solving, judgement, memory, and attention for increased safety/participation in ADL/IADL tasks  * Positioning for increased function, prevention of contractures, and improve skin integrity  * Therapeutic exercise to improve motor endurance, ROM, and functional strength for ADLs/functional transfers  * Therapeutic activities to facilitate/challenge dynamic balance, stand tolerance for increased safety and independence with ADLs  * Positioning to improve skin integrity, interaction with environment and functional independence     Recommended Adaptive Equipment: TBD at rehab       Home Living: with Sarah Perez; single family home, 1 story, ramp to enter, walk in shower.        Equipment owned: built in shower chair, standard walker, collapsible hurrycane, wheelchair, rollator, bedside commode      Prior Level of Function: Independent with ADLs and needs assistance with IADLs; pt states she ambulates with a cane, per chart review her spouse Christal Carcamo) she uses a wheelchair. Has aides from [employer] M-W-F for 2 hrs per day. Two recent falls at home.      Driving: no  Occupation: not working     Pain Level: unquantified pain in lower back; Nursing aware.            Cognition: A&O: 1/4; Follows 1 step directions intermittently; pt alert to self only              Memory: poor              Sequencing: poor              Problem solving: poor              Judgement/safety: poor     [facility]   AM-PAC Daily Activity Inpatient   How much help for putting on and taking off regular lower body clothing?: Total  How much help for Bathing?: A Lot  How much help for Toileting?: A Lot  How much help for putting on and taking off regular upper body clothing?: A Lot  How much help for taking care of personal grooming?: A Lot  How much help for eating meals?: A Little  AM-[facility] Inpatient Daily Activity Raw Score: 12  AM-PAC Inpatient ADL T-Scale Score : 30.6  ADL Inpatient CMS 0-100% Score: 66.57  ADL Inpatient CMS G-Code Modifier : CL                Functional Assessment:     Initial Eval Status  Date: 11/22/21 Treatment Status  Date:11/23/2021 STGs = LTGs  Time frame: 10-14 days   Feeding Supervision  N/T  Independent    Grooming Moderate Assist with set up to apply toothpaste to toothbrush x 2 reps, brush teeth, and rinse. Poor sequencing, difficulty staying on task, increased confusion. Moderate Assist to comb hair. N/T; pt had assist for grooming prior to session  Independent    UB Dressing Maximal Assist  Max A to tie back of gown Supervision    LB Dressing Dependent to doff and don incontinent garment as pt was incontinent of bowel.   Dep to don socks when long seated in bed; pt playing with socks in hands and unable to follow instructions to cross LE's to don socks  Supervision    Bathing Maximal Assist N/T; pt had assist for bathing prior to session  Minimal Assist    Toileting Dependent for hygiene as pt was incontinent of bowel in incontinent garment. Dep - pt has bateman catheter  Minimal Assist    Bed Mobility  Supine to sit: N/T   Sit to supine: Moderate Assist x 2    Minimal assist for rolling for hygiene and to doff and don incontinent garments. Mod A for supine to sit with assist to bring UB to sitting with use of log roll technique; scooting at mod A with use of chux pad as sling; sit to supine at mod A with assist to support LE's to supine; log roll technique;max A to scoot to [facility] with bed in trendelenburg position with use of TAPS system as sling; positioning provided on RTB; alarm on  Supine to sit: Independent   Sit to supine: Independent    Functional Transfers Two attempts to stand from bedside chair to wheeled walker with maximal assist x 2, put unable to achieve upright status, pt c/o R hip pain. Minimal Assist x 2 from bedside chair to wheeled walker upon 3rd stand. Transfer training with verbal cues for hand placement throughout session to improve safety. Did not attempt sit to stand from EOB d/t pt going for MRI later on this date  Supervision    Functional Mobility Minimal Assist x 2 from bedside chair to edge of bed to improve balance, for safety, verbal cues for walker sequence and safety.    N/T Supervision     Balance Sitting:     Static: fair in bedside chair    Dynamic: fair minus  Standing: fair minus with wheeled walker Sitting:     Static: fair     Dynamic: fair minus  Standing:N/T     Activity Tolerance Fair minus Fair/fair minus; pt tolerated sitting EOB for 15 minutes with min A for upright position d/t decreased sitting balance  good    Visual/  Perceptual Glasses: no                        Hand Dominance: right        AROM (PROM) Strength Additional Info:    RUE  WFL 3/5 good  and wfl FMC/dexterity noted during ADL tasks      LUE WFL 3/5 good  and wfl FMC/dexterity noted during ADL tasks       - BUE AAROM/PROM exercises: 15 reps in all planes of movement to increase ROM required for functional transfers/ADL participation. Exercises completed in shoulder and elbow flexion/extension, internal/external rotation, abduction/adduction, supination/pronation, digit and wrist flexion/extension. B UE ROM appears to be [facility] with assist and min rest breaks provided 2* to decreased endurance/tolerance. Hearing: WFL   Sensation:  No c/o numbness or tingling  Tone: WFL   Edema: no       Comments: Patient cleared by nursing staff. Upon arrival pt supine in bed with R LE over bed rail. Pt had O2 out of her nose on arrival.   Pt agreeable to OT tx session. Pt educated with regards to bed mobility using log roll technique, hand placement, safety awareness, static sitting balance,  UE/LE dressing, B UE ROM ex's, positioning, ECT's. At end of session pt seated in bed with HOB elevated and B LE positioning behind knees in attempts to relieve pressure in back;  all lines and tubes intact, call light within reach. Overall, pt demonstrated decreased independence and safety during completion of ADL/functional transfers/mobility tasks. Pt would benefit from continued skilled OT to increase safety and independence with completion of ADL/IADL tasks for functional independence and quality of life. Pt required cues and education as noted above for safe facilitation and completion of tasks. Therapist provided skilled monitoring of patient's response during treatment session. Prior to and at the end of session, environmental modifications and line management completed for patients safety and efficiency of treatment session. Overall, patient demonstrates moderate difficulties with completion of BADLs and IADLs.  Factors contributing to these difficulties include c/o discomfort in back, confusion, decreased endurance, and generalized weakness. As noted above, patient likely to benefit from further OT intervention to increase independence, safety, and overall quality of life. Treatment:     ? Bed mobility: Facilitated bed mobility with cues for proper body mechanics and sequencing to prepare for ADL completion. Log roll technique  ? ADL completion: Self-care retraining for the above-mentioned ADLs; training on proper hand placement, safety technique, sequencing, and energy conservation techniques. ? Postural Balance: Sitting balance retraining to improve righting reactions with postural changes during ADLs. ? Skilled positioning: Proper positioning to improve interaction with environment, overall functioning and in attempts to alleviate pressure from back. ? Therapeutic Ex's: To increase B UE strength/endurance/ROM required for functional transfers and ADL participation. · Pt has made fair minus progress towards set goals   · OT 1-3x/week for 5-7 days during hospitalization       Treatment Time also includes thorough review of current medical information, gathering information on past medical history/social history and prior level of function, informal observation of tasks, assessment of data and education on plan of care and goals.     Treatment Time In: 10:41 AM     Treatment Time Out: 11:04 AM            Treatment Charges: Mins Units   ADL/Home Mgt     [address]      [id number]   Ther Ex                 [id number] 15 1   Manual Therapy    [id number]     Neuro Re-ed         [id number]     Orthotic manage/training                               [id number]     Non Billable Time     Total Timed Treatment 23 610 [address]

## 2021-11-23 NOTE — PLAN OF CARE
Problem: Falls - Risk of:  Goal: Will remain free from falls  Description: Will remain free from falls  11/23/2021 1748 by Robert Meraz RN  Outcome: Met This Shift     Problem: Falls - Risk of:  Goal: Absence of physical injury  Description: Absence of physical injury  11/23/2021 1748 by Robert Meraz RN  Outcome: Met This Shift     Problem: Injury - Risk of, Physical Injury:  Goal: Will remain free from falls  Description: Will remain free from falls  11/23/2021 1748 by Robert Meraz RN  Outcome: Met This Shift     Problem: Injury - Risk of, Physical Injury:  Goal: Absence of physical injury  Description: Absence of physical injury  11/23/2021 1748 by Robert Meraz RN  Outcome: Met This Shift

## 2021-11-23 NOTE — PROCEDURES
MRI:  Compression Fractures  Best possible images, pt confused and medicated, straps for arms/legs used. Moving all over  Ordered with and without but pt unable to tolerate imaging. Limited study. No contrast given. Tried to get as much as we could.  Limited without scans  Electronically signed by Ninfa Morris on 11/23/2021 at 12:45 PM

## 2021-11-23 NOTE — CONSULTS
Brother     Alcohol Abuse Brother     Drug Abuse Brother         Social History     Tobacco Use    Smoking status: Current Every Day Smoker     Packs/day: 1.00     Years: 49.00     Pack years: 49.00     Start date: 1970    Smokeless tobacco: Never Used   Substance Use Topics    Alcohol use: Yes     Alcohol/week: 28.0 standard drinks     Types: 14 Cans of beer, 14 Shots of liquor per week     Comment: 2 cans of beer and 2 shots of liquor per day        Prior to Admission medications    Medication Sig Start Date End Date Taking? Authorizing Provider   lactulose [facility]) 10 GM/15ML solution Take 30 mLs by mouth 2 times daily 11/18/21 12/18/21  Terry Millet, DO   rifaximin Trish Keepers) 550 MG tablet Take 1 tablet by mouth 2 times daily 11/18/21   Terry Millet, DO   spironolactone (ALDACTONE) 25 MG tablet Take 1 tablet by mouth daily 11/13/21   Terry Millet, DO   nicotine (NICODERM CQ) 21 MG/24HR Place 1 patch onto the skin daily 11/12/21   Terry Millet, DO   furosemide (LASIX) 20 MG tablet Take 1 tablet by mouth 2 times daily 11/12/21   Terry Millet, DO   oxyCODONE (ROXICODONE) 5 MG immediate release tablet Take 5 mg by mouth every 8 hours as needed for Pain.     Historical Provider, MD   levothyroxine (SYNTHROID) 25 MCG tablet take 1 tablet by mouth once daily 8/17/21   [facility], DO   magnesium oxide (MAG-OX) 400 MG tablet Take 1 tablet by mouth twice daily 7/27/21   [facility], DO   meloxicam (MOBIC) 15 MG tablet Take 1 tablet by mouth daily 6/30/21   [facility], DO   albuterol sulfate HFA (PROAIR HFA) 108 (90 Base) MCG/ACT inhaler Inhale 2 puffs into the lungs every 6 hours as needed for Wheezing 5/17/21   [facility], DO   amLODIPine (NORVASC) 5 MG tablet take 1 tablet by mouth once daily 5/17/21   [facility], DO   atenolol (TENORMIN) 50 MG tablet take 1 tablet by mouth once daily 5/17/21   [facility], DO   atorvastatin (LIPITOR) 40 MG tablet take 1 tablet by mouth once daily 5/17/21   Darrayna Woods, DO   benazepril (LOTENSIN) 20 MG tablet take 1 tablet by mouth once daily 5/17/21   Darrayna Chiquier, DO   citalopram (CELEXA) 40 MG tablet take 1 tablet by mouth once daily 5/17/21   Darral Chiquier, DO   clopidogrel (PLAVIX) 75 MG tablet take 1 tablet by mouth once daily 5/17/21   Smitha Chiquier, DO   cyclobenzaprine (FLEXERIL) 5 MG tablet take 1 tablet by mouth twice a day if needed for muscle spasm - DO NOT TAKE WHILE DRINKING ALCOHOL OR DRIVING 5/09/06   Darrayna Chiquier, DO   levETIRAcetam (KEPPRA) 500 MG tablet take 1 tablet by mouth twice a day 5/17/21   Smitha Chiquier, DO   ARIPiprazole (ABILIFY) 5 MG tablet take 1 tablet by mouth once daily 3/29/21   TINA Marte         OBJECTIVE:        Vitals:    11/23/21 0800   BP: 125/68   Pulse: 125   Resp: 20   Temp: 98.6 °F (37 °C)   SpO2: 90%            EXAM:        Pt is AAOx3    Vascular Exam:  DP and PT pulses slightly palpable bilaterally. CFT is brisk less than 3 seconds all pedal digits bilateral.  Skin is dry thin and atrophic bilaterally. +1 pitting edema bilaterally tibial, ankle, foot. Neuro Exam: Light touch sensation intact. Protective sensation intact bilaterally. Dermatologic Exam:  Nails are thickened, elongated with subungual debris bilaterally. No other open lesions or wounds at this time. Skin is thin dry and atrophic bilaterally with mild hyperpigmentation to the anterior tibial region consistent with chronic venous stasis. MSK: Muscle strength 5/5 Bilateral  ROM is full without pain or crepitation bilateral.  Tenderness on palpation to all nailbeds, improved post debridement.     Current Facility-Administered Medications   Medication Dose Route Frequency Provider Last Rate Last Admin    0.9 % sodium chloride infusion   IntraVENous Continuous Jay Rosales  mL/hr at 11/22/21 1430 New Bag at 11/22/21 1430    sodium chloride flush 0.9 % injection 10 mL  10 mL IntraVENous PRN Catalino Councilman, DO        therapeutic multivitamin-minerals 1 tablet  1 tablet Oral Daily Catalino Councilman, DO   1 tablet at 11/23/21 0944    sodium chloride flush 0.9 % injection 5-40 mL  5-40 mL IntraVENous 2 times per day Alvena Cornea, DO   10 mL at 11/22/21 2130    sodium chloride flush 0.9 % injection 5-40 mL  5-40 mL IntraVENous PRN Alvena Cornea, DO        0.9 % sodium chloride infusion  25 mL IntraVENous PRN Alvena Cornea, DO        ondansetron (ZOFRAN-ODT) disintegrating tablet 4 mg  4 mg Oral Q8H PRN Alvena Cornea, DO        Or    ondansetron [facility]) injection 4 mg  4 mg IntraVENous Q6H PRN Alvena Cornea, DO        polyethylene glycol (GLYCOLAX) packet 17 g  17 g Oral Daily PRN Alvena Cornea, DO        acetaminophen (TYLENOL) tablet 650 mg  650 mg Oral Q6H PRN Alvena Cornea, DO        Or    acetaminophen (TYLENOL) suppository 650 mg  650 mg Rectal Q6H PRN Alvena Cornea, DO        therapeutic multivitamin-minerals 1 tablet  1 tablet Oral Daily Alvena Cornea, DO   1 tablet at 11/23/21 2958    thiamine mononitrate tablet 100 mg  100 mg Oral Daily Jillian Ron Veres, DO   100 mg at 11/23/21 2751    LORazepam (ATIVAN) tablet 1 mg  1 mg Oral Q1H PRN Alvena Cornea, DO        Or    LORazepam (ATIVAN) injection 1 mg  1 mg IntraVENous Q1H PRN Alvena Cornea, DO   1 mg at 11/20/21 2159    Or    LORazepam (ATIVAN) tablet 2 mg  2 mg Oral Q1H PRN Alvena Cornea, DO   2 mg at 11/22/21 1934    Or    LORazepam (ATIVAN) injection 2 mg  2 mg IntraVENous Q1H PRN Alvena Cornea, DO        Or    LORazepam (ATIVAN) tablet 3 mg  3 mg Oral Q1H PRN Alvena Cornea, DO        Or    LORazepam (ATIVAN) injection 3 mg  3 mg IntraVENous Q1H PRN Alvena Cornea, DO        Or    LORazepam (ATIVAN) tablet 4 mg  4 mg Oral Q1H PRN Alvena Cornea, DO        Or    LORazepam (ATIVAN) injection 4 mg  4 mg IntraVENous Q1H PRN Alvena Cornea, DO        amLODIPine (NORVASC) tablet 5 mg  5 mg Oral Daily Furman Speed Veres, DO   5 mg at 11/23/21 8081    ARIPiprazole (ABILIFY) tablet 5 mg  5 mg Oral Daily Chuck Speed Veres, DO   5 mg at 11/23/21 0945    atenolol (TENORMIN) tablet 25 mg  25 mg Oral Daily [place] Speed Veres, DO   25 mg at 11/23/21 4028    clopidogrel (PLAVIX) tablet 75 mg  75 mg Oral Daily Chuck Speed Veres, DO   75 mg at 11/23/21 0925    citalopram (CELEXA) tablet 40 mg  40 mg Oral Daily Chuck Speed Veres, DO   40 mg at 11/23/21 1842    lisinopril (PRINIVIL;ZESTRIL) tablet 10 mg  10 mg Oral Daily [place] Speed Veres, DO   10 mg at 11/23/21 9527    atorvastatin (LIPITOR) tablet 40 mg  40 mg Oral Daily [place] Speed Veres, DO   40 mg at 11/23/21 3207    oxyCODONE (ROXICODONE) immediate release tablet 5 mg  5 mg Oral Q6H PRN Susi Mali, DO        rifaximin Everlena Doctor) tablet 550 mg  550 mg Oral BID [place] Speed Veres, DO   550 mg at 11/23/21 9350    magnesium oxide (MAG-OX) tablet 400 mg  400 mg Oral Daily Chuck Speed Veres, DO   400 mg at 11/23/21 7632    levothyroxine (SYNTHROID) tablet 50 mcg  50 mcg Oral Daily Chuck Speed Veres, DO   50 mcg at 11/23/21 0530    furosemide (LASIX) tablet 20 mg  20 mg Oral BID [place] Speed Veres, DO   20 mg at 11/23/21 0926    lactulose (CHRONULAC) 10 GM/15ML solution 20 g  20 g Oral BID Chuck Speed Veres, DO   20 g at 11/23/21 8997    levETIRAcetam (KEPPRA) tablet 500 mg  500 mg Oral BID Chuck Speed Veres, DO   500 mg at 11/23/21 7560    albuterol (PROVENTIL) nebulizer solution 2.5 mg  2.5 mg Nebulization Q6H PRN Susi Mali, DO            Lab Results   Component Value Date    WBC 14.9 (H) 11/22/2021    HCT 41.8 11/22/2021    HGB 14.7 11/22/2021     11/22/2021     11/22/2021    K 3.2 (L) 11/22/2021    CL 97 (L) 11/22/2021    CO2 24 11/22/2021    BUN 26 (H) 11/22/2021    CREATININE 0.6 11/22/2021    GLUCOSE 158 (H) 11/22/2021         ASSESSMENT:  -Onychomycosis toenails 1 through 5 bilateral    PLAN:  - Examined and evaluated  - All labs, imaging, and charts reviewed  -WBC 14.9  -Nails 1 through 5 trimmed bilaterally without any incident using nail nippers with evacuation of all subungual debris.  -No other wounds or lesions noted to lower extremities. -Gradual sign off at this time. Please reconsult for any lower extremity complaints.  -Thank you for consult    D/W:  Tanja Michael DPM FACFAS  Fellowship-Trained Foot and Ankle Surgeon  Diplomate, [employer] Board of Foot and Ankle Surgeons  [phone]       Thank you for involving podiatry in this patients care. Please do not hesitate to call with any questions or concerns.      Darren Bethea Podiatry PGY2  11/23/2021   11:03 AM

## 2021-11-23 NOTE — PROGRESS NOTES
Reviewed CT scan of Lumbar spine. Will get MRI scan of Thoracic & Lumbar spine.   Will evaluate today

## 2021-11-24 NOTE — PROGRESS NOTES
Physical Therapy    Patient unavailable for therapy today per nursing. Will check back on patient at a later date.     Xander SOTO

## 2021-11-24 NOTE — PROGRESS NOTES
CHEMISTRIES:  Recent Labs     11/22/21  1702      K 3.2*   CL 97*   CO2 24   BUN 26*   CREATININE 0.6   GLUCOSE 158*     PT/INR:No results for input(s): PROTIME, INR in the last 72 hours. APTT:No results for input(s): APTT in the last 72 hours. LIVER PROFILE:  Recent Labs     11/22/21  1702   *   *   BILITOT 1.2   ALKPHOS 160*       Imaging Last 24 Hours:  CT LUMBAR SPINE WO CONTRAST    Result Date: 11/22/2021  EXAMINATION: CT OF THE LUMBAR SPINE WITHOUT CONTRAST  11/22/2021 TECHNIQUE: CT of the lumbar spine was performed without the administration of intravenous contrast. Multiplanar reformatted images are provided for review. Adjustment of mA and/or kV according to patient size was utilized. Dose modulation, iterative reconstruction, and/or weight based adjustment of the mA/kV was utilized to reduce the radiation dose to as low as reasonably achievable. COMPARISON: None HISTORY: ORDERING SYSTEM PROVIDED HISTORY: VERTEBRAL COMPRESSION FRACTURE TECHNOLOGIST PROVIDED HISTORY: Reason for exam:->lumbar compression fracture FINDINGS: BONES/ALIGNMENT: 4 mm of retrolisthesis of L5 secondary to compression fracture of L5. Estimated 75% vertebral body height loss. Subtle foci of lucency throughout the vertebral body could be from osteopenia versus pathologic fracture. There is suspected soft tissue density versus migrated disc extrusion posterior to the vertebral body. Otherwise preserved vertebral body heights. DEGENERATIVE CHANGES: Multilevel facet arthrosis. Suspect multilevel disc bulging. Epidural lipomatosis in the lower lumbosacral spine contributing to at least moderate spinal canal stenosis at L4-S1. SOFT TISSUES/RETROPERITONEUM: Partially seen age-indeterminate 13 mm pulmonary nodule in the left lower lobe, not seen on CT chest from October 7, 2020. prominent retroperitoneal lymph nodes lymph nodes, less likely varicosities near the aorta and distal esophagus.      Compression fracture of L5 with 4 mm of bony retropulsion. Heterogeneous lytic appearance of the L5 vertebral body is suspicious for pathologic fracture. Recommend correlation with PET-CT or lumbar spine MRI with and without contrast.  Malignancy or infection not excluded. Suspicious 13 mm left lower lobe pulmonary nodule, not present on exam from 2020. Recommend dedicated CT chest. Mildly prominent indeterminate peritoneal and retroperitoneal lymph nodes. Metastatic disease not excluded. MRI THORACIC SPINE WO CONTRAST    Result Date: 11/23/2021  EXAMINATION: MRI OF THE THORACIC AND LUMBAR SPINE WITHOUT CONTRAST; MRI OF THE THORACIC SPINE WITHOUT CONTRAST, 11/23/2021  12:40 TECHNIQUE: Multiplanar multisequence MRI of the lumbar spine was performed without the administration of intravenous contrast.; Multiplanar multisequence MRI of the thoracic spine was performed without the administration of intravenous contrast. COMPARISON: Noncontrast CT of the lumbar spine 11/22/2021 and 10/22/2021; plain radiographs of the lumbar spine 11/09/2021; noncontrast MRI of the lumbar spine 02/12/2018 HISTORY: ORDERING SYSTEM PROVIDED HISTORY: Compression Fractures TECHNOLOGIST PROVIDED HISTORY: Reason for exam:->Compression Fractures FINDINGS: These exams are limited by patient motion, in addition to the lack of intravenous contrast.  Given these factors: OSSEOUS STRUCTURES/ALIGNMENT: There is acute/subacute compression deformity, with approximate 75%, central-posterior loss of L5 vertebral body height and moderate, retropulsion of the L5 vertebral body margin. Along with below-described epidural lipomatosis, this contributes to severe/marked central spinal canal stenosis/functional narrowing of the traversing thecal sac, as well as central spinal canal stenosis/neural foraminal narrowing at the L4/5 and L5/S1 levels as detailed below.  Chronic, multilevel, minimal to mild, asymmetric vertebral compression deformities are otherwise most notable anteriorly at T9, progressively lessening both superiorly and inferiorly throughout the remainder of the thoracic spine, further contributing to a mildly-exaggerated thoracic kyphosis, in addition to a moderately-exaggerated lumbosacral junction lordosis. There is otherwise nonspecific, moderate diffuse background marrow cellularity, greater than expected for the patient's age. SPINAL CORD/CONUS MEDULLARIS/CAUDA EQUINA: At the level of the sacrum, there is moderate circumferential epidural lipomatosis, producing severe functional narrowing of the caudal thecal sac, all progressively lessening superiorly through the lumbar region. The conus medullaris terminates posterior to the L1/2 intervertebral disc space. Given technical artifacts, it, and the visualized spinal cord, appear otherwise unremarkable. However, there is variable nerve root contact/compression equivalent to the below-described central spinal canal stenoses/neural foraminal narrowing. DEGENERATIVE DISEASE: All visualized intervertebral discs are moderately to severely dehydrated, and show mild to moderate asymmetric disc height loss, with trace to moderate mixed discogenic endplate change, overall most notable at L4/5 and L5/S1. At L4/5 and L5/S1 there is mild/moderate circumferential disc-osteophyte complex, asymmetric posterior, and moderate  bilateral facet arthropathy, and moderate circumferential epidural lipomatosis. These further contribute to moderate/severe central spinal canal stenosis and equivalent bilateral neural foraminal narrowing, overall slightly worse at L5/S1. Lesser degenerative disease is scattered throughout the remainder of the visualized thoracic and lumbar spine, without significant identified central spinal canal stenosis or neural foraminal narrowing. SOFT TISSUES: There is moderate/severe gluteal and inferoposterior paraspinal muscular atrophy, lessening superiorly.  No other clinically-significant changes are noted. .     1. Limited exams 2. Acute/subacute compression deformity, with approximate 75%, central-posterior loss of L5 vertebral body height and moderate, retropulsion of the L5 vertebral body margin. Along with moderate circumferential epidural lipomatosis, this contributes to severe/marked central spinal canal stenosis/functional narrowing of the traversing thecal sac posterior to the L5 vertebral body. 3.  Chronic multilevel asymmetric vertebral compression deformities throughout the remainder of the thoracic and lumbar spine, as described, further contributing to a mildly-exaggerated thoracic kyphosis, in addition to a moderately-exaggerated lumbosacral junction lordosis. 4.  Multilevel degenerative disease, overall most notable at L4/5 and L5/S1, where there is moderate/severe central spinal canal stenosis and equivalent bilateral neural foraminal narrowing, overall slightly worse at L5/S1. 5.   Nonspecific, moderate diffuse background marrow cellularity, greater than expected for the patient's age. 6.   Moderate/severe gluteal and inferoposterior paraspinal muscular atrophy, lessening superiorly. Findings [facility] [id number] at 2:03 pm on 11/23/2021 to be communicated to a licensed caregiver. Attempts to contact the referring physician are currently in progress. Francois Severino      MRI LUMBAR SPINE WO CONTRAST    Result Date: 11/23/2021  EXAMINATION: MRI OF THE THORACIC AND LUMBAR SPINE WITHOUT CONTRAST; MRI OF THE THORACIC SPINE WITHOUT CONTRAST, 11/23/2021  12:40 TECHNIQUE: Multiplanar multisequence MRI of the lumbar spine was performed without the administration of intravenous contrast.; Multiplanar multisequence MRI of the thoracic spine was performed without the administration of intravenous contrast. COMPARISON: Noncontrast CT of the lumbar spine 11/22/2021 and 10/22/2021; plain radiographs of the lumbar spine 11/09/2021; noncontrast MRI of the lumbar spine 02/12/2018 HISTORY: ORDERING SYSTEM PROVIDED HISTORY: Compression Fractures TECHNOLOGIST PROVIDED HISTORY: Reason for exam:->Compression Fractures FINDINGS: These exams are limited by patient motion, in addition to the lack of intravenous contrast.  Given these factors: OSSEOUS STRUCTURES/ALIGNMENT: There is acute/subacute compression deformity, with approximate 75%, central-posterior loss of L5 vertebral body height and moderate, retropulsion of the L5 vertebral body margin. Along with below-described epidural lipomatosis, this contributes to severe/marked central spinal canal stenosis/functional narrowing of the traversing thecal sac, as well as central spinal canal stenosis/neural foraminal narrowing at the L4/5 and L5/S1 levels as detailed below. Chronic, multilevel, minimal to mild, asymmetric vertebral compression deformities are otherwise most notable anteriorly at T9, progressively lessening both superiorly and inferiorly throughout the remainder of the thoracic spine, further contributing to a mildly-exaggerated thoracic kyphosis, in addition to a moderately-exaggerated lumbosacral junction lordosis. There is otherwise nonspecific, moderate diffuse background marrow cellularity, greater than expected for the patient's age. SPINAL CORD/CONUS MEDULLARIS/CAUDA EQUINA: At the level of the sacrum, there is moderate circumferential epidural lipomatosis, producing severe functional narrowing of the caudal thecal sac, all progressively lessening superiorly through the lumbar region. The conus medullaris terminates posterior to the L1/2 intervertebral disc space. Given technical artifacts, it, and the visualized spinal cord, appear otherwise unremarkable. However, there is variable nerve root contact/compression equivalent to the below-described central spinal canal stenoses/neural foraminal narrowing.  DEGENERATIVE DISEASE: All visualized intervertebral discs are moderately to severely dehydrated, and show mild to moderate asymmetric disc height loss, with trace to moderate mixed discogenic endplate change, overall most notable at L4/5 and L5/S1. At L4/5 and L5/S1 there is mild/moderate circumferential disc-osteophyte complex, asymmetric posterior, and moderate  bilateral facet arthropathy, and moderate circumferential epidural lipomatosis. These further contribute to moderate/severe central spinal canal stenosis and equivalent bilateral neural foraminal narrowing, overall slightly worse at L5/S1. Lesser degenerative disease is scattered throughout the remainder of the visualized thoracic and lumbar spine, without significant identified central spinal canal stenosis or neural foraminal narrowing. SOFT TISSUES: There is moderate/severe gluteal and inferoposterior paraspinal muscular atrophy, lessening superiorly. No other clinically-significant changes are noted. .     1. Limited exams 2. Acute/subacute compression deformity, with approximate 75%, central-posterior loss of L5 vertebral body height and moderate, retropulsion of the L5 vertebral body margin. Along with moderate circumferential epidural lipomatosis, this contributes to severe/marked central spinal canal stenosis/functional narrowing of the traversing thecal sac posterior to the L5 vertebral body. 3.  Chronic multilevel asymmetric vertebral compression deformities throughout the remainder of the thoracic and lumbar spine, as described, further contributing to a mildly-exaggerated thoracic kyphosis, in addition to a moderately-exaggerated lumbosacral junction lordosis. 4.  Multilevel degenerative disease, overall most notable at L4/5 and L5/S1, where there is moderate/severe central spinal canal stenosis and equivalent bilateral neural foraminal narrowing, overall slightly worse at L5/S1. 5.   Nonspecific, moderate diffuse background marrow cellularity, greater than expected for the patient's age.  6.   Moderate/severe gluteal and inferoposterior paraspinal muscular atrophy, lessening superiorly. Findings were sent to the [facility] [id number] at 2:03 pm on 11/23/2021 to be communicated to a licensed caregiver. Attempts to contact the referring physician are currently in progress. Cloteal Pruitt US ABDOMEN LIMITED    Result Date: 11/22/2021  EXAMINATION: Limited abdominal ULTRASOUND 11/22/2021 1:41 pm COMPARISON: None. HISTORY: ORDERING SYSTEM PROVIDED HISTORY: ascites TECHNOLOGIST PROVIDED HISTORY: Reason for exam:->ascites What reading provider will be dictating this exam?->CRC FINDINGS: Scanning of the abdomen shows no definite ascites. Organs are not specifically studied although on limited visualization the hepatic appearance is consistent with the history of cirrhosis. No ascites is identified. Assessment//Plan           Hospital Problems           Last Modified POA    Dehydration 11/20/2021 Yes        Assessment:    Condition: In stable condition. Improving. (Stable, improved, CIWA better    Plan for kyphoplasty). Plan:   Consults: physical therapy. Advance diet as tolerated. (Continue current management  Stop ativan  Change to haldol  Will follow).        Electronically signed by John Ramirez DO on 11/24/21 at 12:14 PM EST

## 2021-11-24 NOTE — PROGRESS NOTES
Called Dr Leeanne Gomes regarding plan for surgery and NPO status . Per Dr Leeanne Gomes we may feed patient today. He or Dr Catherine Puga will contact us back with plans for surgery.

## 2021-11-24 NOTE — CARE COORDINATION
Ss note:11/24/2021.2:19 PM Neg covid on 11-. Pt has telesitter today, remains confused. Per rounds may transfer to [address] [place] [address] for kyphoplasty. Per nursing pts CIWA scale was 2 yesterday. Pt on Haldol per charting. Sw has [facility] following as possible discharge plan as spouse relays pt will need placement unless able to care for self at home as he cannot meet her needs. Sw unable to reach spouse; charting reflects pts spouse does not have SNF preference. Liaison at [facility] is following pt, will require a PRECERT,and updated therapy notes for placement, cannot admit to SNF on Haldol. [facility] supporter Kathy is following. Pt presents from home with spouse and will need [facility] orders for [facility] if pt discharges home. SW will follow.  ANGELO Esteves

## 2021-11-24 NOTE — PROGRESS NOTES
Attempted tx with pt, however pt is going to be going to [place]. E's for kyphoplasty. Will attempt tx with pt at later time/date.    Venkatesh Guillermo, [address]

## 2021-11-24 NOTE — CONSULTS
[address]                                  CONSULTATION    PATIENT NAME: Shyla Rdz                 :        1960  MED REC NO:   [id number]                            ROOM:       8991  ACCOUNT NO:   [de-identified]                           ADMIT DATE: 2021  PROVIDER:     Ana Palomo MD    CONSULT DATE:  2021    CHIEF COMPLAINT:  Pain in the lower back. HISTORY OF PRESENT ILLNESS:  This is a 20-year-old female who presented  to the emergency room for severe low back pain following two falls at  home. She states that her legs gave out on her twice and she fell. She  denies hitting her head, denies passing out or blacking out. The  patient denies any bowel or bladder incontinence or saddle paresthesias. She also denies any weakness as such in the lower extremities. The CT  scan of the lumbar spine which was performed on admission was reviewed. It was reported to show compression fracture of L5 with a 4-mm  retropulsion due to suspicious pathological fracture. The patient did  have a 13-mm left lower lobe pulmonary nodule. She underwent an MRI  scan of the thoracic and the lumbar spine and it was reported to show  acute/subacute fracture deformity of L5 with approximately 75% central  posterior loss of vertebral height and slight retropulsion. She has  multiple level degenerative disc disease and also asymmetric vertebral  compression deformities throughout the remainder of the thoracic and  lumbar spine. These studies were reviewed by me. PAST MEDICAL HISTORY:  Alcohol abuse CVA, depression, diverticulosis,  elevated blood sugar, hepatitis, hyperlipidemia, hypertension, liver  cirrhosis, marijuana use, nicotine dependence, seizures, and subclinical  hypothyroidism. PAST SURGICAL HISTORY:  Dilation and curettage of uterus and liver  biopsy.     SOCIAL HISTORY:  Current

## 2021-11-25 PROBLEM — S32.050A CLOSED COMPRESSION FRACTURE OF L5 VERTEBRA (HCC): Status: ACTIVE | Noted: 2021-01-01

## 2021-11-25 PROBLEM — S32.010K: Status: ACTIVE | Noted: 2021-01-01

## 2021-11-25 NOTE — PROGRESS NOTES
Spoke to patients  Rishabh [employer] and notified him patient is getting transferred to [facility] to rm [id number]

## 2021-11-25 NOTE — LETTER
[place] Department Medicaid  CERTIFICATION OF NECESSITY  FOR NON-EMERGENCY TRANSPORTATION   BY GROUND AMBULANCE      Individual Information   1. Name: Sascha Aggarwal 2. [place] Medicaid Billing Number:    3. Address: [facility]     Transportation Provider Information   4. Provider Name: Winsome Esposito    5. [place] Medicaid Provider Number:  National Provider Identifier (NPI):      Certification  7. Criteria:  During transport, this individual requires:  [x] Medical treatment or continuous     supervision by an EMT. [] The administration or regulation of oxygen by another person. [] Supervised protective restraint. 8. Period Beginning Date: 21   9. Length  [x] Not more than 5 day(s)  [] One Year     Additional Information Relevant to Certification   10. Comments or Explanations, If Necessary or Appropriate   LUMBAR FRACTURE WITH KYPHOPLASTY      Certifying Practitioner Information   11. Name of Practitioner: DR Farrah Jackman    12. [place] Medicaid Provider Number, If Applicable:  [address] Provider Identifier (NPI):      Signature Information   14. Date of Signature: 2021 15. Name of Person Signing: Electronically signed by Jim Nunes RN on 2021 at 1:57 PM     16. Signature and Professional Designation: DR SANTANA/Electronically signed by Jim Nunes RN  on 2021 at 1:57 PM       [facility] [address]  Rev. 2015                        [address] [place] [address] Admission Date/Time: 21 [address] Account: [de-identified]    MRN: [id number]    Patient: Sascha Aggarwal   Contact Serial #: [id number]            ENCOUNTER          Patient Class: I Private Enc?   No Unit  BD:  [place]  Service: Med/Surg   ADM DX: Compression fracture of *   ADM Provider: Emanuel Davila DO   Procedure:     ATT Provider: Chio Babcock MD   REF Provider: Jody Ferris      PATIENT                 Name: Sascha Aggarwal : 1960 (61 yrs)   Address:  [address] Sex: Female   City: [address] [place] [address]         Marital Status:    Employer: DISABLED         Anabaptist: Sikh   Primary Care Provider: DO Beth Corrigan Phone: [phone]   EMERGENCY CONTACT   Contact Name Legal Guardian? Relationship to Patient Home Phone Work Phone   1. Kervin Sultana  2. *No Contact Specified* No    Spouse    [phone]                 GUARANTOR            Guarantor: Estee More     : 1960   Address: [facility] Sex: Female     [address] [id number]     Relation to Patient: Self       Home Phone: [phone]   Guarantor ID: [id number]       Work Phone:     Guarantor Employer: DISABLED         Status: DISABLED      COVERAGE  PRIMARY INSURANCE   Payor: [facility] MEDICARE Plan: [facility] MEDICARE COMPLETE   Payor Address: ,          Group Number: [id number] Insurance Type: [facility] [address] Name: Rufino Davila : 1960   Subscriber ID: [id number] Pat. Rel. to Sub: Self   SECONDARY INSURANCE   Payor:   Plan:     Payor Address:  ,           Group Number:   Insurance Type:     Subscriber Name:   Subscriber :     Subscriber ID:   Pat.  Rel. to Sub:

## 2021-11-25 NOTE — PLAN OF CARE
Problem: Falls - Risk of:  Goal: Will remain free from falls  11/25/2021 1643 by Black Brown RN  Outcome: Completed     Problem: Falls - Risk of:  Goal: Absence of physical injury  11/25/2021 1643 by Black Brown RN  Outcome: Completed     Problem: Pain:  Goal: Pain level will decrease  11/25/2021 1643 by Black Brown RN  Outcome: Completed     Problem: Pain:  Goal: Control of acute pain  11/25/2021 1643 by Black Brown RN  Outcome: Completed     Problem: Pain:  Goal: Control of chronic pain  11/25/2021 1643 by Black Brown RN  Outcome: Completed     Problem: Skin Integrity:  Goal: Will show no infection signs and symptoms  11/25/2021 1643 by Black Brown RN  Outcome: Completed     Problem: Skin Integrity:  Goal: Will show no infection signs and symptoms  11/25/2021 1643 by Black Brown RN  Outcome: Completed     Problem: Skin Integrity:  Goal: Absence of new skin breakdown  11/25/2021 1643 by Black Brown RN  Outcome: Completed     Problem: Confusion - Acute:  Goal: Absence of continued neurological deterioration signs and symptoms  11/25/2021 1643 by Black Borwn RN  Outcome: Completed     Problem: Confusion - Acute:  Goal: Mental status will be restored to baseline  11/25/2021 1643 by Black Brown RN  Outcome: Completed     Problem: Discharge Planning:  Goal: Ability to perform activities of daily living will improve  11/25/2021 1643 by Black Brown RN  Outcome: Completed     Problem: Discharge Planning:  Goal: Participates in care planning  11/25/2021 1643 by Black Brown RN  Outcome: Completed     Problem: Discharge Planning:  Goal: Participates in care planning  11/25/2021 1643 by Black Brown RN  Outcome: Completed     Problem: Injury - Risk of, Physical Injury:  Goal: Will remain free from falls  11/25/2021 1643 by Black Brown RN  Outcome: Completed     Problem: Injury - Risk of, Physical Injury:  Goal: Absence of physical injury  11/25/2021 1643 by Black Brown RN  Outcome: Completed     Problem: Mood - Altered:  Goal: Mood stable  11/25/2021 1643 by [place] Molina RN  Outcome: Completed     Problem: Mood - Altered:  Goal: Mood stable  11/25/2021 1643 by [place] Molina RN  Outcome: Completed     Problem: Mood - Altered:  Goal: Absence of abusive behavior  11/25/2021 1643 by [place] Molina RN  Outcome: Completed     Problem: Mood - Altered:  Goal: Verbalizations of feeling emotionally comfortable while being cared for will increase  11/25/2021 1643 by [place] Molina RN  Outcome: Completed     Problem: Psychomotor Activity - Altered:  Goal: Absence of psychomotor disturbance signs and symptoms  11/25/2021 1643 by [place] Molina RN  Outcome: Completed     Problem: Sensory Perception - Impaired:  Goal: Demonstrations of improved sensory functioning will increase  11/25/2021 1643 by [place] Molina RN  Outcome: Completed     Problem: Sensory Perception - Impaired:  Goal: Able to refrain from responding to false sensory perceptions  11/25/2021 1643 by [place] Molina RN  Outcome: Completed     Problem: Sensory Perception - Impaired:  Goal: Demonstrates accurate environmental perceptions  11/25/2021 1643 by [place] Molina RN  Outcome: Completed     Problem: Sensory Perception - Impaired:  Goal: Able to distinguish between reality-based and nonreality-based thinking  11/25/2021 1643 by [place] Molina RN  Outcome: Completed     Problem: Sensory Perception - Impaired:  Goal: Able to interrupt nonreality-based thinking  11/25/2021 1643 by [place] Molina RN  Outcome: Completed     Problem: Sleep Pattern Disturbance:  Goal: Appears well-rested  11/25/2021 1643 by [place] Molina RN  Outcome: Completed

## 2021-11-25 NOTE — PLAN OF CARE
Problem: Falls - Risk of:  Goal: Will remain free from falls  Description: Will remain free from falls  Outcome: Met This Shift  Goal: Absence of physical injury  Description: Absence of physical injury  Outcome: Met This Shift     Problem: Pain:  Goal: Pain level will decrease  Description: Pain level will decrease  Outcome: Met This Shift  Goal: Control of acute pain  Description: Control of acute pain  Outcome: Met This Shift  Goal: Control of chronic pain  Description: Control of chronic pain  Outcome: Met This Shift     Problem: Skin Integrity:  Goal: Will show no infection signs and symptoms  Description: Will show no infection signs and symptoms  Outcome: Met This Shift  Goal: Absence of new skin breakdown  Description: Absence of new skin breakdown  Outcome: Met This Shift     Problem: Confusion - Acute:  Goal: Absence of continued neurological deterioration signs and symptoms  Description: Absence of continued neurological deterioration signs and symptoms  Outcome: Met This Shift  Goal: Mental status will be restored to baseline  Description: Mental status will be restored to baseline  Outcome: Met This Shift     Problem: Discharge Planning:  Goal: Ability to perform activities of daily living will improve  Description: Ability to perform activities of daily living will improve  Outcome: Met This Shift  Goal: Participates in care planning  Description: Participates in care planning  Outcome: Met This Shift     Problem: Injury - Risk of, Physical Injury:  Goal: Will remain free from falls  Description: Will remain free from falls  Outcome: Met This Shift  Goal: Absence of physical injury  Description: Absence of physical injury  Outcome: Met This Shift     Problem: Mood - Altered:  Goal: Mood stable  Description: Mood stable  Outcome: Met This Shift  Goal: Absence of abusive behavior  Description: Absence of abusive behavior  Outcome: Met This Shift  Goal: Verbalizations of feeling emotionally comfortable while being cared for will increase  Description: Verbalizations of feeling emotionally comfortable while being cared for will increase  Outcome: Met This Shift     Problem: Psychomotor Activity - Altered:  Goal: Absence of psychomotor disturbance signs and symptoms  Description: Absence of psychomotor disturbance signs and symptoms  Outcome: Met This Shift     Problem: Sensory Perception - Impaired:  Goal: Demonstrations of improved sensory functioning will increase  Description: Demonstrations of improved sensory functioning will increase  Outcome: Met This Shift  Goal: Decrease in sensory misperception frequency  Description: Decrease in sensory misperception frequency  Outcome: Met This Shift  Goal: Able to refrain from responding to false sensory perceptions  Description: Able to refrain from responding to false sensory perceptions  Outcome: Met This Shift  Goal: Demonstrates accurate environmental perceptions  Description: Demonstrates accurate environmental perceptions  Outcome: Met This Shift  Goal: Able to distinguish between reality-based and nonreality-based thinking  Description: Able to distinguish between reality-based and nonreality-based thinking  Outcome: Met This Shift  Goal: Able to interrupt nonreality-based thinking  Description: Able to interrupt nonreality-based thinking  Outcome: Met This Shift     Problem: Sleep Pattern Disturbance:  Goal: Appears well-rested  Description: Appears well-rested  Outcome: Met This Shift

## 2021-11-25 NOTE — H&P
Hospitalist History & Physical    Transfer from [place]      PCP: Melecio Torrez DO    Date of Admission: 11/25/2021  Admit to [address] 11/20/2021    Date of Service: Pt seen/examined on 11/25/2021 and is admitted to Inpatient with expected LOS greater than two midnights due to medical therapy. Chief Complaint:  L5 compress fx diagnosed at 2018 [facility] consult recommended transfer to [facility]    History Of Present Illness:    Ms. Lubna English, a 56y.o. year old female  who  has a past medical history of Alcohol abuse, Cerebral artery occlusion with cerebral infarction [facility]), Depression, Diverticulosis of colon, Elevated blood sugar, H/O cardiovascular stress test, Hepatitis C, Hyperlipidemia, Hypertension, Liver cirrhosis ([address] Magnesium deficiency, Marijuana user, Nicotine dependence, Seizures ([address] and Subclinical hypothyroidism. Presented to [facility] 11/20  56 y female with 2 days of increased weakness, lethargy and back pain,  Increased alcohol consumption. Increased debility. Abdominal distension noted.   No CP, No SOB, no fever,  Unable to ambulate  Hx of falling    Remained [facility] 11/20-11/25  Received treatment  For   (Hepatic encephalopathy  Cirrhosis  Ascites  Debility  Lactic acidosis  Hyponatremia    CT scan LS revealed compression frax   Due to pain in the back CT scan LS revealed compress fx L5  NS [facility] at [facility]    Past Medical History:        Diagnosis Date    Alcohol abuse     Cerebral artery occlusion with cerebral infarction ([address]     Depression     Diverticulosis of colon     Elevated blood sugar     H/O cardiovascular stress test 11/13/2021    Nuclear Lexiscan Stress Test    Hepatitis C     from blood transfusion, underwent treatment per patient and \"cured\"    Hyperlipidemia     Hypertension     Liver cirrhosis ([address]     Magnesium deficiency     Marijuana user     Nicotine dependence     Seizures ([address]     Subclinical hypothyroidism        Past Surgical History:        Procedure Laterality Date    DILATION AND CURETTAGE OF UTERUS      LIVER BIOPSY  07/05/2016       Medications Prior to Admission:      Prior to Admission medications    Medication Sig Start Date End Date Taking? Authorizing Provider   oxyCODONE (ROXICODONE) 5 MG immediate release tablet Take 1 tablet by mouth every 6 hours as needed for Pain for up to 3 days.  11/25/21 11/28/21 Yes Abdi Rosales DO   magnesium oxide (MAG-OX) 400 MG tablet Take 1 tablet by mouth daily 11/26/21  Yes Ambar Rosales DO   albuterol (PROVENTIL) (2.5 MG/3ML) 0.083% nebulizer solution Take 3 mLs by nebulization every 6 hours as needed for Wheezing 11/25/21  Yes Davide Villalobos DO   lisinopril (PRINIVIL;ZESTRIL) 10 MG tablet Take 1 tablet by mouth daily 11/26/21  Yes Ambar Rosales DO   haloperidol (HALDOL) 0.5 MG tablet Take 1 tablet by mouth every 6 hours as needed (agitation) 11/25/21  Yes Ambar Rosales DO   atenolol (TENORMIN) 25 MG tablet Take 1 tablet by mouth daily 11/26/21  Yes Ambar Rosales DO   polyethylene glycol (GLYCOLAX) 17 g packet Take 17 g by mouth daily as needed for Constipation 11/25/21 12/25/21 Yes Davide Villalobos DO   Multiple Vitamins-Minerals (THERAPEUTIC MULTIVITAMIN-MINERALS) tablet Take 1 tablet by mouth daily 11/26/21  Yes Ambar Rosales DO   thiamine mononitrate (THIAMINE) 100 MG tablet Take 1 tablet by mouth daily 11/26/21  Yes Ambar Rosales DO   lactulose (CHRONULAC) 10 GM/15ML solution Take 30 mLs by mouth 2 times daily 11/18/21 12/18/21 Yes Ambar Rosales DO   rifaximin (XIFAXAN) 550 MG tablet Take 1 tablet by mouth 2 times daily 11/18/21  Yes Ambar Rosales DO   nicotine (NICODERM CQ) 21 MG/24HR Place 1 patch onto the skin daily 11/12/21  Yes Davide Villalobos DO   levothyroxine (SYNTHROID) 25 MCG tablet take 1 tablet by mouth once daily 8/17/21  Yes Brian Banks DO   amLODIPine (NORVASC) 5 MG tablet take 1 tablet by mouth once daily 5/17/21  Yes Adelso Samayoa, DO   atorvastatin (LIPITOR) 40 MG tablet take 1 tablet by mouth once daily 5/17/21  Yes Adelso Gonzalesalgo, DO   citalopram (CELEXA) 40 MG tablet take 1 tablet by mouth once daily 5/17/21  Yes Adelso Gonzalesalgo, DO   clopidogrel (PLAVIX) 75 MG tablet take 1 tablet by mouth once daily 5/17/21  Yes Adelso Samayoa, DO   levETIRAcetam (KEPPRA) 500 MG tablet take 1 tablet by mouth twice a day 5/17/21  Yes Adelso Junioro, DO   ARIPiprazole (ABILIFY) 5 MG tablet take 1 tablet by mouth once daily 3/29/21  Yes Alicja Leon PA-C   ondansetron (ZOFRAN-ODT) 4 MG disintegrating tablet Take 1 tablet by mouth every 8 hours as needed for Nausea or Vomiting 11/25/21   Tiago Cano DO   ondansetron [facility]) 4 MG/2ML injection Infuse 2 mLs intravenously every 6 hours as needed for Nausea or Vomiting 11/25/21   Tiago Cano DO       Allergies:  Codeine    Social History:    TOBACCO:   reports that she has been smoking. She started smoking about 51 years ago. She has a 49.00 pack-year smoking history. She has never used smokeless tobacco.  ETOH:   reports current alcohol use of about 28.0 standard drinks of alcohol per week. Family History:    Reviewed in detail and negative for DM, CAD, Cancer, CVA.  Positive as follows\"      Problem Relation Age of Onset    Cancer Mother         Breast    Alcohol Abuse Father     Drug Abuse Father     Cancer Brother     Alcohol Abuse Brother     Drug Abuse Brother        REVIEW OF SYSTEMS:       Patient remained in the hospital for sev days  While at the hospital her review of systems reviewed on initial H+P  +fatigue and weakness and confusion  As of today/yesterday she was described as being in stable condition and improving and better with regards to withdrawal)    I did review systems with patient  GEN/Constituion= denies fever  HEENT=denies HA  Pulm=denies hemoptysis  CV=denies chest pain  ABD= denies diarrhea  =denies flank pain  MSK= as per hpi  Neuro= denies syncope  Derm= denies pruritus  Heme= denies unusual bleeding        PHYSICAL EXAM:  BP (!) 146/63   Pulse 94   Temp 98.1 °F (36.7 °C) (Temporal)   Resp 18   Ht 4' 10\" (1.473 m)   Wt 175 lb (79.4 kg)   SpO2 92%   BMI 36.58 kg/m²   General appearance: Not jaundiced  HEENT: no thrush  Neck: Trachea midline.   Respiratory:  No wheeze on auscult upper lobes  Cardiovascular: s1s2  Pulse reg  Abdomen: no rigidity   Musculoskeletal: No clubbing,  Skin: no hives  Neurologic: awake clear speech  Psychiatric:mood is calm     EKG 11/20/2021 reviewed tracing  Sr/nl axis /q waves septal leads     Rate  83   CBC: no results tdy  Last cbc 11/22  Wbc 14.9    BMP:   Recent Labs     11/24/21  1237 11/25/21  0530    138   K 3.0* 3.3*    102   CO2 25 24   BUN 12 10   CREATININE 0.5 0.5     LFT:  11/22/2021  Alb 2.9/ alk p04 160/ast 221/alt 132    PT/INR: 11/20/2021  INR 1.4      Folate and B12:     Lactic Acid:   Lab Results   Component Value Date    LACTA 1.4 11/24/2021     Thyroid Studies:   Lab Results   Component Value Date    TSH 3.450 11/20/2021       Oupatient labs:hgba1c 5.9  On 11/09/2021      Urinalysis:      Imaging:  Echo Complete    Result Date: 11/10/2021  Transthoracic Echocardiography Report (TTE)  Demographics   Patient Name   [facility]    Gender           Female                 [facility] Record [id number]       Room Number      8224  Number   Account #      [de-identified]      Procedure Date   11/10/2021   Corporate ID                  Ordering         Julianna Amaya MD                                Physician   Accession      [id number]     Referring  Number                        Physician   Date of Birth  1960     [address] [facility]   Age            56 year(s)     Interpreting     Madalyn 62                                Physician        Physician Cardiology                                                 Julianna Amaya MD Any Other  Procedure Type of Study   TTE procedure  Procedure Date Date: 11/10/2021 :    Conclusions   Summary  No comparison study available. Technically limited study, which might have  affected the accuracy and the completeness of the interpretation. Micro-bubble contrast injected to enhance left ventricular visualization. Left ventricle size is normal.  Mild concentric left ventricular hypertrophy. Ejection fraction is visually estimated at 55%. No regional wall motion abnormalities seen. E/A flow reversal noted. Suggestive of diastolic dysfunction. Physiologic and/or trace tricuspid regurgitation. RVSP is normal.   Signature   ----------------------------------------------------------------  Electronically signed by Terry Almaraz MD(Interpreting  physician) on 11/10/2021 02:53 PM  ----------------------------------------------------------------  nt:      CT LUMBAR SPINE WO CONTRAST    Result Date: 11/22/2021  EXAMINATION: CT OF THE LUMBAR SPINE WITHOUT CONTRAST  11/22/2021 TECHNIQUE: CT of the lumbar spine was performed without the administration of intravenous contrast. Compression fracture of L5 with 4 mm of bony retropulsion. Heterogeneous lytic appearance of the L5 vertebral body is suspicious for pathologic fracture. Recommend correlation with PET-CT or lumbar spine MRI with and without contrast.  Malignancy or infection not excluded. Suspicious 13 mm left lower lobe pulmonary nodule, not present on exam from 2020. Recommend dedicated CT chest. Mildly prominent indeterminate peritoneal and retroperitoneal lymph nodes. Metastatic disease not excluded. MRI THORACIC SPINE WO CONTRAST    Result Date: 11/23/2021  EXAMINATION: MRI OF THE THORACIC AND LUMBAR SPINE WITHOUT CONTRAST; .     1.  Limited exams 2. Acute/subacute compression deformity, with approximate 75%, central-posterior loss of L5 vertebral body height and moderate, retropulsion of the L5 vertebral body margin. Along with moderate circumferential epidural lipomatosis, this contributes to severe/marked central spinal canal stenosis/functional narrowing of the traversing thecal sac posterior to the L5 vertebral body. 3.  Chronic multilevel asymmetric vertebral compression deformities throughout the remainder of the thoracic and lumbar spine, as described, further contributing to a mildly-exaggerated thoracic kyphosis, in addition to a moderately-exaggerated lumbosacral junction lordosis. 4.  Multilevel degenerative disease, overall most notable at L4/5 and L5/S1, where there is moderate/severe central spinal canal stenosis and equivalent bilateral neural foraminal narrowing, overall slightly worse at L5/S1. 5.   Nonspecific, moderate diffuse background marrow cellularity, greater than expected for the patient's age. 6.   Moderate/severe gluteal and inferoposterior paraspinal muscular atrophy, lessening superiorly. Findings [facility] [id number] at 2:03 pm on 11/23/2021 to be communicated to a licensed caregiver. Attempts to contact the referring physician are currently in progress. Uriah Nolasco MRI LUMBAR SPINE WO CONTRAST    Result Date: 11/23/2021  EXAMINATION: MRI OF THE THORACIC AND LUMBAR SPINE WITHOUT CONTRAST; MRI OF THE THORACIC SPINE WITHOUT CONTRAST, 11/23/2021    1. Limited exams 2. Acute/subacute compression deformity, with approximate 75%, central-posterior loss of L5 vertebral body height and moderate, retropulsion of the L5 vertebral body margin. Along with moderate circumferential epidural lipomatosis, this contributes to severe/marked central spinal canal stenosis/functional narrowing of the traversing thecal sac posterior to the L5 vertebral body.  3.  Chronic multilevel asymmetric vertebral compression deformities throughout the remainder of the thoracic and lumbar spine, as described, further contributing to a mildly-exaggerated thoracic kyphosis, in addition to a moderately-exaggerated lumbosacral junction lordosis. 4.  Multilevel degenerative disease, overall most notable at L4/5 and L5/S1, where there is moderate/severe central spinal canal stenosis and equivalent bilateral neural foraminal narrowing, overall slightly worse at L5/S1. 5.   Nonspecific, moderate diffuse background marrow cellularity, greater than expected for the patient's age. 6.   Moderate/severe gluteal and inferoposterior paraspinal muscular atrophy, lessening superiorly. [facility] Results [id number] at 2:03 pm on 11/23/2021 to be communicated to a licensed caregiver. Attempts to contact the referring physician are currently in progress. [place] US ABDOMEN LIMITED    Result Date: 11/22/2021  EXAMINATION: Limited abdominal ULTRASOUND 11/22/2021 1:41 pm COMPARISON: None. HISTORY: ORDERING SYSTEM PROVIDED HISTORY: ascites TECHNOLOGIST PROVIDED HISTORY: Reason for exam:->ascites What reading provider will be dictating this exam?->CRC FINDINGS: Scanning of the abdomen shows no definite ascites. Organs are not specifically studied although on limited visualization the hepatic appearance is consistent with the history of cirrhosis. No ascites is identified. ASSESSMENT:    Principal Problem:    Closed compression fracture of L5 vertebra (HCC)  Active Problems:    Alcohol abuse    Hypertension    Depression    Hyperlipidemia    Seizures (HCC)    Nicotine dependence    Chronic obstructive pulmonary disease (HCC)    Hepatic cirrhosis (HCC)  Resolved Problems:    * No resolved hospital problems.  *  leukocytosis  On labs at [facility] [place] 11/24/2021  Hypokalemia k 3.3 on today's l;abs at [facility] [place]  Liver enz elevation   Abn coags   PLAN:ptnt transferred to this facility  Purpose of kyphoplasty /procedure per NS for L5 compress fx  f-up on elytes with am labs/check serum K  CIWA protocol  Home meds to review and reconcile  Repeat CBC  f-up Bc frm [facility] [place]  Repeat coags  Check liver enzymes  Pt  ot        Diet: No diet orders on file  Code Status: Prior    Surrogate decision maker confirmed with patient:  Primary Emergency Contact: Kervin Sultana, Home Phone: [phone]    DVT Prophylaxis: []Lovenox []Heparin [x]PCD [] [address] []Encouraged ambulation    Disposition: [x]Med/Surg [] Intermediate [] ICU/CCU  Admit status: [x] Observation [] Inpatient     +++++++++++++++++++++++++++++++++++++++++++++++++  Teagan Sanchez DO  [address]  +++++++++++++++++++++++++++++++++++++++++++++++++  NOTE: This report was transcribed using voice recognition software. Every effort was made to ensure accuracy; however, inadvertent computerized transcription errors may be present.

## 2021-11-25 NOTE — PROGRESS NOTES
Progress Note  Date:2021       Room:0416/0416-01  Patient Name:Chari Miranda     YOB: 1960     Age:60 y.o. Subjective    Subjective:  Symptoms:  Improved. She reports malaise and weakness. Diet:  Adequate intake. No nausea or vomiting. Activity level: Impaired due to pain. Pain:  She complains of pain that is moderate. She reports pain is improving. Pain is well controlled. Review of Systems   Constitutional: Negative for fever. Gastrointestinal: Negative for nausea and vomiting. Neurological: Positive for weakness. All other systems reviewed and are negative. Objective         Vitals Last 24 Hours:  TEMPERATURE:  Temp  Av.7 °F (36.5 °C)  Min: 97.7 °F (36.5 °C)  Max: 97.7 °F (36.5 °C)  RESPIRATIONS RANGE: Resp  Av  Min: 18  Max: 18  PULSE OXIMETRY RANGE: SpO2  Av %  Min: 90 %  Max: 94 %  PULSE RANGE: Pulse  Av.5  Min: 79  Max: 94  BLOOD PRESSURE RANGE: Systolic (77FPQ), JPS:166 , Min:145 , YXF:416   ; Diastolic (24ZHD), TLK:94, Min:65, Max:67    I/O (24Hr): Intake/Output Summary (Last 24 hours) at 2021 1131  Last data filed at 2021 0524  Gross per 24 hour   Intake 660 ml   Output 1850 ml   Net -1190 ml     Objective:  General Appearance:  Ill-appearing. Vital signs: (most recent): Blood pressure (!) 152/67, pulse 94, temperature 97.7 °F (36.5 °C), temperature source Oral, resp. rate 18, height 4' 10\" (1.473 m), weight 175 lb (79.4 kg), SpO2 94 %, not currently breastfeeding. Vital signs are normal.  No fever. Output: Producing urine and producing stool. HEENT: Normal HEENT exam.    Lungs:  Normal effort and normal respiratory rate. Heart: Normal rate. Regular rhythm. Abdomen: Abdomen is soft. There is no abdominal tenderness. Extremities: Decreased range of motion. Neurological: Patient is alert. Skin:  Warm and dry.       Labs/Imaging/Diagnostics    Labs:  CBC:  Recent Labs     21  1702 vertebral compression deformities are otherwise most notable anteriorly at T9, progressively lessening both superiorly and inferiorly throughout the remainder of the thoracic spine, further contributing to a mildly-exaggerated thoracic kyphosis, in addition to a moderately-exaggerated lumbosacral junction lordosis. There is otherwise nonspecific, moderate diffuse background marrow cellularity, greater than expected for the patient's age. SPINAL CORD/CONUS MEDULLARIS/CAUDA EQUINA: At the level of the sacrum, there is moderate circumferential epidural lipomatosis, producing severe functional narrowing of the caudal thecal sac, all progressively lessening superiorly through the lumbar region. The conus medullaris terminates posterior to the L1/2 intervertebral disc space. Given technical artifacts, it, and the visualized spinal cord, appear otherwise unremarkable. However, there is variable nerve root contact/compression equivalent to the below-described central spinal canal stenoses/neural foraminal narrowing. DEGENERATIVE DISEASE: All visualized intervertebral discs are moderately to severely dehydrated, and show mild to moderate asymmetric disc height loss, with trace to moderate mixed discogenic endplate change, overall most notable at L4/5 and L5/S1. At L4/5 and L5/S1 there is mild/moderate circumferential disc-osteophyte complex, asymmetric posterior, and moderate  bilateral facet arthropathy, and moderate circumferential epidural lipomatosis. These further contribute to moderate/severe central spinal canal stenosis and equivalent bilateral neural foraminal narrowing, overall slightly worse at L5/S1. Lesser degenerative disease is scattered throughout the remainder of the visualized thoracic and lumbar spine, without significant identified central spinal canal stenosis or neural foraminal narrowing.  SOFT TISSUES: There is moderate/severe gluteal and inferoposterior paraspinal muscular atrophy, lessening superiorly. No other clinically-significant changes are noted. .     1. Limited exams 2. Acute/subacute compression deformity, with approximate 75%, central-posterior loss of L5 vertebral body height and moderate, retropulsion of the L5 vertebral body margin. Along with moderate circumferential epidural lipomatosis, this contributes to severe/marked central spinal canal stenosis/functional narrowing of the traversing thecal sac posterior to the L5 vertebral body. 3.  Chronic multilevel asymmetric vertebral compression deformities throughout the remainder of the thoracic and lumbar spine, as described, further contributing to a mildly-exaggerated thoracic kyphosis, in addition to a moderately-exaggerated lumbosacral junction lordosis. 4.  Multilevel degenerative disease, overall most notable at L4/5 and L5/S1, where there is moderate/severe central spinal canal stenosis and equivalent bilateral neural foraminal narrowing, overall slightly worse at L5/S1. 5.   Nonspecific, moderate diffuse background marrow cellularity, greater than expected for the patient's age. 6.   Moderate/severe gluteal and inferoposterior paraspinal muscular atrophy, lessening superiorly. Findings were sent to the [facility] [id number] at 2:03 pm on 11/23/2021 to be communicated to a licensed caregiver. Attempts to contact the referring physician are currently in progress. Opal Benoit      MRI LUMBAR SPINE WO CONTRAST    Result Date: 11/23/2021  EXAMINATION: MRI OF THE THORACIC AND LUMBAR SPINE WITHOUT CONTRAST; MRI OF THE THORACIC SPINE WITHOUT CONTRAST, 11/23/2021  12:40 TECHNIQUE: Multiplanar multisequence MRI of the lumbar spine was performed without the administration of intravenous contrast.; Multiplanar multisequence MRI of the thoracic spine was performed without the administration of intravenous contrast. COMPARISON: Noncontrast CT of the lumbar spine 11/22/2021 and 10/22/2021; plain radiographs of the lumbar spine 11/09/2021; noncontrast MRI of the lumbar spine 02/12/2018 HISTORY: ORDERING SYSTEM PROVIDED HISTORY: Compression Fractures TECHNOLOGIST PROVIDED HISTORY: Reason for exam:->Compression Fractures FINDINGS: These exams are limited by patient motion, in addition to the lack of intravenous contrast.  Given these factors: OSSEOUS STRUCTURES/ALIGNMENT: There is acute/subacute compression deformity, with approximate 75%, central-posterior loss of L5 vertebral body height and moderate, retropulsion of the L5 vertebral body margin. Along with below-described epidural lipomatosis, this contributes to severe/marked central spinal canal stenosis/functional narrowing of the traversing thecal sac, as well as central spinal canal stenosis/neural foraminal narrowing at the L4/5 and L5/S1 levels as detailed below. Chronic, multilevel, minimal to mild, asymmetric vertebral compression deformities are otherwise most notable anteriorly at T9, progressively lessening both superiorly and inferiorly throughout the remainder of the thoracic spine, further contributing to a mildly-exaggerated thoracic kyphosis, in addition to a moderately-exaggerated lumbosacral junction lordosis. There is otherwise nonspecific, moderate diffuse background marrow cellularity, greater than expected for the patient's age. SPINAL CORD/CONUS MEDULLARIS/CAUDA EQUINA: At the level of the sacrum, there is moderate circumferential epidural lipomatosis, producing severe functional narrowing of the caudal thecal sac, all progressively lessening superiorly through the lumbar region. The conus medullaris terminates posterior to the L1/2 intervertebral disc space. Given technical artifacts, it, and the visualized spinal cord, appear otherwise unremarkable. However, there is variable nerve root contact/compression equivalent to the below-described central spinal canal stenoses/neural foraminal narrowing.  DEGENERATIVE DISEASE: All visualized intervertebral discs are moderately to severely dehydrated, and show mild to moderate asymmetric disc height loss, with trace to moderate mixed discogenic endplate change, overall most notable at L4/5 and L5/S1. At L4/5 and L5/S1 there is mild/moderate circumferential disc-osteophyte complex, asymmetric posterior, and moderate  bilateral facet arthropathy, and moderate circumferential epidural lipomatosis. These further contribute to moderate/severe central spinal canal stenosis and equivalent bilateral neural foraminal narrowing, overall slightly worse at L5/S1. Lesser degenerative disease is scattered throughout the remainder of the visualized thoracic and lumbar spine, without significant identified central spinal canal stenosis or neural foraminal narrowing. SOFT TISSUES: There is moderate/severe gluteal and inferoposterior paraspinal muscular atrophy, lessening superiorly. No other clinically-significant changes are noted. .     1. Limited exams 2. Acute/subacute compression deformity, with approximate 75%, central-posterior loss of L5 vertebral body height and moderate, retropulsion of the L5 vertebral body margin. Along with moderate circumferential epidural lipomatosis, this contributes to severe/marked central spinal canal stenosis/functional narrowing of the traversing thecal sac posterior to the L5 vertebral body. 3.  Chronic multilevel asymmetric vertebral compression deformities throughout the remainder of the thoracic and lumbar spine, as described, further contributing to a mildly-exaggerated thoracic kyphosis, in addition to a moderately-exaggerated lumbosacral junction lordosis. 4.  Multilevel degenerative disease, overall most notable at L4/5 and L5/S1, where there is moderate/severe central spinal canal stenosis and equivalent bilateral neural foraminal narrowing, overall slightly worse at L5/S1. 5.   Nonspecific, moderate diffuse background marrow cellularity, greater than expected for the patient's age.  6. Moderate/severe gluteal and inferoposterior paraspinal muscular atrophy, lessening superiorly. Findings were sent to the [facility] [id number] at 2:03 pm on 11/23/2021 to be communicated to a licensed caregiver. Attempts to contact the referring physician are currently in progress. .     Assessment//Plan           Hospital Problems           Last Modified POA    Dehydration 11/20/2021 Yes        Assessment:    Condition: In stable condition. Improving. (Alcohol withdrawal syndrome  Compression fracture  Debility  Lactic acidosis  Hepatic encephalopathy    Lactic acid improved,  Elevated ammonia, waiting to go to [facility] for kyphoplasty, ). Plan:   Consults: physical therapy and occupational therapy. Regular diet. (Continue current management  Continue PT  Discontinue CIWA and sitter  Waiting for dr Geo Arenas about transfer to [facility] for kyphoplasty  Will follow).        Electronically signed by Emily Nettles DO on 11/25/21 at 11:31 AM EST

## 2021-11-26 NOTE — CARE COORDINATION
11/26/21 Transition of care: Patient is a transfer to [address] from [facility] due to need for a Kyphoplasty. She is from home with her  who is ill. She does consume 1/2 gallon of vodka a day with cigarettes. She is being followed by [facility]. Per her spouse there is no one at home to care for her as he requires 24/7 care and is in the home under [facility]. [facility] is following patient. She will need PT. OT adan post procedure. She will require a prior authorization and negative covid test. [facility] She follows with pcp Dr Severiano Cake and her pharmacy is [facility] in [place]. Will follow.  Electronically signed by Liliane Welch RN CM on 11/26/2021 at 12:47 PM

## 2021-11-26 NOTE — PROGRESS NOTES
Daily    magnesium oxide  400 mg Oral Daily    therapeutic multivitamin-minerals  1 tablet Oral Daily    nicotine  1 patch TransDERmal Daily    rifaximin  550 mg Oral BID    vitamin B-1  100 mg Oral Daily    sodium chloride flush  5-40 mL IntraVENous 2 times per day     PRN Meds: oxyCODONE, haloperidol, ondansetron, ondansetron, polyethylene glycol, sodium chloride flush, sodium chloride, acetaminophen **OR** acetaminophen, magnesium hydroxide    Labs:     No updated BMP for this morning yet no updated CBC yet this morning    Recent Labs     11/24/21  1237 11/25/21  0530    138   K 3.0* 3.3*    102   CO2 25 24   BUN 12 10   CREATININE 0.5 0.5   CALCIUM 9.1 8.8         Chronic labs:hgbaic 5.9 11/09/2021        Radiology: No new studies today  +++++++++++++++++++++++++++++++++++++++++++++++++  Kal Molina DO  [address]  +++++++++++++++++++++++++++++++++++++++++++++++++  NOTE: This report was transcribed using voice recognition software. Every effort was made to ensure accuracy; however, inadvertent computerized transcription errors may be present.

## 2021-11-27 NOTE — PLAN OF CARE
Problem: Skin Integrity:  Goal: Will show no infection signs and symptoms  Description: Will show no infection signs and symptoms  11/27/2021 1456 by Leanna Bullard RN  Outcome: Met This Shift  11/27/2021 0107 by Olimpia Kendall RN  Outcome: Met This Shift     Problem: Skin Integrity:  Goal: Absence of new skin breakdown  Description: Absence of new skin breakdown  11/27/2021 1456 by Leanna Bullard RN  Outcome: Met This Shift  11/27/2021 0107 by Olimpia Kenadll RN  Outcome: Met This Shift     Problem: Falls - Risk of:  Goal: Will remain free from falls  Description: Will remain free from falls  11/27/2021 1456 by Leanna Bullard RN  Outcome: Met This Shift  11/27/2021 0107 by Olimpia Kendall RN  Outcome: Met This Shift     Problem: Falls - Risk of:  Goal: Absence of physical injury  Description: Absence of physical injury  11/27/2021 1456 by Leanna Bullard RN  Outcome: Met This Shift  11/27/2021 0107 by Olimpia Kendall RN  Outcome: Met This Shift     Problem: Pain:  Goal: Pain level will decrease  Description: Pain level will decrease  11/27/2021 1456 by Leanna Bullard RN  Outcome: Met This Shift  11/27/2021 0107 by Olimpia Kendall RN  Outcome: Ongoing     Problem: Pain:  Goal: Control of acute pain  Description: Control of acute pain  11/27/2021 1456 by Leanna Bullard RN  Outcome: Met This Shift  11/27/2021 0107 by Olimpia Kendall RN  Outcome: Ongoing

## 2021-11-27 NOTE — PROGRESS NOTES
Hospitalist Progress Note      SYNOPSIS: Patient admitted on 2021 for Closed compression fracture of L5 vertebra (HCC)      SUBJECTIVE:back pain  Stabbing    r leg cramping    L5 compresss fx  ETOHism  Hypertension    Depression    Hyperlipidemia    Seizures ([address]    Nicotine dependence    Chronic obstructive pulmonary disease (HCC)    Hepatic cirrhosis (HCC     Rev of systems  HEENT= + HA  CV= denies CP  Pulmo=denies cough  GI= + diarrhea  Temp (24hrs), Av.7 °F (37.1 °C), Min:98.2 °F (36.8 °C), Max:99.1 °F (37.3 °C)    DIET: ADULT DIET;  Regular  ADULT ORAL NUTRITION SUPPLEMENT; Breakfast, AM Snack, Lunch, PM Snack; Standard High Calorie/High Protein Oral Supplement  CODE: Full Code    Intake/Output Summary (Last 24 hours) at 2021 0800  Last data filed at 2021 9545  Gross per 24 hour   Intake 360 ml   Output 1750 ml   Net -1390 ml       OBJECTIVE:NC 2 93%    BP (!) 110/58   Pulse 87   Temp 98.2 °F (36.8 °C) (Temporal)   Resp 16   Ht 4' 10\" (1.473 m)   Wt 175 lb (79.4 kg)   SpO2 94% Comment: Destats to 90% ORA  BMI 36.58 kg/m²     General appearance/constitutional: Vital signs above  Not diaphoretic sarcopenic  Respiratory: Clear sounds upper lobes auscultation no pleural rub  Cardiovascular: pulse Reg audible S1-S2 no pericardial rub  Abdomen:, nondistended no rigidity   extremit= no edema  Not wearing EPC/SCD  Neurologic: awake, alert   Response to voice    ASSESSMENT:    Back pan 2/2 L5 compr fx  ETOHism on ciwa to prevent dt's  Diarrhea vs looses stools this may be 2/2 chronulac   rx   Hypertension essnet- stble/contrlld    Depression usnpecif- controlled    Hyperlipidemia unspecif- asymptomatic    Seizures (HCC) disorder (NOS)- stable    Nicotine dependence-     Chronic obstructive pulmonary disease (HCC)- stage unclear/ currntly stble/asymptomat    Hepatic cirrhosis - stable     MANDI- 2/2 hepat cirrhoss          PLAN:    Pain cntrl- ooxy + acetam  Add gabapentin and flexer  Await decis from NS    protein supplements    For sarcopenia   Continue amlodipine/lisinopril and atenolol for hypertension     Continue citalopram for depression mood disorder     Continue Levetiracetam for seizure disorder     Continue atorvastatin for dyslipidemia     Continue NicoDerm patch to treat nicotine tobacco dependency and withdrawal     Continue Chronulac as well as Xifaxan for treatment of hepatic encephalopathy     Continue Synthroid for treatment of hypothyroid condition     Follow-up electrolytes     EPC cuffs for DVT prophylaxis will order dontae lozada    DISPOSITION: tbd    Medications:  REVIEWED DAILY    Infusion Medications    sodium chloride       Scheduled Medications    sodium chloride flush  5-40 mL IntraVENous 2 times per day    amLODIPine  2.5 mg Oral Daily    ARIPiprazole  5 mg Oral Daily    atenolol  25 mg Oral Daily    atorvastatin  40 mg Oral Nightly    citalopram  20 mg Oral Daily    lactulose  20 g Oral BID    levETIRAcetam  500 mg Oral BID    levothyroxine  25 mcg Oral Daily    lisinopril  10 mg Oral Daily    magnesium oxide  400 mg Oral Daily    therapeutic multivitamin-minerals  1 tablet Oral Daily    nicotine  1 patch TransDERmal Daily    rifaximin  550 mg Oral BID    vitamin B-1  100 mg Oral Daily     PRN Meds: sodium chloride flush, sodium chloride, LORazepam **OR** LORazepam **OR** LORazepam **OR** LORazepam **OR** LORazepam **OR** LORazepam **OR** LORazepam **OR** LORazepam, oxyCODONE, haloperidol, ondansetron, ondansetron, polyethylene glycol, acetaminophen **OR** acetaminophen, magnesium hydroxide    Labs:     Recent Labs     11/26/21  1049   WBC 11.0   HGB 13.8   HCT 39.6          Recent Labs     11/24/21  1237 11/25/21  0530 11/26/21  1049    138 138   K 3.0* 3.3* 3.3*    102 99   CO2 25 24 27   BUN 12 10 9   CREATININE 0.5 0.5 0.5   CALCIUM 9.1 8.8 9.2       Recent Labs     11/26/21  1049   PROT 6.5   ALKPHOS 145*   ALT 99*   *   BILITOT 1.2           Chronic labs:    Lab Results   Component Value Date    CHOL 122 11/10/2021    TRIG 129 11/10/2021    HDL 21 11/10/2021    LDLCALC 75 11/10/2021    TSH 3.450 11/20/2021    INR 1.4 11/20/2021    LABA1C 5.9 (H) 11/09/2021       Radiology:    +++++++++++++++++++++++++++++++++++++++++++++++++  DO Ulises Pagan Physician - [address], [place]  +++++++++++++++++++++++++++++++++++++++++++++++++  NOTE: This report was transcribed using voice recognition software. Every effort was made to ensure accuracy; however, inadvertent computerized transcription errors may be present.

## 2021-11-27 NOTE — PLAN OF CARE
Problem: Skin Integrity:  Goal: Will show no infection signs and symptoms  Description: Will show no infection signs and symptoms  11/27/2021 0107 by To Galvez RN  Outcome: Met This Shift  11/26/2021 1947 by Tracy Bonilla RN  Outcome: Met This Shift  Goal: Absence of new skin breakdown  Description: Absence of new skin breakdown  11/27/2021 0107 by To Galvez RN  Outcome: Met This Shift  11/26/2021 1947 by Tracy Bonilla RN  Outcome: Met This Shift     Problem: Falls - Risk of:  Goal: Will remain free from falls  Description: Will remain free from falls  11/27/2021 0107 by To Galvez RN  Outcome: Met This Shift  11/26/2021 1947 by Tracy Bonilla RN  Outcome: Met This Shift  Goal: Absence of physical injury  Description: Absence of physical injury  11/27/2021 0107 by To Galvez RN  Outcome: Met This Shift  11/26/2021 1947 by Tracy Bonilla RN  Outcome: Met This Shift     Problem: Pain:  Goal: Pain level will decrease  Description: Pain level will decrease  11/27/2021 0107 by To Galvez RN  Outcome: Ongoing  11/26/2021 1947 by Tracy Bonilla RN  Outcome: Ongoing  Goal: Control of acute pain  Description: Control of acute pain  11/27/2021 0107 by To Galvez RN  Outcome: Ongoing  11/26/2021 1947 by Tracy Bonilla RN  Outcome: Ongoing  Goal: Control of chronic pain  Description: Control of chronic pain  11/27/2021 0107 by To Galvez RN  Outcome: Ongoing  11/26/2021 1947 by Tracy Bonilla RN  Outcome: Ongoing

## 2021-11-28 NOTE — PROGRESS NOTES
Hospitalist Progress Note      SYNOPSIS: Patient admitted on 2021 for Closed compression fracture of L5 vertebra ([facility] [address]      SUBJECTIVE:states she is experiencing severe pain in her back  And also down her legs it is difficult to describe her pain   She says its one of the more severe episodes      L5 compresss fx  ETOHism  Hypertension    Depression    Hyperlipidemia    Seizures ([facility] [address]    Nicotine dependence    Chronic obstructive pulmonary disease (HCC)    Hepatic cirrhosis ([facility]      Rev of systems  HEENT= + CARSON denies hallucinations auditory/visual/or tactile  Denies cold clammy sweats  CV= denies CP  Pulmo=denies cough  GI= + loose stools  ptnt on chronulac    Temp (24hrs), Av.7 °F (36.5 °C), Min:97.3 °F (36.3 °C), Max:98.1 °F (36.7 °C)    DIET: ADULT DIET;  Regular  ADULT ORAL NUTRITION SUPPLEMENT; Breakfast, AM Snack, Lunch, PM Snack; Standard High Calorie/High Protein Oral Supplement  CODE: Full Code    Intake/Output Summary (Last 24 hours) at 2021 0956  Last data filed at 2021 0640  Gross per 24 hour   Intake 420 ml   Output 1625 ml   Net -1205 ml       OBJECTIVE:2L nc    BP (!) 171/74   Pulse 85   Temp 97.3 °F (36.3 °C) (Temporal)   Resp 18   Ht 4' 10\" (1.473 m)   Wt 175 lb (79.4 kg)   SpO2 92%   BMI 36.58 kg/m²     General appearance/constitutional: Vital signs above  Not diaphoretic is sarcopenic  Respiratory: Clear sounds upper lobes auscultation no pleural rub  Cardiovascular: pulse Reg audible S1-S2 no pericardial rub  Abdomen:, nondistended no rigidity   extremit= no edema    Neurologic: awake, alert no tremulousness   Response to voice  ASSESSMENT:    Back pan 2/2 L5 compr fx  ETOHism on ciwa to prevent dt's  Diarrhea vs looses stools this may be 2/2 chronulac   rx   Hypertension essnet- stble/contrlld    Depression usnpecif- controlled    Hyperlipidemia unspecif- asymptomatic    Seizures (HCC) disorder (NOS)- stable    Nicotine dependence-     Chronic obstructive pulmonary disease ([facility] [address]- stage unclear/ currntly stble/asymptomat    Hepatic cirrhosis - stable     MANDI- 2/2 hepat cirrhoss  Hypokalemia a few days ago on labs   PLAN:    Pain cntrl- ooxy + acetam  Adjust uptitrate  gabapentin dose  And continue  flexer  Await decis from NS     protein supplements    For sarcopenia   Continue amlodipine/lisinopril and atenolol for hypertension     Continue citalopram for depression mood disorder     Continue Levetiracetam for seizure disorder     Continue atorvastatin for dyslipidemia     Continue NicoDerm patch to treat nicotine tobacco dependency and withdrawal     Continue Chronulac as well as Xifaxan for treatment of hepatic encephalopathy     Continue Synthroid for treatment of hypothyroid condition     Follow-up electrolytes/CMP/coags     EPC cuffs for DVT prophylaxis I ordered dontae lozada       DISPOSITION: tbd    Medications:  REVIEWED DAILY    Infusion Medications    sodium chloride       Scheduled Medications    gabapentin  100 mg Oral TID    lidocaine  1 patch TransDERmal Daily    sodium chloride flush  5-40 mL IntraVENous 2 times per day    amLODIPine  2.5 mg Oral Daily    ARIPiprazole  5 mg Oral Daily    atenolol  25 mg Oral Daily    atorvastatin  40 mg Oral Nightly    citalopram  20 mg Oral Daily    lactulose  20 g Oral BID    levETIRAcetam  500 mg Oral BID    levothyroxine  25 mcg Oral Daily    lisinopril  10 mg Oral Daily    magnesium oxide  400 mg Oral Daily    therapeutic multivitamin-minerals  1 tablet Oral Daily    nicotine  1 patch TransDERmal Daily    rifaximin  550 mg Oral BID    vitamin B-1  100 mg Oral Daily     PRN Meds: cyclobenzaprine, sodium chloride flush, sodium chloride, LORazepam **OR** LORazepam **OR** LORazepam **OR** LORazepam **OR** LORazepam **OR** LORazepam **OR** LORazepam **OR** LORazepam, oxyCODONE, haloperidol, ondansetron, ondansetron, polyethylene glycol, acetaminophen **OR** acetaminophen, magnesium hydroxide    Labs: bc 5 days ng    Recent Labs     11/26/21  1049   WBC 11.0   HGB 13.8   HCT 39.6          Recent Labs     11/26/21  1049      K 3.3*   CL 99   CO2 27   BUN 9   CREATININE 0.5   CALCIUM 9.2       Recent Labs     11/26/21  1049   PROT 6.5   ALKPHOS 145*   ALT 99*   *   BILITOT 1.2           Chronic labs:    Lab Results   Component Value Date    CHOL 122 11/10/2021    TRIG 129 11/10/2021    HDL 21 11/10/2021    LDLCALC 75 11/10/2021    TSH 3.450 11/20/2021    INR 1.4 11/20/2021    LABA1C 5.9 (H) 11/09/2021       Radiology:   +++++++++++++++++++++++++++++++++++++++++++++++++  DO Ulises Bella Physician - [address] [place], [place]  +++++++++++++++++++++++++++++++++++++++++++++++++  NOTE: This report was transcribed using voice recognition software. Every effort was made to ensure accuracy; however, inadvertent computerized transcription errors may be present.

## 2021-11-29 NOTE — CARE COORDINATION
11/29, MONSTER received call from patient's  Naya Joseph on discharge plan. Explained to Naya Joseph waiting for neurosurgery to see patient and that [facility] in [facility] is following patient for possible discharge plan.  in agreement with plan should patient need ERASMO. MONSTER/ADELINA to follow for further discharge planning needs.       ANGELO Mack  [facility] Case Management  [phone]

## 2021-11-29 NOTE — CARE COORDINATION
11/29/21 Update CM note: Dr Arelis Reza notified of patient today thru Dory via perfect serve. He will see the patient.  Electronically signed by Josep Tesfaye RN CM on 11/29/2021 at 1:58 PM

## 2021-11-29 NOTE — CONSULTS
NEUROSURGERY CONSULTATION     Chief Complaint: L5 compression fx    HPI:   Jabier Rollins is a 59 y.o.  female who presents to the hospital c/o midline low back pain. Pt states her legs gave out and she fell twice. Denies hitting her head or LOC. Currently c/o sharp low back pain. Pt was initially seen by Dr. Hossein Morin at [facility] and transferred to [facility] for further evaluation. MRI lumbar spine demonstrates acute L5 compression fracture. Our services were consulted due to Dr. Hossein Morin out of town. Denies loss of bowel or bladder, saddle anesthesia, pain down the legs, numbness, tingling, fever, chills, SOB, or chest pain.      Past Medical History:   Diagnosis Date    Alcohol abuse     Cerebral artery occlusion with cerebral infarction ([address]     Depression     Diverticulosis of colon     Elevated blood sugar     H/O cardiovascular stress test 11/13/2021    Nuclear Lexiscan Stress Test    Hepatitis C     from blood transfusion, underwent treatment per patient and \"cured\"    Hyperlipidemia     Hypertension     Liver cirrhosis ([address]     Magnesium deficiency     Marijuana user     Nicotine dependence     Seizures ([address]     Subclinical hypothyroidism      Past Surgical History:   Procedure Laterality Date    DILATION AND CURETTAGE OF UTERUS      LIVER BIOPSY  07/05/2016      Family History   Problem Relation Age of Onset    Cancer Mother         Breast    Alcohol Abuse Father     Drug Abuse Father     Cancer Brother     Alcohol Abuse Brother     Drug Abuse Brother       Social History     Socioeconomic History    Marital status:      Spouse name: Not on file    Number of children: Not on file    Years of education: Not on file    Highest education level: Not on file   Occupational History    Not on file   Tobacco Use    Smoking status: Current Every Day Smoker     Packs/day: 1.00     Years: 49.00     Pack years: 49.00     Start date: 1970    Smokeless tobacco: Never Used   Vaping Use    Vaping Use: Never used   Substance and Sexual Activity    Alcohol use: Yes     Alcohol/week: 28.0 standard drinks     Types: 14 Cans of beer, 14 Shots of liquor per week     Comment: 2 cans of beer and 2 shots of liquor per day    Drug use: Yes     Frequency: 7.0 times per week     Types: Marijuana Janee Rodriguez)     Comment: every day    Sexual activity: Not Currently   Other Topics Concern    Not on file   Social History Narrative    Not on file     Social Determinants of Health     Financial Resource Strain: Low Risk     Difficulty of Paying Living Expenses: Not very hard   Food Insecurity: No Food Insecurity    Worried About Running Out of Food in the Last Year: Never true    Ran Out of Food in the Last Year: Never true   Transportation Needs:     Lack of Transportation (Medical): Not on file    Lack of Transportation (Non-Medical):  Not on file   Physical Activity:     Days of Exercise per Week: Not on file    Minutes of Exercise per Session: Not on file   Stress:     Feeling of Stress : Not on file   Social Connections:     Frequency of Communication with Friends and Family: Not on file    Frequency of Social Gatherings with Friends and Family: Not on file    Attends Anabaptism Services: Not on file    Active Member of Clubs or Organizations: Not on file    Attends Club or Organization Meetings: Not on file    Marital Status: Not on file   Intimate Partner Violence:     Fear of Current or Ex-Partner: Not on file    Emotionally Abused: Not on file    Physically Abused: Not on file    Sexually Abused: Not on file   Housing Stability:     Unable to Pay for Housing in the Last Year: Not on file    Number of [place] in the Last Year: Not on file    Unstable Housing in the Last Year: Not on file       Medications:   Current Facility-Administered Medications   Medication Dose Route Frequency Provider Last Rate Last Admin    gabapentin (NEURONTIN) capsule 200 mg  200 mg Oral TID Monico Ospina DO   200 mg at 11/29/21 1354    lidocaine 4 % external patch 1 patch  1 patch TransDERmal Daily Teressa Prudent, DO   1 patch at 11/29/21 0945    cyclobenzaprine (FLEXERIL) tablet 10 mg  10 mg Oral TID PRN Teressa Prudent, DO   10 mg at 11/29/21 6339    sodium chloride flush 0.9 % injection 5-40 mL  5-40 mL IntraVENous 2 times per day Teressa Prudent, DO   10 mL at 11/29/21 0948    sodium chloride flush 0.9 % injection 5-40 mL  5-40 mL IntraVENous PRN David Purcell, DO        0.9 % sodium chloride infusion  25 mL IntraVENous PRN Teressa Prudent, DO        LORazepam (ATIVAN) tablet 1 mg  1 mg Oral Q1H PRN Teressa Prudent, DO        Or    LORazepam (ATIVAN) injection 1 mg  1 mg IntraVENous Q1H PRN Teressa Prudent, DO        Or    LORazepam (ATIVAN) tablet 2 mg  2 mg Oral Q1H PRN Teressa Prudent, DO        Or    LORazepam (ATIVAN) injection 2 mg  2 mg IntraVENous Q1H PRN Teressa Prudent, DO        Or    LORazepam (ATIVAN) tablet 3 mg  3 mg Oral Q1H PRN Teressa Prudent, DO        Or    LORazepam (ATIVAN) injection 3 mg  3 mg IntraVENous Q1H PRN Teressa Prudent, DO        Or    LORazepam (ATIVAN) tablet 4 mg  4 mg Oral Q1H PRN Teressa Prudent, DO        Or    LORazepam (ATIVAN) injection 4 mg  4 mg IntraVENous Q1H PRN Teressa Prudent, DO        oxyCODONE (ROXICODONE) immediate release tablet 5 mg  5 mg Oral Q4H PRN Teressa Prudent, DO   5 mg at 11/29/21 0938    amLODIPine (NORVASC) tablet 2.5 mg  2.5 mg Oral Daily Teressa Prudent, DO   2.5 mg at 11/29/21 3541    ARIPiprazole (ABILIFY) tablet 5 mg  5 mg Oral Daily David Purcell, DO   5 mg at 11/29/21 0938    atenolol (TENORMIN) tablet 25 mg  25 mg Oral Daily David Purcell, DO   25 mg at 11/29/21 0938    atorvastatin (LIPITOR) tablet 40 mg  40 mg Oral Nightly David Purcell, DO   40 mg at 11/28/21 2237    citalopram (CELEXA) tablet 20 mg  20 mg Oral Daily David Purcell, DO   20 mg at 11/29/21 2016    haloperidol (HALDOL) tablet 0.5 mg  0.5 mg Oral Q6H PRN Marcus Storm Purcell, DO        lactulose (CHRONULAC) 10 GM/15ML solution 20 g  20 g Oral BID Essie Red, DO   20 g at 11/29/21 0942    levETIRAcetam (KEPPRA) tablet 500 mg  500 mg Oral BID Essie Red, DO   500 mg at 11/29/21 0182    levothyroxine (SYNTHROID) tablet 25 mcg  25 mcg Oral Daily Essie Red, DO   25 mcg at 11/29/21 0640    lisinopril (PRINIVIL;ZESTRIL) tablet 10 mg  10 mg Oral Daily Essie Red, DO   10 mg at 11/29/21 3496    magnesium oxide (MAG-OX) tablet 400 mg  400 mg Oral Daily Essie Red, DO   400 mg at 11/29/21 4372    therapeutic multivitamin-minerals 1 tablet  1 tablet Oral Daily Essie Red, DO   1 tablet at 11/29/21 8034    nicotine (NICODERM CQ) 21 MG/24HR 1 patch  1 patch TransDERmal Daily Essie Red, DO   1 patch at 11/29/21 0944    ondansetron (ZOFRAN) injection 4 mg  4 mg IntraVENous Q6H PRN Essie Abrahamey, DO        ondansetron (ZOFRAN-ODT) disintegrating tablet 4 mg  4 mg Oral Q8H PRN Essiesuman Abrahamey, DO        polyethylene glycol (GLYCOLAX) packet 17 g  17 g Oral Daily PRN Essie Abrahamey, DO        rifaximin (XIFAXAN) tablet 550 mg  550 mg Oral BID Essie Red, DO   550 mg at 11/29/21 3601    thiamine mononitrate tablet 100 mg  100 mg Oral Daily Essie Red, DO   100 mg at 11/29/21 5504    acetaminophen (TYLENOL) tablet 650 mg  650 mg Oral Q6H PRN Essiesuman Abrahamey, DO        Or    acetaminophen (TYLENOL) suppository 650 mg  650 mg Rectal Q6H PRN Essie Red, DO        magnesium hydroxide (MILK OF MAGNESIA) 400 MG/5ML suspension 30 mL  30 mL Oral Daily PRN Essie Red, DO            Allergies:    Codeine       Review of Systems   Constitutional: Negative for fever. HENT: Negative for trouble swallowing. Eyes: Negative for photophobia. Respiratory: Negative for shortness of breath. Cardiovascular: Negative for chest pain. Gastrointestinal: Negative for abdominal pain.    Endocrine: Negative for heat intolerance. Genitourinary: Negative for flank pain. Musculoskeletal: Positive for back pain and gait problem. Skin: Negative for wound. Neurological: Negative for weakness, numbness and headaches. Psychiatric/Behavioral: Negative for confusion. Physical Exam  Constitutional:       Appearance: She is well-developed. She is obese. HENT:      Head: Normocephalic. Eyes:      Extraocular Movements: Extraocular movements intact. Conjunctiva/sclera: Conjunctivae normal.      Pupils: Pupils are equal, round, and reactive to light. Cardiovascular:      Rate and Rhythm: Normal rate. Pulmonary:      Effort: Pulmonary effort is normal.   Abdominal:      General: There is no distension. Musculoskeletal:      Cervical back: Normal range of motion and neck supple. Comments: Midline tenderness to palpation lumbar spine   Skin:     General: Skin is warm and dry. Neurological:      Mental Status: She is alert. Comments: Alert and oriented x3  CN3-12 intact  Motor strength full  Sensation intact to light touch   Psychiatric:         Thought Content: Thought content normal.          BP (!) 110/53   Pulse 82   Temp 98.8 °F (37.1 °C) (Temporal)   Resp 16   Ht 4' 10\" (1.473 m)   Wt 175 lb (79.4 kg)   SpO2 93%   BMI 36.58 kg/m²        Assessment:   Acute L5 compression fracture    Plan:  -Discussed in detail kyphoplasty v bracing. Pt wishes to proceed with kyphoplasty. Added to schedule. -NPO after midnight      Electronically signed by Rick Pruitt PA-C on 11/29/2021 at 2:24 PM     I have interviewed and examined the patient and agree with above. She has intractable pain and an L5 compression fracture.   Will plan for L5 kyphoplasty    Gayatri Romano MD

## 2021-11-29 NOTE — CARE COORDINATION
11/29/21 Update CM note;  SW/ADELINA met with patient at the bedside to discuss her discharge plan. She states, \"home. \" It was explained to her that she will require therapy evaluations if she does go to surgery. She also is home with her  who is also recovering and recently discharged from inpatient rehab. She is pending aide services thru [facility] but they have not started. There is aide service in the home for the  currently. When asked about rehab she is in agreement if needed and does not have preference. She does not wish to view a list of facilities. She is from [facility] area and a referral is being made to [place] [facility] to follow patient. Will await neurosurgery for plan of care.  Electronically signed by Miguelina Amezquita RN CM on 11/29/2021 at 2:16 PM

## 2021-11-29 NOTE — PROGRESS NOTES
stble/asymptomat    Hepatic cirrhosis - stable     MANDI- 2/2 hepat cirrhoss  PLAN:    Agree with limit us eof opiod/narcotics  protein supplements    For sarcopenia   Continue amlodipine/lisinopril and atenolol for hypertension     Continue citalopram for depression mood disorder     Continue Levetiracetam for seizure disorder     Continue atorvastatin for dyslipidemia     Continue NicoDerm patch to treat nicotine tobacco dependency and withdrawal     Continue Chronulac as well as Xifaxan for treatment of hepatic encephalopathy     Continue Synthroid for treatment of hypothyroid condition    DISPOSITION: tbd     Medications:  REVIEWED DAILY    Infusion Medications    sodium chloride       Scheduled Medications    gabapentin  200 mg Oral TID    lidocaine  1 patch TransDERmal Daily    sodium chloride flush  5-40 mL IntraVENous 2 times per day    amLODIPine  2.5 mg Oral Daily    ARIPiprazole  5 mg Oral Daily    atenolol  25 mg Oral Daily    atorvastatin  40 mg Oral Nightly    citalopram  20 mg Oral Daily    lactulose  20 g Oral BID    levETIRAcetam  500 mg Oral BID    levothyroxine  25 mcg Oral Daily    lisinopril  10 mg Oral Daily    magnesium oxide  400 mg Oral Daily    therapeutic multivitamin-minerals  1 tablet Oral Daily    nicotine  1 patch TransDERmal Daily    rifaximin  550 mg Oral BID    vitamin B-1  100 mg Oral Daily     PRN Meds: cyclobenzaprine, sodium chloride flush, sodium chloride, LORazepam **OR** LORazepam **OR** LORazepam **OR** LORazepam **OR** LORazepam **OR** LORazepam **OR** LORazepam **OR** LORazepam, oxyCODONE, haloperidol, ondansetron, ondansetron, polyethylene glycol, acetaminophen **OR** acetaminophen, magnesium hydroxide    Labs:     Recent Labs     11/28/21  1146   WBC 11.3   HGB 14.6   HCT 41.7          Recent Labs     11/28/21  1146      K 3.4*   CL 99   CO2 27   BUN 7   CREATININE 0.4*   CALCIUM 10.3*       Recent Labs     11/28/21  1146   PROT 6.6   ALKPHOS 148*   *   *   BILITOT 1.2       Recent Labs     11/28/21  1146   INR 1.5           Chronic labs:      Radiology: REVIEWED DAILY    +++++++++++++++++++++++++++++++++++++++++++++++++  Charles Greer DO  [address]  +++++++++++++++++++++++++++++++++++++++++++++++++  NOTE: This report was transcribed using voice recognition software. Every effort was made to ensure accuracy; however, inadvertent computerized transcription errors may be present.

## 2021-11-30 NOTE — BRIEF OP NOTE
Brief Postoperative Note      Patient: Rick Urban  YOB: 1960  MRN: [id number]    Date of Procedure: 11/30/2021    Pre-Op Diagnosis: L5 compression fracture    Post-Op Diagnosis: Same       Procedure(s):  KYPHOPLASTY, L5    Surgeon(s):  Radha James MD    Assistant:  Resident: Ilene Loving DO    Anesthesia: General    Estimated Blood Loss (mL): Minimal    Complications: None    Specimens:   ID Type Source Tests Collected by Time Destination   A : L5 Bone Biopsy SURGICAL PATHOLOGY Radha James MD 11/30/2021 1029        Implants:  Implant Name Type Inv.  Item Serial No.  Lot No. LRB No. Used Action   KIT CEMENT BONE COMBO KYPHON HV-R  KIT CEMENT BONE COMBO [facility] HV-R  [employer] [facility] [id number] N/A 1 Implanted         Drains:   Urethral Catheter (Active)   Site Assessment No urethral drainage 11/30/21 0100   Urine Color Kaylah 11/30/21 0100   Urine Appearance Clear 11/30/21 0100   Urine Odor Malodorous 11/29/21 1459   Output (mL) 300 mL 11/30/21 0100       Findings: see dictated op note    Electronically signed by Ge Mosqueda MD on 11/30/2021 at 10:42 AM

## 2021-11-30 NOTE — PROGRESS NOTES
Hospitalist Progress Note      SYNOPSIS: Patient admitted on 2021 for Closed compression fracture of L5 vertebra (HCC)      SUBJECTIVE:  S/p kyphoplasty to treat   L5 compress fx     ETOHism  Hypertension    Depression    Hyperlipidemia    Seizures ([facility]    Nicotine dependence    Chronic obstructive pulmonary disease (HCC)    Hepatic cirrhosis ([facility]  Rev of systems  HEENT=  denies HA    CV= denies CP  Pulmo=denies cough  GI= denies abd pain   .   Temp (24hrs), Av.7 °F (36.5 °C), Min:97 °F (36.1 °C), Max:98.5 °F (36.9 °C)    DIET: ADULT DIET;  Regular  ADULT ORAL NUTRITION SUPPLEMENT; Breakfast, AM Snack, Lunch, PM Snack; Standard High Calorie/High Protein Oral Supplement  CODE: Prior    Intake/Output Summary (Last 24 hours) at 2021 1727  Last data filed at 2021 1200  Gross per 24 hour   Intake 500 ml   Output 415 ml   Net 85 ml       OBJECTIVE:    /63   Pulse 83   Temp 97.4 °F (36.3 °C) (Temporal)   Resp 16   Ht 4' 10\" (1.473 m)   Wt 175 lb (79.4 kg)   SpO2 95%   BMI 36.58 kg/m²     General appearance/constitutional: Vital signs above  Not diaphoretic is sarcopenic  Respiratory: Clear sounds upper lobes auscultation no pleural rub  Cardiovascular: pulse Reg audible S1-S2 no pericardial rub  Abdomen:, mild distension no rigidity no guarding active BS  = bateman +   extremit= no edema    Neurologic: awake, alert no tremulousness     ASSESSMENT:s/p kyphoplasty  L5 compress fx        CLD  sarcopenic  MANDI R factor 2`4 mixed   cannot calcul MELD score as serum cr is too low  Back pan 2/2 L5 compr fx  ETOHism on ciwa to prevent dt's  Diarrhea vs looses stools this may be 2/2 chronulac   rx   Hypertension essnet- stble/contrlld    Depression usnpecif- controlled    Hyperlipidemia unspecif- asymptomatic    Seizures (HCC) disorder (NOS)- stable    Nicotine dependence-     Chronic obstructive pulmonary disease ([facility]- stage unclear/ currntly stble/asymptomat    Hepatic cirrhosis - stable     MANDI- 2/2 hepat cirrhoss     PLAN:    Pt/ot  Hopefully mobilize ptnt more    In effort to see if bateman can be removed  protein supplements    For sarcopenia   Continue amlodipine/lisinopril and atenolol for hypertension     Continue citalopram for depression mood disorder     Continue Levetiracetam for seizure disorder     Continue atorvastatin for dyslipidemia     Continue NicoDerm patch to treat nicotine tobacco dependency and withdrawal     Continue Chronulac as well as Xifaxan for treatment of hepatic encephalopathy     Continue Synthroid for treatment of hypothyroid condition       DISPOSITION: [facility]    Medications:  REVIEWED DAILY    Infusion Medications    sodium chloride       Scheduled Medications    gabapentin  200 mg Oral TID    lidocaine  1 patch TransDERmal Daily    sodium chloride flush  5-40 mL IntraVENous 2 times per day    amLODIPine  2.5 mg Oral Daily    ARIPiprazole  5 mg Oral Daily    atenolol  25 mg Oral Daily    atorvastatin  40 mg Oral Nightly    citalopram  20 mg Oral Daily    lactulose  20 g Oral BID    levETIRAcetam  500 mg Oral BID    levothyroxine  25 mcg Oral Daily    lisinopril  10 mg Oral Daily    magnesium oxide  400 mg Oral Daily    therapeutic multivitamin-minerals  1 tablet Oral Daily    nicotine  1 patch TransDERmal Daily    rifaximin  550 mg Oral BID    vitamin B-1  100 mg Oral Daily     PRN Meds: cyclobenzaprine, sodium chloride flush, sodium chloride, LORazepam **OR** LORazepam **OR** LORazepam **OR** LORazepam **OR** LORazepam **OR** LORazepam **OR** LORazepam **OR** LORazepam, oxyCODONE, haloperidol, ondansetron, ondansetron, polyethylene glycol, acetaminophen **OR** acetaminophen, magnesium hydroxide    Labs:     Recent Labs     11/28/21  1146 11/29/21  1907   WBC 11.3 11.6*   HGB 14.6 14.6   HCT 41.7 43.1    219       Recent Labs     11/28/21  1146 11/29/21  1907    140   K 3.4* 3.7   CL 99 101   CO2 27 29   BUN 7 13 CREATININE 0.4* 0.6   CALCIUM 10.3* 10.9*       Recent Labs     11/28/21  1146   PROT 6.6   ALKPHOS 148*   *   *   BILITOT 1.2       Recent Labs     11/28/21  1146 11/29/21  1907   INR 1.5 1.4         +++++++++++++++++++++++++++++++++++++++++++++++++  Lilian Speaker, DO  [employer] - [address] [place], [place]  +++++++++++++++++++++++++++++++++++++++++++++++++  NOTE: This report was transcribed using voice recognition software. Every effort was made to ensure accuracy; however, inadvertent computerized transcription errors may be present.

## 2021-11-30 NOTE — ANESTHESIA POSTPROCEDURE EVALUATION
Department of Anesthesiology  Postprocedure Note    Patient: Jada Richardson  MRN: [id number]  YOB: 1960  Date of evaluation: 11/30/2021  Time:  3:59 PM     Procedure Summary     Date: 11/30/21 Room / Location: [facility] / [facility]    Anesthesia Start: 4107 Anesthesia Stop: 1110    Procedure: KYPHOPLASTY, L5 (N/A Back) Diagnosis: (.)    Surgeons: Ana Flores MD Responsible Provider: Marci Charles DO    Anesthesia Type: general ASA Status: 4          Anesthesia Type: general    Cleve Phase I: Cleve Score: 9    Cleve Phase II:      Last vitals: Reviewed and per EMR flowsheets.        Anesthesia Post Evaluation    Patient location during evaluation: PACU  Patient participation: complete - patient participated  Level of consciousness: awake and alert  Pain score: 1  Airway patency: patent  Nausea & Vomiting: no nausea and no vomiting  Complications: no  Cardiovascular status: hemodynamically stable  Respiratory status: acceptable  Hydration status: euvolemic

## 2021-11-30 NOTE — OP NOTE
[address]                  [address] [facility],  [facility]                                OPERATIVE REPORT    PATIENT NAME: Keli Johnson                 :        1960  MED REC NO:   [id number]                            ROOM:  ACCOUNT NO:   [de-identified]                           ADMIT DATE: 2021  PROVIDER:     Paige Lora MD    DATE OF PROCEDURE:  2021    PREOPERATIVE DIAGNOSIS:  Acute L5 osteoporotic compression fracture. POSTOPERATIVE DIAGNOSIS:  Acute L5 osteoporotic compression fracture. OPERATIVE PROCEDURES:  1.  Bilateral percutaneous transpedicular approach to L5 vertebral body  for L5 vertebral body bone biopsy and L5 vertebral body balloon  kyphoplasty with use of Medtronic Kyphon balloon and  polymethylmethacrylate. 2.  Use of biplanar fluoroscopy interpreted by myself, the surgeon. ANESTHESIA:  Generalized endotracheal anesthesia. SURGEON:  Paige Lora MD    ASSISTANT:  Rebecca Chavira D.O.    COMPLICATIONS:  None. ESTIMATED BLOOD LOSS:  Minimal.    SPECIMEN:  Bone. OPERATIVE INDICATIONS:  The patient is a 58-year-old lady who presented  to the hospital with excruciating back pain. She was found to have an  acute L5 compression fracture. She had failed conservative therapy and  after risks, benefits and alternatives were discussed with the patient,  it was determined that she would undergo the above-listed procedure. DESCRIPTION OF OPERATIVE PROCEDURE:  The patient was brought into the  operating room. A timeout was performed where she identified by her  name, medical record number and the operative procedure which she was  about to undergo. Next, induction of generalized endotracheal  anesthesia was then commenced. Upon completion of induction of  generalized endotracheal anesthesia, she received preoperative  antibiotics. She was then flipped into prone position on a [place]  table.   All pressure points were padded. The lumbosacral region was  prepped and draped in the usual sterile fashion. After this was done,  using biplanar fluoroscopy, I marked entry point to the L5 pedicle on  the patient's left side. I infiltrated the skin with lidocaine with  epinephrine 1:100,000. I used 15-blade to make a stab incision. I  docked Jamshidi One-Step Osteo Introducer on the facet, advanced through  the pedicle into the vertebral body. I removed the inner stylet, left  the outer working cannula in place. I inserted bone biopsy tool. After  obtaining a biopsy, I inserted Medtronic Kyphon balloon that was  inflated to 20 psi, deflated balloon, injected 3 mL of  polymethylmethacrylate through the left pedicle. After this was done, I  removed the outer working cannula. I went over the patient's right  side, identified the L5 pedicle and after I identified the entry point  to the right L5 pedicle, I then infiltrated the skin with lidocaine with  1:100,000. I used a 15-blade to make a stab incision. I docked  Jamshidi One-Step Osteo Introducer on the facet, advanced through the  right pedicle into the vertebral body. I then removed the inner stylet,  left the outer working cannula in place. I inserted bone biopsy tool. After obtaining a biopsy, I inserted Medtronic Kyphon balloon that was  inflated 20 psi, deflated balloon, injected another 3 mL of  polymethylmethacrylate into the vertebral body for a total of 6 mL of  polymethylmethacrylate to vertebral body. I removed the outer working  cannula, obtained a final AP and lateral fluoroscopic images. No  evidence of extravasation of cement. Skin was closed with Dermabond. The patient was then flipped into supine position on her hospital bed,  was extubated, and transported to the postanesthesia care unit in stable  condition. There were no complications. Counts were correct. I was  present for the entire case.           Montel Boxer, MD    D:

## 2021-11-30 NOTE — ANESTHESIA PRE PROCEDURE
Department of Anesthesiology  Preprocedure Note       Name:  Klever Santana   Age:  59 y.o.  :  1960                                          MRN:  [id number]         Date:  2021      Surgeon: Diego Maya):  Srini Morales MD    Procedure: Procedure(s):  KYPHOPLASTY, L5    Medications prior to admission:   Prior to Admission medications    Medication Sig Start Date End Date Taking?  Authorizing Provider   magnesium oxide (MAG-OX) 400 MG tablet Take 1 tablet by mouth daily 21  Yes Luba Conteh DO   albuterol (PROVENTIL) (2.5 MG/3ML) 0.083% nebulizer solution Take 3 mLs by nebulization every 6 hours as needed for Wheezing 21  Yes Luba Conteh DO   lisinopril (PRINIVIL;ZESTRIL) 10 MG tablet Take 1 tablet by mouth daily 21  Yes Karis Rosales DO   haloperidol (HALDOL) 0.5 MG tablet Take 1 tablet by mouth every 6 hours as needed (agitation) 21  Yes Karis Rosales DO   atenolol (TENORMIN) 25 MG tablet Take 1 tablet by mouth daily 21  Yes Karis Rosales DO   polyethylene glycol (GLYCOLAX) 17 g packet Take 17 g by mouth daily as needed for Constipation 21 Yes Luba Conteh DO   Multiple Vitamins-Minerals (THERAPEUTIC MULTIVITAMIN-MINERALS) tablet Take 1 tablet by mouth daily 21  Yes Karis Rosales DO   thiamine mononitrate (THIAMINE) 100 MG tablet Take 1 tablet by mouth daily 21  Yes Karis Rosales DO   lactulose (CHRONULAC) 10 GM/15ML solution Take 30 mLs by mouth 2 times daily 21 Yes Karis Rosales DO   rifaximin (XIFAXAN) 550 MG tablet Take 1 tablet by mouth 2 times daily 21  Yes Karis Rosales DO   nicotine (NICODERM CQ) 21 MG/24HR Place 1 patch onto the skin daily 21  Yes Luba Conteh DO   levothyroxine (SYNTHROID) 25 MCG tablet take 1 tablet by mouth once daily 21  Yes Talia Wright, DO   amLODIPine (NORVASC) 5 MG tablet take 1 tablet by mouth once daily 21  Yes Talia Wright, DO atorvastatin (LIPITOR) 40 MG tablet take 1 tablet by mouth once daily 5/17/21  Yes oCri Chávez, DO   citalopram (CELEXA) 40 MG tablet take 1 tablet by mouth once daily 5/17/21  Yes Cori Chávez, DO   clopidogrel (PLAVIX) 75 MG tablet take 1 tablet by mouth once daily 5/17/21  Yes Cori Chávez, DO   levETIRAcetam (KEPPRA) 500 MG tablet take 1 tablet by mouth twice a day 5/17/21  Yes Cori Chávez, DO   ARIPiprazole (ABILIFY) 5 MG tablet take 1 tablet by mouth once daily 3/29/21  Yes Dev Hernandez PA-C   ondansetron (ZOFRAN-ODT) 4 MG disintegrating tablet Take 1 tablet by mouth every 8 hours as needed for Nausea or Vomiting 11/25/21   Mariana Bernal DO   ondansetron [facility]) 4 MG/2ML injection Infuse 2 mLs intravenously every 6 hours as needed for Nausea or Vomiting 11/25/21   Mariana Bernal DO       Current medications:    Current Facility-Administered Medications   Medication Dose Route Frequency Provider Last Rate Last Admin    sodium chloride flush 0.9 % injection 5-40 mL  5-40 mL IntraVENous 2 times per day Maria L [place] gore        sodium chloride flush 0.9 % injection 5-40 mL  5-40 mL IntraVENous PRN Maria L [place] gore        0.9 % sodium chloride infusion  25 mL IntraVENous PRN Maria L See PA-C        ceFAZolin (ANCEF) 2000 mg in sterile water 20 mL IV syringe  2,000 mg IntraVENous On Call to [address]        gabapentin (NEURONTIN) capsule 200 mg  200 mg Oral TID Kaitlin Miraold, DO   200 mg at 11/29/21 2025    lidocaine 4 % external patch 1 patch  1 patch TransDERmal Daily Kaitlin Amrit, DO   1 patch at 11/29/21 0945    cyclobenzaprine (FLEXERIL) tablet 10 mg  10 mg Oral TID PRN Kaitlin Arnold, DO   10 mg at 11/29/21 2026    sodium chloride flush 0.9 % injection 5-40 mL  5-40 mL IntraVENous 2 times per day Kaitlin Amrit, DO   10 mL at 11/29/21 2139    sodium chloride flush 0.9 % injection 5-40 mL  5-40 mL IntraVENous PRN Kaitlin Marcus, DO        0.9 % sodium chloride infusion  25 mL IntraVENous PRN Jenean Colla, DO        LORazepam (ATIVAN) tablet 1 mg  1 mg Oral Q1H PRN Jenean Colla, DO        Or    LORazepam (ATIVAN) injection 1 mg  1 mg IntraVENous Q1H PRN Jenean Colla, DO        Or    LORazepam (ATIVAN) tablet 2 mg  2 mg Oral Q1H PRN Jenean Colla, DO        Or    LORazepam (ATIVAN) injection 2 mg  2 mg IntraVENous Q1H PRN Jenean Colla, DO        Or    LORazepam (ATIVAN) tablet 3 mg  3 mg Oral Q1H PRN Jenean Colla, DO        Or    LORazepam (ATIVAN) injection 3 mg  3 mg IntraVENous Q1H PRN Jenean Colla, DO        Or    LORazepam (ATIVAN) tablet 4 mg  4 mg Oral Q1H PRN Jenean Colla, DO        Or    LORazepam (ATIVAN) injection 4 mg  4 mg IntraVENous Q1H PRN Jenean Colla, DO        oxyCODONE (ROXICODONE) immediate release tablet 5 mg  5 mg Oral Q4H PRN Jenean Colla, DO   5 mg at 11/30/21 0556    amLODIPine (NORVASC) tablet 2.5 mg  2.5 mg Oral Daily Jenean Colla, DO   2.5 mg at 11/29/21 1730    ARIPiprazole (ABILIFY) tablet 5 mg  5 mg Oral Daily Jenean Colla, DO   5 mg at 11/29/21 2153    atenolol (TENORMIN) tablet 25 mg  25 mg Oral Daily Jenean Colla, DO   25 mg at 11/29/21 9678    atorvastatin (LIPITOR) tablet 40 mg  40 mg Oral Nightly David Purcell, DO   40 mg at 11/29/21 2025    citalopram (CELEXA) tablet 20 mg  20 mg Oral Daily Jenean Colla, DO   20 mg at 11/29/21 6011    haloperidol (HALDOL) tablet 0.5 mg  0.5 mg Oral Q6H PRN Jenean Colla, DO        lactulose (CHRONULAC) 10 GM/15ML solution 20 g  20 g Oral BID Jenean Colla, DO   20 g at 11/29/21 2026    levETIRAcetam (KEPPRA) tablet 500 mg  500 mg Oral BID Jenean Colla, DO   500 mg at 11/29/21 2026    levothyroxine (SYNTHROID) tablet 25 mcg  25 mcg Oral Daily Que Oliver DO   25 mcg at 11/29/21 0640    lisinopril (PRINIVIL;ZESTRIL) tablet 10 mg  10 mg Oral Daily Malena Barnett Purcell, DO   10 mg at 11/29/21 0938    magnesium oxide (MAG-OX) tablet 400 mg  400 mg Oral Daily [facility], DO   400 mg at 11/29/21 3554    therapeutic multivitamin-minerals 1 tablet  1 tablet Oral Daily [facility], DO   1 tablet at 11/29/21 0294    nicotine (NICODERM CQ) 21 MG/24HR 1 patch  1 patch TransDERmal Daily [facility], DO   1 patch at 11/29/21 0944    ondansetron (ZOFRAN) injection 4 mg  4 mg IntraVENous Q6H PRN [facility], DO        ondansetron (ZOFRAN-ODT) disintegrating tablet 4 mg  4 mg Oral Q8H PRN [facility], DO        polyethylene glycol (GLYCOLAX) packet 17 g  17 g Oral Daily PRN [facility], DO        rifaximin Hernandez Alken) tablet 550 mg  550 mg Oral BID [facility], DO   550 mg at 11/29/21 2026    thiamine mononitrate tablet 100 mg  100 mg Oral Daily [facility], DO   100 mg at 11/29/21 4786    acetaminophen (TYLENOL) tablet 650 mg  650 mg Oral Q6H PRN [facility], DO   650 mg at 11/29/21 2026    Or    acetaminophen (TYLENOL) suppository 650 mg  650 mg Rectal Q6H PRN [facility], DO        magnesium hydroxide (MILK OF MAGNESIA) 400 MG/5ML suspension 30 mL  30 mL Oral Daily PRN [facility], DO           Allergies: Allergies   Allergen Reactions    Codeine Nausea And Vomiting       Problem List:    Patient Active Problem List   Diagnosis Code    Acute respiratory failure with hypoxia (HCC) J96.01    Alcohol abuse F10.10    Cerebral artery occlusion with cerebral infarction ([address] I63.50    Hypertension I10    Depression F32. A    Hyperlipidemia E78.5    Seizures ([facility] D33.6    Alcoholic intoxication without complication ([facility] V36.360    Hyponatremia E87.1    Nicotine dependence F17.200    Marijuana user F12.90    Fall at home, initial encounter W19. Leanna Feng, Y92.009    UTI (urinary tract infection) N39.0    Acute on chronic diastolic (congestive) heart failure (HCC) I50.33    Pedal edema R60.0    Chronic obstructive pulmonary disease (HCC) J44.9    Tobacco abuse Z72.0    Hepatic cirrhosis (HCC) K74.60    Hypokalemia E87.6    Hypomagnesemia E83.42    Dehydration E86.0    Compression fracture of first lumbar vertebra with nonunion S32.010K    Closed compression fracture of L5 vertebra (HCC) S32.050A       Past Medical History:        Diagnosis Date    Alcohol abuse     [facility] occlusion [facility] infarction ([facility]     Depression     Diverticulosis of colon     Elevated blood sugar     H/O cardiovascular stress test 11/13/2021    Nuclear Lexiscan Stress Test    Hepatitis C     from blood transfusion, underwent treatment per patient and \"cured\"    Hyperlipidemia     Hypertension     Liver cirrhosis ([facility]     Magnesium deficiency     Marijuana user     Nicotine dependence     Seizures ([facility]     Subclinical hypothyroidism        Past Surgical History:        Procedure Laterality Date    DILATION AND CURETTAGE OF UTERUS      LIVER BIOPSY  07/05/2016       Social History:    Social History     Tobacco Use    Smoking status: Current Every Day Smoker     Packs/day: 1.00     Years: 49.00     Pack years: 49.00     Start date: 1970    Smokeless tobacco: Never Used   Substance Use Topics    Alcohol use:  Yes     Alcohol/week: 28.0 standard drinks     Types: 14 Cans of beer, 14 Shots of liquor per week     Comment: 2 cans of beer and 2 shots of liquor per day                                Ready to quit: Not Answered  Counseling given: Not Answered      Vital Signs (Current):   Vitals:    11/29/21 1159 11/29/21 1515 11/29/21 2000 11/29/21 2359   BP: (!) 110/53 130/63 139/63 120/71   Pulse: 82 79 83 82   Resp: 16 16 16 16   Temp: 37.1 °C (98.8 °F) 36.3 °C (97.3 °F) 36.9 °C (98.5 °F) 36.8 °C (98.2 °F)   TempSrc: Temporal Temporal Temporal Temporal   SpO2:   93%    Weight:       Height:                                                  BP Readings from Last 3 Encounters:   11/29/21 120/71 11/25/21 (!) 108/55   11/18/21 (!) 92/58       NPO Status: Time of last liquid consumption: 2100                        Time of last solid consumption: 2100                        Date of last liquid consumption: 11/29/21                        Date of last solid food consumption: 11/29/21    BMI:   Wt Readings from Last 3 Encounters:   11/25/21 175 lb (79.4 kg)   11/20/21 175 lb (79.4 kg)   11/18/21 181 lb (82.1 kg)     Body mass index is 36.58 kg/m². CBC:   Lab Results   Component Value Date    WBC 11.6 11/29/2021    RBC 4.59 11/29/2021    HGB 14.6 11/29/2021    HCT 43.1 11/29/2021    MCV 93.9 11/29/2021    RDW 14.6 11/29/2021     11/29/2021       CMP:   Lab Results   Component Value Date     11/29/2021    K 3.7 11/29/2021     11/29/2021    CO2 29 11/29/2021    BUN 13 11/29/2021    CREATININE 0.6 11/29/2021    GFRAA >60 11/29/2021    LABGLOM >60 11/29/2021    GLUCOSE 117 11/29/2021    PROT 6.6 11/28/2021    CALCIUM 10.9 11/29/2021    BILITOT 1.2 11/28/2021    ALKPHOS 148 11/28/2021     11/28/2021     11/28/2021       POC Tests: No results for input(s): POCGLU, POCNA, POCK, POCCL, POCBUN, POCHEMO, POCHCT in the last 72 hours.     Coags:   Lab Results   Component Value Date    PROTIME 15.3 11/29/2021    INR 1.4 11/29/2021    APTT 31.0 07/05/2016       HCG (If Applicable): No results found for: PREGTESTUR, PREGSERUM, HCG, HCGQUANT     ABGs:   Lab Results   Component Value Date    PO2ART 47.6 10/07/2020    KHP4IWQ 46.9 10/07/2020    QCA1EPI 25.0 10/07/2020        Type & Screen (If Applicable):  No results found for: [facility], [facility]    Drug/Infectious Status (If Applicable):  No results found for: HIV, HEPCAB    COVID-19 Screening (If Applicable):   Lab Results   Component Value Date    COVID19 Not Detected 11/15/2021     11/15/2021- [facility]- \"Unsuccessful attempt to perform coronary angiography due to  inability to gain access secondary to severe peripheral vascular  disease. \"    11/13/2021-Cardiac stress- \"1. The myocardial perfusion is abnormal.  2. Reversible defect in the anterolateral and inferolateral walls  suggestive of ischemia. 3. Normal LV systolic function, EF greater than 70%. 4. There is no transient ischemic dilation. 5. Intermediate risk myocardial perfusion study. \"    11/9/2021- Echo- \" Summary   No comparison study available. Technically limited study, which might have   affected the accuracy and the completeness of the interpretation. Micro-bubble contrast injected to enhance left ventricular visualization. Left ventricle size is normal.   Mild concentric left ventricular hypertrophy. Ejection fraction is visually estimated at 55%. No regional wall motion abnormalities seen. E/A flow reversal noted. Suggestive of diastolic dysfunction. Physiologic and/or trace tricuspid regurgitation. RVSP is normal.   \"  ECG-11/20/21- \"Normal sinus rhythm  Septal infarct , age undetermined  Abnormal ECG\"            Anesthesia Evaluation  Patient summary reviewed and Nursing notes reviewed no history of anesthetic complications:   Airway: Mallampati: III  TM distance: >3 FB   Neck ROM: limited  Mouth opening: > = 3 FB Dental:      Comment: Chipped upper front tooth    Pulmonary: breath sounds clear to auscultation  (+) COPD:  decreased breath sounds,  current smoker                          ROS comment: On 2l NC currently   Cardiovascular:    (+) hypertension:, CHF:, hyperlipidemia      ECG reviewed  Rhythm: regular  Rate: normal  Echocardiogram reviewed  Stress test reviewed                Neuro/Psych:   (+) seizures (Last seizure 5 yrs ago):, CVA:, psychiatric history:depression/anxiety              ROS comment: Acute L5 fx GI/Hepatic/Renal:   (+) hepatitis ( Underwent treatment): C, liver disease ( ascites present ):,          ROS comment: Increased LFTs.    Endo/Other:    (+) hypothyroidism::., .                 Abdominal:   (+) obese, Vascular:   + PVD, aortic or cerebral, . Other Findings:           Anesthesia Plan      general     ASA 4       Induction: intravenous. BIS  MIPS: Postoperative opioids intended and Prophylactic antiemetics administered. Anesthetic plan and risks discussed with patient. Plan discussed with CRNA and attending. Tenzin Keenan RN   11/30/2021      Patient seen and examined, chart reviewed, agree with above findings. Anesthetic plan, risks, benefits, alternatives, and personnel involved discussed with patient. Patient verbalized an understanding and agreed to proceed. NPO status confirmed. Anesthetic plan discussed with care team members and agreed upon. Armando Olsen DO   11/30/2021  9:50 AM    Chart reviewed, findings discussed with anesthesiologist . Anesthesia plan of care discussed with wilbert Ricks CRNA

## 2021-11-30 NOTE — CARE COORDINATION
11/30/21 Update CM Note; Patient is same day Kyphoplasty. [facility] is [facility] for [facility]. She will need PT and OT completed. Will follow.  Electronically signed by Sandra Campbell RN CM on 11/30/2021 at 2:14 PM

## 2021-12-01 NOTE — PROGRESS NOTES
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        Precautions:  Fall Risk, O2, spine neutrality, bed alarm, TAPS    Assessment of current deficits    [x] Functional mobility  [x]ADLs  [x] Strength               []Cognition    [x] Functional transfers   [x] IADLs         [x] Safety Awareness   [x]Endurance    [] Fine Coordination              [x] Balance      [] Vision/perception   []Sensation     []Gross Motor Coordination  [] ROM  [] Delirium                   [] Motor Control     OT PLAN OF CARE   OT POC based on physician orders, patient diagnosis and results of clinical assessment    Frequency/Duration 1-3 days/wk for 2 weeks PRN   Specific OT Treatment Interventions to include:   * Instruction/training on adapted ADL techniques and AE recommendations to increase functional independence within precautions       * Training on energy conservation strategies, correct breathing pattern and techniques to improve independence/tolerance for self-care routine  * Functional transfer/mobility training/DME recommendations for increased independence, safety, and fall prevention  * Patient/Family education to increase follow through with safety techniques and functional independence  * Recommendation of environmental modifications for increased safety with functional transfers/mobility and ADLs  * Cognitive retraining/development of therapeutic activities to improve problem solving, judgement, memory, and attention for increased safety/participation in ADL/IADL tasks  * Therapeutic exercise to improve motor endurance, ROM, and functional strength for ADLs/functional transfers  * Therapeutic activities to facilitate/challenge dynamic balance, stand tolerance for increased safety and independence with ADLs  * Therapeutic activities to facilitate gross/fine motor skills for increased independence with ADLs  * Neuro-muscular re-education: facilitation of righting/equilibrium reactions, midline orientation, scapular stability/mobility, normalization of muscle tone, and facilitation of volitional active controled movement  * Positioning to improve skin integrity, interaction with environment and functional independence  * Delirium prevention/treatment    Recommended Adaptive Equipment:  TBD     Home Living: Pt lives alone in a 1 story home with ramp entrance    Bathroom setup: walk-in shower    Equipment owned: cane, w/c    Prior Level of Function: mod I with ADLs , assist prn with IADLs; ambulated cane vs w/c for mobility   Driving: no   Occupation: enjoys cooking    Pain Level: Pt reports back and LE pain with activity (unable to quantify);  Therapist educated on spine neutrality precautions and facilitated repositioning to address pain      Cognition: A&O: x1; Follows 1 step directions with redirection   Memory:  poor   Sequencing:  poor   Problem solving:  poor   Judgement/safety:  poor     Functional Assessment:  AM-PAC Daily Activity Raw Score: 12/24 Initial Eval Status  Date: 12/1/21 Treatment Status  Date: STGs = LTGs  Time frame: 10-14 days   Feeding Minimal Assist   Mod I   Grooming Minimal Assist   Modified [place]    UB Dressing Moderate Assist   Stand by Assist    LB Dressing Dependent   Moderate Assist    Bathing Maximal Assist  Minimal Assist    Toileting Dependent   Moderate Assist    Bed Mobility  Supine to sit: Maximal Assist x2   Sit to supine: Maximal Assist x2  Supine to sit: Moderate Assist   Sit to supine: Moderate Assist    Functional Transfers Moderate Assist x2  Minimal Assist    Functional Mobility NT   Minimal Assist    Balance Sitting: Max A progressing to min A  (posterior lean)    Standing: Mod A x2     Activity Tolerance F-  F+   Visual/  Perceptual Glasses: yes  wfl                    Hand Dominance right   Strength ROM Additional Info:    RUE  WFL, formal MMT deferred due to spinal precautions  WFL good  and wfl FMC/dexterity noted during ADL tasks     LUE WFL formal MMT deferred due to spinal precautions  WFL good  and wfl FMC/dexterity noted during ADL tasks     Hearing:  [place]  Sensation:WFL  Tone: WFL  Edema:none noted     Comments: Upon arrival patient supine in bed and agreeable to OT Session. Therapist educated pt on role of OT. At end of session, patient semi-supine in bed with call light and phone within reach, all lines and tubes intact. Overall patient demonstrated decreased independence and safety during completion of ADL/functional transfer/mobility tasks. Pt would benefit from continued skilled OT to increase safety and independence with completion of ADL/IADL tasks for functional independence and quality of life.     Treatment: OT treatment provided this date includes: Facilitation of bed mobility (rolling, supine<>sit; log roll technique), sitting balance (impacting ADLs; addressing posture, weight shifting, dynamic reaching), functional sit to stand transfers, standing tolerance tasks at w/w (addressing posture, balance and activity tolerance; impacting ADLs and functional activity) - skilled cuing on sequencing, hand placement, posture, body mechanics, energy conservation techniques and safety. Therapist facilitated self-care retraining: UB/LB self-care tasks (gown, sock), toileting hygiene task and grooming tasks while educating pt on modified techniques, posture, safety and energy conservation techniques. Skilled monitoring of HR, O2 sats and pts response to treatment. Rehab Potential: Fair for established goals     Patient / Family Goal: none stated      Patient and/or family were instructed on functional diagnosis, prognosis/goals and OT plan of care. Demonstrated fair understanding. Eval Complexity: Low    Time In: 735  Time Out: 800  Total Treatment Time: 10 minutes    Min Units   OT Eval Low 97165  x  1   OT Eval Medium 24981      OT Eval High 07013      OT Re-Eval [id number]       Therapeutic Ex 98354       Therapeutic Activities 95169  2     ADL/Self Care 73197  8  1   Orthotic Management 64626       Manual [id number]     Neuro Re-Ed 33745       Non-Billable Time          Evaluation Time additionally includes thorough review of current medical information, gathering information on past medical history/social history and prior level of function, interpretation of standardized testing/informal observation of tasks, assessment of data and development of plan of care and goals.           Camron Eastman OTR/L #[id number]

## 2021-12-01 NOTE — PROGRESS NOTES
Department of Neurosurgery  Progress Note    CHIEF COMPLAINT: s/p L5 kyphoplasty    SUBJECTIVE:  C/o back pain. confused    REVIEW OF SYSTEMS :  Constitutional: Negative for chills and fever. Neurological: Negative for dizziness, tremors and speech change. OBJECTIVE:   VITALS:  BP (!) 125/51   Pulse 80   Temp 97.5 °F (36.4 °C) (Temporal)   Resp 18   Ht 4' 10\" (1.473 m)   Wt 175 lb (79.4 kg)   SpO2 95%   BMI 36.58 kg/m²   PHYSICAL:  CONSTITUTIONAL:  awake, alert, confused, no apparent distress, and appears stated age.   TILLEY    DATA:  CBC:   Lab Results   Component Value Date    WBC 11.6 11/29/2021    RBC 4.59 11/29/2021    HGB 14.6 11/29/2021    HCT 43.1 11/29/2021    MCV 93.9 11/29/2021    MCH 31.8 11/29/2021    MCHC 33.9 11/29/2021    RDW 14.6 11/29/2021     11/29/2021    MPV 11.2 11/29/2021     BMP:    Lab Results   Component Value Date     11/29/2021    K 3.7 11/29/2021     11/29/2021    CO2 29 11/29/2021    BUN 13 11/29/2021    LABALBU 2.6 11/28/2021    CREATININE 0.6 11/29/2021    CALCIUM 10.9 11/29/2021    GFRAA >60 11/29/2021    LABGLOM >60 11/29/2021    GLUCOSE 117 11/29/2021     PT/INR:    Lab Results   Component Value Date    PROTIME 15.3 11/29/2021    INR 1.4 11/29/2021     PTT:    Lab Results   Component Value Date    APTT 31.0 07/05/2016   [APTT}    Current Inpatient Medications  Current Facility-Administered Medications: gabapentin (NEURONTIN) capsule 200 mg, 200 mg, Oral, TID  lidocaine 4 % external patch 1 patch, 1 patch, TransDERmal, Daily  cyclobenzaprine (FLEXERIL) tablet 10 mg, 10 mg, Oral, TID PRN  sodium chloride flush 0.9 % injection 5-40 mL, 5-40 mL, IntraVENous, 2 times per day  sodium chloride flush 0.9 % injection 5-40 mL, 5-40 mL, IntraVENous, PRN  0.9 % sodium chloride infusion, 25 mL, IntraVENous, PRN  LORazepam (ATIVAN) tablet 1 mg, 1 mg, Oral, Q1H PRN **OR** LORazepam (ATIVAN) injection 1 mg, 1 mg, IntraVENous, Q1H PRN **OR** LORazepam (ATIVAN) tablet 2 mg, 2 mg, Oral, Q1H PRN **OR** LORazepam (ATIVAN) injection 2 mg, 2 mg, IntraVENous, Q1H PRN **OR** LORazepam (ATIVAN) tablet 3 mg, 3 mg, Oral, Q1H PRN **OR** LORazepam (ATIVAN) injection 3 mg, 3 mg, IntraVENous, Q1H PRN **OR** LORazepam (ATIVAN) tablet 4 mg, 4 mg, Oral, Q1H PRN **OR** LORazepam (ATIVAN) injection 4 mg, 4 mg, IntraVENous, Q1H PRN  oxyCODONE (ROXICODONE) immediate release tablet 5 mg, 5 mg, Oral, Q4H PRN  amLODIPine (NORVASC) tablet 2.5 mg, 2.5 mg, Oral, Daily  ARIPiprazole (ABILIFY) tablet 5 mg, 5 mg, Oral, Daily  atenolol (TENORMIN) tablet 25 mg, 25 mg, Oral, Daily  atorvastatin (LIPITOR) tablet 40 mg, 40 mg, Oral, Nightly  citalopram (CELEXA) tablet 20 mg, 20 mg, Oral, Daily  haloperidol (HALDOL) tablet 0.5 mg, 0.5 mg, Oral, Q6H PRN  lactulose (CHRONULAC) 10 GM/15ML solution 20 g, 20 g, Oral, BID  levETIRAcetam (KEPPRA) tablet 500 mg, 500 mg, Oral, BID  levothyroxine (SYNTHROID) tablet 25 mcg, 25 mcg, Oral, Daily  lisinopril (PRINIVIL;ZESTRIL) tablet 10 mg, 10 mg, Oral, Daily  magnesium oxide (MAG-OX) tablet 400 mg, 400 mg, Oral, Daily  therapeutic multivitamin-minerals 1 tablet, 1 tablet, Oral, Daily  nicotine (NICODERM CQ) 21 MG/24HR 1 patch, 1 patch, TransDERmal, Daily  ondansetron (ZOFRAN) injection 4 mg, 4 mg, IntraVENous, Q6H PRN  ondansetron (ZOFRAN-ODT) disintegrating tablet 4 mg, 4 mg, Oral, Q8H PRN  polyethylene glycol (GLYCOLAX) packet 17 g, 17 g, Oral, Daily PRN  rifaximin (XIFAXAN) tablet 550 mg, 550 mg, Oral, BID  thiamine mononitrate tablet 100 mg, 100 mg, Oral, Daily  acetaminophen (TYLENOL) tablet 650 mg, 650 mg, Oral, Q6H PRN **OR** acetaminophen (TYLENOL) suppository 650 mg, 650 mg, Rectal, Q6H PRN  magnesium hydroxide (MILK OF MAGNESIA) 400 MG/5ML suspension 30 mL, 30 mL, Oral, Daily PRN    ASSESSMENT:   · S/p L5 kyphoplasty on 11/30    PLAN:  · PT/OT  · WBAT  · Pain control  · Follow up in clinic in 4 weeks       Electronically signed by CHRIS Auguste on 12/1/2021 at 7: 57 AM

## 2021-12-01 NOTE — PROGRESS NOTES
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 has a past surgical history that includes Dilation and curettage of uterus; liver biopsy (2016); and Spine surgery (N/A, 2021). Procedure/Surgery:  KYPHOPLASTY, L5  Precautions:  Falls, falls, alarm and O2 , FWB (full weight bearing), Spinal precautions no \"BLT\" and Spinal neutral mechanics  Equipment Needs: Wheeled Walker    SUBJECTIVE:  Patient lives alone  in a ranch home  with Ramp to enter. Patient ambulated using furniture at home and utilized a quad cane for community ambulation PTA. Equipment owned: Wheelchair and Cane    OBJECTIVE:   Initial Evaluation  Date: 21 Treatment Short Term/ Long Term   Goals   AM-PAC 6 Clicks 54/69     Was pt agreeable to Eval/treatment? Yes     Does pt have pain? Yes     Bed Mobility  Rolling: Mod   Supine to sit:   Max x 2   Sit to supine: Max x 2   Scooting: Max x 2   Rolling: Sup  Supine to sit: Sup  Sit to supine: Sup  Scooting: Sup   Transfers Sit to stand: Mod x 2   Stand to sit: Mod x 2  Stand pivot: Mod x 2   Sit to stand: Mod Ind   Stand to sit: Mod Ind    Stand pivot:  Mod Ind    Ambulation    1 leg advanced side step with [employer] and Mod x 2   20 feet with Mod Ind     Stair negotiation: ascended and descended  NT  3 steps with AAD 2 rail   ROM BUE:  Defer to OT eval  BLE:  wfl     Strength BUE: Defer to OT eval  RLE:  Grossly 4-/5  LLE:  Grossly  4-/5  4/5   Balance Sitting EOB:  Min A  Dynamic Standing: Mod A x 2  Sitting EOB:  Sup  Dynamic Standing:  Sup     Patient is Alert & Oriented x 1 person and did follow one step commands with VC and redirection at times  Sensation:  Pt denies numbness and tingling to extremities  Edema:  Not assessed at this time    Vitals:  Supine Supine Post Activity   Heart Rate at rest 92 bpm    SPO2 at rest 91% RA SPO2 at rest 98% 4L NC     Therapeutic Exercises:  Functional activity included tolerance to upright position at EOB, transfer training, ambulation training. Patient education  Pt educated regarding role of PT evaluation, need for OOB activity, spinal precautions, spinal neutral mechanics and use of call light for safety    Patient response to education:   Pt verbalized understanding Pt demonstrated skill Pt requires further education in this area   Yes Yes Yes; Reminders     ASSESSMENT:  Conditions Requiring Skilled Therapeutic Intervention:  [x]Decreased strength     [x]Decreased ROM  [x]Decreased functional mobility  [x]Decreased balance   [x]Decreased endurance   [x]Decreased posture  []Decreased sensation  [x]Decreased coordination   []Decreased vision  [x]Decreased safety awareness   [x]Increased pain       Comments:    Pt was found in the supine position in bed and agreed to PT eval and treat. Pt appeared to be confused at times and required redirection and VC throughout session to remain on task. She did follow one step commands and was oriented to person however did not establish a correct location, month, year or upcoming [place] (christmas). Pt required VC for hand positioning and Max x  2 assist level from PT in order to sit upright at EOB. During static sitting at EOB pt displayed strong retropulsion and posterior support was required to keep patient from sliding off of bed.   After ~3 minutes pt found her COG and required less assist to sit EOB with decreased posterior lean. Pt required Mod A x 2 to stand and displayed difficulties side stepping to the [facility]. Pt requested to sit down due to strength and endurance deficits in BLE that compromised functional mobility at this time. Pt was repositioned for comfort and her call light/phone was placed within reach upon leaving pt's room. Pt is a high fall risk at this time; all four rails were placed up with call light activated. Pt would benefit from skilled PT intervention to strengthen LE, improve bed mobility, and increase ambulation distance. Treatment:  Patient practiced and was instructed in the following treatment:    · Bed mobility training - pt given verbal and tactile cues to facilitate proper sequencing and safety during rolling and supine>sit as well as provided with physical assistance to complete task    · Assistive device training - pt educated on using [employer] during gait, [employer] approximation/negotiation, and hand placement during sit<>stand to [employer]  · STS and transfer training - educated on hand/foot placement, safety, and sequencing during STS and pivot transfers using assistive device  · Gait training - verbal cues for [employer] approximation/negotiation, upright posture. o Bed mobility: Pt was cued for technique during bed mobility transfers. o Sitting EOB: Pt sat at EOB for 3+ minutes as sitting balance and upright tolerance were challenged. o Education: Pt was educated spinal precautions and health benefits of OOB activity while in the hospital.   o Vitals and symptoms were monitored closely throughout session. o Skilled repositioning in bed. Pt's/ family goals   1. Return Home    Prognosis is good  for reaching above PT goals. Patient and or family understand(s) diagnosis, prognosis, and plan of care.   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 [S32.010K]  Specific instructions for next treatment:  Sit EOB, postural exercises, Transfer to bedside chair, Increase ambulation distance, Stair negotiation, BLE therapeutic exercise and Review spinal precautions  Current Treatment Recommendations:     [x] Strengthening to improve independence with functional mobility   [x] ROM to improve independence with functional mobility   [x] Balance Training to improve static/dynamic balance and to reduce fall risk  [x] Endurance Training to improve activity tolerance during functional mobility   [x] Transfer Training to improve safety and independence with all functional transfers   [x] Gait Training to improve gait mechanics, endurance and asses need for appropriate assistive device  [x] Stair Training in preparation for safe discharge home and/or into the community   [] Positioning to prevent skin breakdown and contractures  [x] Safety and Education Training   [] Patient/Caregiver Education   [] HEP  [] Other     PT long term treatment goals are located in above grid    Frequency of treatments: 2-5x/week x 1-2 weeks. Time in  08:00  Time out  08:25    Total Treatment Time  25 minutes     Evaluation Time includes thorough review of current medical information, gathering information on past medical history/social history and prior level of function, completion of standardized testing/informal observation of tasks, assessment of data and education on plan of care and goals.     CPT codes:  [x] Low Complexity PT evaluation 62886  [] Moderate Complexity PT evaluation 69485  [] High Complexity PT evaluation 63333  [] PT Re-evaluation 58199  [] Gait training 96263 - minutes  [] Manual therapy 90112 - minutes  [x] Therapeutic activities 39016 15 minutes  [] Therapeutic exercises 13854 - minutes  [] Neuromuscular reeducation 74652 - minutes     Melecio Chew, FARRUKH [facility] [place], [address] [facility]

## 2021-12-01 NOTE — PROGRESS NOTES
Hospitalist Progress Note      SYNOPSIS: Patient admitted on 2021 for Closed compression fracture of L5 vertebra ([facility] [address]      SUBJECTIVE: Status post kyphoplasty  Nursing staff state that patient appears more confused today  CAGE questionnaire  +C  +G    Review of systems  General/constitutional= patient denies fevers  HEENT= patient denies sore throat or swelling of her lips or tongue  Cardiovascular= patient denies chest pain  GI= patient denies vomiting  Pulmonary= patient denies cough      ETOHism  Hypertension    Depression    Hyperlipidemia    Seizures ([facility] [address]    Nicotine dependence    Chronic obstructive pulmonary disease ([facility] [address]    Hepatic cirrhosis (HCC     Temp (24hrs), Av.5 °F (36.4 °C), Min:97 °F (36.1 °C), Max:98.1 °F (36.7 °C)    DIET: ADULT DIET;  Regular  ADULT ORAL NUTRITION SUPPLEMENT; Breakfast, AM Snack, Lunch, PM Snack; Standard High Calorie/High Protein Oral Supplement  CODE: Prior    Intake/Output Summary (Last 24 hours) at 2021 1012  Last data filed at 2021 1001  Gross per 24 hour   Intake 920 ml   Output 1515 ml   Net -595 ml       OBJECTIVE:3L NC    BP (!) 125/51   Pulse 80   Temp 97.5 °F (36.4 °C) (Temporal)   Resp 18   Ht 4' 10\" (1.473 m)   Wt 175 lb (79.4 kg)   SpO2 95%   BMI 36.58 kg/m²   Mews 1 based on vital signs and respiratory rate      General appearance/constitutional: Vital signs above  Not diaphoretic is sarcopenic  HEENT= no thrush and no swelling to her lips or tongue  Respiratory: Clear sounds upper lobes auscultation no pleural rub  Cardiovascular: pulse Reg audible S1-S2 no pericardial rub  Abdomen:, mild distension no rigidity no guarding active BS  = bateman +   extremit= no edema    Neurologic: awake, alert      Mentation= patient does not recognize me        However she can tell me her full name and her age         And current location           She corrects herself provides the correct month    ASSESSMENT:    L5 compress fx status post kyphoplasty  Being actively monitored for alcohol withdrawal  Patient is at risk for alcohol withdrawal and delirium tremens     CLD  sarcopenic  MANDI R factor 2`4 mixed   cannot calcul MELD score as serum cr is too low  Back pan 2/2 L5 compr fx  ETOHism on ciwa to prevent dt's  Diarrhea vs looses stools this may be 2/2 chronulac   rx   Hypertension essnet- stble/contrlld    Depression usnpecif- controlled    Hyperlipidemia unspecif- asymptomatic    Seizures (HCC) disorder (NOS)- stable    Nicotine dependence-     Chronic obstructive pulmonary disease (HCC)- stage unclear/ currntly stble/asymptomat    Hepatic cirrhosis - stable     MANDI- 2/2 hepat cirrhoss     PLAN:    We will initiate Librium taper 50 mg oral schedule every 6 hours x6 doses  Then 25 mg oral schedule every 6 hours x6 doses  Third day 25 mg q. 8 hours x 3 doses  With option to stop or reevaluate      Pt/ot  Hopefully mobilize ptnt more    In effort to see if bateman can be removed  protein supplements    For sarcopenia   Continue amlodipine/lisinopril and atenolol for hypertension     Continue citalopram for depression mood disorder     Continue Levetiracetam for seizure disorder     Continue atorvastatin for dyslipidemia     Continue NicoDerm patch to treat nicotine tobacco dependency and withdrawal     Continue Chronulac as well as Xifaxan for treatment of hepatic encephalopathy     Continue Synthroid for treatment of hypothyroid condition     Epc/scd's/stockings-for DVT prophylaxis    DISPOSITION: Possible rehab [facility]  Pending PT and OT evaluation completions    Medications:  REVIEWED DAILY    Infusion Medications    sodium chloride       Scheduled Medications    gabapentin  200 mg Oral TID    lidocaine  1 patch TransDERmal Daily    sodium chloride flush  5-40 mL IntraVENous 2 times per day    amLODIPine  2.5 mg Oral Daily    ARIPiprazole  5 mg Oral Daily    atenolol  25 mg Oral Daily    atorvastatin  40 mg Oral Nightly    citalopram  20 mg Oral Daily    lactulose  20 g Oral BID    levETIRAcetam  500 mg Oral BID    levothyroxine  25 mcg Oral Daily    lisinopril  10 mg Oral Daily    magnesium oxide  400 mg Oral Daily    therapeutic multivitamin-minerals  1 tablet Oral Daily    nicotine  1 patch TransDERmal Daily    rifaximin  550 mg Oral BID    vitamin B-1  100 mg Oral Daily     PRN Meds: cyclobenzaprine, sodium chloride flush, sodium chloride, LORazepam **OR** LORazepam **OR** LORazepam **OR** LORazepam **OR** LORazepam **OR** LORazepam **OR** LORazepam **OR** LORazepam, oxyCODONE, haloperidol, ondansetron, ondansetron, polyethylene glycol, acetaminophen **OR** acetaminophen, magnesium hydroxide    Labs:     Recent Labs     11/28/21  1146 11/29/21  1907   WBC 11.3 11.6*   HGB 14.6 14.6   HCT 41.7 43.1    219       Recent Labs     11/28/21  1146 11/29/21  1907    140   K 3.4* 3.7   CL 99 101   CO2 27 29   BUN 7 13   CREATININE 0.4* 0.6   CALCIUM 10.3* 10.9*       Recent Labs     11/28/21  1146   PROT 6.6   ALKPHOS 148*   *   *   BILITOT 1.2       Recent Labs     11/28/21  1146 11/29/21  1907   INR 1.5 1.4             Radiology: +++++++++++++++++++++++++++++++++++++++++++++++++  Myla Cortez DO  [address] [place] [address]  +++++++++++++++++++++++++++++++++++++++++++++++++  NOTE: This report was transcribed using voice recognition software. Every effort was made to ensure accuracy; however, inadvertent computerized transcription errors may be present.

## 2021-12-02 NOTE — PROGRESS NOTES
175 lb (79.4 kg) (unknown method 11/25, UTO CBW at this time)   · Admission Body Weight: 175 lb (79.4 kg) (unknown method 11/25, UTO CBW at this time)    · Usual Body Weight: 194 lb (88 kg) (actual 5/13/21 per EMR, INES wt loss properly d/t no actual wt's since adm currently as well as 2/2 CHF hx w/ fluid shifts likely pta)     · Ideal Body Weight: 90 lbs; % Ideal Body Weight     · BMI: 36.6  · Adjusted Body Weight:  ; No Adjustment   · Adjusted BMI:      · BMI Categories: Obese Class 2 (BMI 35.0 -39.9)       Nutrition Diagnosis:   · Inadequate oral intake related to cognitive or neurological impairment (2/2 ETOH Abuse hx w/ Hepatic Encaphalopathy) as evidenced by intake 26-50%, poor intake prior to admission      Nutrition Interventions:   Food and/or Nutrient Delivery:  Continue Current Diet, Modify Oral Nutrition Supplement (Continue Diet. Will Modify Current ONS to Ensure High [place] ONS TID and [facility] ONS BID.)  Nutrition Education/Counseling:  Education not appropriate   Coordination of Nutrition Care:  Continue to monitor while inpatient    Goals:  PO intake >75% of meals/ONS. Nutrition Monitoring and Evaluation:   Behavioral-Environmental Outcomes:  None Identified   Food/Nutrient Intake Outcomes:  Diet Advancement/Tolerance, Food and Nutrient Intake, Supplement Intake  Physical Signs/Symptoms Outcomes:  Biochemical Data, Chewing or Swallowing, GI Status, Fluid Status or Edema, Nutrition Focused Physical Findings, Skin, Weight     Discharge Planning:     Too soon to determine     Electronically signed by Kathy Davila RD, LD on 12/2/21 at 11:56 AM EST    Contact: ext [phone]

## 2021-12-02 NOTE — CARE COORDINATION
12/02/21 Update CM note: POD 2 of kyphoplasty. Patient is approved for discharge to [facility].. and good for 48hrs. She is confused and needs redirected. She is pending labwork, ct abdomen. She is continued on CIWA scale. IV ativan x1 given. She is on room air. Will follow for readiness to discharge. The HENS is started and will need completed prior to discharge. Envelope started and placed in soft chart.  Electronically signed by Tim Medellin RN CM on 12/2/2021 at 2:01 PM

## 2021-12-02 NOTE — PROGRESS NOTES
Physical Therapy  Treatment Note    Name: Gurjit Novak  : 1960  MRN: [id number]      Date of Service: 2021  Evaluating PT:  Leann Waters, PT [id number]    Room #:  6796/9864-N  Diagnosis:  Compression fracture of first lumbar vertebra with nonunion [S32.010K]  PMHx/PSHx:     has a past medical history of Alcohol abuse, Cerebral artery occlusion with cerebral infarction [facility]), Depression, Diverticulosis of colon, Elevated blood sugar, H/O cardiovascular stress test, Hepatitis C, Hyperlipidemia, Hypertension, Liver cirrhosis ([facility] [address] Magnesium deficiency, Marijuana user, Nicotine dependence, Seizures ([facility] [address] and Subclinical hypothyroidism. has a past surgical history that includes Dilation and curettage of uterus; liver biopsy (2016); and Spine surgery (N/A, 2021). Procedure/Surgery:  KYPHOPLASTY, L5  Precautions:  Falls, falls, alarm and O2 , FWB (full weight bearing), Spinal precautions no \"BLT\" and Spinal neutral mechanics  Equipment Needs: Wheeled Walker    SUBJECTIVE:  Patient lives alone  in a ranch home  with Ramp to enter. Patient ambulated using furniture at home and utilized a quad cane for community ambulation PTA. Equipment owned: Wheelchair and Cane    OBJECTIVE:   Initial Evaluation  Date: 21 Treatment  Date: 21 Short Term/ Long Term   Goals   AM-PAC 6 Clicks     Was pt agreeable to Eval/treatment? Yes Yes, with encouragement    Does pt have pain? Yes Back pain    Bed Mobility  Rolling: Mod   Supine to sit:   Max x 2   Sit to supine: Max x 2   Scooting: Max x 2  Rolling: ModA  Supine to sit: MaxA  Sit to supine: MaxA  Scooting: MaxA Rolling: Sup  Supine to sit: Sup  Sit to supine: Sup  Scooting: Sup   Transfers Sit to stand: Mod x 2   Stand to sit: Mod x 2  Stand pivot: Mod x 2  Sit to stand: NT  Stand to sit: NT  Stand pivot: NT Sit to stand: Mod Ind   Stand to sit: Mod Ind    Stand pivot:  Mod Ind    Ambulation    1 leg advanced side step with [address] and Mod x 2  NT 20 feet with Mod Ind     Stair negotiation: ascended and descended  NT NT 3 steps with AAD 2 rail   ROM BUE:  Defer to OT eval  BLE:  wfl BLE: WFL    Strength BUE: Defer to OT eval  RLE:  Grossly 4-/5  LLE:  Grossly  4-/5 BLE: grossly 4-/5 4/5   Balance Sitting EOB:  Min A  Dynamic Standing: Mod A x 2 Sitting EOB: ModA  Standing: NT Sitting EOB:  Sup  Dynamic Standing:  Sup       Pt is A & O x 1, able to state her name  Sensation:  Denied numbness/tingling  Edema:  None noted in BLE    Therapeutic Exercises:    Supine BLE manual resisted exercises  -hip/knee extension/flexion with minimal resistance    Patient education  Pt educated on positioning, spinal precautions (reinforced), need for continued activity. Patient response to education:   Pt verbalized understanding Pt demonstrated skill Pt requires further education in this area   Yes With assist only Yes     ASSESSMENT:    Comments: The pt was cleared for minimal activity, per nursing the pt has not been oriented well today. The pt was able to answer questions and follow commands to , she was in a significant amount of pain when log rolled and assisted to supine, not able to tolerate sitting EOB for long before attempting to return to supine. Completed supine exercises and repositioned with skill in bed. Television was turned on and curtains were drawn, pt was sat upright to attempt to improve orientation. Treatment:  Patient practiced and was instructed in the following treatment:     Bed mobility training - pt given verbal and tactile cues to facilitate proper sequencing and safety during rolling and supine>sit as well as provided with physical assistance to complete task   Skilled repositioning in bed to promote improved posture and improved cardiorespiratory function. Pt positioned with bed in semi-chair position to enhance skin/joint protection.    Supine exercises performed to improve the pt's BLE strength and build toward functional transfers and ambulation. PLAN:    Patient is making good progress towards established goals. Will continue with current POC. Time in  1535  Time out  1550    Total Treatment Time  15 minutes     CPT codes:  [] Gait training 66696 0 minutes  [] Manual therapy 92911 0 minutes  [x] Therapeutic activities 81047 15 minutes  [] Therapeutic exercises 99385 0 minutes  [] Neuromuscular reeducation 29299 0 minutes    Mabel Salter.  Heather Melendez, [address]  number:  [phone] [id number]

## 2021-12-02 NOTE — PROGRESS NOTES
Hospitalist Progress Note      Synopsis: Patient admitted on 11/25/2021 for a closed compression fracture of L5 s/p kyphoplasty complicated by alcohol withdrawal. While pt is not withdrawing she continues to be confused nad her mentatin is worse than yesteday; she is not able to even carry a conversation     Subjective    Pt ativan needs decreased  She continues on oxygen, but needs are going down   Remained afebrile    Exam:  /80   Pulse 80   Temp 97.8 °F (36.6 °C) (Temporal)   Resp 18   Ht 4' 10\" (1.473 m)   Wt 175 lb (79.4 kg)   SpO2 92%   BMI 36.58 kg/m²   General appearance: No apparent distress, appears stated age and cooperative. HEENT: Pupils equal, round, and reactive to light. Conjunctivae/corneas clear. Neck: Supple. No jugular venous distention. Trachea midline. Respiratory:  Normal respiratory effort. Clear to auscultation, bilaterally without Rales/Wheezes/Rhonchi. Cardiovascular: Regular rate and rhythm with normal S1/S2 without murmurs, rubs or gallops. Abdomen: distended, diffuse tenderness to palpation   Musculoskeletal: No clubbing, cyanosis or edema bilaterally. Brisk capillary refill. 2+ lower extremity pulses (dorsalis pedis).    Skin:  No rashes    Neurologic: awakens to name but just mumbles a few words, not able to carry conversation     Medications:  Reviewed    Infusion Medications    sodium chloride       Scheduled Medications    chlordiazePOXIDE  5 mg Oral Q6H    gabapentin  200 mg Oral TID    lidocaine  1 patch TransDERmal Daily    sodium chloride flush  5-40 mL IntraVENous 2 times per day    amLODIPine  2.5 mg Oral Daily    ARIPiprazole  5 mg Oral Daily    atenolol  25 mg Oral Daily    atorvastatin  40 mg Oral Nightly    citalopram  20 mg Oral Daily    lactulose  20 g Oral BID    levETIRAcetam  500 mg Oral BID    levothyroxine  25 mcg Oral Daily    lisinopril  10 mg Oral Daily    magnesium oxide  400 mg Oral Daily    therapeutic multivitamin-minerals  1 tablet Oral Daily    nicotine  1 patch TransDERmal Daily    rifaximin  550 mg Oral BID    vitamin B-1  100 mg Oral Daily     PRN Meds: cyclobenzaprine, sodium chloride flush, sodium chloride, oxyCODONE, haloperidol, ondansetron, ondansetron, polyethylene glycol, acetaminophen **OR** acetaminophen, magnesium hydroxide    I/O    Intake/Output Summary (Last 24 hours) at 12/2/2021 0924  Last data filed at 12/2/2021 0530  Gross per 24 hour   Intake 660 ml   Output 1225 ml   Net -565 ml       Labs:   Recent Labs     11/29/21 1907   WBC 11.6*   HGB 14.6   HCT 43.1          Recent Labs     11/29/21 1907      K 3.7      CO2 29   BUN 13   CREATININE 0.6   CALCIUM 10.9*       No results for input(s): PROT, ALB, ALKPHOS, ALT, AST, BILITOT, AMYLASE, LIPASE in the last 72 hours. Recent Labs     11/29/21 1907   INR 1.4       No results for input(s): Frida Winn in the last 72 hours. Chronic labs:  Lab Results   Component Value Date    CHOL 122 11/10/2021    TRIG 129 11/10/2021    HDL 21 11/10/2021    LDLCALC 75 11/10/2021    TSH 3.450 11/20/2021    INR 1.4 11/29/2021    LABA1C 5.9 (H) 11/09/2021       Radiology:  Imaging studies reviewed today. ASSESSMENT:  Hepatic encephalopathy   Abdominal pain   L5 compress fracture s/p kyphoplasty  Alcohol abuse, and hospitalization complicated by alcohol withdrawal  Diarrhea - likely related to lactulose  Liver cirrhosis  HLD  Depression  COPD  Acute hypoxic respiratory failure     PLAN:  Pain control  Strict I/O, monitor renal function  Continue lactulose; only hold if having greater than 4 BM daily  CIWA stopped  CT abdomen/pelvis  Pt is pending precert to her facility  Wean oxygen       Diet: ADULT DIET;  Regular  ADULT ORAL NUTRITION SUPPLEMENT; Breakfast, AM Snack, Lunch, PM Snack; Standard High Calorie/High Protein Oral Supplement  Code Status: Prior  PT/OT Eval Status:   ordered  DVT Prophylaxis: ?  Recommended disposition at discharge:  SNF pending precert     +++++++++++++++++++++++++++++++++++++++++++++++++  Lynnette Mathew MD   [facility].  +++++++++++++++++++++++++++++++++++++++++++++++++  NOTE: This report was transcribed using voice recognition software. Every effort was made to ensure accuracy; however, inadvertent computerized transcription errors may be present.

## 2021-12-03 NOTE — CONSULTS
Hepatobiliary and Pancreatic Surgery    History and Physical      Patient's Name/Date of Birth: Rock Elizondo /1960 (36 y.o.)    Date: December 3, 2021     CC: fall    HPI:  Rock Elizondo is a 59year old female with alcohol abuse, hepatitis C, and cirrhosis who presented to the hospital from [address] [place] [address],[address] for a L5 fracture. She underwent L5 kyphoplasty on 11/30/21. HPB surgery was consulted for concerning liver lesions found on CT imaging and portal vein thrombosis. She was started on therapeutic Lovenox. She is A&O x 0, and history is obtained via chart review and nursing staff. It appears as though she follows with Dr. Anisa Gallardo for her cirrhosis. She underwent a liver biopsy in 2016 which showed cirrhosis and moderate hemosiderosis. She has a CEA of 7.6 in 2016. She states that she had a colonoscopy at that time but is unsure what the results were. She drinks a fifth of vodka daily, smokes 3-4 pack of cigarettes a day, and smokes marijuana. MELD 13 and Child-Daniel class B. CT imaging reveals multiple right hepatic lobe liver lesions and pulmonary lesions.      Past Medical History:   Diagnosis Date    Alcohol abuse     Cerebral artery occlusion with cerebral infarction ([address]     Depression     Diverticulosis of colon     Elevated blood sugar     H/O cardiovascular stress test 11/13/2021    Nuclear Lexiscan Stress Test    Hepatitis C     from blood transfusion, underwent treatment per patient and \"cured\"    Hyperlipidemia     Hypertension     Liver cirrhosis ([address]     Magnesium deficiency     Marijuana user     Nicotine dependence     Seizures ([address]     Subclinical hypothyroidism        Past Surgical History:   Procedure Laterality Date    DILATION AND CURETTAGE OF UTERUS      LIVER BIOPSY  07/05/2016    SPINE SURGERY N/A 11/30/2021    KYPHOPLASTY, L5 performed by Michael [facility] at [facility] OR       Current Facility-Administered Medications   Medication Dose Route Frequency Provider Last Rate Last Admin    enoxaparin (LOVENOX) injection 80 mg  1 mg/kg SubCUTAneous BID Lynnette Wilde MD   80 mg at 12/03/21 1026    lactulose (CHRONULAC) 10 GM/15ML solution 30 g  30 g Oral TID Lynnette Wilde MD   30 g at 12/03/21 0929    gabapentin (NEURONTIN) capsule 200 mg  200 mg Oral TID Cristóbal Lee, DO   200 mg at 12/03/21 8358    lidocaine 4 % external patch 1 patch  1 patch TransDERmal Daily Cristóbal Lee, DO   1 patch at 12/03/21 0929    cyclobenzaprine (FLEXERIL) tablet 10 mg  10 mg Oral TID PRN Cristóbal Lee, DO   10 mg at 12/01/21 2028    sodium chloride flush 0.9 % injection 5-40 mL  5-40 mL IntraVENous 2 times per day Cristóbal Lee, DO   10 mL at 12/03/21 0932    sodium chloride flush 0.9 % injection 5-40 mL  5-40 mL IntraVENous PRN Cristóbal Lee, DO        0.9 % sodium chloride infusion  25 mL IntraVENous PRN Cristóbal Lee, DO        oxyCODONE (ROXICODONE) immediate release tablet 5 mg  5 mg Oral Q4H PRN Cristóbal Lee, DO   5 mg at 11/30/21 0556    amLODIPine (NORVASC) tablet 2.5 mg  2.5 mg Oral Daily Cristóbal Lee, DO   2.5 mg at 12/03/21 1800    ARIPiprazole (ABILIFY) tablet 5 mg  5 mg Oral Daily Cristóbal Lee, DO   5 mg at 12/03/21 0931    atenolol (TENORMIN) tablet 25 mg  25 mg Oral Daily Cristóbal Lee, DO   25 mg at 12/03/21 2280    atorvastatin (LIPITOR) tablet 40 mg  40 mg Oral Nightly David Purcell, DO   40 mg at 12/02/21 2032    citalopram (CELEXA) tablet 20 mg  20 mg Oral Daily Cristóbal Lee, DO   20 mg at 12/02/21 3573    haloperidol (HALDOL) tablet 0.5 mg  0.5 mg Oral Q6H PRN Cristóbal Lee, DO        levETIRAcetam (KEPPRA) tablet 500 mg  500 mg Oral BID Cristóbal Lee, DO   500 mg at 12/03/21 0929    levothyroxine (SYNTHROID) tablet 25 mcg  25 mcg Oral Daily Cristóbal Lee, DO   25 mcg at 12/03/21 0970    lisinopril (PRINIVIL;ZESTRIL) tablet 10 mg  10 mg Oral Daily Cristóbal Lee, DO   10 mg at 12/03/21 6107    magnesium oxide (MAG-OX) tablet 400 mg  400 mg Oral Daily Sherman , DO   400 mg at 12/03/21 0402    therapeutic multivitamin-minerals 1 tablet  1 tablet Oral Daily Sherman , DO   1 tablet at 12/03/21 6744    nicotine (NICODERM CQ) 21 MG/24HR 1 patch  1 patch TransDERmal Daily Sherman , DO   1 patch at 12/03/21 0931    ondansetron (ZOFRAN) injection 4 mg  4 mg IntraVENous Q6H PRN Sherman , DO        ondansetron (ZOFRAN-ODT) disintegrating tablet 4 mg  4 mg Oral Q8H PRN Sherman , DO        polyethylene glycol (GLYCOLAX) packet 17 g  17 g Oral Daily PRN Sherman , DO        rifaximin Donnita Doom) tablet 550 mg  550 mg Oral BID Sherman , DO   550 mg at 12/03/21 0930    thiamine mononitrate tablet 100 mg  100 mg Oral Daily Sherman , DO   100 mg at 12/03/21 8920    acetaminophen (TYLENOL) tablet 650 mg  650 mg Oral Q6H PRN Sherman , DO   650 mg at 12/01/21 9212    Or    acetaminophen (TYLENOL) suppository 650 mg  650 mg Rectal Q6H PRN Sherman , DO        magnesium hydroxide (MILK OF MAGNESIA) 400 MG/5ML suspension 30 mL  30 mL Oral Daily PRN Sherman , DO           Allergies   Allergen Reactions    Codeine Nausea And Vomiting       Family History   Problem Relation Age of Onset    Cancer Mother         Breast    Alcohol Abuse Father     Drug Abuse Father     Cancer Brother     Alcohol Abuse Brother     Drug Abuse Brother        Social History     Socioeconomic History    Marital status:      Spouse name: Not on file    Number of children: Not on file    Years of education: Not on file    Highest education level: Not on file   Occupational History    Not on file   Tobacco Use    Smoking status: Current Every Day Smoker     Packs/day: 1.00     Years: 49.00     Pack years: 49.00     Start date: 1970    Smokeless tobacco: Never Used   Vaping Use    Vaping Use: Never used   Substance and Sexual Activity  Alcohol use: Yes     Alcohol/week: 28.0 standard drinks     Types: 14 Cans of beer, 14 Shots of liquor per week     Comment: 2 cans of beer and 2 shots of liquor per day    Drug use: Yes     Frequency: 7.0 times per week     Types: Marijuana Courtney Lux)     Comment: every day    Sexual activity: Not Currently   Other Topics Concern    Not on file   Social History Narrative    Not on file     Social Determinants of Health     Financial Resource Strain: Low Risk     Difficulty of Paying Living Expenses: Not very hard   Food Insecurity: No Food Insecurity    Worried About Running Out of Food in the Last Year: Never true    Ankur of Food in the Last Year: Never true   Transportation Needs:     Lack of Transportation (Medical): Not on file    Lack of Transportation (Non-Medical): Not on file   Physical Activity:     Days of Exercise per Week: Not on file    Minutes of Exercise per Session: Not on file   Stress:     Feeling of Stress : Not on file   Social Connections:     Frequency of Communication with Friends and Family: Not on file    Frequency of Social Gatherings with Friends and Family: Not on file    Attends Mandaen Services: Not on file    Active Member of [address] [place] [address] [employer] or Organizations: Not on file    Attends Club or Organization Meetings: Not on file    Marital Status: Not on file   Intimate Partner Violence:     Fear of Current or Ex-Partner: Not on file    Emotionally Abused: Not on file    Physically Abused: Not on file    Sexually Abused: Not on file   Housing Stability:     Unable to Pay for Housing in the Last Year: Not on file    Number of [place] in the Last Year: Not on file    Unstable Housing in the Last Year: Not on file       ROS:   Review of Systems   Constitutional: Positive for activity change and appetite change. Respiratory: Negative for cough and shortness of breath. Cardiovascular: Negative for chest pain.    Gastrointestinal: Positive for abdominal distention and abdominal pain. Negative for constipation, diarrhea and vomiting. Genitourinary: Negative for difficulty urinating and hematuria. Musculoskeletal: Positive for back pain. Negative for arthralgias. Skin: Negative for color change and rash. Hematological: Negative for adenopathy. Does not bruise/bleed easily. Psychiatric/Behavioral: Positive for confusion. Physical Exam:  Vitals:    12/03/21 0930   BP: (!) 161/70   Pulse: 101   Resp: 16   Temp: 98.2 °F (36.8 °C)   SpO2: 93%       PSYCH: resting in bed. alert and oriented x 0: No apparent distress, comfortable  EYES: Sclera white, pupils equal round and reactive to light  ENMT:  Hearing normal, trachea midline, ears externally intact  RESP: Respiratory effort was normal with no retractions or use of accessory muscles. On 2 L of O2 nasal cannula. CV: Tachycardic, hypertensive. No pedal edema  GI/ Abdomen: The abdomen was soft, moderately distended, and diffusely tender. No guarding, rebound, or rigidity. There was no, hepatosplenomegaly, or hernias. MSK: no clubbing/ no cyanosis. Assessment/Plan: Edgard Bey is a 59year old female admitted for L5 fracture s/p kyphoplasty 11/29 with hepatic encephalopathy and concerning liver masses for hepatocellular carcinoma. MELD 13 Child Daniel class B.    CT chest  CT percutaneous liver biopsy- will need anticoagulation held for procedure, but recognize patient has active portal vein thrombosis - likely secondary to tumor involvement  CEA  CA 19-9  AFP  Chromogranin  INR  Daily CBC/BMP/hepatic function panel/INR     Thank you for the consultation allowing me to take part in Onel Sultana's care.      Electronically signed by Yessenia Sultana MD on 12/3/2021 at 5:36 PM      Electronically signed by Nuvia Lucio MD on 12/3/2021 at 1:34 PM

## 2021-12-03 NOTE — PROGRESS NOTES
Hospitalist Progress Note      Synopsis: Patient admitted on 11/25/2021 for a closed compression fracture of L5 s/p kyphoplasty complicated by alcohol withdrawal. While pt is not withdrawing she continues to be confused nad her mentatin is worse than yesteday; she is not able to even carry a conversation; ammonia was repeated and more elevated, and it was found that patient had not had a bowel movement for 3 days. Lactulose dose increased with improvement in bowel movement. CT abdomen obtained to due abdominal distention and tenderness and identified suspected hepatocellular carcinoma with possible metastatic lesions in addition to portal vein thrombosis. HB was consulted and patient was started on lovenox     Subjective  Pt continues on low amount of oxygen  She is still sleepy    Exam:  BP (!) 161/70   Pulse 101   Temp 98.2 °F (36.8 °C) (Temporal)   Resp 16   Ht 4' 10\" (1.473 m)   Wt 175 lb (79.4 kg)   SpO2 93%   BMI 36.58 kg/m²   General appearance: No apparent distress, appears stated age and cooperative. HEENT: Pupils equal, round, and reactive to light. Conjunctivae/corneas clear. Neck: Supple. No jugular venous distention. Trachea midline. Respiratory:  Normal respiratory effort. Clear to auscultation, bilaterally without Rales/Wheezes/Rhonchi. Cardiovascular: Regular rate and rhythm with normal S1/S2 without murmurs, rubs or gallops. Abdomen: distended, mild diffuse tenderness to palpation   Musculoskeletal: No clubbing, cyanosis or edema bilaterally. Brisk capillary refill. 2+ lower extremity pulses (dorsalis pedis).    Skin:  No rashes    Neurologic: awakens to name but just mumbles a few words, not able to carry conversation     Medications:  Reviewed    Infusion Medications    sodium chloride       Scheduled Medications    enoxaparin  1 mg/kg SubCUTAneous BID    lactulose  30 g Oral TID    gabapentin  200 mg Oral TID    lidocaine  1 patch TransDERmal Daily    sodium chloride flush 5-40 mL IntraVENous 2 times per day    amLODIPine  2.5 mg Oral Daily    ARIPiprazole  5 mg Oral Daily    atenolol  25 mg Oral Daily    atorvastatin  40 mg Oral Nightly    citalopram  20 mg Oral Daily    levETIRAcetam  500 mg Oral BID    levothyroxine  25 mcg Oral Daily    lisinopril  10 mg Oral Daily    magnesium oxide  400 mg Oral Daily    therapeutic multivitamin-minerals  1 tablet Oral Daily    nicotine  1 patch TransDERmal Daily    rifaximin  550 mg Oral BID    vitamin B-1  100 mg Oral Daily     PRN Meds: cyclobenzaprine, sodium chloride flush, sodium chloride, oxyCODONE, haloperidol, ondansetron, ondansetron, polyethylene glycol, acetaminophen **OR** acetaminophen, magnesium hydroxide    I/O    Intake/Output Summary (Last 24 hours) at 12/3/2021 1018  Last data filed at 12/3/2021 0648  Gross per 24 hour   Intake 180 ml   Output 3050 ml   Net -2870 ml       Labs:   No results for input(s): WBC, HGB, HCT, PLT in the last 72 hours. No results for input(s): NA, K, CL, CO2, BUN, CREATININE, CALCIUM, PHOS in the last 72 hours. Invalid input(s): MAGNES    No results for input(s): PROT, ALB, ALKPHOS, ALT, AST, BILITOT, AMYLASE, LIPASE in the last 72 hours. No results for input(s): INR in the last 72 hours. No results for input(s): Karen Ramsey in the last 72 hours. Chronic labs:  Lab Results   Component Value Date    CHOL 122 11/10/2021    TRIG 129 11/10/2021    HDL 21 11/10/2021    LDLCALC 75 11/10/2021    TSH 3.450 11/20/2021    INR 1.4 11/29/2021    LABA1C 5.9 (H) 11/09/2021       Radiology:  CT ABDOMEN PELVIS W WO CONTRAST Additional Contrast? Radiologist Recommendation   Final Result   Very large heterogeneous mass in the right hepatic lobe. There are multiple   other smaller masses throughout the liver as well. These findings are   suggestive of multiple hepatocellular carcinomas. Pulmonary nodules in the left lower lobe are suggestive of metastases.       Mesenteric, retroperitoneal, and retrocrural lymphadenopathy. Thrombosis of the portal vein. Findings suggestive of a mild ileus. Moderate height loss of L5, status post kyphoplasty. Refer to recently   performed MRI lumbar spine for further evaluation. FLUORO FOR SURGICAL PROCEDURES   Final Result   Intraprocedural fluoroscopic spot images as above. See separate procedure   report for more information. ASSESSMENT:  Hepatic encephalopathy   Suspected hepatocellular carcinoma with pulmonary nodules suggestive of metastasis  Portal vein thrombosis  Mild ileus   L5 compress fracture s/p kyphoplasty  Alcohol abuse, and hospitalization complicated by alcohol withdrawal  Diarrhea - likely related to lactulose  Liver cirrhosis  HLD  Depression  COPD  Acute hypoxic respiratory failure     PLAN:  HB consulted given CT abdomen results   Pain control  Strict I/O, monitor renal function  Continue lactulose; only hold if having greater than 4 BM daily  Wean oxygen   Started on therapeutic lovenox for portal vein thrombosis       Spouse Mr. Kathy Gutierrez called and updated on 12/3/21 re CT abdomen findings, all questions answered     Diet: ADULT DIET; Regular  ADULT ORAL NUTRITION SUPPLEMENT; Breakfast, Lunch, Dinner; Standard High Calorie/High Protein Oral Supplement  ADULT ORAL NUTRITION SUPPLEMENT; Breakfast, Dinner; Wound Healing Oral Supplement  Code Status: Prior  PT/OT Eval Status:   ordered  DVT Prophylaxis: lovenox  Recommended disposition at discharge:  pending medical progression   +++++++++++++++++++++++++++++++++++++++++++++++++  Lynnette Choe MD   [facility].  +++++++++++++++++++++++++++++++++++++++++++++++++  NOTE: This report was transcribed using voice recognition software. Every effort was made to ensure accuracy; however, inadvertent computerized transcription errors may be present.

## 2021-12-03 NOTE — PLAN OF CARE
As per Interventional Radiology guidelines the patient would need to be off Lovenox for 24 hours. . When the patient has met these guidelines we will be happy to schedule the patient for the ordered procedure. Please notify us at [phone]. Iris Browne RN on floor notified.

## 2021-12-03 NOTE — PROGRESS NOTES
Occupational Therapy  OT BEDSIDE TREATMENT NOTE   [facility] [address] [facility]  [address] [place] [address]        CLVZ:  Patient Name: Kevin Tierney  MRN: [id number]  : 1960  Room: [address] [place] [address]A     Per OT Eval:    Evaluating OT: Silvio Hernandez OTR/L #[id number]      Referring Provider: Theresa Perry MD  Specific Provider Orders/Date: OT eval and treat      Diagnosis: Compression fracture of first lumbar vertebra with nonunion [S32.010K]   Pt admitted to hospital with back pain     Surgery / Procedure: 21  s/p L5 kyphoplasty      Pertinent Medical History:  has a past medical history of Alcohol abuse, Cerebral artery occlusion with cerebral infarction [facility]), Depression, Diverticulosis of colon, Elevated blood sugar, H/O cardiovascular stress test, Hepatitis C, Hyperlipidemia, Hypertension, Liver cirrhosis ([facility] [address] Magnesium deficiency, Marijuana user, Nicotine dependence, Seizures ([facility] [address] and Subclinical hypothyroidism.         Precautions:  Fall Risk, O2, spine neutrality, bed alarm, TAPS     Assessment of current deficits    [x]? Functional mobility          [x]? ADLs           [x]? Strength                  []?Cognition    [x]? Functional transfers        [x]? IADLs         [x]? Safety Awareness   [x]? Endurance    []? Fine Coordination                        [x]? Balance      []? Vision/perception   []? Sensation      []? Gross Motor Coordination            []? ROM           []?  Delirium                   []? Motor Control      OT PLAN OF CARE   OT POC based on physician orders, patient diagnosis and results of clinical assessment     Frequency/Duration 1-3 days/wk for 2 weeks PRN   Specific OT Treatment Interventions to include:   * Instruction/training on adapted ADL techniques and AE recommendations to increase functional independence within precautions       * Training on energy conservation strategies, correct breathing pattern and techniques to improve independence/tolerance for self-care routine  * Functional transfer/mobility training/DME recommendations for increased independence, safety, and fall prevention  * Patient/Family education to increase follow through with safety techniques and functional independence  * Recommendation of environmental modifications for increased safety with functional transfers/mobility and ADLs  * Cognitive retraining/development of therapeutic activities to improve problem solving, judgement, memory, and attention for increased safety/participation in ADL/IADL tasks  * Therapeutic exercise to improve motor endurance, ROM, and functional strength for ADLs/functional transfers  * Therapeutic activities to facilitate/challenge dynamic balance, stand tolerance for increased safety and independence with ADLs  * Therapeutic activities to facilitate gross/fine motor skills for increased independence with ADLs  * Neuro-muscular re-education: facilitation of righting/equilibrium reactions, midline orientation, scapular stability/mobility, normalization of muscle tone, and facilitation of volitional active controled movement  * Positioning to improve skin integrity, interaction with environment and functional independence  * Delirium prevention/treatment     Recommended Adaptive Equipment:  TBD      Home Living: Pt lives alone in a 1 story home with ramp entrance    Bathroom setup: walk-in shower    Equipment owned: cane, w/c     Prior Level of Function: mod I with ADLs , assist prn with IADLs; ambulated cane vs w/c for mobility   Driving: no   Occupation: enjoys cooking     Pain Level: Pt complained of back pain this session     Cognition: A&O: x1; Follows 1 step directions with redirection             Memory:  poor             Sequencing:  poor             Problem solving:  poor             Judgement/safety:  poor               Functional Assessment:  AM-PAC Daily Activity Raw Score: 10/24    Initial Eval Status  Date: 12/1/21 Treatment Status  Date: 12/3/21 STGs = LTGs  Time frame: 10-14 days   Feeding Minimal Assist  modA  Pt requiring assistance to retrieive cup, grasp and bring to mouth drinking from straw. Pt having difficulty holding utensil, retrieving food and bringing to mouth, assistance needed Mod I   Grooming Minimal Assist   modA  Pt able to wash face, assistance to joel deodorant and comb hair due to being knotted while seated EOB Modified [place]    UB Dressing Moderate Assist   modA  [facility] hospital gown seated EOB Stand by Assist    LB Dressing Dependent  Dependent   Joel/[facility] socks Moderate Assist    Bathing Maximal Assist maxA  Simulated Task  Minimal Assist    Toileting Dependent   Dependent  Pt using of bed pan, assistance with hygeine Moderate Assist    Bed Mobility  Supine to sit: Maximal Assist x2   Sit to supine: Maximal Assist x2 maxAx2  Supine<>EOB  Eob<>Supine  Supine to sit: Moderate Assist   Sit to supine: Moderate Assist    Functional Transfers Moderate Assist x2  maxAx2  Sit to Stand  Stand to Sit    Partial stand Minimal Assist    Functional Mobility NT  DNT  Minimal Assist    Balance Sitting: Max A progressing to min A  (posterior lean)     Standing:  Mod A x2  Sitting EOB:  maxA    Standing:  maxAx2     Activity Tolerance F- Fair-  F+   Visual/  Perceptual Glasses: yes  wfl                             Hand Dominance right    Strength ROM Additional Info:    RUE  WFL, formal MMT deferred due to spinal precautions  WFL good  and wfl FMC/dexterity noted during ADL tasks      LUE WFL formal MMT deferred due to spinal precautions  WFL good  and wfl FMC/dexterity noted during ADL tasks      Hearing:  WFL  Sensation:WFL  Tone: WFL  Edema:none noted           Education:  Pt was educated on role of OT, goals to be reached, precautions to follow, log roll technique and safety with bed mobility, safety and hand placement with sit to stand transfers, importance of OOB activity and increasing of activity tolerance. Comments: Upon arrival pt supine in bed, agreeable to therapy after encorugement. Pt compelted of bed mobility, transfers and light ADL this session. Rest breaks provided as needed. At end of session, pt seated upright in bed, lunch meal in front of pt, nursing notified of needing assistance to finish meal, all lines and tubes intact, call light within reach. PT present during session due to pt's poor activity tolerance, safety, and current assist levels. · Pt has made limited progress towards set goals.    · Continue with current plan of care focusing on increasing of activity tolerance, independency with transfers and ADL task's      Treatment Time In: 9:00am            Treatment Time Out: 9:25am                Treatment Charges: Mins Units   Ther Ex  29712     Manual Therapy 67560     Thera Activities 55109 15 1   ADL/Home Mgt 00216 10 1   Neuro Re-ed [id number]     Group Therapy      Orthotic manage/training  17108     Non-Billable Time     Total Timed Treatment 25 2        Josefa Soriano WISEMAN/L [id number]

## 2021-12-03 NOTE — ACP (ADVANCE CARE PLANNING)
Advance Care Planning   The patient has the following advanced directives on file: she states she does not have advanced directives      The patient has appointed the following active healthcare agents:  Her , Judy Pedraza at [phone]      The Patient has the following current code status:  Full    Visit Documentation:  I discussed Advance Care Planning with Edgard Bey today which included the patient's choices for care and treatment in the case of a health event that adversely affects decision-making abilities. She stated she does not have advanced directives. We discussed her current values, goals and care preferences at the end of life. Edgard Bey has no questions at this time and has agreed to keep me up-to-date should anything change. She stated her  is her contact and decision maker for her healthcare.          Calista Halsted, RN  12/3/2021

## 2021-12-03 NOTE — CARE COORDINATION
12/03/21 Update CM note: Patient is post kyphoplasty and is continued with confusion/hallucintations and agitation. She has a ct abdomen last pm revealing a hepatic lesion. She has abdominal tenderness and distention. Surgery consult is completed and patient to have a needle liver biopsy. Rehab is currently on hold due to new findings. Will follow.  Electronically signed by Jeanne Sullivan RN CM on 12/3/2021 at 1:22 PM

## 2021-12-03 NOTE — PROGRESS NOTES
pivot: NT Sit to stand: Mod Ind   Stand to sit: Mod Ind    Stand pivot: Mod Ind    Ambulation    1 leg advanced side step with [employer] and Mod x 2  NT 20 feet with Mod Ind     Stair negotiation: ascended and descended  NT NT 3 steps with AAD 2 rail   ROM BUE:  Defer to OT eval  BLE:  wfl BLE: WFL     Strength BUE: Defer to OT eval  RLE:  Grossly 4-/5  LLE:  Grossly  4-/5 BLE: grossly 4-/5 4/5   Balance Sitting EOB:  Min A  Dynamic Standing: Mod A x 2 Sitting EOB: MaxA  Standing: unable to achieve full stance, MaxA x2 for partial stand Sitting EOB:  Sup  Dynamic Standing:  Sup       Pt is A & O x 1 self only (first name)  Sensation:  Pt denies numbness and tingling to extremities  Edema:  unremarkable    Patient education  Pt educated on role of therapy, safety with mobility, transfer technique, review of spinal precautions    Patient response to education:   Pt verbalized understanding Pt demonstrated skill Pt requires further education in this area   yes yes yes     ASSESSMENT:    Comments:  Pt resting supine upon arrival, agreeable to PT session with encouragement. Pt was able to recall 0/3 spinal precautions and all spinal precautions and use of log rolling technique were reviewed prior to upright sitting. Pt required manual assist for B LE and trunk negotiation for supine>sit transition with poor use of extremities to assist with transition. Pt required cues for anterior trunk position and hands on assist at all times to remain sitting on EOB. Pt attempted partial stand but was unable to fully extend B knees and hips for full stance therefore amb unsafe to attempt. Pt returned to semi-supine with HOB elevated to max tolerance to improve upright tolerance as pt unsafe for pivot transfers or to remain OOB to chair unattended d/t cognition. Pt with all needs in reach upon completion of session.     Treatment:  Patient practiced and was instructed in the following treatment:     Bed mobility: cues for sequencing and technique, manual assist for maintaining spinal neutral throughout, manual assist at B LEs and trunk for supine<>sit   Transfer training: cues for hand and B LE placement, manual assist for lift/lower to complete partial stance   Therapeutic activities: sitting EOB x15 minutes, cues for anterior trunk and manual assist to maintain upright sitting and prevent posterior LOB. PLAN:    Patient is making good progress towards established goals. Will continue with current POC.         PLAN:    Time in  0856  Time out  0925    Total Treatment Time  29 min    CPT codes:  [] Gait training 52072 0 minutes  [] Manual therapy 66494 0 minutes  [x] Therapeutic activities 62751 29 minutes  [] Therapeutic exercises 17183 0 minutes  [] Neuromuscular reeducation 66583 0 minutes      Zeny Renae, PT, DPT  [id number]

## 2021-12-04 NOTE — PROGRESS NOTES
HEPATOBILIARY SURGERY  DAILY PROGRESS NOTE  12/4/2021    Subjective:  Resting comfortably, no distress. No acute issues overnight. Objective:  BP (!) 97/56   Pulse 106   Temp 97.4 °F (36.3 °C) (Temporal)   Resp 16   Ht 4' 10\" (1.473 m)   Wt 175 lb (79.4 kg)   SpO2 93%   BMI 36.58 kg/m²     PSYCH: resting in bed. alert and oriented x 0: No apparent distress, comfortable  EYES: Sclera white, pupils equal round and reactive to light  ENMT:  Hearing normal, trachea midline, ears externally intact  RESP: Respiratory effort was normal with no retractions or use of accessory muscles. On 2 L of O2 nasal cannula. CV: Tachycardic, hypertensive. No pedal edema  GI/ Abdomen: The abdomen was soft, moderately distended, and diffusely tender. No guarding, rebound, or rigidity. There was no, hepatosplenomegaly, or hernias. MSK: no clubbing/ no cyanosis. Assessment/Plan:  59 y.o. female with hepatic encephalopathy and concerning liver masses for hepatocellular carcinoma.      CT chest  CT percutaneous liver biopsy- will need anticoagulation held for procedure, but recognize patient has active portal vein thrombosis - likely secondary to tumor involvement  CEA = 12.6  CA 19-9 pending  AFP pedning  Chromogranin  Daily CBC/BMP/hepatic function panel/INR    Electronically signed by Karen Jean MD on 12/4/2021 at 9:23 AM     Agree with above  Will need Oncology consultation  IR liver biopsy  CT chest completed and reviewed - has Stage 4 cancer - etiology to be determined with tissue    Electronically signed by Naun Ramires MD on 12/4/2021 at 1:16 PM

## 2021-12-04 NOTE — PROGRESS NOTES
Hospitalist Progress Note      Synopsis: Patient admitted on 11/25/2021 for a closed compression fracture of L5 s/p kyphoplasty complicated by alcohol withdrawal. While pt is not withdrawing she continues to be confused nad her mentatin is worse than yesteday; she is not able to even carry a conversation; ammonia was repeated and more elevated, and it was found that patient had not had a bowel movement for 3 days. Lactulose dose increased with improvement in bowel movement. CT abdomen obtained to due abdominal distention and tenderness and identified suspected hepatocellular carcinoma with possible metastatic lesions in addition to portal vein thrombosis. HB was consulted and patient was started on lovenox. Plan for CT guided biopsy on Monday. Oncology consulted     Subjective  Pt continues on low amount of oxygen  She is still sleepy    Exam:  BP (!) 97/56   Pulse 106   Temp 97.4 °F (36.3 °C) (Temporal)   Resp 16   Ht 4' 10\" (1.473 m)   Wt 175 lb (79.4 kg)   SpO2 93%   BMI 36.58 kg/m²   General appearance: No apparent distress, appears stated age and cooperative. HEENT: Pupils equal, round, and reactive to light. Conjunctivae/corneas clear. Neck: Supple. No jugular venous distention. Trachea midline. Respiratory:  Normal respiratory effort. Clear to auscultation, bilaterally without Rales/Wheezes/Rhonchi. Cardiovascular: Regular rate and rhythm with normal S1/S2 without murmurs, rubs or gallops. Abdomen: distended, mild diffuse tenderness to palpation   Musculoskeletal: No clubbing, cyanosis or edema bilaterally. Brisk capillary refill. 2+ lower extremity pulses (dorsalis pedis).    Skin:  No rashes    Neurologic: awakens to name but just mumbles a few words, able to speak more understandably today but pt is confused     Medications:  Reviewed    Infusion Medications    sodium chloride       Scheduled Medications    enoxaparin  1 mg/kg SubCUTAneous BID    lactulose  30 g Oral TID    gabapentin 200 mg Oral TID    lidocaine  1 patch TransDERmal Daily    sodium chloride flush  5-40 mL IntraVENous 2 times per day    amLODIPine  2.5 mg Oral Daily    ARIPiprazole  5 mg Oral Daily    atenolol  25 mg Oral Daily    atorvastatin  40 mg Oral Nightly    citalopram  20 mg Oral Daily    levETIRAcetam  500 mg Oral BID    levothyroxine  25 mcg Oral Daily    lisinopril  10 mg Oral Daily    magnesium oxide  400 mg Oral Daily    therapeutic multivitamin-minerals  1 tablet Oral Daily    nicotine  1 patch TransDERmal Daily    rifaximin  550 mg Oral BID    vitamin B-1  100 mg Oral Daily     PRN Meds: sodium chloride flush, cyclobenzaprine, sodium chloride flush, sodium chloride, oxyCODONE, haloperidol, ondansetron, ondansetron, polyethylene glycol, acetaminophen **OR** acetaminophen, magnesium hydroxide    I/O    Intake/Output Summary (Last 24 hours) at 12/4/2021 1455  Last data filed at 12/4/2021 1358  Gross per 24 hour   Intake 360 ml   Output 1765 ml   Net -1405 ml       Labs:   Recent Labs     12/03/21  1309 12/04/21  0611   WBC 14.8* 13.6*   HGB 15.3 15.8*   HCT 44.9 46.5    207       Recent Labs     12/03/21  1309 12/04/21  0611    141   K 3.9 3.2*    100   CO2 29 27   BUN 18 16   CREATININE 0.4* 0.5   CALCIUM 11.6* 11.5*       Recent Labs     12/03/21  1309 12/04/21  0611   PROT 7.1 6.7   ALKPHOS 169* 177*   * 228*   * 195*   BILITOT 1.1 1.3*       Recent Labs     12/03/21  1309 12/04/21  0611   INR 1.5 1.4       No results for input(s): Frida Winn in the last 72 hours.     Chronic labs:  Lab Results   Component Value Date    CHOL 122 11/10/2021    TRIG 129 11/10/2021    HDL 21 11/10/2021    LDLCALC 75 11/10/2021    TSH 3.450 11/20/2021    INR 1.4 12/04/2021    LABA1C 5.9 (H) 11/09/2021       Radiology:  CT CHEST W CONTRAST   Final Result   Extensive widespread pulmonary metastasis with multiple bilateral pulmonary   nodules with extensive lymphadenopathy in the mediastinum hilum and   supraclavicular region. Heterogeneous masses in the liver concerning for liver malignancy with   metastatic involvement in the adjacent lymph nodes and the adrenal glands and   portal venous thrombosis. There may be possible developing bone metastasis   as well. Consider PET-CT scan         CT ABDOMEN PELVIS W WO CONTRAST Additional Contrast? Radiologist Recommendation   Final Result   Very large heterogeneous mass in the right hepatic lobe. There are multiple   other smaller masses throughout the liver as well. These findings are   suggestive of multiple hepatocellular carcinomas. Pulmonary nodules in the left lower lobe are suggestive of metastases. Mesenteric, retroperitoneal, and retrocrural lymphadenopathy. Thrombosis of the portal vein. Findings suggestive of a mild ileus. Moderate height loss of L5, status post kyphoplasty. Refer to recently   performed MRI lumbar spine for further evaluation. FLUORO FOR SURGICAL PROCEDURES   Final Result   Intraprocedural fluoroscopic spot images as above. See separate procedure   report for more information.          CT NEEDLE BIOPSY LIVER PERCUTANEOUS    (Results Pending)       ASSESSMENT:  Hepatic encephalopathy   Suspected hepatocellular carcinoma with pulmonary nodules suggestive of metastasis  Portal vein thrombosis  Mild ileus   L5 compress fracture s/p kyphoplasty  Alcohol abuse, and hospitalization complicated by alcohol withdrawal  Diarrhea - likely related to lactulose  Liver cirrhosis  HLD  Depression  COPD  Acute hypoxic respiratory failure   Hypokalemia     PLAN:  HB consulted given CT abdomen results   CT chest confirms suspicion of stage IV metastatic cancer   For IR guided biopsy on Monday   Pain control  Strict I/O, monitor renal function  Continue lactulose; only hold if having greater than 4 BM daily  Wean oxygen   Started on therapeutic lovenox for portal vein thrombosis   K replacement as indicated     Spouse Mr. Nicolas Guerrero called and updated on 12/3/21 re CT abdomen findings, all questions answered     Diet: ADULT DIET; Regular  ADULT ORAL NUTRITION SUPPLEMENT; Breakfast, Lunch, Dinner; Standard High Calorie/High Protein Oral Supplement  ADULT ORAL NUTRITION SUPPLEMENT; Breakfast, Dinner; Wound Healing Oral Supplement  Code Status: Prior  PT/OT Eval Status:   ordered  DVT Prophylaxis: lovenox  Recommended disposition at discharge:  pending medical progression   +++++++++++++++++++++++++++++++++++++++++++++++++  Lynnette Lemons MD   [facility].  +++++++++++++++++++++++++++++++++++++++++++++++++  NOTE: This report was transcribed using voice recognition software. Every effort was made to ensure accuracy; however, inadvertent computerized transcription errors may be present.

## 2021-12-05 NOTE — PROGRESS NOTES
Call placed to 0637 and notified of stat labs needed.   Electronically signed by Med Rea RN on 12/5/2021 at 8:07 AM

## 2021-12-05 NOTE — PLAN OF CARE
Problem: Skin Integrity:  Goal: Absence of new skin breakdown  Description: Absence of new skin breakdown  12/5/2021 1125 by Vicente Fairchild RN  Outcome: Met This Shift  12/5/2021 0014 by Diana Martin RN  Outcome: Met This Shift     Problem: Falls - Risk of:  Goal: Will remain free from falls  Description: Will remain free from falls  12/5/2021 1125 by Vicente Fairchild RN  Outcome: Met This Shift  12/5/2021 0014 by Diana Martin RN  Outcome: Met This Shift  Goal: Absence of physical injury  Description: Absence of physical injury  12/5/2021 1125 by Vicente Fairchild RN  Outcome: Met This Shift  12/5/2021 0014 by Diana Martin RN  Outcome: Met This Shift     Problem: Pain:  Goal: Pain level will decrease  Description: Pain level will decrease  12/5/2021 0014 by Diana Martin RN  Outcome: Ongoing  Goal: Control of acute pain  Description: Control of acute pain  12/5/2021 0014 by Diana Martin RN  Outcome: Ongoing  Goal: Control of chronic pain  Description: Control of chronic pain  12/5/2021 0014 by Diana Martin RN  Outcome: Ongoing

## 2021-12-05 NOTE — PROGRESS NOTES
Nurse to nurse report called to 39. Spoke to Karly Boyd.   Electronically signed by Carri Mix RN on 12/5/2021 at 2:13 PM

## 2021-12-05 NOTE — PLAN OF CARE
Problem: Skin Integrity:  Goal: Absence of new skin breakdown  Description: Absence of new skin breakdown  12/5/2021 1643 by Malena Connell RN  Outcome: Met This Shift  12/5/2021 1125 by Bonnie Craig RN  Outcome: Met This Shift     Problem: Falls - Risk of:  Goal: Will remain free from falls  Description: Will remain free from falls  12/5/2021 1643 by Malena Connell RN  Outcome: Met This Shift  12/5/2021 1125 by Bonnie Craig RN  Outcome: Met This Shift     Problem: Falls - Risk of:  Goal: Absence of physical injury  Description: Absence of physical injury  12/5/2021 1643 by Malena Connell RN  Outcome: Met This Shift  12/5/2021 1125 by Bonnie Craig RN  Outcome: Met This Shift     Problem: Pain:  Goal: Control of acute pain  Description: Control of acute pain  Outcome: Met This Shift

## 2021-12-05 NOTE — PROGRESS NOTES
Hospitalist Progress Note      Synopsis: Patient admitted on 11/25/2021 for a closed compression fracture of L5 s/p kyphoplasty complicated by alcohol withdrawal. While pt is not withdrawing she continues to be confused nad her mentatin is worse than yesteday; she is not able to even carry a conversation; ammonia was repeated and more elevated, and it was found that patient had not had a bowel movement for 3 days. Lactulose dose increased with improvement in bowel movement. CT abdomen obtained to due abdominal distention and tenderness and identified suspected hepatocellular carcinoma with possible metastatic lesions in addition to portal vein thrombosis. HB was consulted and patient was started on lovenox. Plan for CT guided biopsy on Monday. Oncology consulted     Subjective  This am patient developed increased confusion in the setting of fever and tachycardia and she was restarted on her CIWA? ?? Exam:  /60   Pulse 114   Temp 100.6 °F (38.1 °C) (Axillary)   Resp 18   Ht 4' 10\" (1.473 m)   Wt 175 lb (79.4 kg)   SpO2 93%   BMI 36.58 kg/m²   General appearance: No apparent distress, appears stated age and cooperative. HEENT: Pupils equal, round, and reactive to light. Conjunctivae/corneas clear. Neck: Supple. No jugular venous distention. Trachea midline. Respiratory:  Normal respiratory effort. Clear to auscultation, bilaterally without Rales/Wheezes/Rhonchi. Cardiovascular: Regular rate and rhythm with normal S1/S2 without murmurs, rubs or gallops. Abdomen: distended, mild diffuse tenderness to palpation   Musculoskeletal: No clubbing, cyanosis or edema bilaterally. Brisk capillary refill. 2+ lower extremity pulses (dorsalis pedis).    Skin:  No rashes    Neurologic:sleepy and difficult to arouse but does respond to pain     Medications:  Reviewed    Infusion Medications    sodium chloride       Scheduled Medications    sodium chloride flush  5-40 mL IntraVENous 2 times per day    11/20/2021    INR 1.3 12/05/2021    LABA1C 5.9 (H) 11/09/2021       Radiology:  CT CHEST W CONTRAST   Final Result   Extensive widespread pulmonary metastasis with multiple bilateral pulmonary   nodules with extensive lymphadenopathy in the mediastinum hilum and   supraclavicular region. Heterogeneous masses in the liver concerning for liver malignancy with   metastatic involvement in the adjacent lymph nodes and the adrenal glands and   portal venous thrombosis. There may be possible developing bone metastasis   as well. Consider PET-CT scan         CT ABDOMEN PELVIS W WO CONTRAST Additional Contrast? Radiologist Recommendation   Final Result   Very large heterogeneous mass in the right hepatic lobe. There are multiple   other smaller masses throughout the liver as well. These findings are   suggestive of multiple hepatocellular carcinomas. Pulmonary nodules in the left lower lobe are suggestive of metastases. Mesenteric, retroperitoneal, and retrocrural lymphadenopathy. Thrombosis of the portal vein. Findings suggestive of a mild ileus. Moderate height loss of L5, status post kyphoplasty. Refer to recently   performed MRI lumbar spine for further evaluation. FLUORO FOR SURGICAL PROCEDURES   Final Result   Intraprocedural fluoroscopic spot images as above. See separate procedure   report for more information. CT NEEDLE BIOPSY LIVER PERCUTANEOUS    (Results Pending)   XR CHEST PORTABLE    (Results Pending)       ASSESSMENT:  Sepsis 2/2 possible aspiration pneumonia?   Hepatic encephalopathy   Suspected hepatocellular carcinoma with pulmonary nodules suggestive of metastasis  Portal vein thrombosis  Mild ileus   L5 compress fracture s/p kyphoplasty  Alcohol abuse, and hospitalization complicated by alcohol withdrawal  Diarrhea - likely related to lactulose  Liver cirrhosis  HLD  Depression  COPD  Acute hypoxic respiratory failure   Hypokalemia     PLAN:  Bolus, repeat vitals, obtain ua, blood cultures, CXR. Will consult ID as well; unasyn started, transfer to [facility]. CIWA discontinued   HB consulted given CT abdomen results   CT chest confirms suspicion of stage IV metastatic cancer   For IR guided biopsy on Monday   Pain control  Strict I/O, monitor renal function  Continue lactulose; only hold if having greater than 4 BM daily; will give pt lactulose enema this am   Wean oxygen   Started on therapeutic lovenox for portal vein thrombosis   K replacement as indicated     Spouse Mr. Toyin Barger updated at bedside on 12/5/20201      Diet: ADULT DIET; Regular  ADULT ORAL NUTRITION SUPPLEMENT; Breakfast, Lunch, Dinner; Standard High Calorie/High Protein Oral Supplement  ADULT ORAL NUTRITION SUPPLEMENT; Breakfast, Dinner; Wound Healing Oral Supplement  Diet NPO Exceptions are: Sips of Water with Meds  Code Status: Prior  PT/OT Eval Status:   ordered  DVT Prophylaxis: lovenox  Recommended disposition at discharge:  pending medical progression   +++++++++++++++++++++++++++++++++++++++++++++++++  Nuha Plaza MD   [facility].  +++++++++++++++++++++++++++++++++++++++++++++++++  NOTE: This report was transcribed using voice recognition software. Every effort was made to ensure accuracy; however, inadvertent computerized transcription errors may be present.

## 2021-12-05 NOTE — PROGRESS NOTES
Message sent to Dr. Abelino Sheets regarding patient. \"Patient became aggressive last night. Patient was replaced on CIWA scale. CIWA at 2252 was 27. Patient was given Ativan 4mg at 0750. Current vital signs are Temperature 101.2, Pulse 131, /63, Respirations 16, SpO2 94% on 2 L. \"  Electronically signed by Wilkins Goldberg, RN on 12/5/2021 at 7:58 AM     Obtained order to bolus normal saline 500ml. This RN also notified her WBC count also increased today. 13.6 yesterday and 17.5 today. Dr. Jaimee Briscoe stated to order UA and blood cultures on patient and to Transfer to intermediate for sepsis. Electronically signed by Wilkins Goldberg, RN on 12/5/2021 at 8:03 AM       Call placed to bed placement. No intermediate beds at this time. Awaiting call back when a bed opens. Notified that patient has a sitter.   Electronically signed by Wilkins Goldberg, RN on 12/5/2021 at 8:08 AM

## 2021-12-05 NOTE — PROGRESS NOTES
HEPATOBILIARY SURGERY  DAILY PROGRESS NOTE  12/5/2021  Cc: cirrhosis, metastatic disease    Subjective:  Some agitation overnight, got Ativan per CIWA scale. Sleeping comfortably this morning    Objective:  /63   Pulse 131   Temp 101.2 °F (38.4 °C) (Temporal)   Resp 16   Ht 4' 10\" (1.473 m)   Wt 175 lb (79.4 kg)   SpO2 94%   BMI 36.58 kg/m²     PSYCH: resting in bed. sleeping comfortably, rousable, follows simple commands  EYES: Sclera white, pupils equal round and reactive to light  ENMT:  Hearing normal, trachea midline, ears externally intact  RESP: Respiratory effort was normal with no retractions or use of accessory muscles. On 2 L of O2 nasal cannula. CV: Tachycardic  GI/ Abdomen: The abdomen was soft, moderately distended, and mild tenderness. + hepatomegaly  MSK: no clubbing/ no cyanosis.     Assessment/Plan:  59 y.o. female with hepatic encephalopathy, portal vein thrombosis, likely hepatocellular carcinoma, concern for metastatic disease to lung and bone    CT chest showed widespread pulmonary metastatic disease and pathology from kyphoplasty last week returned with metastatic carcinoma  CT percutaneous liver biopsy tomorrow - therapeutic lovenox held yesterday by nursing  CEA = 12.6  CA 19-9 pending  AFP pending  Chromogranin  Daily MELD labs  Oncology to see    Electronically signed by Kimberly Mistry MD on 12/5/2021 at 8:51 AM     Agree with above  Tumor markers pending  IR liver biopsy  Hold therapeutic lovenox    Electronically signed by Yulissa Tejada MD on 12/5/2021 at 11:17 AM

## 2021-12-05 NOTE — CONSULTS
[address] [place] [address] [facility]    Consultation Note     Admit Date: 11/25/2021  5:12 PM    Reason for Consult:   Aspiration pneumonia    Attending Physician:  Pio Katz MD     Chief Complaint: Back pain/alcohol withdrawal    HISTORY OF PRESENT ILLNESS:   The patient is a 59 y.o.  woman not known to the Infectious Diseases service. The patient is admitted on 11/25/2021. She had complaints of back pain and had an L5 compression fracture diagnosed at [facility]. She has underlying history of liver disease and cirrhosis associated with alcohol abuse plus hepatitis C. On 11/30/2021 she underwent kyphoplasty on L5. She was seen by hepatobiliary service because of a portal vein thrombosis. Her alcohol abuse apparently includes 1/5 of vodka daily as well as 3 to 4 packs of cigarettes a day. There is a liver lesion that is suspicious for hepatocellular carcinoma by CAT scan with possible metastasis as well. Liver lesions to be biopsied. On 12 5 she started spiking temperatures to 101 and was requiring more oxygen went from 2 L to 4 and after blood cultures were drawn she was started on Unasyn. CT scan of the chest shows multiple bilateral pulmonary nodules extensive lymphadenopathy in the mediastinum all consistent with metastasis to the lung superimposed on the heterogeneous masses in the liver. Current labs showed a white count of 17,000 with a hemoglobin of 15 and a BUN of creatinine of 19 and 0.5. Her albumin is 2.9 and alkaline phosphatase is 198 with a ammonia of 69 and AST of 228 bilirubin of 1.3 and ALT of 280.   Currently the patient is moribund and all information is taken from reviewing the chart        Past Medical History:        Diagnosis Date    Alcohol abuse     Cerebral artery occlusion with cerebral infarction ([facility] [address]     Depression     Diverticulosis of colon     Elevated blood sugar     H/O cardiovascular stress test 11/13/2021    [address]     Start date: 5    Smokeless tobacco: Never Used   Vaping Use    Vaping Use: Never used   Substance and Sexual Activity    Alcohol use: Yes     Alcohol/week: 28.0 standard drinks     Types: 14 Cans of beer, 14 Shots of liquor per week     Comment: 2 cans of beer and 2 shots of liquor per day    Drug use: Yes     Frequency: 7.0 times per week     Types: Marijuana Sherrill Postin)     Comment: every day    Sexual activity: Not Currently   Other Topics Concern    Not on file   Social History Narrative    Not on file     Social Determinants of Health     Financial Resource Strain: Low Risk     Difficulty of Paying Living Expenses: Not very hard   Food Insecurity: No Food Insecurity    Worried About Running Out of Food in the Last Year: Never true    Ankur of Food in the Last Year: Never true   Transportation Needs:     Lack of Transportation (Medical): Not on file    Lack of Transportation (Non-Medical):  Not on file   Physical Activity:     Days of Exercise per Week: Not on file    Minutes of Exercise per Session: Not on file   Stress:     Feeling of Stress : Not on file   Social Connections:     Frequency of Communication with Friends and Family: Not on file    Frequency of Social Gatherings with Friends and Family: Not on file    Attends Mu-ism Services: Not on file    Active Member of [address] [place] [address] [employer] or Organizations: Not on file    Attends Club or Organization Meetings: Not on file    Marital Status: Not on file   Intimate Partner Violence:     Fear of Current or Ex-Partner: Not on file    Emotionally Abused: Not on file    Physically Abused: Not on file    Sexually Abused: Not on file   Housing Stability:     Unable to Pay for Housing in the Last Year: Not on file    Number of [place] in the Last Year: Not on file    Unstable Housing in the Last Year: Not on file     Family History:       Problem Relation Age of Onset    Cancer Mother         Breast    Alcohol Abuse Father     Drug edema.  Musculoskeletal: Equal and symmetrical  Neurological: No focal but obtunded and encephalopathic  Lines: peripheral      CBC+dif:  Recent Labs     12/03/21  1309 12/03/21  1309 12/04/21  0611 12/04/21  0611 12/05/21  0537   WBC 14.8*  --  13.6*  --  17.5*   HGB 15.3   < > 15.8*   < > 15.6*   HCT 44.9   < > 46.5   < > 45.8   MCV 95.3   < > 95.9   < > 93.1      < > 207   < > 218   NEUTROABS 9.81*   < > 10.06*   < > 11.83*    < > = values in this interval not displayed.      No results found for: CRP  No results found for: CRPHS  No results found for: SEDRATE  Lab Results   Component Value Date     (H) 12/05/2021     (H) 12/05/2021    ALKPHOS 198 (H) 12/05/2021    BILITOT 1.3 (H) 12/05/2021     Lab Results   Component Value Date     12/05/2021    K 3.9 12/05/2021     12/05/2021    CO2 29 12/05/2021    BUN 19 12/05/2021    CREATININE 0.5 12/05/2021    GFRAA >60 12/05/2021    LABGLOM >60 12/05/2021    GLUCOSE 116 12/05/2021    PROT 7.2 12/05/2021    LABALBU 2.9 12/05/2021    CALCIUM 12.0 12/05/2021    BILITOT 1.3 12/05/2021    ALKPHOS 198 12/05/2021     12/05/2021     12/05/2021       Lab Results   Component Value Date    PROTIME 14.4 12/05/2021    INR 1.3 12/05/2021       Lab Results   Component Value Date    TSH 3.450 11/20/2021       Lab Results   Component Value Date    COLORU Yellow 11/09/2021    PHUR 5.5 11/09/2021    LABCAST MANY 04/26/2019    WBCUA 5-10 11/09/2021    RBCUA 1-3 11/09/2021    BACTERIA MANY 11/09/2021    CLARITYU Clear 11/09/2021    SPECGRAV 1.010 11/09/2021    LEUKOCYTESUR MODERATE 11/09/2021    UROBILINOGEN 2.0 11/09/2021    BILIRUBINUR Negative 11/09/2021    BLOODU LARGE 11/09/2021    GLUCOSEU Negative 11/09/2021       Lab Results   Component Value Date    RTA1TSR 25.0 10/07/2020    RJL1ZRL 46.9 10/07/2020    PO2ART 47.6 10/07/2020     Radiology:  CT CHEST W CONTRAST   Final Result   Extensive widespread pulmonary metastasis with multiple bilateral pulmonary   nodules with extensive lymphadenopathy in the mediastinum hilum and   supraclavicular region. Heterogeneous masses in the liver concerning for liver malignancy with   metastatic involvement in the adjacent lymph nodes and the adrenal glands and   portal venous thrombosis. There may be possible developing bone metastasis   as well. Consider PET-CT scan         CT ABDOMEN PELVIS W WO CONTRAST Additional Contrast? Radiologist Recommendation   Final Result   Very large heterogeneous mass in the right hepatic lobe. There are multiple   other smaller masses throughout the liver as well. These findings are   suggestive of multiple hepatocellular carcinomas. Pulmonary nodules in the left lower lobe are suggestive of metastases. Mesenteric, retroperitoneal, and retrocrural lymphadenopathy. Thrombosis of the portal vein. Findings suggestive of a mild ileus. Moderate height loss of L5, status post kyphoplasty. Refer to recently   performed MRI lumbar spine for further evaluation. FLUORO FOR SURGICAL PROCEDURES   Final Result   Intraprocedural fluoroscopic spot images as above. See separate procedure   report for more information. CT NEEDLE BIOPSY LIVER PERCUTANEOUS    (Results Pending)   XR CHEST PORTABLE    (Results Pending)       Microbiology:  Pending  No results for input(s): BC in the last 72 hours. No results for input(s): ORG in the last 72 hours. No results for input(s): Bethanne Dubin in the last 72 hours. No results for input(s): STREPNEUMAGU in the last 72 hours. No results for input(s): LP1UAG in the last 72 hours. No results for input(s): ASO in the last 72 hours. No results for input(s): CULTRESP in the last 72 hours.     Assessment:  · Hepatocellular carcinoma with metastatic lesions to the lung  · Possible septic phlebitis of the thrombosed portal vein    Plan:    · Cont Unasyn for now  · Consider hospice  · Check cultures  · Baseline ESR, CRP  · Monitor labs  · Will follow with you    Thank you for having us see this patient in consultation. I will be discussing this case with the treating physicians.       Electronically signed by Maritza Ferguson MD on 12/5/2021 at 12:26 PM

## 2021-12-05 NOTE — PROGRESS NOTES
Physical Therapy  Facility/Department: [address] NEURO SPINE  Daily Treatment Note  NAME: Mukul Gimenez  : 1960  MRN: [id number]    Date of Service: 2021  Attempted physical therapy treatment session. Upon room entry pt found supine with HOB elevated, snoring loudly and un-arousable. Pt did not awaken to tactile stimuli/nail bed pressure. Pt dependent to reposition in bed with groans at attempted repositioning. Per chart, pt recently received ativan for agitation. Will attempt again when pt more able to participate.      Suzanne Beal, PT, DPT  Board Certified Neurologic Clinical Specialist  [phone]

## 2021-12-05 NOTE — PLAN OF CARE
Problem: Skin Integrity:  Goal: Will show no infection signs and symptoms  Description: Will show no infection signs and symptoms  12/5/2021 0014 by Venkata Tse RN  Outcome: Met This Shift  12/4/2021 2013 by Chelsea Klein RN  Outcome: Met This Shift  Goal: Absence of new skin breakdown  Description: Absence of new skin breakdown  12/5/2021 0014 by Venkata Tse RN  Outcome: Met This Shift  12/4/2021 2013 by Chelsea Klein RN  Outcome: Met This Shift     Problem: Falls - Risk of:  Goal: Will remain free from falls  Description: Will remain free from falls  12/5/2021 0014 by Venkata Tse RN  Outcome: Met This Shift  12/4/2021 2013 by Chelsea Klein RN  Outcome: Met This Shift  Goal: Absence of physical injury  Description: Absence of physical injury  12/5/2021 0014 by Venkata Tse RN  Outcome: Met This Shift  12/4/2021 2013 by Chelsea Klein RN  Outcome: Met This Shift     Problem: Pain:  Goal: Pain level will decrease  Description: Pain level will decrease  12/5/2021 0014 by Venkata Tse RN  Outcome: Ongoing  12/4/2021 2013 by Chelsea Klein RN  Outcome: Met This Shift  Goal: Control of acute pain  Description: Control of acute pain  12/5/2021 0014 by Venkata Tse RN  Outcome: Ongoing  12/4/2021 2013 by Chelsea Klein RN  Outcome: Met This Shift  Goal: Control of chronic pain  Description: Control of chronic pain  12/5/2021 0014 by Venkata Tse RN  Outcome: Ongoing  12/4/2021 2013 by Chelsea Klein RN  Outcome: Met This Shift

## 2021-12-06 PROBLEM — S32.050A CLOSED COMPRESSION FRACTURE OF L5 LUMBAR VERTEBRA, INITIAL ENCOUNTER (HCC): Status: ACTIVE | Noted: 2021-01-01

## 2021-12-06 NOTE — PROGRESS NOTES
Physical Therapy    PT session attempted this AM. Pt currently not following commands per RN. PT will follow and attempt again at later time/date as appropriate. Thank you.     Hugo Courtney, PT, DPT  [id number]

## 2021-12-06 NOTE — CONSULTS
[facility] [address]. Miquel Frankel      Patient Name: Sascha Aggarwal  YOB: 1960  PCP: Farzaneh Correia DO   Referring Provider: [id number] [place], [place] [address] / [address] [id number]     Reason for Consultation: No chief complaint on file. History of Present Illness: This pt is a very pleasant 60 yo female initially presented to [facility] ER with back pain, weakness, fatigue. She had CT scan for back pain showing L5 compression fracture and was transferred to [facility] (1-) for NSG kyphoplasty. with pathology at that time showing carcinoma. Imaging was also concerning for wide spread metastatic HCC with PV involvement, with multiple LNs and pulmonary involvement. She underwent liver biopsy today to confirm. AFP not resulted yet.  Oncology has been consulted for further evaluation    Diagnostic Data:     Past Medical History:   Diagnosis Date    Alcohol abuse     Cerebral artery occlusion with cerebral infarction ([address]     Depression     Diverticulosis of colon     Elevated blood sugar     H/O cardiovascular stress test 11/13/2021    Nuclear Lexiscan Stress Test    Hepatitis C     from blood transfusion, underwent treatment per patient and \"cured\"    Hyperlipidemia     Hypertension     Liver cirrhosis ([address]     Magnesium deficiency     Marijuana user     Nicotine dependence     Seizures ([address]     Subclinical hypothyroidism        Patient Active Problem List    Diagnosis Date Noted    Closed compression fracture of L5 lumbar vertebra, initial encounter ([address] 12/06/2021    Metastatic cancer to bone ([address]     Hepatocellular carcinoma (HCC)     Compression fracture of first lumbar vertebra with nonunion 11/25/2021    Closed compression fracture of L5 vertebra (HCC) 11/25/2021    Dehydration 11/20/2021    Pedal edema     Chronic obstructive pulmonary disease (HCC)     Tobacco abuse     Hepatic cirrhosis (HCC)     Hypokalemia     Hypomagnesemia     UTI (urinary tract infection) 11/09/2021    Acute on chronic diastolic (congestive) heart failure ([facility] 11/09/2021    Alcohol abuse     Cerebral artery occlusion with cerebral infarction ([address]     Hypertension     Depression     Hyperlipidemia     Seizures (HCC)     Alcoholic intoxication without complication (HCC)     Hyponatremia     Nicotine dependence     Marijuana user     Fall at home, initial encounter     Acute respiratory failure with hypoxia ([facility] 10/07/2020        Past Surgical History:   Procedure Laterality Date    CT NEEDLE BIOPSY LIVER PERCUTANEOUS  12/6/2021    CT NEEDLE BIOPSY LIVER PERCUTANEOUS 12/6/2021 Marcheta Bernheim, [place] [facility]    DILATION AND CURETTAGE OF UTERUS      LIVER BIOPSY  07/05/2016    SPINE SURGERY N/A 11/30/2021    KYPHOPLASTY, L5 performed by Harleen Dunaway [place] at [facility]       Family History  Family History   Problem Relation Age of Onset    Cancer Mother         Breast    Alcohol Abuse Father     Drug Abuse Father     Cancer Brother     Alcohol Abuse Brother     Drug Abuse Brother        Social History    TOBACCO:   reports that she has been smoking. She started smoking about 51 years ago. She has a 49.00 pack-year smoking history. She has never used smokeless tobacco.  ETOH:   reports current alcohol use of about 28.0 standard drinks of alcohol per week. Home Medications  Prior to Admission medications    Medication Sig Start Date End Date Taking?  Authorizing Provider   magnesium oxide (MAG-OX) 400 MG tablet Take 1 tablet by mouth daily 11/26/21  Yes Bonny Santiago, DO   albuterol (PROVENTIL) (2.5 MG/3ML) 0.083% nebulizer solution Take 3 mLs by nebulization every 6 hours as needed for Wheezing 11/25/21  Yes Sameerraine Maru Rosales,    lisinopril (PRINIVIL;ZESTRIL) 10 MG tablet Take 1 tablet by mouth daily 11/26/21  Yes Brendan Rosales DO   haloperidol (HALDOL) 0.5 MG tablet Take 1 tablet by mouth every 6 hours as needed (agitation) 11/25/21  Yes Brendan Rosales, DO   atenolol (TENORMIN) 25 MG tablet Take 1 tablet by mouth daily 11/26/21  Yes Lenice Leaf, DO   polyethylene glycol (GLYCOLAX) 17 g packet Take 17 g by mouth daily as needed for Constipation 11/25/21 12/25/21 Yes Lenice Leaf, DO   Multiple Vitamins-Minerals (THERAPEUTIC MULTIVITAMIN-MINERALS) tablet Take 1 tablet by mouth daily 11/26/21  Yes Lenice Leaf, DO   thiamine mononitrate (THIAMINE) 100 MG tablet Take 1 tablet by mouth daily 11/26/21  Yes Shaan Rosales, DO   lactulose [facility]) 10 GM/15ML solution Take 30 mLs by mouth 2 times daily 11/18/21 12/18/21 Yes Juan Carlosice Leaf, DO   rifaximin (XIFAXAN) 550 MG tablet Take 1 tablet by mouth 2 times daily 11/18/21  Yes Shaan Rosales, DO   nicotine (NICODERM CQ) 21 MG/24HR Place 1 patch onto the skin daily 11/12/21  Yes Lenice Leaf, DO   levothyroxine (SYNTHROID) 25 MCG tablet take 1 tablet by mouth once daily 8/17/21  Yes Green Ellen, DO   amLODIPine (NORVASC) 5 MG tablet take 1 tablet by mouth once daily 5/17/21  Yes Green Ellen, DO   atorvastatin (LIPITOR) 40 MG tablet take 1 tablet by mouth once daily 5/17/21  Yes Green Ellen, DO   citalopram (CELEXA) 40 MG tablet take 1 tablet by mouth once daily 5/17/21  Yes Green Ellen, DO   clopidogrel (PLAVIX) 75 MG tablet take 1 tablet by mouth once daily 5/17/21  Yes Green Ellen, DO   levETIRAcetam (KEPPRA) 500 MG tablet take 1 tablet by mouth twice a day 5/17/21  Yes Green Ellen, DO   ARIPiprazole (ABILIFY) 5 MG tablet take 1 tablet by mouth once daily 3/29/21  Yes Loretta Chowdhury PA-C   ondansetron (ZOFRAN-ODT) 4 MG disintegrating tablet Take 1 tablet by mouth every 8 hours as needed for Nausea or Vomiting 11/25/21   Lenice Leaf, DO   ondansetron [facility]) 4 MG/2ML injection Infuse 2 mLs intravenously every 6 hours as needed for Nausea or Vomiting 11/25/21   Lenice Leaf, DO       Allergies  Allergies   Allergen Reactions    Codeine Nausea And Vomiting       Review of Systems:    +confusion      Objective  BP (!) 109/57   Pulse 111   Temp 98.9 °F (37.2 °C) (Oral)   Resp 20   Ht 4' 10\" (1.473 m)   Wt 175 lb (79.4 kg)   SpO2 99%   BMI 36.58 kg/m²     Physical Exam:   Performance Status:  General: Confused. No acute distress  Head and neck : PERRLA, EOMI . Sclera non icteric. Oropharynx : Clear  Neck: no JVD,  no adenopathy  LYMPHATICS : No LAD  Heart: Regular rate and regular rhythm, no murmur  Lungs: Clear to auscultation   Extremities: No edema,no cyanosis, no clubbing. Abdomen: Soft, NTTP, distended  Skin:  No rash  Neurologic:Cranial nerves grossly intact    Recent Laboratory Data-   Lab Results   Component Value Date    WBC 21.3 (H) 12/06/2021    HGB 13.8 12/06/2021    HCT 40.2 12/06/2021    MCV 93.1 12/06/2021     12/06/2021    LYMPHOPCT 7.0 (L) 12/06/2021    RBC 4.32 12/06/2021    MCH 31.9 12/06/2021    MCHC 34.3 12/06/2021    RDW 14.9 12/06/2021    NEUTOPHILPCT 88.7 (H) 12/06/2021    MONOPCT 4.3 12/06/2021    BASOPCT 0.2 12/06/2021    NEUTROABS 18.96 (H) 12/06/2021    LYMPHSABS 1.49 (L) 12/06/2021    MONOSABS 0.85 12/06/2021    EOSABS 0.00 (L) 12/06/2021    BASOSABS 0.00 12/06/2021       Lab Results   Component Value Date     12/06/2021    K 3.3 (L) 12/06/2021     12/06/2021    CO2 29 12/06/2021    BUN 15 12/06/2021    CREATININE 0.4 (L) 12/06/2021    GLUCOSE 73 (L) 12/06/2021    CALCIUM 11.4 (H) 12/06/2021    PROT 6.5 12/06/2021    LABALBU 2.4 (L) 12/06/2021    BILITOT 1.7 (H) 12/06/2021    ALKPHOS 175 (H) 12/06/2021     (H) 12/06/2021     (H) 12/06/2021    LABGLOM >60 12/06/2021    GFRAA >60 12/06/2021       No results found for: IRON, TIBC, FERRITIN        Radiology-    CT NEEDLE BIOPSY LIVER PERCUTANEOUS   Final Result   Successful uncomplicated CT and fluoroscopic guided liver   mass biopsy.       If there are any physician concerns regarding this report, a   Radiologist can be reached by calling the following number [phone]. CT GUIDED NEEDLE PLACEMENT   Final Result   Successful uncomplicated CT and fluoroscopic guided liver   mass biopsy. If there are any physician concerns regarding this report, a   Radiologist can be reached by calling the following number [phone]. CT HEAD WO CONTRAST   Final Result   Multifocal encephalomalacia compatible with old infarcts. Atrophy and   chronic microvascular ischemic disease. No significant change. XR CHEST PORTABLE   Final Result   No definitive consolidation. Bilateral nodular densities consistent with the patient's known metastatic   disease. CT CHEST W CONTRAST   Final Result   Extensive widespread pulmonary metastasis with multiple bilateral pulmonary   nodules with extensive lymphadenopathy in the mediastinum hilum and   supraclavicular region. Heterogeneous masses in the liver concerning for liver malignancy with   metastatic involvement in the adjacent lymph nodes and the adrenal glands and   portal venous thrombosis. There may be possible developing bone metastasis   as well. Consider PET-CT scan         CT ABDOMEN PELVIS W WO CONTRAST Additional Contrast? Radiologist Recommendation   Final Result   Very large heterogeneous mass in the right hepatic lobe. There are multiple   other smaller masses throughout the liver as well. These findings are   suggestive of multiple hepatocellular carcinomas. Pulmonary nodules in the left lower lobe are suggestive of metastases. Mesenteric, retroperitoneal, and retrocrural lymphadenopathy. Thrombosis of the portal vein. Findings suggestive of a mild ileus. Moderate height loss of L5, status post kyphoplasty. Refer to recently   performed MRI lumbar spine for further evaluation. FLUORO FOR SURGICAL PROCEDURES   Final Result   Intraprocedural fluoroscopic spot images as above. See separate procedure   report for more information.          MRI BRAIN W WO CONTRAST    (Results Pending)         ASSESSMENT/PLAN :  60 yo female  Suspected stage IV HCC to lungs, LNs and bone  Hepatic encephalopathy  Alcohol abuse  Aspiration PNA    - ID following on abx for suspected PNA  - On lactulose for encephalopathy  - IR liver biopsy, path pending  - CBC with neutrophilic leukocytosis  - Likely CPS A if/when encephalopathy resolves. Would be candidate for Avastin and Tecentriq as outpatient. From an oncology POV, she does have therapeutic options for her [address] She would need to follow with GI to make sure her cirrhosis/ammonia remain controlled and significantly decrease her alcohol intake  - Hospice is reasonable if she and her  do not wish to pursue therapy  - Confusion is likely multifactorial related to alcohol withdrawal and encephalopathy. Imaging to r/o mets appropriate, but likely to be negative  - Will follow    Thank you for this consult.  Please call with further questions or concerns      Electronically signed by Raudel Flores MD on 12/6/2021 at 12:53 PM

## 2021-12-06 NOTE — PROGRESS NOTES
Recalled oncology consult, Dr. María Moncada is covering today.  He was added to the treatment team.

## 2021-12-06 NOTE — PROGRESS NOTES
Occupational Therapy  OT SESSION ATTEMPT     Date:2021  Patient Name: Klever Santana  MRN: [id number]  : 1960  Room: [address] [place], [place] [address]     Attempted OT session this date:    [] unavailable due to other medical staff currently with pt   [] on hold per nursing staff   [x] On hold per nursing staff secondary to patient not able to follow commands or participate in therapy tasks. [] politely declined treatment this date due to   [] off unit    [] Other:     Will reattempt OT session at a later time.       Jeanna Gonzales [address], [address]

## 2021-12-06 NOTE — PROGRESS NOTES
Hospitalist Progress Note      Synopsis: Patient admitted on 11/25/2021 for a closed compression fracture of L5 s/p kyphoplasty complicated by alcohol withdrawal. While pt is not withdrawing she continues to be confused nad her mentatin is worse than yesteday; she is not able to even carry a conversation; ammonia was repeated and more elevated, and it was found that patient had not had a bowel movement for 3 days. Lactulose dose increased with improvement in bowel movement. CT abdomen obtained to due abdominal distention and tenderness and identified suspected hepatocellular carcinoma with possible metastatic lesions in addition to portal vein thrombosis. HB was consulted and patient was started on lovenox. Plan for CT guided biopsy on Monday.  Oncology consulted     Subjective  Pt continued to have confusion overnight and was swinging at nurses; required a dose of haldol, vitals improved with repeat bolus     Exam:  /62   Pulse 103   Temp 98.2 °F (36.8 °C) (Axillary)   Resp 18   Ht 4' 10\" (1.473 m)   Wt 175 lb (79.4 kg)   SpO2 93%   BMI 36.58 kg/m²     Attempted to see patient on multiple occasions but she was getting biopsy today    Medications:  Reviewed    Infusion Medications    sodium chloride       Scheduled Medications    sodium chloride flush  5-40 mL IntraVENous 2 times per day    ampicillin-sulbactam  3,000 mg IntraVENous Q6H    levetiracetam  500 mg IntraVENous BID    [Held by provider] enoxaparin  1 mg/kg SubCUTAneous BID    lactulose  30 g Oral TID    gabapentin  200 mg Oral TID    lidocaine  1 patch TransDERmal Daily    [Held by provider] amLODIPine  2.5 mg Oral Daily    ARIPiprazole  5 mg Oral Daily    [Held by provider] atenolol  25 mg Oral Daily    atorvastatin  40 mg Oral Nightly    citalopram  20 mg Oral Daily    levothyroxine  25 mcg Oral Daily    [Held by provider] lisinopril  10 mg Oral Daily    magnesium oxide  400 mg Oral Daily    therapeutic multivitamin-minerals  1 tablet Oral Daily    nicotine  1 patch TransDERmal Daily    rifaximin  550 mg Oral BID    vitamin B-1  100 mg Oral Daily     PRN Meds: sodium chloride flush, sodium chloride, cyclobenzaprine, oxyCODONE, haloperidol, ondansetron, ondansetron, polyethylene glycol, acetaminophen **OR** acetaminophen, magnesium hydroxide    I/O    Intake/Output Summary (Last 24 hours) at 12/6/2021 0820  Last data filed at 12/6/2021 0615  Gross per 24 hour   Intake 100 ml   Output 1475 ml   Net -1375 ml       Labs:   Recent Labs     12/04/21  0611 12/05/21  0537 12/06/21  0503   WBC 13.6* 17.5* 21.3*   HGB 15.8* 15.6* 13.8   HCT 46.5 45.8 40.2    218 214       Recent Labs     12/04/21  0611 12/05/21  0537 12/06/21  0503    140 146   K 3.2* 3.9 3.3*    100 107   CO2 27 29 29   BUN 16 19 15   CREATININE 0.5 0.5 0.4*   CALCIUM 11.5* 12.0* 11.4*       Recent Labs     12/04/21  0611 12/05/21  0537 12/06/21  0503   PROT 6.7 7.2 6.5   ALKPHOS 177* 198* 175*   * 280* 337*   * 228* 353*   BILITOT 1.3* 1.3* 1.7*       Recent Labs     12/04/21  0611 12/05/21  0537 12/06/21  0503   INR 1.4 1.3 1.6       No results for input(s): CKTOTAL, TROPONINI in the last 72 hours. Chronic labs:  Lab Results   Component Value Date    CHOL 122 11/10/2021    TRIG 129 11/10/2021    HDL 21 11/10/2021    LDLCALC 75 11/10/2021    TSH 3.450 11/20/2021    INR 1.6 12/06/2021    LABA1C 5.9 (H) 11/09/2021       Radiology:  XR CHEST PORTABLE   Final Result   No definitive consolidation. Bilateral nodular densities consistent with the patient's known metastatic   disease. CT CHEST W CONTRAST   Final Result   Extensive widespread pulmonary metastasis with multiple bilateral pulmonary   nodules with extensive lymphadenopathy in the mediastinum hilum and   supraclavicular region.       Heterogeneous masses in the liver concerning for liver malignancy with   metastatic involvement in the adjacent lymph nodes and the adrenal glands and   portal venous thrombosis. There may be possible developing bone metastasis   as well. Consider PET-CT scan         CT ABDOMEN PELVIS W WO CONTRAST Additional Contrast? Radiologist Recommendation   Final Result   Very large heterogeneous mass in the right hepatic lobe. There are multiple   other smaller masses throughout the liver as well. These findings are   suggestive of multiple hepatocellular carcinomas. Pulmonary nodules in the left lower lobe are suggestive of metastases. Mesenteric, retroperitoneal, and retrocrural lymphadenopathy. Thrombosis of the portal vein. Findings suggestive of a mild ileus. Moderate height loss of L5, status post kyphoplasty. Refer to recently   performed MRI lumbar spine for further evaluation. FLUORO FOR SURGICAL PROCEDURES   Final Result   Intraprocedural fluoroscopic spot images as above. See separate procedure   report for more information. CT NEEDLE BIOPSY LIVER PERCUTANEOUS    (Results Pending)   CT HEAD WO CONTRAST    (Results Pending)   MRI BRAIN W WO CONTRAST    (Results Pending)       ASSESSMENT:  Sepsis 2/2 possible aspiration pneumonia  Hepatic encephalopathy   Suspected hepatocellular carcinoma with pulmonary nodules suggestive of metastasis  Portal vein thrombosis with possible septic phlebitis   Mild ileus   L5 compress fracture s/p kyphoplasty  Alcohol abuse, and hospitalization complicated by alcohol withdrawal  Diarrhea - likely related to lactulose  Liver cirrhosis  HLD  Depression  COPD  Acute hypoxic respiratory failure   Hypokalemia     PLAN:  For IR guided liver biopsy on 12/6  MRI brain ordered also to eval for mets  Oncology consult has not yet been called?   Continue unasyn, appreciate ID recs; I agree pt would be appropriate for hospice, but  not yet ready for that measure   HB consulted given CT abdomen results   CT chest confirms suspicion of stage IV metastatic cancer AVOID SEDATING DRUGS    Pain control  Strict I/O, monitor renal function  Continue lactulose; only hold if having greater than 4 BM daily; will give pt lactulose enema this am   Wean oxygen   Started on therapeutic lovenox for portal vein thrombosis   K replacement as indicated     Spouse Mr. uAra Adrian updated at bedside on 12/5/20201      Diet: Diet NPO  Code Status: Prior  PT/OT Eval Status:   ordered  DVT Prophylaxis: lovenox needs resumed following biopsy   Recommended disposition at discharge:  pending medical progression   +++++++++++++++++++++++++++++++++++++++++++++++++  Kaylin Kendall MD   [facility].  +++++++++++++++++++++++++++++++++++++++++++++++++  NOTE: This report was transcribed using voice recognition software. Every effort was made to ensure accuracy; however, inadvertent computerized transcription errors may be present.

## 2021-12-06 NOTE — CARE COORDINATION
Hortencia following, will need precert prior to discharge. For biopsy in IR, MRI ordered today. IV unasyn. HENS pending and ambulance on soft chart. For questions I can be reached at [phone].  Robles GordonNorth[facility]

## 2021-12-06 NOTE — PROGRESS NOTES
[address] [place] [address] [facility]  Progress Note    Chief complaint: Confusion    SUBJECTIVE:  Patient is tolerating medications. No reported adverse drug reactions. No nausea, vomiting, diarrhea. Heme-onc note read  Afebrile  4 L  A little less lethargic today  Review of systems:  As stated above in the chief complaint, otherwise negative. Medications:  Scheduled Meds:   sodium chloride flush  5-40 mL IntraVENous 2 times per day    ampicillin-sulbactam  3,000 mg IntraVENous Q6H    levetiracetam  500 mg IntraVENous BID    [Held by provider] enoxaparin  1 mg/kg SubCUTAneous BID    lactulose  30 g Oral TID    gabapentin  200 mg Oral TID    lidocaine  1 patch TransDERmal Daily    [Held by provider] amLODIPine  2.5 mg Oral Daily    ARIPiprazole  5 mg Oral Daily    [Held by provider] atenolol  25 mg Oral Daily    atorvastatin  40 mg Oral Nightly    citalopram  20 mg Oral Daily    levothyroxine  25 mcg Oral Daily    [Held by provider] lisinopril  10 mg Oral Daily    magnesium oxide  400 mg Oral Daily    therapeutic multivitamin-minerals  1 tablet Oral Daily    nicotine  1 patch TransDERmal Daily    rifaximin  550 mg Oral BID    vitamin B-1  100 mg Oral Daily     Continuous Infusions:   sodium chloride       PRN Meds:sodium chloride flush, sodium chloride, cyclobenzaprine, oxyCODONE, haloperidol, ondansetron, ondansetron, polyethylene glycol, acetaminophen **OR** acetaminophen, magnesium hydroxide    OBJECTIVE:  BP (!) 146/59   Pulse 121   Temp 98 °F (36.7 °C) (Oral)   Resp 24   Ht 4' 10\" (1.473 m)   Wt 175 lb (79.4 kg)   SpO2 93%   BMI 36.58 kg/m²   Temp  Av.2 °F (36.8 °C)  Min: 97.7 °F (36.5 °C)  Max: 98.9 °F (37.2 °C)  Constitutional: The patient is awake, alert, and oriented. Skin: Warm and dry. No rashes were noted. HEENT: Round and reactive pupils. Moist mucous membranes. No ulcerations or thrush. Neck: Supple to movements.    Chest: No use of accessory muscles to breathe. Symmetrical expansion. No wheezing, crackles or rhonchi. Cardiovascular: S1 and S2 are rhythmic and regular. No murmurs appreciated. Abdomen: Positive bowel sounds to auscultation. Benign to palpation. No masses felt. No hepatosplenomegaly. Genitourinary: Female  Extremities: No clubbing, no cyanosis, no edema.   Lines: peripheral    Laboratory and Tests Review:  Lab Results   Component Value Date    WBC 21.3 (H) 12/06/2021    WBC 17.5 (H) 12/05/2021    WBC 13.6 (H) 12/04/2021    HGB 13.8 12/06/2021    HCT 40.2 12/06/2021    MCV 93.1 12/06/2021     12/06/2021     Lab Results   Component Value Date    NEUTROABS 18.96 (H) 12/06/2021    NEUTROABS 11.83 (H) 12/05/2021    NEUTROABS 10.06 (H) 12/04/2021     No results found for: CRPHS  Lab Results   Component Value Date     (H) 12/06/2021     (H) 12/06/2021    ALKPHOS 175 (H) 12/06/2021    BILITOT 1.7 (H) 12/06/2021     Lab Results   Component Value Date     12/06/2021    K 3.3 12/06/2021     12/06/2021    CO2 29 12/06/2021    BUN 15 12/06/2021    CREATININE 0.4 12/06/2021    CREATININE 0.5 12/05/2021    CREATININE 0.5 12/04/2021    GFRAA >60 12/06/2021    LABGLOM >60 12/06/2021    GLUCOSE 73 12/06/2021    PROT 6.5 12/06/2021    LABALBU 2.4 12/06/2021    CALCIUM 11.4 12/06/2021    BILITOT 1.7 12/06/2021    ALKPHOS 175 12/06/2021     12/06/2021     12/06/2021     No results found for: CRP  No results found for: Carolina Cormier  Radiology:  Reviewed    Microbiology:   Lab Results   Component Value Date    BC 24 Hours no growth 12/05/2021    BC 5 Days no growth 11/22/2021    ORG Klebsiella pneumoniae ssp pneumoniae 11/09/2021     Lab Results   Component Value Date    BLOODCULT2 24 Hours no growth 12/05/2021    BLOODCULT2 5 Days no growth 11/22/2021    ORG Klebsiella pneumoniae ssp pneumoniae 11/09/2021     No results found for: WNDABS  No results found for: RESPSMEAR  No results found for: Jen Tan, LABLEGI, AFBCX, FUNGSM, LABFUNG  No results found for: CULTRESP  No results found for: CXCATHTIP  Body Fluid Culture, Sterile   Date Value Ref Range Status   04/19/2016 Growth not present  Final     No results found for: CXSURG  Urine Culture, Routine   Date Value Ref Range Status   11/09/2021 >100,000 CFU/ml  Final     No results found for: [address]     ASSESSMENT:  · Metastatic HCC to the lungs nodes and bone; liver biopsy today  · Metabolic encephalopathy  · Possible aspiration pneumonia  · Rule out septic thrombosis of the portal vein    PLAN:  · Continue Unasyn  · If patient's not ready for hospice; extended-care facility or LTAC may be a consideration  · Check final cultures  · Monitor labs    Emmett Crews MD  5:49 PM  12/6/2021

## 2021-12-06 NOTE — PROGRESS NOTES
Hospitalist Progress Note      Synopsis: Patient admitted on 11/25/2021 for a closed compression fracture of L5 s/p kyphoplasty complicated by alcohol withdrawal. While pt is not withdrawing she continues to be confused nad her mentatin is worse than yesteday; she is not able to even carry a conversation; ammonia was repeated and more elevated, and it was found that patient had not had a bowel movement for 3 days. Lactulose dose increased with improvement in bowel movement. CT abdomen obtained to due abdominal distention and tenderness and identified suspected hepatocellular carcinoma with possible metastatic lesions in addition to portal vein thrombosis. HB was consulted and patient was started on lovenox. Plan for CT guided biopsy on Monday. Oncology consulted     Subjective  Pt continued to have confusion overnight and was swinging at nurses; required a dose of haldol, vitals improved with repeat bolus     Exam:  /62   Pulse 103   Temp 98.2 °F (36.8 °C) (Axillary)   Resp 18   Ht 4' 10\" (1.473 m)   Wt 175 lb (79.4 kg)   SpO2 93%   BMI 36.58 kg/m²     General appearance: No apparent distress, appears older than stated age and cooperative. HEENT: Pupils equal, round, and reactive to light. Conjunctivae/corneas clear. Neck: Supple. No jugular venous distention. Trachea midline. Respiratory:  Normal respiratory effort. Clear to auscultation, bilaterally without Rales/Wheezes/Rhonchi. Cardiovascular: Regular rate and rhythm with normal S1/S2 without murmurs, rubs or gallops. Abdomen: distended, nontender  Musculoskeletal: No clubbing, cyanosis or edema bilaterally. Brisk capillary refill. 2+ lower extremity pulses (dorsalis pedis).    Skin:  No rashes    Neurologic:awake, alert, oriented only to self      Medications:  Reviewed    Infusion Medications    sodium chloride       Scheduled Medications    sodium chloride flush  5-40 mL IntraVENous 2 times per day    ampicillin-sulbactam  3,000 mg IntraVENous Q6H    levetiracetam  500 mg IntraVENous BID    [Held by provider] enoxaparin  1 mg/kg SubCUTAneous BID    lactulose  30 g Oral TID    gabapentin  200 mg Oral TID    lidocaine  1 patch TransDERmal Daily    [Held by provider] amLODIPine  2.5 mg Oral Daily    ARIPiprazole  5 mg Oral Daily    [Held by provider] atenolol  25 mg Oral Daily    atorvastatin  40 mg Oral Nightly    citalopram  20 mg Oral Daily    levothyroxine  25 mcg Oral Daily    [Held by provider] lisinopril  10 mg Oral Daily    magnesium oxide  400 mg Oral Daily    therapeutic multivitamin-minerals  1 tablet Oral Daily    nicotine  1 patch TransDERmal Daily    rifaximin  550 mg Oral BID    vitamin B-1  100 mg Oral Daily     PRN Meds: sodium chloride flush, sodium chloride, cyclobenzaprine, oxyCODONE, haloperidol, ondansetron, ondansetron, polyethylene glycol, acetaminophen **OR** acetaminophen, magnesium hydroxide    I/O    Intake/Output Summary (Last 24 hours) at 12/6/2021 0823  Last data filed at 12/6/2021 0615  Gross per 24 hour   Intake 100 ml   Output 1475 ml   Net -1375 ml       Labs:   Recent Labs     12/04/21  0611 12/05/21  0537 12/06/21  0503   WBC 13.6* 17.5* 21.3*   HGB 15.8* 15.6* 13.8   HCT 46.5 45.8 40.2    218 214       Recent Labs     12/04/21  0611 12/05/21  0537 12/06/21  0503    140 146   K 3.2* 3.9 3.3*    100 107   CO2 27 29 29   BUN 16 19 15   CREATININE 0.5 0.5 0.4*   CALCIUM 11.5* 12.0* 11.4*       Recent Labs     12/04/21  0611 12/05/21  0537 12/06/21  0503   PROT 6.7 7.2 6.5   ALKPHOS 177* 198* 175*   * 280* 337*   * 228* 353*   BILITOT 1.3* 1.3* 1.7*       Recent Labs     12/04/21  0611 12/05/21  0537 12/06/21  0503   INR 1.4 1.3 1.6       No results for input(s): CKTOTAL, TROPONINI in the last 72 hours.     Chronic labs:  Lab Results   Component Value Date    CHOL 122 11/10/2021    TRIG 129 11/10/2021    HDL 21 11/10/2021    LDLCALC 75 11/10/2021    TSH 3.450 11/20/2021    INR 1.6 12/06/2021    LABA1C 5.9 (H) 11/09/2021       Radiology:  CT HEAD WO CONTRAST   Final Result   Multifocal encephalomalacia compatible with old infarcts. Atrophy and   chronic microvascular ischemic disease. No significant change. XR CHEST PORTABLE   Final Result   No definitive consolidation. Bilateral nodular densities consistent with the patient's known metastatic   disease. CT CHEST W CONTRAST   Final Result   Extensive widespread pulmonary metastasis with multiple bilateral pulmonary   nodules with extensive lymphadenopathy in the mediastinum hilum and   supraclavicular region. Heterogeneous masses in the liver concerning for liver malignancy with   metastatic involvement in the adjacent lymph nodes and the adrenal glands and   portal venous thrombosis. There may be possible developing bone metastasis   as well. Consider PET-CT scan         CT ABDOMEN PELVIS W WO CONTRAST Additional Contrast? Radiologist Recommendation   Final Result   Very large heterogeneous mass in the right hepatic lobe. There are multiple   other smaller masses throughout the liver as well. These findings are   suggestive of multiple hepatocellular carcinomas. Pulmonary nodules in the left lower lobe are suggestive of metastases. Mesenteric, retroperitoneal, and retrocrural lymphadenopathy. Thrombosis of the portal vein. Findings suggestive of a mild ileus. Moderate height loss of L5, status post kyphoplasty. Refer to recently   performed MRI lumbar spine for further evaluation. FLUORO FOR SURGICAL PROCEDURES   Final Result   Intraprocedural fluoroscopic spot images as above. See separate procedure   report for more information.          CT NEEDLE BIOPSY LIVER PERCUTANEOUS    (Results Pending)   MRI BRAIN W WO CONTRAST    (Results Pending)       ASSESSMENT:  Sepsis 2/2 possible aspiration pneumonia  Hepatic encephalopathy   Suspected hepatocellular carcinoma with pulmonary nodules suggestive of metastasis  Portal vein thrombosis with possible septic phlebitis   Mild ileus   L5 compress fracture s/p kyphoplasty  Alcohol abuse, and hospitalization complicated by alcohol withdrawal  Diarrhea - likely related to lactulose  Liver cirrhosis  HLD  Depression  COPD  Acute hypoxic respiratory failure   Hypokalemia   Indeterminate COVID test     PLAN:  For IR guided liver biopsy on 12/6  MRI brain ordered also to eval for mets, although I think patient's mental status more likely due to chronic effects of alcohol in combination of hepatic encephalopathy   Oncology consult has not yet been called? Continue unasyn, appreciate ID recs; I agree pt would be appropriate for hospice, but  not yet ready for that measure   Repeat COVID test ordered   HB consulted given CT abdomen results   CT chest confirms suspicion of stage IV metastatic cancer   AVOID SEDATING DRUGS    Pain control  Strict I/O, monitor renal function  Continue lactulose; only hold if having greater than 4 BM daily; will give pt lactulose enema this am   Wean oxygen   Started on therapeutic lovenox for portal vein thrombosis   K replacement as indicated     Spouse Mr. Valencia Hallman updated at bedside on 12/5/20201      Diet: Diet NPO  Code Status: Prior  PT/OT Eval Status:   ordered  DVT Prophylaxis: lovenox needs resumed following biopsy   Recommended disposition at discharge:  pending medical progression   +++++++++++++++++++++++++++++++++++++++++++++++++  Keyshawn Zimmeramn MD   [facility].  +++++++++++++++++++++++++++++++++++++++++++++++++  NOTE: This report was transcribed using voice recognition software. Every effort was made to ensure accuracy; however, inadvertent computerized transcription errors may be present.

## 2021-12-07 NOTE — PROGRESS NOTES
Perfect serve to surgical resident, Lory Pan) regarding abnormal labs this morning, sodium 153, potassium 2.9 @0823,message read at 85 99 60, pending new orders at this time.

## 2021-12-07 NOTE — PROGRESS NOTES
HEPATOBILIARY SURGERY  DAILY PROGRESS NOTE  12/7/2021  Cc: cirrhosis, metastatic disease    Subjective:  No new issues overnight. Lethargic this morning - no haldol or benzos given recently    Objective:  /88   Pulse 120   Temp 98.5 °F (36.9 °C) (Oral)   Resp 30   Ht 4' 10\" (1.473 m)   Wt 175 lb (79.4 kg)   SpO2 96%   BMI 36.58 kg/m²     General appearance: laying in bed, lethargic but rousable  Lungs: nonlabored on nasal cannula  Heart: tachycardic, regular  Abdomen: softly distended, mild tenderness  Skin: No skin abnormalities  Musculoskeletal: No clubbing cyanosis or edema. Recent Labs     12/07/21  0431 12/06/21  0503 12/05/21  0537   WBC 15.5* 21.3* 17.5*   HGB 15.0 13.8 15.6*   HCT 44.3 40.2 45.8   MCV 95.3 93.1 93.1    214 218     Assessment/Plan:  59 y.o. female with history of alcoholic cirrhosis, hepatic encephalopathy, with likely metastatic hepatocellular carcinoma to lung and bone and portal vein thrombosis    - follow up IR liver biopsy from yesterday to confirm diagnosis  - tumor markers pending  - Daily labs - add ammonia.  Did get some lactulose yesterday  - ID consulted yesterday for suspected aspiration pneumonia, on Unasyn  - Oncology consulted - likely candidate for Avastin and Tecentriq  - Resume anticoagulation    Electronically signed by Aiden Sanchez MD on 12/7/2021 at 8:04 AM

## 2021-12-07 NOTE — PROGRESS NOTES
[facility] [address] Miquel Frankel      Patient Name: Sascha Aggarwal  YOB: 1960  PCP: Farzaneh Correia DO   Referring Provider: [id number] [place], [place] [address] / [place] [id number]     Reason for Consultation: No chief complaint on file. Subjective: Lethargic and confused. History of Present Illness: This pt is a very pleasant 58 yo female initially presented to [facility] ER with back pain, weakness, fatigue. She had CT scan for back pain showing L5 compression fracture and was transferred to [facility] (1-) for NSG kyphoplasty. with pathology at that time showing carcinoma. Imaging was also concerning for wide spread metastatic HCC with PV involvement, with multiple LNs and pulmonary involvement. She underwent liver biopsy today to confirm. AFP not resulted yet.  Oncology has been consulted for further evaluation    Diagnostic Data:     Past Medical History:   Diagnosis Date    Alcohol abuse     Cerebral artery occlusion with cerebral infarction ([address]     Depression     Diverticulosis of colon     Elevated blood sugar     H/O cardiovascular stress test 11/13/2021    Nuclear Lexiscan Stress Test    Hepatitis C     from blood transfusion, underwent treatment per patient and \"cured\"    Hyperlipidemia     Hypertension     Liver cirrhosis ([address]     Magnesium deficiency     Marijuana user     Nicotine dependence     Seizures ([address]     Subclinical hypothyroidism        Patient Active Problem List    Diagnosis Date Noted    Closed compression fracture of L5 lumbar vertebra, initial encounter ([address] 12/06/2021    Metastatic cancer to bone ([address]     Hepatocellular carcinoma (HCC)     Compression fracture of first lumbar vertebra with nonunion 11/25/2021    Closed compression fracture of L5 vertebra ([address] 11/25/2021    Dehydration 11/20/2021    Pedal edema     Chronic obstructive pulmonary disease (HCC)     Tobacco abuse     Hepatic cirrhosis (HCC)     Hypokalemia     Hypomagnesemia     UTI (urinary tract infection) 11/09/2021    Acute on chronic diastolic (congestive) heart failure ([facility] 11/09/2021    Alcohol abuse     Cerebral artery occlusion with cerebral infarction ([facility]     Hypertension     Depression     Hyperlipidemia     Seizures (HCC)     Alcoholic intoxication without complication (HCC)     Hyponatremia     Nicotine dependence     Marijuana user     Fall at home, initial encounter     Acute respiratory failure with hypoxia ([facility] 10/07/2020        Past Surgical History:   Procedure Laterality Date    CT NEEDLE BIOPSY LIVER PERCUTANEOUS  12/6/2021    CT NEEDLE BIOPSY LIVER PERCUTANEOUS [place]20[place] [facility] [place]    DILATION AND CURETTAGE OF UTERUS      LIVER BIOPSY  07/05/2016    SPINE SURGERY N/A 11/30/2021    [place], [place] [facility] [place]       Family History  Family History   Problem Relation Age of Onset    Cancer Mother         Breast    Alcohol Abuse Father     Drug Abuse Father     Cancer Brother     Alcohol Abuse Brother     Drug Abuse Brother        Social History    TOBACCO:   reports that she has been smoking. She started smoking about 51 years ago. She has a 49.00 pack-year smoking history. She has never used smokeless tobacco.  ETOH:   reports current alcohol use of about 28.0 standard drinks of alcohol per week. Home Medications  Prior to Admission medications    Medication Sig Start Date End Date Taking?  Authorizing Provider   magnesium oxide (MAG-OX) 400 MG tablet Take 1 tablet by mouth daily 11/26/21  Yes Celeste Rosales DO   albuterol (PROVENTIL) (2.5 MG/3ML) 0.083% nebulizer solution Take 3 mLs by nebulization every 6 hours as needed for Wheezing 11/25/21  Yes Celeste Rosales DO   lisinopril (PRINIVIL;ZESTRIL) 10 MG tablet Take 1 tablet by mouth daily 11/26/21  Yes Abdi Rosales DO   haloperidol (HALDOL) 0.5 MG tablet Take 1 tablet by mouth every 6 hours as needed (agitation) 11/25/21  Yes Viri Rosales DO   atenolol (TENORMIN) 25 MG tablet Take 1 tablet by mouth daily 11/26/21  Yes Viri Rosales DO   polyethylene glycol (GLYCOLAX) 17 g packet Take 17 g by mouth daily as needed for Constipation 11/25/21 12/25/21 Yes Mariana Bernal DO   Multiple Vitamins-Minerals (THERAPEUTIC MULTIVITAMIN-MINERALS) tablet Take 1 tablet by mouth daily 11/26/21  Yes Mariana Bernal DO   thiamine mononitrate (THIAMINE) 100 MG tablet Take 1 tablet by mouth daily 11/26/21  Yes Vrii Rosales DO   lactulose [facility]) 10 GM/15ML solution Take 30 mLs by mouth 2 times daily 11/18/21 12/18/21 Yes Mariana Bernal DO   rifaximin (XIFAXAN) 550 MG tablet Take 1 tablet by mouth 2 times daily 11/18/21  Yes Viri Rosales DO   nicotine (NICODERM CQ) 21 MG/24HR Place 1 patch onto the skin daily 11/12/21  Yes Mariana Bernal DO   levothyroxine (SYNTHROID) 25 MCG tablet take 1 tablet by mouth once daily 8/17/21  Yes Cori Chávez, DO   amLODIPine (NORVASC) 5 MG tablet take 1 tablet by mouth once daily 5/17/21  Yes Cori Chávez, DO   atorvastatin (LIPITOR) 40 MG tablet take 1 tablet by mouth once daily 5/17/21  Yes Cori Chávez, DO   citalopram (CELEXA) 40 MG tablet take 1 tablet by mouth once daily 5/17/21  Yes Cori Chávez, DO   clopidogrel (PLAVIX) 75 MG tablet take 1 tablet by mouth once daily 5/17/21  Yes Cori Chávez, DO   levETIRAcetam (KEPPRA) 500 MG tablet take 1 tablet by mouth twice a day 5/17/21  Yes Cori Chávez DO   ARIPiprazole (ABILIFY) 5 MG tablet take 1 tablet by mouth once daily 3/29/21  Yes Dev Hernandez PA-C   ondansetron (ZOFRAN-ODT) 4 MG disintegrating tablet Take 1 tablet by mouth every 8 hours as needed for Nausea or Vomiting 11/25/21   Mariana Bernal DO   ondansetron [facility]) 4 MG/2ML injection Infuse 2 mLs intravenously every 6 hours as needed for Nausea or Vomiting 11/25/21   Mariana Bernal, DO       Allergies  Allergies   Allergen Reactions    Codeine Nausea And Vomiting       Review of Systems:    +confusion      Objective  /69   Pulse 125   Temp 101.1 °F (38.4 °C) (Oral)   Resp 27   Ht 4' 10\" (1.473 m)   Wt 175 lb (79.4 kg)   SpO2 95%   BMI 36.58 kg/m²     Physical Exam:   Performance Status:  General: Confused. No acute distress  Head and neck : PERRLA, EOMI . Sclera non icteric. Oropharynx : Clear  Neck: no JVD,  no adenopathy  LYMPHATICS : No LAD  Heart: Regular rate and regular rhythm, no murmur  Lungs: Clear to auscultation   Extremities: No edema,no cyanosis, no clubbing. Abdomen: Soft, NTTP, distended  Skin:  No rash  Neurologic:Cranial nerves grossly intact    Recent Laboratory Data-   Lab Results   Component Value Date    WBC 15.5 (H) 12/07/2021    HGB 15.0 12/07/2021    HCT 44.3 12/07/2021    MCV 95.3 12/07/2021     12/07/2021    LYMPHOPCT 19.1 (L) 12/07/2021    RBC 4.65 12/07/2021    MCH 32.3 12/07/2021    MCHC 33.9 12/07/2021    RDW 15.1 (H) 12/07/2021    NEUTOPHILPCT 73.1 12/07/2021    MONOPCT 7.8 12/07/2021    BASOPCT 0.4 12/07/2021    NEUTROABS 11.32 (H) 12/07/2021    LYMPHSABS 2.95 12/07/2021    MONOSABS 1.24 (H) 12/07/2021    EOSABS 0.00 (L) 12/07/2021    BASOSABS 0.00 12/07/2021       Lab Results   Component Value Date     (H) 12/07/2021    K 2.9 (L) 12/07/2021     (H) 12/07/2021    CO2 29 12/07/2021    BUN 16 12/07/2021    CREATININE 0.4 (L) 12/07/2021    GLUCOSE 144 (H) 12/07/2021    CALCIUM 11.9 (H) 12/07/2021    PROT 7.0 12/07/2021    LABALBU 2.5 (L) 12/07/2021    BILITOT 1.7 (H) 12/07/2021    ALKPHOS 184 (H) 12/07/2021     (H) 12/07/2021     (H) 12/07/2021    LABGLOM >60 12/07/2021    GFRAA >60 12/07/2021       No results found for: IRON, TIBC, FERRITIN        Radiology-    CT NEEDLE BIOPSY LIVER PERCUTANEOUS   Final Result   Successful uncomplicated CT and fluoroscopic guided liver   mass biopsy.       If there are any physician concerns regarding this report, a   Radiologist can be reached by calling the following number [phone]. CT GUIDED NEEDLE PLACEMENT   Final Result   Successful uncomplicated CT and fluoroscopic guided liver   mass biopsy. If there are any physician concerns regarding this report, a   Radiologist can be reached by calling the following number [phone]. CT HEAD WO CONTRAST   Final Result   Multifocal encephalomalacia compatible with old infarcts. Atrophy and   chronic microvascular ischemic disease. No significant change. XR CHEST PORTABLE   Final Result   No definitive consolidation. Bilateral nodular densities consistent with the patient's known metastatic   disease. CT CHEST W CONTRAST   Final Result   Extensive widespread pulmonary metastasis with multiple bilateral pulmonary   nodules with extensive lymphadenopathy in the mediastinum hilum and   supraclavicular region. Heterogeneous masses in the liver concerning for liver malignancy with   metastatic involvement in the adjacent lymph nodes and the adrenal glands and   portal venous thrombosis. There may be possible developing bone metastasis   as well. Consider PET-CT scan         CT ABDOMEN PELVIS W WO CONTRAST Additional Contrast? Radiologist Recommendation   Final Result   Very large heterogeneous mass in the right hepatic lobe. There are multiple   other smaller masses throughout the liver as well. These findings are   suggestive of multiple hepatocellular carcinomas. Pulmonary nodules in the left lower lobe are suggestive of metastases. Mesenteric, retroperitoneal, and retrocrural lymphadenopathy. Thrombosis of the portal vein. Findings suggestive of a mild ileus. Moderate height loss of L5, status post kyphoplasty. Refer to recently   performed MRI lumbar spine for further evaluation. FLUORO FOR SURGICAL PROCEDURES   Final Result   Intraprocedural fluoroscopic spot images as above.   See separate procedure report for more information. MRI BRAIN W WO CONTRAST    (Results Pending)   XR CHEST PORTABLE    (Results Pending)         ASSESSMENT/PLAN :  60 yo female  Suspected stage IV HCC to lungs, LNs and bone  Hepatic encephalopathy  Alcohol abuse  Aspiration PNA    - ID following on abx for suspected PNA  - On lactulose for encephalopathy  - IR liver biopsy, path pending  - CBC with neutrophilic leukocytosis  - Likely CPS A if/when encephalopathy resolves. Would be candidate for Avastin and Tecentriq as outpatient. From an oncology POV, she does have therapeutic options for her [address] She would need to follow with GI to make sure her cirrhosis/ammonia remain controlled and significantly decrease her alcohol intake  - Hospice is reasonable if she and her  do not wish to pursue therapy  - Confusion is likely multifactorial related to alcohol withdrawal and encephalopathy. Imaging to r/o mets appropriate, but likely to be negative  - Will follow    Thank you for this consult. Please call with further questions or concerns    12/7/21  Lethargic and confused. Status post CT-guided liver biopsy yesterday. Hepatic panel with abnormal transaminases with low albumin and slightly elevated bilirubin. Continues on Unasyn  Continues on Xifaxan and lactulose   Only if performance status improves she will be considered for systemic therapy.   Prognosis poor    Electronically signed by Hermelindo Ball MD on 12/7/2021 at 4:03 PM

## 2021-12-07 NOTE — PROGRESS NOTES
[address] [place] [address] [facility]  Progress Note    Chief complaint: Confusion    SUBJECTIVE:  Patient is tolerating medications. No reported adverse drug reactions. No nausea, vomiting, diarrhea. Afebrile  4 L. Tmax 101.1   lethargic today, sitter at bedside  Review of systems:  As stated above in the chief complaint, otherwise negative. Medications:  Scheduled Meds:   sodium chloride flush  5-40 mL IntraVENous 2 times per day    ampicillin-sulbactam  3,000 mg IntraVENous Q6H    levetiracetam  500 mg IntraVENous BID    enoxaparin  1 mg/kg SubCUTAneous BID    lactulose  30 g Oral TID    gabapentin  200 mg Oral TID    lidocaine  1 patch TransDERmal Daily    [Held by provider] amLODIPine  2.5 mg Oral Daily    ARIPiprazole  5 mg Oral Daily    [Held by provider] atenolol  25 mg Oral Daily    atorvastatin  40 mg Oral Nightly    citalopram  20 mg Oral Daily    levothyroxine  25 mcg Oral Daily    [Held by provider] lisinopril  10 mg Oral Daily    magnesium oxide  400 mg Oral Daily    therapeutic multivitamin-minerals  1 tablet Oral Daily    nicotine  1 patch TransDERmal Daily    rifaximin  550 mg Oral BID    vitamin B-1  100 mg Oral Daily     Continuous Infusions:   sodium chloride       PRN Meds:sodium chloride flush, sodium chloride, cyclobenzaprine, oxyCODONE, haloperidol, ondansetron, ondansetron, polyethylene glycol, acetaminophen **OR** acetaminophen, magnesium hydroxide    OBJECTIVE:  /69   Pulse 125   Temp 101.1 °F (38.4 °C) (Oral)   Resp 27   Ht 4' 10\" (1.473 m)   Wt 175 lb (79.4 kg)   SpO2 95%   BMI 36.58 kg/m²   Temp  Av.9 °F (37.2 °C)  Min: 98 °F (36.7 °C)  Max: 101.1 °F (38.4 °C)  Constitutional: The patient is lethargic  Skin: Warm and dry. No rashes were noted. HEENT: Round and reactive pupils. Moist mucous membranes. No ulcerations or thrush. Neck: Supple to movements. Chest: No use of accessory muscles to breathe. Symmetrical expansion.   No wheezing, crackles or rhonchi. Cardiovascular: S1 and S2 are rhythmic and regular. No murmurs appreciated. Abdomen: Positive bowel sounds to auscultation. Benign to palpation. No masses felt. No hepatosplenomegaly. Genitourinary: Female  Extremities: No clubbing, no cyanosis, no edema.   Lines: peripheral    Laboratory and Tests Review:  Lab Results   Component Value Date    WBC 15.5 (H) 12/07/2021    WBC 21.3 (H) 12/06/2021    WBC 17.5 (H) 12/05/2021    HGB 15.0 12/07/2021    HCT 44.3 12/07/2021    MCV 95.3 12/07/2021     12/07/2021     Lab Results   Component Value Date    NEUTROABS 11.32 (H) 12/07/2021    NEUTROABS 18.96 (H) 12/06/2021    NEUTROABS 11.83 (H) 12/05/2021     No results found for: CRPHS  Lab Results   Component Value Date     (H) 12/07/2021     (H) 12/07/2021    ALKPHOS 184 (H) 12/07/2021    BILITOT 1.7 (H) 12/07/2021     Lab Results   Component Value Date     12/07/2021    K 2.9 12/07/2021     12/07/2021    CO2 29 12/07/2021    BUN 16 12/07/2021    CREATININE 0.4 12/07/2021    CREATININE 0.4 12/06/2021    CREATININE 0.5 12/05/2021    GFRAA >60 12/07/2021    LABGLOM >60 12/07/2021    GLUCOSE 144 12/07/2021    PROT 7.0 12/07/2021    LABALBU 2.5 12/07/2021    CALCIUM 11.9 12/07/2021    BILITOT 1.7 12/07/2021    ALKPHOS 184 12/07/2021     12/07/2021     12/07/2021     No results found for: CRP  No results found for: [address]  Radiology:  Reviewed    Microbiology:   Lab Results   Component Value Date    BC 24 Hours no growth 12/05/2021    BC 5 Days no growth 11/22/2021    ORG Klebsiella pneumoniae ssp pneumoniae 11/09/2021     Lab Results   Component Value Date    BLOODCULT2 24 Hours no growth 12/05/2021    BLOODCULT2 5 Days no growth 11/22/2021    ORG Klebsiella pneumoniae ssp pneumoniae 11/09/2021     No results found for: WNDABS  No results found for: RESPSMEAR  No results found for: MPNEUMO, CLAMYDCU, LABLEGI, AFBCX, FUNGSM, LABFUNG  No results found for: CULTRESP  No results found for: CXCATHTIP  Body Fluid Culture, Sterile   Date Value Ref Range Status   04/19/2016 Growth not present  Final     No results found for: CXSURG  Urine Culture, Routine   Date Value Ref Range Status   11/09/2021 >100,000 CFU/ml  Final     No results found for: [address]    ASSESSMENT:  Metastatic HCC to the lungs nodes and bone; liver biopsy today  Metabolic encephalopathy  Possible aspiration pneumonia  Rule out septic thrombosis of the portal vein    PLAN:  Continue Unasyn  If patient's not ready for hospice; extended-care facility or LTAC may be a consideration  Check final cultures  Monitor labs  Monitor lft on Unasyn-trending up  For MRI brain  Still with fever, suspect volume contraction with elevated NA, suspect atelectasis as she has been lethargic  reccommended continuous iv fluids   Follow up chest xray in am    GAMALIEL Adair CNP  3:31 PM  12/7/2021    Pt seen and examined. Above discussed agree with advanced practice nurse. Labs, cultures, and radiographs reviewed. Face to Face encounter occurred. Changes made as necessary.      Magy Hernandez MD

## 2021-12-07 NOTE — PROGRESS NOTES
Patient febrile received tylenol x1 this shift for temp of 101.1, patients temp was rechecked and is now 101.7, cold rags applied to patient extra linens removed, will continue to monitor patients temperature.

## 2021-12-07 NOTE — CARE COORDINATION
Spoke with spouse, we discussed plans, he asked for a referral to [facility] since it is closer to his home but agreeable to [facility] if [facility] cannot accept, referral pending. We also talked about LTC planning, he plans to follow up with accepting facility with medicaid taylor for LTC. Bx results pending, MRI pending, unasyn continues. For questions I can be reached at [phone]. [place], [place]    The Plan for Transition of Care is related to the following treatment goals: discharge planning when stable    The Patient and/or patient representative Sakina Roe was provided with a choice of provider and agrees   with the discharge plan. [x] Yes [] No    Freedom of choice list was provided with basic dialogue that supports the patient's individualized plan of care/goals, treatment preferences and shares the quality data associated with the providers.  [x] Yes [] No

## 2021-12-07 NOTE — PROGRESS NOTES
Hospitalist Progress Note      SYNOPSIS: Patient admitted on 2021 for Closed compression fracture of L5 vertebra ([facility] [address]      SUBJECTIVE:    Patient seen and examined: Pt is a s/p done 2021 khyphoplasty for a closed L-5 compression. She has liver failure (hepatitis related),portal venous thrombosis,metastatic disease, hepatic encephalopathy  and transaminitis. She remains confused. Pt requires a sitter. Records reviewed. Stable overnight. No other overnight issues reported. Temp (24hrs), Av.9 °F (37.2 °C), Min:98 °F (36.7 °C), Max:101.1 °F (38.4 °C)    DIET: ADULT DIET; Regular  ADULT ORAL NUTRITION SUPPLEMENT; Breakfast, Lunch, Dinner; Standard High Calorie/High Protein Oral Supplement  ADULT ORAL NUTRITION SUPPLEMENT; Breakfast, Dinner; Wound Healing Oral Supplement  CODE: Prior    Intake/Output Summary (Last 24 hours) at 2021 1518  Last data filed at 2021 1502  Gross per 24 hour   Intake 420 ml   Output 1650 ml   Net -1230 ml       Review of Systems  All bolded are positive; please see HPI  General:  Fever, chills, diaphoresis, fatigue, malaise, night sweats, weight loss  Psychological:  Anxiety, disorientation, hallucinations. ENT:  Epistaxis, headaches, vertigo, visual changes. Cardiovascular:  Chest pain, irregular heartbeats, palpitations, paroxysmal nocturnal dyspnea. Respiratory:  Shortness of breath, coughing, sputum production, hemoptysis, wheezing, orthopnea.   Gastrointestinal:  Nausea, vomiting, diarrhea, heartburn, constipation, abdominal pain, hematemesis, hematochezia, melena, acholic stools  Genito-Urinary:  Dysuria, urgency, frequency, hematuria  Musculoskeletal:  Joint pain, joint stiffness, joint swelling, muscle pain  Neurology:  Tremors and confusion  Derm:  Rashes, ulcers, excoriations, bruising  Extremities:  Decreased ROM, peripheral edema, mottling      OBJECTIVE:    /69   Pulse 125   Temp 101.1 °F (38.4 °C) (Oral)   Resp 27   Ht 4' 10\" (1.473 m)   Wt 175 lb (79.4 kg)   SpO2 95%   BMI 36.58 kg/m²     General appearance:  awake, alert, and confused  HEENT: poor dentition  Neck: no goiter   Respiratory: symmetrical; clear to auscultation bilaterally; no wheezes; no rhonchi; no rales  Cardiovascular: rhythm regular; rate controlled; no murmurs  Abdomen: Soft, nontender, nondistended  Extremities:  peripheral pulses present; no peripheral edema; no ulcers  Musculoskeletal: No clubbing, cyanosis, no bilateral lower extremity edema. Brisk capillary refill. Skin:  No rashes  on visible skin  Neurologic: awake, alert,confused-    ASSESSMENT and PLAN:    -hepatic encephalopathy  -metastatic disease  -thrombosis of portal vein  -s/p khyphoplasty  -transaminitis  -hypokalemia  -leukocytosis  -altered mental status related to hepatic encephalopathy       Hemo-onc consulted for evaluation of metastatic disease. Will replace potassium.     DISPOSITION: admitted  Condition fair    Medications:  REVIEWED DAILY    Infusion Medications    sodium chloride       Scheduled Medications    sodium chloride flush  5-40 mL IntraVENous 2 times per day    ampicillin-sulbactam  3,000 mg IntraVENous Q6H    levetiracetam  500 mg IntraVENous BID    enoxaparin  1 mg/kg SubCUTAneous BID    lactulose  30 g Oral TID    gabapentin  200 mg Oral TID    lidocaine  1 patch TransDERmal Daily    [Held by provider] amLODIPine  2.5 mg Oral Daily    ARIPiprazole  5 mg Oral Daily    [Held by provider] atenolol  25 mg Oral Daily    atorvastatin  40 mg Oral Nightly    citalopram  20 mg Oral Daily    levothyroxine  25 mcg Oral Daily    [Held by provider] lisinopril  10 mg Oral Daily    magnesium oxide  400 mg Oral Daily    therapeutic multivitamin-minerals  1 tablet Oral Daily    nicotine  1 patch TransDERmal Daily    rifaximin  550 mg Oral BID    vitamin B-1  100 mg Oral Daily     PRN Meds: sodium chloride flush, sodium chloride, cyclobenzaprine, oxyCODONE, haloperidol, ondansetron, ondansetron, polyethylene glycol, acetaminophen **OR** acetaminophen, magnesium hydroxide    Labs:     Recent Labs     12/05/21  0537 12/06/21  0503 12/07/21 0431   WBC 17.5* 21.3* 15.5*   HGB 15.6* 13.8 15.0   HCT 45.8 40.2 44.3    214 224       Recent Labs     12/05/21  0537 12/06/21  0503 12/07/21 0431    146 153*   K 3.9 3.3* 2.9*    107 112*   CO2 29 29 29   BUN 19 15 16   CREATININE 0.5 0.4* 0.4*   CALCIUM 12.0* 11.4* 11.9*       Recent Labs     12/05/21  0537 12/06/21  0503 12/07/21  0431   PROT 7.2 6.5 7.0   ALKPHOS 198* 175* 184*   * 337* 364*   * 353* 321*   BILITOT 1.3* 1.7* 1.7*       Recent Labs     12/05/21  0537 12/06/21  0503 12/07/21  0431   INR 1.3 1.6 1.6       No results for input(s): Kurt Emanuel in the last 72 hours. Chronic labs:    Lab Results   Component Value Date    CHOL 122 11/10/2021    TRIG 129 11/10/2021    HDL 21 11/10/2021    LDLCALC 75 11/10/2021    TSH 3.450 11/20/2021    INR 1.6 12/07/2021    LABA1C 5.9 (H) 11/09/2021       Radiology: REVIEWED DAILY    +++++++++++++++++++++++++++++++++++++++++++++++++  Thera Naas,  DO  [employer] - [address] [place], [place]  +++++++++++++++++++++++++++++++++++++++++++++++++  NOTE: This report was transcribed using voice recognition software. Every effort was made to ensure accuracy; however, inadvertent computerized transcription errors may be present.

## 2021-12-08 NOTE — PROGRESS NOTES
Resident Rogerio Kuhn) notified of patients potassium of 3. . and sodium of 158 via perfect serve, message read at 1120, pending new orders.

## 2021-12-08 NOTE — PROGRESS NOTES
Hospitalist Progress Note      SYNOPSIS: Patient admitted on 2021 for Closed compression fracture of L5 vertebra (HCC)      SUBJECTIVE:  Pt  still confused today but following commands a lot more today. Nursing staff reports pt taking  po  better today. Pt was seen by hemo-onc and he felt pt has advanced metastatic disease and not  a  candidate for any intervention, but would benefit from pallative care. Patient seen and examined  Records reviewed. Stable overnight. No other overnight issues reported. Temp (24hrs), Av.7 °F (37.6 °C), Min:98.1 °F (36.7 °C), Max:101.7 °F (38.7 °C)    DIET: ADULT DIET; Regular  ADULT ORAL NUTRITION SUPPLEMENT; Breakfast, Lunch, Dinner; Standard High Calorie/High Protein Oral Supplement  ADULT ORAL NUTRITION SUPPLEMENT; Breakfast, Dinner; Wound Healing Oral Supplement  CODE: Prior    Intake/Output Summary (Last 24 hours) at 2021 1316  Last data filed at 2021 0607  Gross per 24 hour   Intake 180 ml   Output 1175 ml   Net -995 ml       Review of Systems  All bolded are positive; please see HPI  General:  Fever, chills, diaphoresis, fatigue, malaise, night sweats, weight loss  Psychological:  confused  ENT:  Epistaxis, headaches, vertigo, visual changes. Cardiovascular:  Chest pain, irregular heartbeats, palpitations, paroxysmal nocturnal dyspnea. Respiratory:  Shortness of breath, coughing, sputum production, hemoptysis, wheezing, orthopnea.   Gastrointestinal:  Nausea, vomiting, diarrhea, heartburn, constipation, abdominal pain, hematemesis, hematochezia, melena, acholic stools  Genito-Urinary:  Dysuria, urgency, frequency, hematuria  Musculoskeletal:  Joint pain, joint stiffness, joint swelling, muscle pain  Neurology:  Headache, focal neurological deficits, weakness, numbness, paresthesia  Derm:  Rashes, ulcers, excoriations, bruising  Extremities:  Decreased ROM, peripheral edema, mottling      OBJECTIVE:    /72   Pulse 114   Temp 98.6 °F (37 °C) (Oral)   Resp 20   Ht 4' 10\" (1.473 m)   Wt 175 lb (79.4 kg)   SpO2 95%   BMI 36.58 kg/m²     General appearance:  awake, alert, and confused  HEENT:  Conjunctivae/corneas clear. Neck: Supple. No jugular venous distention. Respiratory: symmetrical; clear to auscultation bilaterally; no wheezes; no rhonchi; no rales  Cardiovascular: rhythm regular; rate controlled; no murmurs  Abdomen: Soft, nontender, nondistended  Extremities:  peripheral pulses present; no peripheral edema; no ulcers  Musculoskeletal: No clubbing, cyanosis, no bilateral lower extremity edema. Brisk capillary refill. Skin:  No rashes  on visible skin  Neurologic: awake, alert and following commands     ASSESSMENT    -metastatic disease  -hypernatremia  -hepatic encephalopathy  -liver mass  -leukocytosis (white count trending down)  -hypokalemia   -liver failure    Plan     Will treat hypernatremia with free water. Pt has nursing order that she is to be given 500mL of  water to drink every shift. Pt has potassium ordered for her low potassium.                DISPOSITION:     Medications:  REVIEWED DAILY    Infusion Medications    sodium chloride       Scheduled Medications    potassium chloride  30 mEq IntraVENous Once    sodium chloride flush  5-40 mL IntraVENous 2 times per day    ampicillin-sulbactam  3,000 mg IntraVENous Q6H    levetiracetam  500 mg IntraVENous BID    enoxaparin  1 mg/kg SubCUTAneous BID    lactulose  30 g Oral TID    gabapentin  200 mg Oral TID    lidocaine  1 patch TransDERmal Daily    [Held by provider] amLODIPine  2.5 mg Oral Daily    ARIPiprazole  5 mg Oral Daily    [Held by provider] atenolol  25 mg Oral Daily    atorvastatin  40 mg Oral Nightly    citalopram  20 mg Oral Daily    levothyroxine  25 mcg Oral Daily    [Held by provider] lisinopril  10 mg Oral Daily    magnesium oxide  400 mg Oral Daily    therapeutic multivitamin-minerals  1 tablet Oral Daily    nicotine  1 patch TransDERmal Daily    rifaximin  550 mg Oral BID    vitamin B-1  100 mg Oral Daily     PRN Meds: sodium chloride flush, sodium chloride, cyclobenzaprine, oxyCODONE, haloperidol, ondansetron, ondansetron, polyethylene glycol, acetaminophen **OR** acetaminophen, magnesium hydroxide    Labs:     Recent Labs     12/06/21  0503 12/07/21 0431 12/08/21 0429   WBC 21.3* 15.5* 14.4*   HGB 13.8 15.0 13.3   HCT 40.2 44.3 38.8    224 220       Recent Labs     12/06/21  0503 12/07/21 0431 12/08/21 0429    153* 158*   K 3.3* 2.9* 3.0*    112* 116*   CO2 29 29 31*   BUN 15 16 13   CREATININE 0.4* 0.4* 0.5   CALCIUM 11.4* 11.9* 10.9*       Recent Labs     12/06/21  0503 12/07/21 0431 12/08/21 0429   PROT 6.5 7.0 6.2*   ALKPHOS 175* 184* 165*   * 364* 310*   * 321* 248*   BILITOT 1.7* 1.7* 1.8*       Recent Labs     12/06/21  0503 12/07/21 0431 12/08/21 0429   INR 1.6 1.6 1.8       No results for input(s): Lou George in the last 72 hours. Chronic labs:    Lab Results   Component Value Date    CHOL 122 11/10/2021    TRIG 129 11/10/2021    HDL 21 11/10/2021    LDLCALC 75 11/10/2021    TSH 3.450 11/20/2021    INR 1.8 12/08/2021    LABA1C 5.9 (H) 11/09/2021       Radiology: REVIEWED DAILY    +++++++++++++++++++++++++++++++++++++++++++++++++  Ronda Kras, DO DO  [employer] - [address] [facility], [place]  +++++++++++++++++++++++++++++++++++++++++++++++++  NOTE: This report was transcribed using voice recognition software. Every effort was made to ensure accuracy; however, inadvertent computerized transcription errors may be present.

## 2021-12-08 NOTE — PROGRESS NOTES
SPEECH/LANGUAGE PATHOLOGY  SPEECH/LANGUAGE/COGNITIVE EVALUATION   and PLAN OF CARE      PATIENT NAME:  Ivett Valladares  (female)     MRN:  [id number]    :  1960  (59 y.o.)  STATUS:  Inpatient: Room 4503/4503-B    TODAY'S DATE:  2021  REFERRING PROVIDER:   Dr. Eloina Lau: SLP eval and treat  Date of order:  21  REASON FOR REFERRAL:  AMS  EVALUATING THERAPIST: Cade Zhang, BRAYDON    ADMITTING DIAGNOSIS: Compression fracture of first lumbar vertebra with nonunion [S32.010K]    VISIT DIAGNOSIS:      SPEECH THERAPY  PLAN OF CARE   The speech therapy  POC is established based on physician order, speech pathology diagnosis and results of clinical assessment     SPEECH PATHOLOGY DIAGNOSIS:    Moderate-marked cognitive linguistic deficits    Speech Pathology intervention is recommended 3-6 times per week for LOS or when goals are met with emphasis on the following:      Conditions Requiring Skilled Therapeutic Intervention for speech, language and/or cognition    Cognitive linguistic impairment    Specific Speech Therapy Interventions to Include: Therapeutic tasks for Cognition    Specific instructions for next treatment: To initiate memory tasks  To initiate thought organization tasks    SHORT/LONG TERM GOALS  Pt will improve orientation to spatial and temporal surroundings with use of external memory aides. Pt will improve immediate, short term, recent memory during structured and unstructured tasks with 40% accuracy   Pt will improve receptive language skills for response to [facility]- questions and follow through of simple to multi-step directions with 70% accuracy.     Patient goals: Patient/family involved in developing goals and treatment plan:   Treatment goals discussed with Patient    The Patient did not demonstrate complete understanding of the diagnosis, prognosis and plan of care   The patient/family Did not state,     This plan may be re-evaluated and revised as warranted. Rehabilitation Potential/Prognosis: fair                CLINICAL ASSESSMENT:  MOTOR SPEECH       Oral Peripheral Examination   Generalized oral weakness    Parameters of Speech Production  Respiration:  Adequate for speech production  Articulation:  Distortion  Resonance:  Hypernasal  Quality:   Breathy  Pitch: Within functional limits  Intensity: Quiet  Fluency:  Intact  Prosody Monotone    RECEPTIVE LANGUAGE    Comprehension of Yes/No Questions:   Inconsistent    Process  Simple Verbal Commands:   Within functional limits  Process Intermediate Verbal Commands:   Incomplete  Process Complex Verbal Commands:     Incomplete    Comprehension of Conversation:      Latent      EXPRESSIVE LANGUAGE     Serials: Functional    Imitation:  Words   Functional   Sentences Functional    Naming:  (Modality used:  Verbal)  Confrontation Naming  Functional  Functional Description  Impaired  Response Naming: Impaired    Conversation:      Confusion was noted during conversation    COGNITION     Attention/Orientation  Attention: Easily Distracted  Orientation:  Oriented to Person    Memory   Immediate Recall: Repeated 2/3    Delayed Recall:   Recalled 0/3    Long Term Recall:   Recalled Birthdate    Organization/Problem Solving/Reasoning   Verbal Sequencing:   Impaired        Verbal Problem solving:   Impaired          CLINICAL OBSERVATIONS NOTED DURING THE EVALUATION  Latent responses and Cueing was required                  EDUCATION:   The Speech Language Pathologist (SLP) completed education regarding results of evaluation and that intervention is warranted at this time.   Learner: Patient  Education: Reviewed results and recommendations of this evaluation  Evaluation of Education:  Needs further instruction    Evaluation Time includes thorough review of current medical information, gathering information on past medical history/social history and prior level of function, completion of standardized testing/informal observation of tasks, assessment of data and education on plan of care and goals. CPT code:    91253  eval speech sound lang comprehension      The admitting diagnosis and active problem list, as listed below have been reviewed prior to initiation of this evaluation.         ACTIVE PROBLEM LIST:   Patient Active Problem List   Diagnosis    Acute respiratory failure with hypoxia (HCC)    Alcohol abuse    Cerebral artery occlusion with cerebral infarction ([facility] [address]    Hypertension    Depression    Hyperlipidemia    Seizures (HCC)    Alcoholic intoxication without complication ([facility] [address]    Hyponatremia    Nicotine dependence    Marijuana user    Fall at home, initial encounter    UTI (urinary tract infection)    Acute on chronic diastolic (congestive) heart failure (HCC)    Pedal edema    Chronic obstructive pulmonary disease (HCC)    Tobacco abuse    Hepatic cirrhosis (HCC)    Hypokalemia    Hypomagnesemia    Dehydration    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

## 2021-12-08 NOTE — PROGRESS NOTES
Occupational Therapy  OT BEDSIDE TREATMENT NOTE   [address] [facility] [address] 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), Depression, Diverticulosis of colon, Elevated blood sugar, H/O cardiovascular stress test, Hepatitis C, Hyperlipidemia, Hypertension, Liver cirrhosis ([facility] [address] Magnesium deficiency, Marijuana user, Nicotine dependence, Seizures ([facility] [address] and Subclinical hypothyroidism.      Precautions:  Fall Risk, O2, spine neutrality, bed alarm, TAPS     Assessment of current deficits    [x]? Functional mobility          [x]? ADLs           [x]? Strength                  []?Cognition    [x]? Functional transfers        [x]? IADLs         [x]? Safety Awareness   [x]? Endurance    []? Fine Coordination                        [x]? Balance      []? Vision/perception   []? Sensation      []? Gross Motor Coordination            []? ROM           []?  Delirium                   []? Motor Control      OT PLAN OF CARE   OT POC based on physician orders, patient diagnosis and results of clinical assessment     Frequency/Duration 1-3 days/wk for 2 weeks PRN   Specific OT Treatment Interventions to include:   * Instruction/training on adapted ADL techniques and AE recommendations to increase functional independence within precautions       * Training on energy conservation strategies, correct breathing pattern and techniques to improve independence/tolerance for self-care routine  * Functional transfer/mobility training/DME recommendations for increased independence, safety, and fall prevention  * Patient/Family education to increase follow through with safety techniques and functional independence  * Recommendation of environmental modifications for increased safety with functional transfers/mobility and ADLs  * Cognitive retraining/development of therapeutic activities to improve problem solving, judgement, memory, and attention for increased safety/participation in ADL/IADL tasks  * Therapeutic exercise to improve motor endurance, ROM, and functional strength for ADLs/functional transfers  * Therapeutic activities to facilitate/challenge dynamic balance, stand tolerance for increased safety and independence with ADLs  * Therapeutic activities to facilitate gross/fine motor skills for increased independence with ADLs  * Neuro-muscular re-education: facilitation of righting/equilibrium reactions, midline orientation, scapular stability/mobility, normalization of muscle tone, and facilitation of volitional active controled movement  * Positioning to improve skin integrity, interaction with environment and functional independence  * Delirium prevention/treatment     Recommended Adaptive Equipment:  TBD      Home Living: Pt lives alone in a 1 story home with ramp entrance    Bathroom setup: walk-in shower    Equipment owned: cane, w/c     Prior Level of Function: mod I with ADLs , assist prn with IADLs; ambulated cane vs w/c for mobility   Driving: no   Occupation: enjoys cooking     Pain Level: Pt complained of back pain this session     Cognition: A&O: x1; Follows 1 step directions with redirection             Memory:  poor             Sequencing:  poor             Problem solving:  poor             Judgement/safety:  poor               Functional Assessment:  AM-PAC Daily Activity Raw Score: 9/24    Initial Eval Status  Date: 12/1/21 Treatment Status  Date: 12/8/21 STGs = LTGs  Time frame: 10-14 days   Feeding Minimal Assist  Max A   Mod I   Grooming Minimal Assist  Max A overall;    Pt able to assist with washing face but required Max A for mouth hygiene. Modified [place]    UB Dressing Moderate Assist   Mod A;    [facility] gown seated EOB. Stand by Assist    LB Dressing Dependent  Dependent     [facility] socks Moderate Assist    Bathing Maximal Assist Max A   Minimal Assist    Toileting Dependent   Dependent    Pt incontinent of BM. Moderate Assist    Bed Mobility  Supine to sit: Maximal Assist x2   Sit to supine: Maximal Assist x2 MaxAx2    Supine<>EOB  Eob<>Supine    Supine to sit: Moderate Assist   Sit to supine: Moderate Assist    Functional Transfers Moderate Assist x2 N/A; Attempted to stand but pt unsafe at this time to stand. Minimal Assist    Functional Mobility NT  N/T Minimal Assist    Balance Sitting: Max A progressing to min A  (posterior lean)     Standing: Mod A x2  Sitting EOB:  maxA x2 due to safety    Standing:  N/A     Activity Tolerance F- Poor- F+   Visual/  Perceptual Glasses: yes  wfl                             Hand Dominance right    Strength ROM Additional Info:    RUE  WFL, formal MMT deferred due to spinal precautions  WFL good  and wfl FMC/dexterity noted during ADL tasks      LUE WFL formal MMT deferred due to spinal precautions  WFL good  and wfl FMC/dexterity noted during ADL tasks      Hearing:  WFL  Sensation:WFL  Tone: WFL  Edema:none noted     Comments: Upon arrival pt supine in bed, agreeable to therapy after encorugement. Pt completed of bed mobility, transferring to EOB, and light ADL this session. Rest breaks provided as needed. At end of session, pt seated upright in bed with TAPs to assist sitting position and nursing notified of needing assistance to finish meal. All lines and tubes intact and call light within reach.  PT present during session due to pt's poor activity tolerance, safety, and current assist levels. · Pt has made poor progress towards set goals.    · Continue with current plan of care focusing on increasing of activity tolerance, independency with transfers and ADL task's      Treatment Time In: 1035          Treatment Time Out: 1100                Treatment Charges: Mins Units   Ther Ex  66731     Manual Therapy 60206     Thera Activities 09357 12 1   ADL/Home Mgt 36744 13 1   Neuro Re-ed 03628     Group Therapy      Orthotic manage/training  32824     Non-Billable Time     Total Timed Treatment 25 2        Julian Gonzales 46, 50 [facility]

## 2021-12-08 NOTE — PROGRESS NOTES
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    has a past surgical history that includes Dilation and curettage of uterus; liver biopsy (2016); and Spine surgery (N/A, 2021).       Procedure/Surgery: [employer], L5  Precautions:  Falls, falls, alarm and O2 , FWB (full weight bearing), Spinal precautions no \"BLT\" and Spinal neutral mechanics  Equipment Needs: Wheeled Walker     SUBJECTIVE:  Patient lives alone  in a ranch home  with Ramp to enter. Inés Murdock ambulated using furniture at home and utilized a quad cane for community ambulation PTA. Equipment owned: Wheelchair and [employer]     OBJECTIVE:    Initial Evaluation  Date: 21 Treatment  Date: 21 Short Term/ Long Term   Goals   AM-PAC 6 Clicks      Was pt agreeable to Eval/treatment? Yes Yes, with encouragement     Does pt have pain?  Yes \"a lot\" of pain in leg from hip to ankle     Bed Mobility  Rolling: Mod   Supine to sit:   Max x 2   Sit to supine: Max x 2   Scooting: Max x 2  Rolling: MaxA  Supine to sit: MaxA x2  Sit to supine: MaxA x2  Scooting: MaxA to EOB Rolling: Sup  Supine to sit: Sup  Sit to supine: Sup  Scooting: Sup   Transfers Sit to stand: Mod x 2   Stand to sit: Mod x 2  Stand pivot: Mod x 2  Sit to stand: MaxA x2 partial stand  Stand to sit: MaxA x2  Stand pivot: NT Sit to stand: Mod Ind   Stand to sit: Mod Ind    Stand pivot: Mod Ind    Ambulation    1 leg advanced side step with [employer] and Mod x 2  NT 20 feet with Mod Ind     Stair negotiation: ascended and descended  NT NT 3 steps with AAD 2 rail   ROM BUE:  Defer to OT eval  BLE:  wfl BLE: WFL     Strength BUE: Defer to OT eval  RLE:  Grossly 4-/5  LLE:  Grossly  4-/5 BLE: grossly 4-/5 4/5   Balance Sitting EOB:  Min A  Dynamic Standing:  Mod A x 2 Sitting EOB: MaxA  Standing: unable to achieve full stance, MaxA x2 for partial stand Sitting EOB:  Sup  Dynamic Standing:  Sup       Pt is A & O x 1 self only (first name)  Sensation:  Pt denies numbness and tingling to extremities  Edema:  unremarkable    Therapeutic exercises:  10x PROM B LEs: ankle PF/DF, hip/knee flexion and extension, hip abduction  10x PROM B UEs: elbow flexion/extension, shoulder flexion, shoulder abduction    Patient education  Pt educated on spinal precautions, bed mobility using log rolling technique, transfer technique    Patient response to education:   Pt verbalized understanding Pt demonstrated skill Pt requires further education in this area   no With assist yes     ASSESSMENT:    Comments:  Pt resting supine upon arrival, agreeable to PT session. Pt had difficulty following single step cues throughout session requiring frequent verbal/tactile cueing to engage. Pt required manual assist at BLEs and trunk for supine<>sit transitions. She maintained strong posterior listing with active extension palpated with attempts for anterior trunk shift while sitting EOB. Single sit<>stand attempted without full stance achieved this date. Pt returned to semi-supine with HOB elevated to improve upright tolerance; all needs in reach and bed alarm/TSM in place.     Treatment:  Patient practiced and was instructed in the following treatment:     Bed mobility: cues for log rolling, manual assist for maintaining spinal neutral and B LE and trunk neogitation for supine<>sit   Transfer training: attempt for sit<>stand with 25% stance achieved   Therapeutic exercises: PROM of B LEs and B UEs completed to maintain joint integrity and soft tissue mobility    PLAN:    Patient is making limited progress towards established goals. Will continue with current POC.         PLAN:    Time in  1040  Time out  1100    Total Treatment Time  20 min    CPT codes:  [] Gait training 54166 0 minutes  [] Manual therapy 64001 0 minutes  [x] Therapeutic activities 35593 20 minutes  [] Therapeutic exercises 19440 0 minutes  [] Neuromuscular reeducation 73183 0 minutes      Jerry Aiken, PT, DPT  [id number]

## 2021-12-08 NOTE — PROGRESS NOTES
[address] [place] [address] [facility]  Progress Note    Chief complaint: Confusion    SUBJECTIVE:  Patient is tolerating medications. No reported adverse drug reactions. No nausea, vomiting, diarrhea. Afebrile  4 L. Tmax 101.1   lethargic today, sitter at bedside  Review of systems:  As stated above in the chief complaint, otherwise negative. Medications:  Scheduled Meds:   sodium chloride flush  5-40 mL IntraVENous 2 times per day    ampicillin-sulbactam  3,000 mg IntraVENous Q6H    levetiracetam  500 mg IntraVENous BID    enoxaparin  1 mg/kg SubCUTAneous BID    lactulose  30 g Oral TID    gabapentin  200 mg Oral TID    lidocaine  1 patch TransDERmal Daily    [Held by provider] amLODIPine  2.5 mg Oral Daily    ARIPiprazole  5 mg Oral Daily    [Held by provider] atenolol  25 mg Oral Daily    atorvastatin  40 mg Oral Nightly    citalopram  20 mg Oral Daily    levothyroxine  25 mcg Oral Daily    [Held by provider] lisinopril  10 mg Oral Daily    magnesium oxide  400 mg Oral Daily    therapeutic multivitamin-minerals  1 tablet Oral Daily    nicotine  1 patch TransDERmal Daily    rifaximin  550 mg Oral BID    vitamin B-1  100 mg Oral Daily     Continuous Infusions:   sodium chloride       PRN Meds:sodium chloride flush, sodium chloride, cyclobenzaprine, oxyCODONE, haloperidol, ondansetron, ondansetron, polyethylene glycol, acetaminophen **OR** acetaminophen, magnesium hydroxide    OBJECTIVE:  /71   Pulse 124   Temp 99.8 °F (37.7 °C) (Oral)   Resp 20   Ht 4' 10\" (1.473 m)   Wt 175 lb (79.4 kg)   SpO2 95%   BMI 36.58 kg/m²   Temp  Av.4 °F (37.4 °C)  Min: 98.1 °F (36.7 °C)  Max: 101.7 °F (38.7 °C)  Constitutional: The patient is lethargic  Skin: Warm and dry. No rashes were noted. HEENT: Round and reactive pupils. Moist mucous membranes. No ulcerations or thrush. Neck: Supple to movements. Chest: No use of accessory muscles to breathe. Symmetrical expansion. No wheezing, crackles or rhonchi. Cardiovascular: S1 and S2 are rhythmic and regular. No murmurs appreciated. Abdomen: Positive bowel sounds to auscultation. Benign to palpation. No masses felt. No hepatosplenomegaly. Genitourinary: Female  Extremities: No clubbing, no cyanosis, no edema.   Lines: peripheral    Laboratory and Tests Review:  Lab Results   Component Value Date    WBC 14.4 (H) 12/08/2021    WBC 15.5 (H) 12/07/2021    WBC 21.3 (H) 12/06/2021    HGB 13.3 12/08/2021    HCT 38.8 12/08/2021    MCV 93.0 12/08/2021     12/08/2021     Lab Results   Component Value Date    NEUTROABS 9.65 (H) 12/08/2021    NEUTROABS 11.32 (H) 12/07/2021    NEUTROABS 18.96 (H) 12/06/2021     No results found for: CRPHS  Lab Results   Component Value Date     (H) 12/08/2021     (H) 12/08/2021    ALKPHOS 165 (H) 12/08/2021    BILITOT 1.8 (H) 12/08/2021     Lab Results   Component Value Date     12/08/2021    K 3.0 12/08/2021     12/08/2021    CO2 31 12/08/2021    BUN 13 12/08/2021    CREATININE 0.5 12/08/2021    CREATININE 0.4 12/07/2021    CREATININE 0.4 12/06/2021    GFRAA >60 12/08/2021    LABGLOM >60 12/08/2021    GLUCOSE 101 12/08/2021    PROT 6.2 12/08/2021    LABALBU 2.4 12/08/2021    CALCIUM 10.9 12/08/2021    BILITOT 1.8 12/08/2021    ALKPHOS 165 12/08/2021     12/08/2021     12/08/2021     No results found for: CRP  No results found for: [address]  Radiology:  Reviewed    Microbiology:   Lab Results   Component Value Date    BC 24 Hours no growth 12/05/2021    BC 5 Days no growth 11/22/2021    ORG Klebsiella pneumoniae ssp pneumoniae 11/09/2021     Lab Results   Component Value Date    BLOODCULT2 24 Hours no growth 12/05/2021    BLOODCULT2 5 Days no growth 11/22/2021    ORG Klebsiella pneumoniae ssp pneumoniae 11/09/2021     No results found for: WNDABS  No results found for: RESPSMEAR  No results found for: MPNEUMO, CLAMYDCU, LABLEGI, AFBCX, FUNGSM, LABFUNG  No results found for: CULTRESP  No results found for: CXCATHTIP  Body Fluid Culture, Sterile   Date Value Ref Range Status   04/19/2016 Growth not present  Final     No results found for: CXSURG  Urine Culture, Routine   Date Value Ref Range Status   11/09/2021 >100,000 CFU/ml  Final     No results found for: [address]     ASSESSMENT:  · Metastatic HCC to the lungs nodes and bone; liver biopsy today AFP 1416  · Metabolic encephalopathy  · Possible aspiration pneumonia  · Rule out septic thrombosis of the portal vein  · Pictures may be from underlying metastatic disease than infection    PLAN:  · Continue Unasyn day 4  · If patient's not ready for hospice; extended-care facility or LTAC may be a consideration  · Check final cultures  · Monitor labs    Maritza Ferguson MD  5:03 PM  12/8/2021

## 2021-12-08 NOTE — PROGRESS NOTES
[facility] [address] Dr. Miquel Frankel      Patient Name: Sascha Aggarwal  YOB: 1960  PCP: Farzaneh Correia DO   Referring Provider: [address] [place], [place] [address]ace [address] / [place] [id number]     Reason for Consultation: No chief complaint on file. Subjective:   Continues to be confused. History of Present Illness: This pt is a very pleasant 58 yo female initially presented to [facility] ER with back pain, weakness, fatigue. She had CT scan for back pain showing L5 compression fracture and was transferred to [facility] (1-) for NSG kyphoplasty. with pathology at that time showing carcinoma. Imaging was also concerning for wide spread metastatic HCC with PV involvement, with multiple LNs and pulmonary involvement. She underwent liver biopsy today to confirm. AFP not resulted yet.  Oncology has been consulted for further evaluation    Diagnostic Data:     Past Medical History:   Diagnosis Date    Alcohol abuse     Cerebral artery occlusion with cerebral infarction ([address]     Depression     Diverticulosis of colon     Elevated blood sugar     H/O cardiovascular stress test 11/13/2021    Nuclear Lexiscan Stress Test    Hepatitis C     from blood transfusion, underwent treatment per patient and \"cured\"    Hyperlipidemia     Hypertension     Liver cirrhosis ([address]     Magnesium deficiency     Marijuana user     Nicotine dependence     Seizures ([address]     Subclinical hypothyroidism        Patient Active Problem List    Diagnosis Date Noted    Closed compression fracture of L5 lumbar vertebra, initial encounter ([address] 12/06/2021    Metastatic cancer to bone ([address]     Hepatocellular carcinoma (HCC)     Compression fracture of first lumbar vertebra with nonunion 11/25/2021    Closed compression fracture of L5 vertebra ([address] 11/25/2021    Dehydration 11/20/2021    Pedal edema     Chronic obstructive pulmonary disease (HCC)     Tobacco abuse     Hepatic cirrhosis (HCC)     Hypokalemia  Hypomagnesemia     UTI (urinary tract infection) 11/09/2021    Acute on chronic diastolic (congestive) heart failure [facility] 11/09/2021    Alcohol abuse     Cerebral artery occlusion with cerebral infarction ([facility]     Hypertension     Depression     Hyperlipidemia     Seizures (HCC)     Alcoholic intoxication without complication (HCC)     Hyponatremia     Nicotine dependence     Marijuana user     Fall at home, initial encounter     Acute respiratory failure with hypoxia ([facility] 10/07/2020        Past Surgical History:   Procedure Laterality Date    CT NEEDLE BIOPSY LIVER PERCUTANEOUS  12/6/2021    CT NEEDLE BIOPSY LIVER PERCUTANEOUS 12/6/2021 Macdonald Gosselin, [place] [facility] [place]    DILATION AND CURETTAGE OF UTERUS      LIVER BIOPSY  07/05/2016    SPINE SURGERY N/A 11/30/2021    KYPHOPLASTY, L5 performed by Paris Cervantes [place] at [facility] [place]       Family History  Family History   Problem Relation Age of Onset    Cancer Mother         Breast    Alcohol Abuse Father     Drug Abuse Father     Cancer Brother     Alcohol Abuse Brother     Drug Abuse Brother        Social History    TOBACCO:   reports that she has been smoking. She started smoking about 51 years ago. She has a 49.00 pack-year smoking history. She has never used smokeless tobacco.  ETOH:   reports current alcohol use of about 28.0 standard drinks of alcohol per week. Home Medications  Prior to Admission medications    Medication Sig Start Date End Date Taking?  Authorizing Provider   magnesium oxide (MAG-OX) 400 MG tablet Take 1 tablet by mouth daily 11/26/21  Yes Shruti Rosales,    albuterol (PROVENTIL) (2.5 MG/3ML) 0.083% nebulizer solution Take 3 mLs by nebulization every 6 hours as needed for Wheezing 11/25/21  Yes Shruti Rosales DO   lisinopril (PRINIVIL;ZESTRIL) 10 MG tablet Take 1 tablet by mouth daily 11/26/21  Yes Abdi Rosales DO   haloperidol (HALDOL) 0.5 MG tablet Take 1 tablet by mouth every 6 hours as needed (agitation) 11/25/21  Yes Brendan Rosales, DO   atenolol (TENORMIN) 25 MG tablet Take 1 tablet by mouth daily 11/26/21  Yes Brendan Rosales, DO   polyethylene glycol (GLYCOLAX) 17 g packet Take 17 g by mouth daily as needed for Constipation 11/25/21 12/25/21 Yes Bonny Santiago, DO   Multiple Vitamins-Minerals (THERAPEUTIC MULTIVITAMIN-MINERALS) tablet Take 1 tablet by mouth daily 11/26/21  Yes Bonny Santiago DO   thiamine mononitrate (THIAMINE) 100 MG tablet Take 1 tablet by mouth daily 11/26/21  Yes Brendan Rosales, DO   lactulose [facility]) 10 GM/15ML solution Take 30 mLs by mouth 2 times daily 11/18/21 12/18/21 Yes Bonny Santiago, DO   rifaximin (XIFAXAN) 550 MG tablet Take 1 tablet by mouth 2 times daily 11/18/21  Yes Brendan Rosales, DO   nicotine (NICODERM CQ) 21 MG/24HR Place 1 patch onto the skin daily 11/12/21  Yes Bonny Santiago, DO   levothyroxine (SYNTHROID) 25 MCG tablet take 1 tablet by mouth once daily 8/17/21  Yes Lea Keys, DO   amLODIPine (NORVASC) 5 MG tablet take 1 tablet by mouth once daily 5/17/21  Yes Lea Keys, DO   atorvastatin (LIPITOR) 40 MG tablet take 1 tablet by mouth once daily 5/17/21  Yes Lea Keys, DO   citalopram (CELEXA) 40 MG tablet take 1 tablet by mouth once daily 5/17/21  Yes Lea Keys, DO   clopidogrel (PLAVIX) 75 MG tablet take 1 tablet by mouth once daily 5/17/21  Yes Lea Keys, DO   levETIRAcetam (KEPPRA) 500 MG tablet take 1 tablet by mouth twice a day 5/17/21  Yes Leakaitlin Keys, DO   ARIPiprazole (ABILIFY) 5 MG tablet take 1 tablet by mouth once daily 3/29/21  Yes Ti Jorgensen PA-C   ondansetron (ZOFRAN-ODT) 4 MG disintegrating tablet Take 1 tablet by mouth every 8 hours as needed for Nausea or Vomiting 11/25/21   Bonny Santiago, DO   ondansetron [facility]) 4 MG/2ML injection Infuse 2 mLs intravenously every 6 hours as needed for Nausea or Vomiting 11/25/21   Bonny Santiago, DO       Allergies  Allergies   Allergen Reactions    Codeine Nausea And Vomiting       Review of Systems:    +confusion      Objective  /72   Pulse 114   Temp 98.6 °F (37 °C) (Oral)   Resp 20   Ht 4' 10\" (1.473 m)   Wt 175 lb (79.4 kg)   SpO2 95%   BMI 36.58 kg/m²     Physical Exam:   Performance Status:  General: Confused. No acute distress  Head and neck : PERRLA, EOMI . Sclera non icteric. Oropharynx : Clear  Neck: no JVD,  no adenopathy  LYMPHATICS : No LAD  Heart: Regular rate and regular rhythm, no murmur  Lungs: Clear to auscultation   Extremities: No edema,no cyanosis, no clubbing. Abdomen: Soft, NTTP, distended  Skin:  No rash  Neurologic:Cranial nerves grossly intact    Recent Laboratory Data-   Lab Results   Component Value Date    WBC 14.4 (H) 12/08/2021    HGB 13.3 12/08/2021    HCT 38.8 12/08/2021    MCV 93.0 12/08/2021     12/08/2021    LYMPHOPCT 19.3 (L) 12/08/2021    RBC 4.17 12/08/2021    MCH 31.9 12/08/2021    MCHC 34.3 12/08/2021    RDW 15.3 (H) 12/08/2021    NEUTOPHILPCT 66.7 12/08/2021    MONOPCT 12.3 (H) 12/08/2021    BASOPCT 1.7 12/08/2021    NEUTROABS 9.65 (H) 12/08/2021    LYMPHSABS 2.74 12/08/2021    MONOSABS 1.73 (H) 12/08/2021    EOSABS 0.00 (L) 12/08/2021    BASOSABS 0.24 (H) 12/08/2021       Lab Results   Component Value Date     (H) 12/08/2021    K 3.0 (L) 12/08/2021     (H) 12/08/2021    CO2 31 (H) 12/08/2021    BUN 13 12/08/2021    CREATININE 0.5 12/08/2021    GLUCOSE 101 (H) 12/08/2021    CALCIUM 10.9 (H) 12/08/2021    PROT 6.2 (L) 12/08/2021    LABALBU 2.4 (L) 12/08/2021    BILITOT 1.8 (H) 12/08/2021    ALKPHOS 165 (H) 12/08/2021     (H) 12/08/2021     (H) 12/08/2021    LABGLOM >60 12/08/2021    GFRAA >60 12/08/2021       No results found for: IRON, TIBC, FERRITIN        Radiology-    XR CHEST PORTABLE   Final Result   1. Cardiomegaly. No findings of failure or pneumonia   2. Vague bilateral pulmonary nodules consistent with the known metastatic   disease.          MRI BRAIN WO CONTRAST   Final Result   1. Very limited study reveals no gross acute abnormality. 2. Repeat MRI of the brain is recommended at a later time when the patient is   either sedated or is better able to lie still. CT NEEDLE BIOPSY LIVER PERCUTANEOUS   Final Result   Successful uncomplicated CT and fluoroscopic guided liver   mass biopsy. If there are any physician concerns regarding this report, a   Radiologist can be reached by calling the following number [phone]. CT GUIDED NEEDLE PLACEMENT   Final Result   Successful uncomplicated CT and fluoroscopic guided liver   mass biopsy. If there are any physician concerns regarding this report, a   Radiologist can be reached by calling the following number [phone]. CT HEAD WO CONTRAST   Final Result   Multifocal encephalomalacia compatible with old infarcts. Atrophy and   chronic microvascular ischemic disease. No significant change. XR CHEST PORTABLE   Final Result   No definitive consolidation. Bilateral nodular densities consistent with the patient's known metastatic   disease. CT CHEST W CONTRAST   Final Result   Extensive widespread pulmonary metastasis with multiple bilateral pulmonary   nodules with extensive lymphadenopathy in the mediastinum hilum and   supraclavicular region. Heterogeneous masses in the liver concerning for liver malignancy with   metastatic involvement in the adjacent lymph nodes and the adrenal glands and   portal venous thrombosis. There may be possible developing bone metastasis   as well. Consider PET-CT scan         CT ABDOMEN PELVIS W WO CONTRAST Additional Contrast? Radiologist Recommendation   Final Result   Very large heterogeneous mass in the right hepatic lobe. There are multiple   other smaller masses throughout the liver as well. These findings are   suggestive of multiple hepatocellular carcinomas.       Pulmonary nodules in the left lower lobe are suggestive of metastases. Mesenteric, retroperitoneal, and retrocrural lymphadenopathy. Thrombosis of the portal vein. Findings suggestive of a mild ileus. Moderate height loss of L5, status post kyphoplasty. Refer to recently   performed MRI lumbar spine for further evaluation. FLUORO FOR SURGICAL PROCEDURES   Final Result   Intraprocedural fluoroscopic spot images as above. See separate procedure   report for more information. ASSESSMENT/PLAN :  58 yo female  Suspected stage IV HCC to lungs, LNs and bone  Hepatic encephalopathy  Alcohol abuse  Aspiration PNA    - ID following on abx for suspected PNA  - On lactulose for encephalopathy  - IR liver biopsy, path pending  - CBC with neutrophilic leukocytosis  - Likely CPS A if/when encephalopathy resolves. Would be candidate for Avastin and Tecentriq as outpatient. From an oncology POV, she does have therapeutic options for her [address] She would need to follow with GI to make sure her cirrhosis/ammonia remain controlled and significantly decrease her alcohol intake  - Hospice is reasonable if she and her  do not wish to pursue therapy  - Confusion is likely multifactorial related to alcohol withdrawal and encephalopathy. Imaging to r/o mets appropriate, but likely to be negative  - Will follow    Thank you for this consult. Please call with further questions or concerns    12/7/21  Lethargic and confused. Status post CT-guided liver biopsy yesterday. Hepatic panel with abnormal transaminases with low albumin and slightly elevated bilirubin. Continues on Unasyn  Continues on Xifaxan and lactulose   Only if performance status improves she will be considered for systemic therapy. Prognosis poor    12/8/2021. Patient is confused. Liver biopsy pathology pending. Will follow. Prognosis is poor.     Electronically signed by Jackie Alvarez MD on 12/8/2021 at 2:47 PM

## 2021-12-08 NOTE — CONSULTS
Palliative Care Department  Palliative Care Initial Consult  Provider: GAMALIEL Nino CNP  [phone]    Hospital Day: 14  Date of Initial Consult: 12/8/2021  Referring Provider: Dr. Jacob Caceres was consulted for assistance with: Code status Discussion, Assist with goals of care and Family Support    Chief Complaint: Jada Richardson is a 59 y.o. female with chief complaint of back pain    HPI:   Jada Richardson is a 59 y.o. female with significant medical history of alcohol abuse, hepatitis C, liver cirrhosis, who was admitted on 11/25/2021 after presenting with back pain found to have L5 pathologic compression fracture, status post kyphoplasty. Pathology shows carcinoma, there is considerable concern of hepatocellular carcinoma, significant liver involvement including portal vein thrombus, as well as metastatic process throughout her lungs. She additionally had decompensated liver failure with hypoalbuminemia encephalopathy. She being followed closely by oncology, hepatobiliary surgery, and is status post liver biopsy. Palliative has been consulted to assist with goals of care and CODE STATUS. ASSESSMENT/PLAN:     Pertinent Hospital Diagnoses:  Current medical issues leading to Palliative Medicine involvement include   Active Hospital Problems    Diagnosis Date Noted    Metastatic cancer to bone [facility]) [C79.51]     [facility]atocellular carcinoma ([facility] [C22.0]     Closed compression fracture of L5 vertebra ([facility] [S32.050A] 11/25/2021    Chronic obstructive pulmonary disease (HCC) [J44.9]     Hepatic cirrhosis ([facility] [K74.60]     Hypertension [I10]     Depression [F32. A]     Hyperlipidemia [E78.5]     Seizures ([facility] [R56.9]     Nicotine dependence [F17.200]     Alcohol abuse [F10.10]          Palliative Care Encounter / Counseling Regarding Goals of Care:  Please see detailed goals of care discussion as below.    At this time, Jada Richardson, Does Not have capacity for medical decision-making. Capacity is time limited and situation/question specific.  During encounter patient's  was surrogate medical decision-maker   Outcome of goals of care meeting: live longer and continue current management   Code status: Full Code  o I reviewed with the family that given multiple medical co-morbidities, advanced disease process, current severe acute illness and poor prognosis, in the event of cardiac arrest, the chances of surviving may likely be low. It was also reviewed that if they survived a cardiac arrest, a return to prior level of function also may be low.  Advanced Directives: None   Surrogate/Legal Zaida Potts Dopp ([phone]) is the patient's     There are no further PM needs at this time. PM will now sign off. If new PM needs arise, please re-consult. Thank you. Referrals: none today    SUBJECTIVE:   Events/Discussions:  Chart reviewed, patient seen the bedside with her mother present. She is alert but confused. She does not appear to be in any acute distress. I spoke with the patient's  by telephone, introducing the palliative medicine service and discussing goals of care. We reviewed both goals regarding treatment options for her newly diagnosed cancer as well as goals regarding CODE STATUS. He states that he is spoken with oncology, understands that her disease is not curable, but was informed that if her performance status improves there may be treatments which will prolong improve her quality of life. He states that he knows his wife would wish to pursue treatment, as well as remain a full resuscitation. He is fully cognizant that in the event of cardiac arrest, especially in light of her newly diagnosed advanced disease, it is unlikely she would survive resuscitative effort. He does state he feels she would wish the effort be made.   He additionally states that in the event her condition deteriorate and she be in a position where she would require long-term life support she would not wish for long-term life support at that time they would consider a transition to comfort focused care. However at this time they would like to continue with full supportive care and full code. He denies further needs from palliative service and palliative service will sign off. Past Medical History:   Diagnosis Date    Alcohol abuse     [facility]bral [facility]     Depression     Diverticulosis of colon     Elevated blood sugar     H/O cardiovascular stress test 11/13/2021    Nuclear Lexiscan Stress Test    Hepatitis C     from blood transfusion, underwent treatment per patient and \"cured\"    Hyperlipidemia     Hypertension     Liver cirrhosis ([facility]     Magnesium deficiency     Marijuana user     Nicotine dependence     Seizures ([facility]     Subclinical hypothyroidism        Past Surgical History:   Procedure Laterality Date    CT NEEDLE BIOPSY LIVER PERCUTANEOUS  12/6/2021    CT NEEDLE BIOPSY LIVER PERCUTANEOUS 12/6/2021 Nereyda Gonzalez MD [facility]    DILATION AND CURETTAGE OF UTERUS      LIVER BIOPSY  07/05/2016    SPINE SURGERY N/A 11/30/2021    KYPHOPLASTY, L5 performed by Johanne Steele MD at [facility] [place]       Family History   Problem Relation Age of Onset    Cancer Mother         Breast    Alcohol Abuse Father     Drug Abuse Father     Cancer Brother     Alcohol Abuse Brother     Drug Abuse Brother        Allergies   Allergen Reactions    Codeine Nausea And Vomiting       ROS: UNLESS STATED ABOVE PATIENT DENIES:  CONSTITUTIONAL:  fever, chill, rigors, nausea, vomiting, fatigue. HEENT: blurry vision, double vision, hearing problem, tinnitus, hoarseness, dysphagia, odynophagia  RESPIRATORY: cough, shortness of breath, sputum expectoration. CARDIOVASCULAR:  Chest pain/pressure, palpitation, syncope, irregular beats  GASTROINTESTINAL:  abdominal or rectal pain, diarrhea, constipation, .   GENITOURINARY:

## 2021-12-08 NOTE — PROGRESS NOTES
Patient's  is very upset that many requests have been put out for him to be called to discuss his wife's care and no one has called him. He wants to discuss her code status and possible palliative care involvement. Hospitalist on call notified.  Electronically signed by Afshan Joseph RN on 12/8/2021 at 8:23 AM

## 2021-12-08 NOTE — PROGRESS NOTES
HEPATOBILIARY SURGERY  DAILY PROGRESS NOTE  12/8/2021  Cc: cirrhosis, metastatic disease    Subjective:  No overnight events. Still confused    Objective:  BP (!) 166/65   Pulse 115   Temp 98.1 °F (36.7 °C) (Oral)   Resp 18   Ht 4' 10\" (1.473 m)   Wt 175 lb (79.4 kg)   SpO2 93%   BMI 36.58 kg/m²     General appearance: laying in bed, lethargic but follows commands briskly  Lungs: nonlabored on nasal cannula  Heart: tachycardic, regular  Abdomen: softly distended, mild tenderness  Skin: No skin abnormalities\     Recent Labs     12/08/21  0429 12/07/21  0431 12/06/21  0503   WBC 14.4* 15.5* 21.3*   HGB 13.3 15.0 13.8   HCT 38.8 44.3 40.2   MCV 93.0 95.3 93.1    224 214     Assessment/Plan:  59 y.o. female with history of alcoholic cirrhosis, hepatic encephalopathy, with likely metastatic hepatocellular carcinoma to lung and bone and portal vein thrombosis    - follow up IR liver biopsy  - [facility] back [facility] at [facility]  - MRI brain yesterday limited but no obvious metastases  - Oncology consulted - possible candidate for systemic therapy but only if performance status improves  - anticoagulation resumed yesterday for portal vein thrombosis    Electronically signed by Uma Lam MD on 12/8/2021 at 6:24 AM     Agree with above  Pathology pending from liver biopsy  AFP 1829  Unfortunately given the wide spread metastatic disease, she is not a candidate for liver directed therapy.     Electronically signed by Cyrus Islas MD on 12/8/2021 at 1:11 PM

## 2021-12-08 NOTE — PROGRESS NOTES
Comprehensive Nutrition Assessment    Type and Reason for Visit:  Reassess    Nutrition Recommendations/Plan: Continue current diet/ONS, Encourage PO intake as able  If pt continues w/ PO intake <25% may need to consider supplemental EN. Please consult as needed    Nutrition Assessment:  Pt remains nutritionally at risk w/ minimal PO intake s/p adm w/ weakness now s/p kyphoplasty, HE resolving. Note pt now s/p liver bx for likely hepatocellular CA w/ mets to liver, bone, & lymph nodes. Hx ETOH abuse, cirrhosis, hep C, CVA, CHF, COPD. Will continue current nutrition plan and monitor    Malnutrition Assessment:  Malnutrition Status: At risk for malnutrition (Comment)    Context:  Acute Illness     Findings of the 6 clinical characteristics of malnutrition:  Energy Intake:  7 - 50% or less of estimated energy requirements for 5 or more days  Weight Loss:  Unable to assess (2/2 fluid shifts and poor EMR wt hx since adm currently)     Body Fat Loss:  No significant body fat loss     Muscle Mass Loss:  No significant muscle mass loss    Fluid Accumulation:  Unable to assess (2/2 multi-factorial)     Strength:  Not Performed    Estimated Daily Nutrient Needs:  Energy (kcal):   (MSJ 1239 x 1.2 SF); Weight Used for Energy Requirements:  Current     Protein (g):  70-80; Weight Used for Protein Requirements:  Ideal (1.5-1.8)        Fluid (ml/day):  ; Method Used for Fluid Requirements:  1 ml/kcal      Nutrition Related Findings:  pt disoriented, abd distention, active BS, diarrhea, ammonia WNL, hypokalemia, hypernatremia, trace edema, -I/Os 15L      Wounds:  Surgical Incision       Current Nutrition Therapies:    ADULT DIET; Regular  ADULT ORAL NUTRITION SUPPLEMENT; Breakfast, Lunch, Dinner; Standard High Calorie/High Protein Oral Supplement  ADULT ORAL NUTRITION SUPPLEMENT; Breakfast, Dinner;  Wound Healing Oral Supplement    Anthropometric Measures:  · Height: 4' 10\" (147.3 cm)  · Current Body Weight: 175 lb (79.4 kg) (estimated)   · Admission Body Weight: 175 lb (79.4 kg) (unknown method 11/25, UTO CBW at this time)    · Usual Body Weight: 194 lb (88 kg) (actual 5/13/21 per EMR, INES wt loss properly d/t no actual wt's since adm currently as well as 2/2 CHF hx w/ fluid shifts likely pta)     · Ideal Body Weight: 90 lbs; % Ideal Body Weight 194.4 %   · BMI: 36.6  · BMI Categories: Obese Class 2 (BMI 35.0 -39.9)       Nutrition Diagnosis:   · Inadequate oral intake related to cognitive or neurological impairment (HE) as evidenced by intake 0-25%, poor intake prior to admission      Nutrition Interventions:   Food and/or Nutrient Delivery:  Continue Current Diet, Continue Oral Nutrition Supplement  Nutrition Education/Counseling:  Education not appropriate   Coordination of Nutrition Care:  Continue to monitor while inpatient    Goals:  PO intake >75% of meals/ONS. Nutrition Monitoring and Evaluation:   Behavioral-Environmental Outcomes:  None Identified   Food/Nutrient Intake Outcomes:  Diet Advancement/Tolerance, Food and Nutrient Intake, Supplement Intake  Physical Signs/Symptoms Outcomes:  Biochemical Data, GI Status, Diarrhea, Fluid Status or Edema, Nutrition Focused Physical Findings, Skin, Weight     Discharge Planning:     Too soon to determine     Electronically signed by Paresh Miller, MS, RD, LD on 12/8/21 at 1:27 PM EST    Contact: [phone]

## 2021-12-08 NOTE — CARE COORDINATION
Spoke with [facility], they are full, no beds at this time. PT/OT did see today. Requested that [facility] start Babita Jefferson. DARIA pending and ambulance on soft chart. For questions I can be reached at [phone].  [facility]

## 2021-12-09 PROBLEM — N39.0 UTI (URINARY TRACT INFECTION): Status: RESOLVED | Noted: 2021-01-01 | Resolved: 2021-01-01

## 2021-12-09 NOTE — PROGRESS NOTES
[address] [place] [address] [facility]  Progress Note    Chief complaint: Confusion    SUBJECTIVE:  Patient is tolerating medications. No reported adverse drug reactions. No nausea, vomiting, diarrhea. Afebrile  4 L. Tmax 101.1   lethargic today, sitter at bedside  Review of systems:  As stated above in the chief complaint, otherwise negative. Medications:  Scheduled Meds:   potassium chloride  10 mEq IntraVENous Q1H    sodium chloride flush  5-40 mL IntraVENous 2 times per day    ampicillin-sulbactam  3,000 mg IntraVENous Q6H    levetiracetam  500 mg IntraVENous BID    enoxaparin  1 mg/kg SubCUTAneous BID    lactulose  30 g Oral TID    gabapentin  200 mg Oral TID    lidocaine  1 patch TransDERmal Daily    [Held by provider] amLODIPine  2.5 mg Oral Daily    ARIPiprazole  5 mg Oral Daily    [Held by provider] atenolol  25 mg Oral Daily    atorvastatin  40 mg Oral Nightly    citalopram  20 mg Oral Daily    levothyroxine  25 mcg Oral Daily    [Held by provider] lisinopril  10 mg Oral Daily    magnesium oxide  400 mg Oral Daily    therapeutic multivitamin-minerals  1 tablet Oral Daily    nicotine  1 patch TransDERmal Daily    rifaximin  550 mg Oral BID    vitamin B-1  100 mg Oral Daily     Continuous Infusions:   sodium chloride       PRN Meds:sodium chloride flush, sodium chloride, cyclobenzaprine, oxyCODONE, haloperidol, ondansetron, ondansetron, polyethylene glycol, acetaminophen **OR** acetaminophen, magnesium hydroxide    OBJECTIVE:  BP (!) 143/61   Pulse 114   Temp 98.3 °F (36.8 °C) (Oral)   Resp 18   Ht 4' 10\" (1.473 m)   Wt 175 lb (79.4 kg)   SpO2 91%   BMI 36.58 kg/m²   Temp  Av.1 °F (36.7 °C)  Min: 97.7 °F (36.5 °C)  Max: 98.4 °F (36.9 °C)  Constitutional: The patient is lethargic  Skin: Warm and dry. No rashes were noted. HEENT: Round and reactive pupils. Moist mucous membranes. No ulcerations or thrush. Neck: Supple to movements.    Chest: No use of accessory muscles to breathe. Symmetrical expansion. No wheezing, crackles or rhonchi. Cardiovascular: S1 and S2 are rhythmic and regular. No murmurs appreciated. Abdomen: Positive bowel sounds to auscultation. Benign to palpation. No masses felt. No hepatosplenomegaly. Genitourinary: Female  Extremities: No clubbing, no cyanosis, no edema.   Lines: peripheral    Laboratory and Tests Review:  Lab Results   Component Value Date    WBC 13.1 (H) 12/09/2021    WBC 14.4 (H) 12/08/2021    WBC 15.5 (H) 12/07/2021    HGB 14.5 12/09/2021    HCT 44.8 12/09/2021    MCV 99.1 12/09/2021     12/09/2021     Lab Results   Component Value Date    NEUTROABS 8.91 (H) 12/09/2021    NEUTROABS 9.65 (H) 12/08/2021    NEUTROABS 11.32 (H) 12/07/2021     No results found for: ANDREY  Lab Results   Component Value Date     (H) 12/09/2021     (H) 12/09/2021    ALKPHOS 175 (H) 12/09/2021    BILITOT 1.6 (H) 12/09/2021     Lab Results   Component Value Date     12/09/2021    K 2.8 12/09/2021     12/09/2021    CO2 29 12/09/2021    BUN 18 12/09/2021    CREATININE 0.5 12/09/2021    CREATININE 0.5 12/08/2021    CREATININE 0.4 12/07/2021    GFRAA >60 12/09/2021    LABGLOM >60 12/09/2021    GLUCOSE 116 12/09/2021    PROT 6.7 12/09/2021    LABALBU 2.2 12/09/2021    CALCIUM 11.4 12/09/2021    BILITOT 1.6 12/09/2021    ALKPHOS 175 12/09/2021     12/09/2021     12/09/2021     No results found for: CRP  No results found for: Vipul Jay  Radiology:  Reviewed    Microbiology:   Lab Results   Component Value Date    BC 24 Hours no growth 12/05/2021    BC 5 Days no growth 11/22/2021    ORG Klebsiella pneumoniae ssp pneumoniae 11/09/2021     Lab Results   Component Value Date    BLOODCULT2 24 Hours no growth 12/05/2021    BLOODCULT2 5 Days no growth 11/22/2021    ORG Klebsiella pneumoniae ssp pneumoniae 11/09/2021     No results found for: WNDABS  No results found for: RESPSMEAR  No results found for: MPNEUMO,

## 2021-12-09 NOTE — PROGRESS NOTES
Physical Therapy  Facility/Department: [address] PICU  Daily Treatment Note  NAME: Dunia Childers  : 1960  MRN: [id number]    Date of Service: 2021    Evaluating PT: Mark Avendaño, PT [id number]     Room #:  7541/5703-M  Diagnosis:  Compression fracture of first lumbar vertebra with nonunion [S32.010K]  PMHx/PSHx:     has a past medical history of Alcohol abuse, Cerebral artery occlusion with cerebral infarction [facility]), Depression, Diverticulosis of colon, Elevated blood sugar, H/O cardiovascular stress test, Hepatitis C, Hyperlipidemia, Hypertension, Liver cirrhosis ([facility] Magnesium deficiency, Marijuana user, Nicotine dependence, Seizures ([facility] and Subclinical hypothyroidism.    has a past surgical history that includes Dilation and curettage of uterus; liver biopsy (2016); and Spine surgery (N/A, 2021).       Procedure/Surgery: Yajaira Barks, L5  Precautions:  Falls, falls, alarm and O2 , FWB (full weight bearing), Spinal precautions no \"BLT\" and Spinal neutral mechanics  Equipment Needs: Wheeled Walker     SUBJECTIVE:  Patient lives alone  in a ranch home  with Ramp to enter. Xavier Asencio ambulated using furniture at home and utilized a quad cane for community ambulation PTA. Equipment owned: Wheelchair and Cane     OBJECTIVE:    Initial Evaluation  Date: 21 Treatment  Date: 21 Short Term/ Long Term   Goals   AM-PAC 6 Clicks 99/46 3/53     Was pt agreeable to Eval/treatment? Yes Yes, with encouragement     Does pt have pain?  Yes \"a lot\" of pain in LLE and back     Bed Mobility  Rolling: Mod   Supine to sit:   Max x 2   Sit to supine: Max x 2   Scooting: Max x 2  Rolling: MaxA  Supine to sit: MaxA  Sit to supine: MaxA  Scooting: MaxA to EOB Rolling: Sup  Supine to sit: Sup  Sit to supine: Sup  Scooting: Sup   Transfers Sit to stand: Mod x 2   Stand to sit: Mod x 2  Stand pivot: Mod x 2  Sit to stand: NT, pt declined to attempt  Stand to sit: NT  Stand pivot: NT Sit to stand: Mod Ind Stand to sit: Mod Ind    Stand pivot: Mod Ind    Ambulation    1 leg advanced side step with [employer] and Mod x 2  NT 20 feet with Mod Ind     Stair negotiation: ascended and descended  NT NT 3 steps with AAD 2 rail   ROM BUE:  Defer to OT eval  BLE:  wfl      Strength BUE: Defer to OT eval  RLE:  Grossly 4-/5  LLE:  Grossly  4-/5  4/5   Balance Sitting EOB:  Min A  Dynamic Standing:  Mod A x 2 Sitting EOB: ModA  Standing: NT, pt declined to attempt Sitting EOB:  Sup  Dynamic Standing:  Sup       Pt is A & O x 1 self only (first name)  Sensation:  Pt denies numbness and tingling to extremities  Edema:  unremarkable    Therapeutic Exercises:    LAQ x10 ea LE  Bed mobility training    Patient education  Pt educated on importance of continued mobility and upright tolerance, spinal precautions, log rolling with bed moblity    Patient response to education:   Pt verbalized understanding Pt demonstrated skill Pt requires further education in this area   no With assist yes     ASSESSMENT:    Comments:  Pt resting semi-supine upon arrival, agreeable to PT session. Pt completed bed mobility with manual assist required for maintaining spinal neutral and B LE/trunk negotiation to achieve supine<>sit. Pt fearful of falling with upright sitting at EOB particularly with weight shifting to attempt to progress hips towards EOB to allow improved balance. Pt required manual assist to prevent posterior LOB throughout all sitting balance and seated TE's. Pt firmly declined any attempts at standing this date. Time at EOB limited by discomfort in back reported by pt. Pt returned to semi-supine upon completion of session with all needs in reach and bed alarm reapplied; significant other present at bedside attentive to needs.     Treatment:  Patient practiced and was instructed in the following treatment:    · Bed mobility: cues for sequencing and engagement of extremities, manual assist for completion of all transitions, manual assist to maintain spinal neutral  · Therapeutic activities: sitting EOB x8 minutes with manual assist to prevent posterior LOB  · Therapeutic exercises: manual assist for AAROM with LAQ sitting EOB    PLAN:    Patient is making continued progress towards established goals. Will continue with current POC.         PLAN:    Time in  1250  Time out  1305    Total Treatment Time  15 min    CPT codes:  [] Gait training 60619 0 minutes  [] Manual therapy 70968 0 minutes  [x] Therapeutic activities 73919 15 minutes  [] Therapeutic exercises 44276 0 minutes  [] Neuromuscular reeducation 82661 0 minutes      Vincent Andrews, PT, DPT  [id number]

## 2021-12-09 NOTE — PROGRESS NOTES
[place] [facility]      Patient Name: Estee More  YOB: 1960  PCP: Bonny Santiago DO   Referring Provider: [id number]  [address] / [place] [id number]     Reason for Consultation: No chief complaint on file. Subjective:   Remains confused. Discussed with     History of Present Illness: This pt is a very pleasant 60 yo female initially presented to [facility] ER with back pain, weakness, fatigue. She had CT scan for back pain showing L5 compression fracture and was transferred to [facility] (1-) for NSG kyphoplasty. with pathology at that time showing carcinoma. Imaging was also concerning for wide spread metastatic HCC with PV involvement, with multiple LNs and pulmonary involvement. She underwent liver biopsy today to confirm. AFP not resulted yet.  Oncology has been consulted for further evaluation    Diagnostic Data:     Past Medical History:   Diagnosis Date    Alcohol abuse     Cerebral artery occlusion with cerebral infarction ([address]     Depression     Diverticulosis of colon     Elevated blood sugar     H/O cardiovascular stress test 11/13/2021    Nuclear Lexiscan Stress Test    Hepatitis C     from blood transfusion, underwent treatment per patient and \"cured\"    Hyperlipidemia     Hypertension     Liver cirrhosis ([address]     Magnesium deficiency     Marijuana user     Nicotine dependence     Seizures ([address]     Subclinical hypothyroidism        Patient Active Problem List    Diagnosis Date Noted    Closed compression fracture of L5 lumbar vertebra, initial encounter ([address] 12/06/2021    Metastatic cancer to bone ([address]     Hepatocellular carcinoma (HCC)     Compression fracture of first lumbar vertebra with nonunion 11/25/2021    Closed compression fracture of L5 vertebra ([address] 11/25/2021    Dehydration 11/20/2021    Pedal edema     Chronic obstructive pulmonary disease ([address]     Tobacco abuse     Hepatic cirrhosis ([address]     Hypokalemia     Hypomagnesemia     UTI (urinary tract infection) 11/09/2021    Acute on chronic diastolic (congestive) heart failure (HCC) 11/09/2021    Alcohol abuse     Cerebral [facility] occlusion [facility] cerebral infarction ([facility]     Hypertension     Depression     Hyperlipidemia     Seizures (HCC)     Alcoholic intoxication without complication (HCC)     Hyponatremia     Nicotine dependence     Marijuana user     Fall at home, initial encounter     Acute respiratory failure with hypoxia ([facility] 10/07/2020        Past Surgical History:   Procedure Laterality Date    CT NEEDLE BIOPSY LIVER PERCUTANEOUS  12/6/2021    CT NEEDLE BIOPSY LIVER PERCUTANEOUS 12/6/2021 Uriah Harvey MD [facility] CT    DILATION AND CURETTAGE OF UTERUS      LIVER BIOPSY  07/05/2016    SPINE SURGERY N/A 11/30/2021    KYPHOPLASTY, L5 performed by Alexi Rivera MD at [facility] OR       Family History  Family History   Problem Relation Age of Onset    Cancer Mother         Breast    Alcohol Abuse Father     Drug Abuse Father     Cancer Brother     Alcohol Abuse Brother     Drug Abuse Brother        Social History    TOBACCO:   reports that she has been smoking. She started smoking about 51 years ago. She has a 49.00 pack-year smoking history. She has never used smokeless tobacco.  ETOH:   reports current alcohol use of about 28.0 standard drinks of alcohol per week. Home Medications  Prior to Admission medications    Medication Sig Start Date End Date Taking?  Authorizing Provider   magnesium oxide (MAG-OX) 400 MG tablet Take 1 tablet by mouth daily 11/26/21  Yes Lizz Rosales,    albuterol (PROVENTIL) (2.5 MG/3ML) 0.083% nebulizer solution Take 3 mLs by nebulization every 6 hours as needed for Wheezing 11/25/21  Yes Lizz Rosales DO   lisinopril (PRINIVIL;ZESTRIL) 10 MG tablet Take 1 tablet by mouth daily 11/26/21  Yes Abdi Rosales DO   haloperidol (HALDOL) 0.5 MG tablet Take 1 tablet by mouth every 6 hours as needed (agitation) 11/25/21  Yes Tate Seth DO   atenolol (TENORMIN) 25 MG tablet Take 1 tablet by mouth daily 11/26/21  Yes Lenka Rosales DO   polyethylene glycol (GLYCOLAX) 17 g packet Take 17 g by mouth daily as needed for Constipation 11/25/21 12/25/21 Yes Tate Seth DO   Multiple Vitamins-Minerals (THERAPEUTIC MULTIVITAMIN-MINERALS) tablet Take 1 tablet by mouth daily 11/26/21  Yes Tate Seth DO   thiamine mononitrate (THIAMINE) 100 MG tablet Take 1 tablet by mouth daily 11/26/21  Yes Lenka Rosales DO   lactulose [facility]) 10 GM/15ML solution Take 30 mLs by mouth 2 times daily 11/18/21 12/18/21 Yes Tate Seth DO   rifaximin (XIFAXAN) 550 MG tablet Take 1 tablet by mouth 2 times daily 11/18/21  Yes Tate Seth DO   nicotine (NICODERM CQ) 21 MG/24HR Place 1 patch onto the skin daily 11/12/21  Yes Tate Seth DO   levothyroxine (SYNTHROID) 25 MCG tablet take 1 tablet by mouth once daily 8/17/21  Yes Becky Vidal DO   amLODIPine (NORVASC) 5 MG tablet take 1 tablet by mouth once daily 5/17/21  Yes Becky Vidal DO   atorvastatin (LIPITOR) 40 MG tablet take 1 tablet by mouth once daily 5/17/21  Yes Becky Vidal DO   citalopram (CELEXA) 40 MG tablet take 1 tablet by mouth once daily 5/17/21  Yes Becky Vidal DO   clopidogrel (PLAVIX) 75 MG tablet take 1 tablet by mouth once daily 5/17/21  Yes Becky Vidal DO   levETIRAcetam (KEPPRA) 500 MG tablet take 1 tablet by mouth twice a day 5/17/21  Yes Becky Vidal DO   ARIPiprazole (ABILIFY) 5 MG tablet take 1 tablet by mouth once daily 3/29/21  Yes Caio Tapia PA-C   ondansetron (ZOFRAN-ODT) 4 MG disintegrating tablet Take 1 tablet by mouth every 8 hours as needed for Nausea or Vomiting 11/25/21   Tate Lawman, DO   ondansetron [facility]) 4 MG/2ML injection Infuse 2 mLs intravenously every 6 hours as needed for Nausea or Vomiting 11/25/21   Tate Seth, DO       Allergies  Allergies   Allergen Reactions    Codeine Nausea And Vomiting       Review of Systems:    +confusion      Objective  BP (!) 143/61   Pulse 114   Temp 98.3 °F (36.8 °C) (Oral)   Resp 18   Ht 4' 10\" (1.473 m)   Wt 175 lb (79.4 kg)   SpO2 91%   BMI 36.58 kg/m²     Physical Exam:   Performance Status:  General: Confused. No acute distress  Head and neck : PERRLA, EOMI . Sclera non icteric. Oropharynx : Clear  Neck: no JVD,  no adenopathy  LYMPHATICS : No LAD  Heart: Regular rate and regular rhythm, no murmur  Lungs: Clear to auscultation   Extremities: No edema,no cyanosis, no clubbing. Abdomen: Soft, NTTP, distended  Skin:  No rash  Neurologic:Cranial nerves grossly intact    Recent Laboratory Data-   Lab Results   Component Value Date    WBC 13.1 (H) 12/09/2021    HGB 14.5 12/09/2021    HCT 44.8 12/09/2021    MCV 99.1 12/09/2021     12/09/2021    LYMPHOPCT 24.4 12/09/2021    RBC 4.52 12/09/2021    MCH 32.1 12/09/2021    MCHC 32.4 12/09/2021    RDW 15.7 (H) 12/09/2021    NEUTOPHILPCT 67.8 12/09/2021    MONOPCT 6.1 12/09/2021    BASOPCT 0.5 12/09/2021    NEUTROABS 8.91 (H) 12/09/2021    LYMPHSABS 3.14 12/09/2021    MONOSABS 0.79 12/09/2021    EOSABS 0.22 12/09/2021    BASOSABS 0.00 12/09/2021       Lab Results   Component Value Date     (H) 12/09/2021    K 2.8 (L) 12/09/2021     (H) 12/09/2021    CO2 29 12/09/2021    BUN 18 12/09/2021    CREATININE 0.5 12/09/2021    GLUCOSE 116 (H) 12/09/2021    CALCIUM 11.4 (H) 12/09/2021    PROT 6.7 12/09/2021    LABALBU 2.2 (L) 12/09/2021    BILITOT 1.6 (H) 12/09/2021    ALKPHOS 175 (H) 12/09/2021     (H) 12/09/2021     (H) 12/09/2021    LABGLOM >60 12/09/2021    GFRAA >60 12/09/2021       No results found for: IRON, TIBC, FERRITIN        Radiology-    XR CHEST PORTABLE   Final Result   1. Cardiomegaly. No findings of failure or pneumonia   2. Vague bilateral pulmonary nodules consistent with the known metastatic   disease.          MRI BRAIN WO CONTRAST Final Result   1. Very limited study reveals no gross acute abnormality. 2. Repeat MRI of the brain is recommended at a later time when the patient is   either sedated or is better able to lie still. CT NEEDLE BIOPSY LIVER PERCUTANEOUS   Final Result   Successful uncomplicated CT and fluoroscopic guided liver   mass biopsy. If there are any physician concerns regarding this report, a   Radiologist can be reached by calling the following number [phone]. CT GUIDED NEEDLE PLACEMENT   Final Result   Successful uncomplicated CT and fluoroscopic guided liver   mass biopsy. If there are any physician concerns regarding this report, a   Radiologist can be reached by calling the following number [phone]. CT HEAD WO CONTRAST   Final Result   Multifocal encephalomalacia compatible with old infarcts. Atrophy and   chronic microvascular ischemic disease. No significant change. XR CHEST PORTABLE   Final Result   No definitive consolidation. Bilateral nodular densities consistent with the patient's known metastatic   disease. CT CHEST W CONTRAST   Final Result   Extensive widespread pulmonary metastasis with multiple bilateral pulmonary   nodules with extensive lymphadenopathy in the mediastinum hilum and   supraclavicular region. Heterogeneous masses in the liver concerning for liver malignancy with   metastatic involvement in the adjacent lymph nodes and the adrenal glands and   portal venous thrombosis. There may be possible developing bone metastasis   as well. Consider PET-CT scan         CT ABDOMEN PELVIS W WO CONTRAST Additional Contrast? Radiologist Recommendation   Final Result   Very large heterogeneous mass in the right hepatic lobe. There are multiple   other smaller masses throughout the liver as well. These findings are   suggestive of multiple hepatocellular carcinomas.       Pulmonary nodules in the left lower lobe are suggestive of metastases. Mesenteric, retroperitoneal, and retrocrural lymphadenopathy. Thrombosis of the portal vein. Findings suggestive of a mild ileus. Moderate height loss of L5, status post kyphoplasty. Refer to recently   performed MRI lumbar spine for further evaluation. FLUORO FOR SURGICAL PROCEDURES   Final Result   Intraprocedural fluoroscopic spot images as above. See separate procedure   report for more information. ASSESSMENT/PLAN :  60 yo female  Suspected stage IV HCC to lungs, LNs and bone  Hepatic encephalopathy  Alcohol abuse  Aspiration PNA    - ID following on abx for suspected PNA  - On lactulose for encephalopathy  - IR liver biopsy, path pending  - CBC with neutrophilic leukocytosis  - Likely CPS A if/when encephalopathy resolves. Would be candidate for Avastin and Tecentriq as outpatient. From an oncology POV, she does have therapeutic options for her [address] She would need to follow with GI to make sure her cirrhosis/ammonia remain controlled and significantly decrease her alcohol intake  - Hospice is reasonable if she and her  do not wish to pursue therapy  - Confusion is likely multifactorial related to alcohol withdrawal and encephalopathy. Imaging to r/o mets appropriate, but likely to be negative  - Will follow    Thank you for this consult. Please call with further questions or concerns    12/7/21  Lethargic and confused. Status post CT-guided liver biopsy yesterday. Hepatic panel with abnormal transaminases with low albumin and slightly elevated bilirubin. Continues on Unasyn  Continues on Xifaxan and lactulose   Only if performance status improves she will be considered for systemic therapy. Prognosis poor    12/8/2021   Patient is confused. Liver biopsy pathology pending. Will follow. Prognosis is poor    12/9/21  - Pathology pending. However, with AFP 1829, [address] is extremely likely  - Ammonia down to 37.  LFTs improved  - CBC stable with improving leukocytosis  - Remains on Unasyn with ID  - Did speak to  yesterday and updated him on situation  - Overall prognosis poor. Would need significant improvement in ECOG for therapy    *After further discussion with , agreeable to hospice consult. Placed. *    Electronically signed by Vicky Farley MD on 12/9/2021 at 4:09 PM

## 2021-12-10 NOTE — CARE COORDINATION
Auth obtained for [facility] bed available today. Referral made to [facility], [facility] yesterday. If spouse not ready for hospice she can go skilled today. HENS pending and ambulance on soft chart. For questions I can be reached at [phone].  Lore Dupree, [place]

## 2021-12-10 NOTE — PROGRESS NOTES
Hospitalist Progress Note      SYNOPSIS: Patient admitted on 2021 for Closed compression fracture of L5 vertebra (HCC)      SUBJECTIVE: Pt has wbc of 20,000 today. No fever recorded. Pt was suppose to be discharged today. Will cancel. Patient seen and examined    Records reviewed. Stable overnight. No other overnight issues reported. Temp (24hrs), Av.7 °F (37.1 °C), Min:98.5 °F (36.9 °C), Max:98.8 °F (37.1 °C)    DIET: ADULT DIET; Regular  ADULT ORAL NUTRITION SUPPLEMENT; Breakfast, Lunch, Dinner; Standard High Calorie/High Protein Oral Supplement  ADULT ORAL NUTRITION SUPPLEMENT; Breakfast, Dinner; Wound Healing Oral Supplement  CODE: Prior    Intake/Output Summary (Last 24 hours) at 12/10/2021 1503  Last data filed at 12/10/2021 1453  Gross per 24 hour   Intake 860 ml   Output 1075 ml   Net -215 ml       Review of Systems  All bolded are positive; please see HPI  General:  Fever, chills, diaphoresis, fatigue, malaise, night sweats, weight loss  Psychological:  Anxiety, disorientation, hallucinations. ENT:  Epistaxis, headaches, vertigo, visual changes. Cardiovascular:  Chest pain, irregular heartbeats, palpitations, paroxysmal nocturnal dyspnea. Respiratory:  Shortness of breath, coughing, sputum production, hemoptysis, wheezing, orthopnea.   Gastrointestinal:  Nausea, vomiting, diarrhea, heartburn, constipation, abdominal pain, hematemesis, hematochezia, melena, acholic stools  Genito-Urinary:  Dysuria, urgency, frequency, hematuria  Musculoskeletal:  Joint pain, joint stiffness, joint swelling, muscle pain  Neurology:  Headache, focal neurological deficits, weakness, numbness, paresthesia  Derm:  Rashes, ulcers, excoriations, bruising  Extremities:  Decreased ROM, peripheral edema, mottling      OBJECTIVE:    BP (!) 107/59   Pulse 107   Temp 98.5 °F (36.9 °C) (Oral)   Resp 24   Ht 4' 10\" (1.473 m)   Wt 175 lb (79.4 kg)   SpO2 96%   BMI 36.58 kg/m²     General appearance:  awake, alert,  confused  HEENT:  Poor dentition  Neck: Supple. No jugular venous distention. Respiratory: symmetrical; clear no  wheezing  Cardiovascular: rhythm regular; rate controlled; no murmurs  Abdomen: Soft, nontender, nondistended  Extremities:  peripheral pulses present; no peripheral edema; no ulcers  Musculoskeletal: No clubbing, cyanosis, no bilateral lower extremity edema. Brisk capillary refill. Skin:  No rashes    Neurologic: awake, alert confused     ASSESSMENT                                    -hepatic encephalopathy                                   -leukocytosis                                  - hypernatremia                                   -hypokalemia                                   -metastatic disease                                      PLAN: Cancelled discharge. Will replace potassium and evaluate high wbc further-blood cultures pending. Chest x ray read ad negative.  UA shows a some WBC's    DISPOSITION Admitted    Medications:  REVIEWED DAILY    Infusion Medications    sodium chloride       Scheduled Medications    lidocaine  5 mL IntraDERmal Once    sodium chloride flush  5-40 mL IntraVENous 2 times per day    heparin flush  1 mL IntraVENous 2 times per day    ampicillin-sulbactam  3,000 mg IntraVENous Q6H    levetiracetam  500 mg IntraVENous BID    enoxaparin  1 mg/kg SubCUTAneous BID    lactulose  30 g Oral TID    gabapentin  200 mg Oral TID    lidocaine  1 patch TransDERmal Daily    [Held by provider] amLODIPine  2.5 mg Oral Daily    ARIPiprazole  5 mg Oral Daily    [Held by provider] atenolol  25 mg Oral Daily    atorvastatin  40 mg Oral Nightly    citalopram  20 mg Oral Daily    levothyroxine  25 mcg Oral Daily    [Held by provider] lisinopril  10 mg Oral Daily    magnesium oxide  400 mg Oral Daily    therapeutic multivitamin-minerals  1 tablet Oral Daily    nicotine  1 patch TransDERmal Daily    rifaximin  550 mg Oral BID    vitamin B-1  100 mg Oral Daily     PRN Meds: sodium chloride flush, sodium chloride, heparin flush, cyclobenzaprine, oxyCODONE, haloperidol, ondansetron, ondansetron, polyethylene glycol, acetaminophen **OR** acetaminophen, magnesium hydroxide    Labs:     Recent Labs     12/08/21  0429 12/09/21  0446 12/10/21  0521   WBC 14.4* 13.1* 20.4*   HGB 13.3 14.5 13.7   HCT 38.8 44.8 41.5    222 209       Recent Labs     12/08/21  0429 12/09/21  0446 12/10/21  0521   * 158* 152*   K 3.0* 2.8* 3.2*   * 118* 115*   CO2 31* 29 29   BUN 13 18 14   CREATININE 0.5 0.5 0.6   CALCIUM 10.9* 11.4* 10.9*       Recent Labs     12/08/21  0429 12/09/21  0446 12/10/21  0521   PROT 6.2* 6.7 7.0   ALKPHOS 165* 175* 164*   * 294* 274*   * 231* 221*   BILITOT 1.8* 1.6* 1.4*       Recent Labs     12/08/21  0429 12/09/21  0446 12/10/21  0750   INR 1.8 1.8 1.7       No results for input(s): CKTOTAL, TROPONINI in the last 72 hours. Chronic labs:    Lab Results   Component Value Date    CHOL 122 11/10/2021    TRIG 129 11/10/2021    HDL 21 11/10/2021    LDLCALC 75 11/10/2021    TSH 3.450 11/20/2021    INR 1.7 12/10/2021    LABA1C 5.9 (H) 11/09/2021       Radiology: REVIEWED DAILY    +++++++++++++++++++++++++++++++++++++++++++++++++  DO DO Ulises Munguia Physician - [address] [facility], [place]  +++++++++++++++++++++++++++++++++++++++++++++++++  NOTE: This report was transcribed using voice recognition software. Every effort was made to ensure accuracy; however, inadvertent computerized transcription errors may be present.

## 2021-12-10 NOTE — PROGRESS NOTES
Son, Penelope Hsu, given update over phone and notified of plan to discharge patient to facility today.

## 2021-12-10 NOTE — PROGRESS NOTES
Perfect serve message sent to Dr. Merlin Schilder regarding patient's potassium and sodium on bmp this am.

## 2021-12-10 NOTE — PLAN OF CARE
Problem: Skin Integrity:  Goal: Will show no infection signs and symptoms  Description: Will show no infection signs and symptoms  12/10/2021 1415 by Devorah Knight RN  Outcome: Completed  Goal: Absence of new skin breakdown  Description: Absence of new skin breakdown  12/10/2021 1415 by Devorah Knight RN  Outcome: Completed     Problem: Falls - Risk of:  Goal: Will remain free from falls  Description: Will remain free from falls  12/10/2021 1415 by Devorah Knight RN  Outcome: Completed  Goal: Absence of physical injury  Description: Absence of physical injury  12/10/2021 1415 by Devorah Knight RN  Outcome: Completed     Problem: Pain:  Goal: Pain level will decrease  Description: Pain level will decrease  12/10/2021 1415 by Devorah Knight RN  Outcome: Completed  Goal: Control of acute pain  Description: Control of acute pain  12/10/2021 1415 by Devorah Knight RN  Outcome: Completed  Goal: Control of chronic pain  Description: Control of chronic pain  12/10/2021 1415 by Devorah Knight RN  Outcome: Completed

## 2021-12-10 NOTE — PROGRESS NOTES
Physical Therapy  Facility/Department: [address] PICU  Treatment Note  NAME: Emmanuel Curran  : 1960  MRN: [id number]    Date of Service: 12/10/2021    Evaluating PT: Jojo Simpson, PT [id number]     Room #:  1530/7262-Y  Diagnosis:  Compression fracture of first lumbar vertebra with nonunion [S32.010K]  PMHx/PSHx:     has a past medical history of Alcohol abuse, Cerebral artery occlusion with cerebral infarction [facility]), Depression, Diverticulosis of colon, Elevated blood sugar, H/O cardiovascular stress test, Hepatitis C, Hyperlipidemia, Hypertension, Liver cirrhosis ([facility] [address] Magnesium deficiency, Marijuana user, Nicotine dependence, Seizures ([facility] [address] and Subclinical hypothyroidism.    has a past surgical history that includes Dilation and curettage of uterus; liver biopsy (2016); and Spine surgery (N/A, 2021).       Procedure/Surgery: Fredrick Villavicencio, L5  Precautions:  Falls, falls, alarm and O2 , FWB (full weight bearing), Spinal precautions no \"BLT\" and Spinal neutral mechanics  Equipment Needs: Wheeled Walker     SUBJECTIVE:  Patient lives alone  in a ranch home  with Ramp to enter. Ron Milian ambulated using furniture at home and utilized a quad cane for community ambulation PTA. Equipment owned: Wheelchair and [address]:    Initial Evaluation  Date: 21 Treatment  Date: 12/10/21 Short Term/ Long Term   Goals   AM-PAC 6 Clicks 12/32 3/36     Was pt agreeable to Eval/treatment? Yes Yes     Does pt have pain?  Yes No c/o pain     Bed Mobility  Rolling: Mod   Supine to sit:   Max x 2   Sit to supine: Max x 2   Scooting: Max x 2  Rolling: MaxA  Supine to sit: MaxA  Sit to supine: MaxA x2  Scooting: MaxA Rolling: Sup  Supine to sit: Sup  Sit to supine: Sup  Scooting: Sup   Transfers Sit to stand: Mod x 2   Stand to sit: Mod x 2  Stand pivot: Mod x 2  Sit to stand: ModA  Stand to sit: ModA  Stand pivot: MaxAx2 Sit to stand: Mod Ind   Stand to sit: Mod Ind    Stand pivot: Mod Ind    Ambulation    1 leg advanced side step with [address] and Mod x 2  12 feet with [address] with MaxA + chair follow 20 feet with Mod Ind     Stair negotiation: ascended and descended  NT NT 3 steps with AAD 2 rail   ROM BUE:  Defer to OT eval  BLE:  wfl BLE: WFL     Strength BUE: Defer to OT eval  RLE:  Grossly 4-/5  LLE:  Grossly  4-/5 RLE: grossly 4-/5  LLE: grossly 4-/5 4/5   Balance Sitting EOB:  Min A  Dynamic Standing:  Mod A x 2 Sitting EOB: Alex  Standing: MaxA Sitting EOB:  Sup  Dynamic Standing:  Sup       Pt is A & O x 1 to self only  Sensation:  No c/o numbness/tingling  Edema:  None noted in BLE    Therapeutic Exercises:    Sit<>stand x5 with Alex<>ModA  Seated balance activities at EOB:  -static holds with cues for hand placement with Alex  -weight shifts in coronal and sagittal planes with Alex    Patient education  Pt educated on bed mobility, transfer technique, seated balance strategies, gait with [address].    Patient response to education:   Pt verbalized understanding Pt demonstrated skill Pt requires further education in this area   Yes With assist only Yes     ASSESSMENT:    Comments: The pt is confused, she is able to follow simple and direct commands only, she attempted to stand multiple times and improved with each attempt. After seated balance activities the pt was able to stand and ambulate with a chair follow, she required heavy assistance and frequent, stepwise cuing. The pt was extremely fatigued after ambulating and required significant assistance from 2 people to return to the bed for safety where she was positioned with skill.     Treatment:  Patient practiced and was instructed in the following treatment:     Bed mobility training - pt given verbal and tactile cues to facilitate proper sequencing and safety during rolling and supine>sit as well as provided with physical assistance to complete task   Transfer training: verbal cues for hand placement sit to stand transfer and assistance for anterior weight shift in order to facilitate initiation of the stand.  Seated balance activities: performed to improve core strength/stability, increase [place] with seated ADLs, and progress toward advanced transfers.  Gait training: verbal cues for step sequencing and safety, verbal cues for increased step length and positioning within walker with dynamic activities, physical assist for walker management, and steadying    Skilled repositioning in bed to promote improved posture and improved cardiorespiratory function. Pt positioned with bed in semi-chair position to enhance skin/joint protection. PLAN:    Patient is making good progress towards established goals. Will continue with current POC. Time in  1000  Time out  1040    Total Treatment Time  40 minutes     CPT codes:  [] Gait training 77746 0 minutes  [] Manual therapy 47034 0 minutes  [x] Therapeutic activities 08006 40 minutes  [] Therapeutic exercises 16584 0 minutes  [] Neuromuscular reeducation 31547 0 minutes    Siddhartha Dave.  Helga Erickson  number:  PT [id number]

## 2021-12-10 NOTE — PROGRESS NOTES
Hospitalist Progress Note      SYNOPSIS: Patient admitted on 2021 for Closed compression fracture of L5 vertebra ([address]      SUBJECTIVE: Pt continues to be confused but more following directions today. Her  was contacted by pallative care yesterday and he  Is  agreeable to place in nursing home at this time. Patient seen and examined  Records reviewed. Stable overnight. No other overnight issues reported. Temp (24hrs), Av.5 °F (36.9 °C), Min:98.3 °F (36.8 °C), Max:98.8 °F (37.1 °C)    DIET: ADULT DIET; Regular  ADULT ORAL NUTRITION SUPPLEMENT; Breakfast, Lunch, Dinner; Standard High Calorie/High Protein Oral Supplement  ADULT ORAL NUTRITION SUPPLEMENT; Breakfast, Dinner; Wound Healing Oral Supplement  CODE: Prior    Intake/Output Summary (Last 24 hours) at 12/10/2021 1445  Last data filed at 12/10/2021 1414  Gross per 24 hour   Intake 860 ml   Output 675 ml   Net 185 ml       Review of Systems  All bolded are positive; please see HPI  General:  Fever, chills, diaphoresis, fatigue, malaise, night sweats, weight loss  Psychological:  Anxiety, disorientation, hallucinations. ENT:  Epistaxis, headaches, vertigo, visual changes. Cardiovascular:  Chest pain, irregular heartbeats, palpitations, paroxysmal nocturnal dyspnea. Respiratory:  Shortness of breath, coughing, sputum production, hemoptysis, wheezing, orthopnea.   Gastrointestinal:  Nausea, vomiting, diarrhea, heartburn, constipation, abdominal pain, hematemesis, hematochezia, melena, acholic stools  Genito-Urinary:  Dysuria, urgency, frequency, hematuria  Musculoskeletal:  Joint pain, joint stiffness, joint swelling, muscle pain  Neurology:  Headache, focal neurological deficits, weakness, numbness, paresthesia  Derm:  Rashes, ulcers, excoriations, bruising  Extremities:  Decreased ROM, peripheral edema, mottling      OBJECTIVE:    BP (!) 107/59   Pulse 107   Temp 98.5 °F (36.9 °C) (Oral)   Resp 24   Ht 4' 10\" (1.473 m)   Wt 175 lb (79.4 kg)   SpO2 96%   BMI 36.58 kg/m²     General appearance:   Wake and alert during exam but pt is confused  HEENT:  Poor dentition   Neck: Supple. No jugular venous distention. Respiratory: symmetrical; clear to auscultation bilaterally; no wheezes; no rhonchi; no rales  Cardiovascular: rhythm regular; rate controlled; no murmurs  Abdomen: Soft, nontender, nondistended  Extremities:  peripheral pulses present; no peripheral edema; no ulcers  Musculoskeletal: No clubbing, cyanosis, no bilateral lower extremity edema. Brisk capillary refill. Skin:  No rashes  on visible skin  Neurologic: awake, alert limited exam due to confusion     ASSESSMENT:  -hepatic encephalopathy                               -metastatic disease (liver mets)                               -leukocytosis                               -hypernatremia                               -altered mental status. -mild hypokalemia         DISPOSITION: Pt was scheduled to be discharged to nursing home today baring her labs remain stable.     Medications:  REVIEWED DAILY    Infusion Medications    sodium chloride       Scheduled Medications    lidocaine  5 mL IntraDERmal Once    sodium chloride flush  5-40 mL IntraVENous 2 times per day    heparin flush  1 mL IntraVENous 2 times per day    ampicillin-sulbactam  3,000 mg IntraVENous Q6H    levetiracetam  500 mg IntraVENous BID    enoxaparin  1 mg/kg SubCUTAneous BID    lactulose  30 g Oral TID    gabapentin  200 mg Oral TID    lidocaine  1 patch TransDERmal Daily    [Held by provider] amLODIPine  2.5 mg Oral Daily    ARIPiprazole  5 mg Oral Daily    [Held by provider] atenolol  25 mg Oral Daily    atorvastatin  40 mg Oral Nightly    citalopram  20 mg Oral Daily    levothyroxine  25 mcg Oral Daily    [Held by provider] lisinopril  10 mg Oral Daily    magnesium oxide  400 mg Oral Daily    therapeutic multivitamin-minerals  1 tablet Oral Daily    nicotine  1 patch TransDERmal Daily    rifaximin  550 mg Oral BID    vitamin B-1  100 mg Oral Daily     PRN Meds: sodium chloride flush, sodium chloride, heparin flush, cyclobenzaprine, oxyCODONE, haloperidol, ondansetron, ondansetron, polyethylene glycol, acetaminophen **OR** acetaminophen, magnesium hydroxide    Labs:     Recent Labs     12/08/21 0429 12/09/21 0446 12/10/21  0521   WBC 14.4* 13.1* 20.4*   HGB 13.3 14.5 13.7   HCT 38.8 44.8 41.5    222 209       Recent Labs     12/08/21 0429 12/09/21 0446 12/10/21  0521   * 158* 152*   K 3.0* 2.8* 3.2*   * 118* 115*   CO2 31* 29 29   BUN 13 18 14   CREATININE 0.5 0.5 0.6   CALCIUM 10.9* 11.4* 10.9*       Recent Labs     12/08/21 0429 12/09/21 0446 12/10/21  0521   PROT 6.2* 6.7 7.0   ALKPHOS 165* 175* 164*   * 294* 274*   * 231* 221*   BILITOT 1.8* 1.6* 1.4*       Recent Labs     12/08/21 0429 12/09/21  0446 12/10/21  0750   INR 1.8 1.8 1.7       No results for input(s): Esme Barrera in the last 72 hours. Chronic labs:    Lab Results   Component Value Date    CHOL 122 11/10/2021    TRIG 129 11/10/2021    HDL 21 11/10/2021    LDLCALC 75 11/10/2021    TSH 3.450 11/20/2021    INR 1.7 12/10/2021    LABA1C 5.9 (H) 11/09/2021       Radiology: REVIEWED DAILY    +++++++++++++++++++++++++++++++++++++++++++++++++  DO DO Ulises Brumfield Physician - [address], [place]  +++++++++++++++++++++++++++++++++++++++++++++++++  NOTE: This report was transcribed using voice recognition software. Every effort was made to ensure accuracy; however, inadvertent computerized transcription errors may be present.

## 2021-12-10 NOTE — PROGRESS NOTES
[facility]     Liaison Information Visit Note              Patient Name: Loretta Richard   Admit date:  11/25/2021   Hospital Admitting Physician:  Reinaldo Woods MD   PCP:  Rosalio Cat DO  Primary Insurance: Payor: Ina Li /  /  /    Emergency Contact:      Advance Directive:  no  {DPOA-HC Name-Relation:    Phone:     Terminal Diagnosis metastatic HCC of lungs, lymph nodes and bone as confirmed by Dr. Nayeli Lima, [address] [place] [address] Problem List:   Patient Active Problem List   Diagnosis Code    Acute respiratory failure with hypoxia ([address] J96.01    Alcohol abuse F10.10    Cerebral artery occlusion with cerebral infarction ([address] I63.50    Hypertension I10    Depression F32. A    Hyperlipidemia E78.5    Seizures ([address] V38.3    Alcoholic intoxication without complication ([address] C38.581    Hyponatremia E87.1    Nicotine dependence F17.200    Marijuana user F12.90    Fall at home, initial encounter W19. Olevia Screen, Y92.009    Acute on chronic diastolic (congestive) heart failure (HCC) I50.33    Pedal edema R60.0    Chronic obstructive pulmonary disease (HCC) J44.9    Tobacco abuse Z72.0    Hepatic cirrhosis (HCC) K74.60    Hypokalemia E87.6    Hypomagnesemia E83.42    Dehydration E86.0    Compression fracture of first lumbar vertebra with nonunion S32.010K    Closed compression fracture of L5 vertebra (HCC) S32.050A    Hepatocellular carcinoma (HCC) C22.0    Closed compression fracture of L5 lumbar vertebra, initial encounter ([facility] [address] S32.050A    Metastatic cancer to bone ([address] C79.51     Code Status Order: Prior   Allergies:  Codeine  Family Goal: comfort but have financial concerns  Meeting held with  via phone. The hospice benefit and philosophy were explained including that hospice is end of life care in which, per Medicare, a patient has a terminal diagnosis that life expectancy would be 6 months or less. Hospice care is a service that is covered by most insurance plans. The following levels of hospice care were discussed including, routine level of hospice care at private home or facility, which patient/family is responsible for any room and board fees at the facility, and general in patient level of care (GIP) at the [facility] for short term symptom management. Per Medicare guidelines, a patient is considered appropriate for GIP if they have uncontrolled symptoms such as pain, agitation, labored breathing or nausea/vomiting. Once symptoms become managed, the patient would need to be moved to a lower level of care such as home with hospice, or ECF with hospice. Family informed that with the routine level of care at home or ECF,  the hospice team consists of the RN who visits 1-3 times a week, a  who visits within the first five days of the hospice election, the personal care team who visit 1-3 times a week, non-medical volunteers and Chaplains. Explained that at home in routine level of care, familles are responsible for the 24 hour care. Discussed that under hospice care patient would not receive chemotherapy, radiation, immune therapy, IV antibiotics, dialysis or blood transfusions. Explained that once in hospice care, all aggressive treatments would be stopped and allow nature to takes its course with focus on comfort care for the patient. Patient is working with PT/OT when I evaluated. Weakness is noted. Spoke with Patient's  Vanessa Lakhani. He wants for her to have comfort, because she took care of him, as he is w/c bound and has aides come to the home. We discussed her progress with PT/OT and will talk with MONSTER Franklin regarding transfer to SNF for rehab, with eventual transition to hospice when appropriate. We also reviewed the daily rate for hospice services for room and board, he states this would be very difficult to manage. He will proceed with a medicaid application at the SNF.     Discharge Plan:  Discharge Disposition; unknown  HOTV plan:  1. Will follow as needed. 2. Please call [facility] [phone] with any questions. 3. Patient not currently under the care of hospice.     Electronically signed by Romulo Ga RN on 12/10/2021 at 10:57 AM

## 2021-12-10 NOTE — PROGRESS NOTES
[address] [place] [address] [facility]  Progress Note    Chief complaint: Confusion    SUBJECTIVE:  Patient is tolerating medications. No reported adverse drug reactions. No nausea, vomiting, diarrhea. Afebrile  3 L. No fever but wbc up to 20. She had one loose stool today. Alert. Per Rn more confused. Chest xray, and cx ordered per primary. Review of systems:  As stated above in the chief complaint, otherwise negative. Medications:  Scheduled Meds:   lidocaine  5 mL IntraDERmal Once    sodium chloride flush  5-40 mL IntraVENous 2 times per day    heparin flush  1 mL IntraVENous 2 times per day    ampicillin-sulbactam  3,000 mg IntraVENous Q6H    levetiracetam  500 mg IntraVENous BID    enoxaparin  1 mg/kg SubCUTAneous BID    lactulose  30 g Oral TID    gabapentin  200 mg Oral TID    lidocaine  1 patch TransDERmal Daily    [Held by provider] amLODIPine  2.5 mg Oral Daily    ARIPiprazole  5 mg Oral Daily    [Held by provider] atenolol  25 mg Oral Daily    atorvastatin  40 mg Oral Nightly    citalopram  20 mg Oral Daily    levothyroxine  25 mcg Oral Daily    [Held by provider] lisinopril  10 mg Oral Daily    magnesium oxide  400 mg Oral Daily    therapeutic multivitamin-minerals  1 tablet Oral Daily    nicotine  1 patch TransDERmal Daily    rifaximin  550 mg Oral BID    vitamin B-1  100 mg Oral Daily     Continuous Infusions:   sodium chloride       PRN Meds:sodium chloride flush, sodium chloride, heparin flush, cyclobenzaprine, oxyCODONE, haloperidol, ondansetron, ondansetron, polyethylene glycol, acetaminophen **OR** acetaminophen, magnesium hydroxide    OBJECTIVE:  BP (!) 107/59   Pulse 107   Temp 98.5 °F (36.9 °C) (Oral)   Resp 24   Ht 4' 10\" (1.473 m)   Wt 175 lb (79.4 kg)   SpO2 96%   BMI 36.58 kg/m²   Temp  Av.7 °F (37.1 °C)  Min: 98.5 °F (36.9 °C)  Max: 98.8 °F (37.1 °C)  Constitutional: The patient is alert with confusion  Skin: Warm and dry. No rashes were noted.    HEENT: Round and reactive pupils. dry mucous membranes. No ulcerations or thrush. Neck: Supple to movements. Chest: No use of accessory muscles to breathe. Symmetrical expansion. No wheezing, crackles or rhonchi. decreased  Cardiovascular: S1 and S2 are rhythmic and regular. No murmurs appreciated. Abdomen: Positive bowel sounds to auscultation. Benign to palpation. No masses felt. No hepatosplenomegaly. disteneded  Genitourinary: Female, bateman yellow urine  Extremities: No clubbing, no cyanosis, + edema.   Lines: peripheral  12/10/21 midline  Laboratory and Tests Review:  Lab Results   Component Value Date    WBC 20.4 (H) 12/10/2021    WBC 13.1 (H) 12/09/2021    WBC 14.4 (H) 12/08/2021    HGB 13.7 12/10/2021    HCT 41.5 12/10/2021    MCV 98.1 12/10/2021     12/10/2021     Lab Results   Component Value Date    NEUTROABS 17.75 (H) 12/10/2021    NEUTROABS 8.91 (H) 12/09/2021    NEUTROABS 9.65 (H) 12/08/2021     No results found for: CRPHS  Lab Results   Component Value Date     (H) 12/10/2021     (H) 12/10/2021    ALKPHOS 164 (H) 12/10/2021    BILITOT 1.4 (H) 12/10/2021     Lab Results   Component Value Date     12/10/2021    K 3.2 12/10/2021     12/10/2021    CO2 29 12/10/2021    BUN 14 12/10/2021    CREATININE 0.6 12/10/2021    CREATININE 0.5 12/09/2021    CREATININE 0.5 12/08/2021    GFRAA >60 12/10/2021    LABGLOM >60 12/10/2021    GLUCOSE 99 12/10/2021    PROT 7.0 12/10/2021    LABALBU 2.2 12/10/2021    CALCIUM 10.9 12/10/2021    BILITOT 1.4 12/10/2021    ALKPHOS 164 12/10/2021     12/10/2021     12/10/2021     No results found for: CRP  No results found for: [address]  Radiology:  Reviewed    Microbiology:   Lab Results   Component Value Date    BC 5 Days no growth 12/05/2021    BC 5 Days no growth 11/22/2021    ORG Klebsiella pneumoniae ssp pneumoniae 11/09/2021     Lab Results   Component Value Date    BLOODCULT2 5 Days no growth 12/05/2021    BLOODCULT2 5 Days no growth 11/22/2021    ORG Klebsiella pneumoniae ssp pneumoniae 11/09/2021     No results found for: WNDABS  No results found for: RESPSMEAR  No results found for: MPNEUMO, CLAMYDCU, LABLEGI, AFBCX, FUNGSM, LABFUNG  No results found for: CULTRESP  No results found for: CXCATHTIP  Body Fluid Culture, Sterile   Date Value Ref Range Status   04/19/2016 Growth not present  Final     No results found for: CXSURG  Urine Culture, Routine   Date Value Ref Range Status   11/09/2021 >100,000 CFU/ml  Final     No results found for: [facility]    ASSESSMENT:  Metastatic HCC to the lungs nodes and bone; liver biopsy today AFP 8181  Metabolic encephalopathy  Possible aspiration pneumonia  Rule out septic thrombosis of the portal vein  Pictures may be from underlying metastatic disease than infection  Leukocytosis-increased    PLAN:  Continue Unasyn for 10 more days-med rec complete  seems like the patient is responding with her white count dropping on the Unasyn I have some concerns for septic phlebitis of the portal vein  Follow up chest xray and repeat cx  Check serum osmo-neg 14 liters  Check final cultures  Monitor labs    Ric Nellh, APRN - CNP  3:03 PM  12/10/2021  Pt seen and examined. Above discussed agree with advanced practice nurse. Labs, cultures, and radiographs reviewed. Face to Face encounter occurred. Changes made as necessary.      Tracy De Oliveira MD

## 2021-12-10 NOTE — PROGRESS NOTES
Patient continuing to attempt to get out of bed. Patient also pulled out peripheral IV. Patient redirected and helped back back in bed. Telesitter continued. Will continue to monitor.

## 2021-12-10 NOTE — PROGRESS NOTES
Chg single lumen midline  Placement 12/10/2021    Product number: [id number]   Lot Number: [id number]      Ultrasound: yes   Right Basilic vein:                Upper Arm Circumference: 28cm    Size: 15cm    Exposed Length: 13cm    Internal Length: 12cm   Cut: 0   Vein Measurement: 0.48    Andrea Segura RN  12/10/2021  12:09 PM

## 2021-12-10 NOTE — PROGRESS NOTES
Occupational Therapy  OT BEDSIDE TREATMENT NOTE   [address] [facility], [place] [address] [facility]  [address] [place] [address]:  Patient Name: Rachael Cobb  MRN: [id number]  : 1960  Room: [address] [place] [address]     Evaluating OT: Ruddy Greco OTR/L #[id number]    Referring Provider: Rae Jefferson MD  Specific Provider Orders/Date: OT eval and treat      Diagnosis: Compression fracture of first lumbar vertebra with nonunion [S32.010K]   Pt admitted to hospital with back pain     Surgery / Procedure: 21  s/p L5 kyphoplasty      Pertinent Medical History:  has a past medical history of Alcohol abuse, Cerebral artery occlusion with cerebral infarction [facility]), Depression, Diverticulosis of colon, Elevated blood sugar, H/O cardiovascular stress test, Hepatitis C, Hyperlipidemia, Hypertension, Liver cirrhosis ([facility] [address] Magnesium deficiency, Marijuana user, Nicotine dependence, Seizures ([facility] [address] and Subclinical hypothyroidism.      Precautions:  Fall Risk, O2, spine neutrality, bed alarm, TAPS     Assessment of current deficits    [x]? Functional mobility          [x]? ADLs           [x]? Strength                  []?Cognition    [x]? Functional transfers        [x]? IADLs         [x]? Safety Awareness   [x]? Endurance    []? Fine Coordination                        [x]? Balance      []? Vision/perception   []? Sensation      []? Gross Motor Coordination            []? ROM           []?  Delirium                   []? Motor Control      OT PLAN OF CARE   OT POC based on physician orders, patient diagnosis and results of clinical assessment     Frequency/Duration 1-3 days/wk for 2 weeks PRN   Specific OT Treatment Interventions to include:   * Instruction/training on adapted ADL techniques and AE recommendations to increase functional independence within precautions       * Training on energy conservation strategies, correct breathing pattern and techniques to improve independence/tolerance for self-care routine  * Functional transfer/mobility training/DME recommendations for increased independence, safety, and fall prevention  * Patient/Family education to increase follow through with safety techniques and functional independence  * Recommendation of environmental modifications for increased safety with functional transfers/mobility and ADLs  * Cognitive retraining/development of therapeutic activities to improve problem solving, judgement, memory, and attention for increased safety/participation in ADL/IADL tasks  * Therapeutic exercise to improve motor endurance, ROM, and functional strength for ADLs/functional transfers  * Therapeutic activities to facilitate/challenge dynamic balance, stand tolerance for increased safety and independence with ADLs  * Therapeutic activities to facilitate gross/fine motor skills for increased independence with ADLs  * Neuro-muscular re-education: facilitation of righting/equilibrium reactions, midline orientation, scapular stability/mobility, normalization of muscle tone, and facilitation of volitional active controled movement  * Positioning to improve skin integrity, interaction with environment and functional independence  * Delirium prevention/treatment     Recommended Adaptive Equipment:  TBD      Home Living: Pt lives alone in a 1 story home with ramp entrance    Bathroom setup: walk-in shower    Equipment owned: cane, w/c     Prior Level of Function: mod I with ADLs , assist prn with IADLs; ambulated cane vs w/c for mobility   Driving: no   Occupation: enjoys cooking     Pain Level: Pt complained of back pain this session     Cognition: A&O: x1 to name;  Follows 1 step directions with redirection             Memory:  poor-             Sequencing:  poor-             Problem solving:  poor-             Judgement/safety:  poor-               Functional Assessment:  AM-PAC Daily Activity Raw Score: 9/24    Initial Eval Status  Date: 12/1/21 Treatment Status  Date: 12/10/21 STGs = LTGs  Time frame: 10-14 days   Feeding Minimal Assist  Max A   Mod I   Grooming Minimal Assist  Mod A overall;    Pt able to wash face and wipe out mouth with swab with verbal prompting and Mod A to increase hygiene. Modified Hoople    UB Dressing Moderate Assist   Max A;    Simulated joel/doff gown. Stand by Assist    LB Dressing Dependent  Dependent     Joel hospital socks Moderate Assist    Bathing Maximal Assist Dep A (simulated)  Pt having difficulty following and performing tasks. Minimal Assist    Toileting Dependent   Dependent    Pt incontinent of BM. Moderate Assist    Bed Mobility  Supine to sit: Maximal Assist x2   Sit to supine: Maximal Assist x2 MaxA - sit to supine (with PT)  Max A of 2 - due to poor positioning on the EOB to transfer into supine.     Supine to sit: Moderate Assist   Sit to supine: Moderate Assist    Functional Transfers Moderate Assist x2 Max A of 2 with w/w   Minimal Assist    Functional Mobility NT  Pt ambulated patient with max A and verbal prompting - pt requiring chair follow and assistance with O2 line management. Minimal Assist    Balance Sitting: Max A progressing to min A  (posterior lean)     Standing: Mod A x2  Sitting EOB:  Max A    Standing: Max A of 2       Activity Tolerance F- Poor- F+   Visual/  Perceptual Glasses: yes  wfl                             Hand Dominance right    Strength ROM Additional Info:    RUE  WFL, formal MMT deferred due to spinal precautions  WFL good  and wfl FMC/dexterity noted during ADL tasks      LUE WFL formal MMT deferred due to spinal precautions  WFL good  and wfl FMC/dexterity noted during ADL tasks      Hearing:  WFL  Sensation:WFL  Tone: WFL  Edema:none noted     Comments: Upon arrival pt sitting up on the EOB with PT. Pt completed of transferring into standing, and into chair with patient able to participate in light ADL this session. Rest breaks provided as needed. At end of session, pt in supine on left side with TAPs to assist sitting position and nursing notified of needing assistance to finish meal. All lines and tubes intact and call light within reach. PT present during session due to pt's poor activity tolerance, safety, and current assist levels. On second session, staffing requested assistance to transfer patient into bed due to patient moved to EOB because of a BM. Pt required Dep A of 2 to transfer from EOB to supine and to be scooted up to the [facility]. Pt required Dep A for hygiene. Pt required 2 person assist for safe mobility due to complexity of medical condition, medical lines and deconditioning. · Pt has made poor progress towards set goals.    · Continue with current plan of care focusing on increasing of activity tolerance, independency with transfers and ADL task's    Treatment Time In: 1024          Treatment Time Out: 1047  &  Treatment Time In: 2:39          Treatment Time Out: 2:50  Treatment Charges: Mins Units   Ther Ex  50902     Manual Therapy 84236     Thera Activities 83053 10 1   ADL/Home Mgt 03058 24 2   Neuro Re-ed 02707     Group Therapy      Orthotic manage/training  26092     Non-Billable Time     Total Timed Treatment 34 [address] [facility], [address] [place] [address] Rd

## 2021-12-10 NOTE — DISCHARGE INSTR - COC
Continuity of Care Form    Patient Name: Gurjit Novak   :  1960  MRN:  [id number]    Admit date:  2021  Discharge date:  12/10/2021    Code Status Order: Prior   Advance Directives:      Admitting Physician:  Noah Toussaint MD  PCP: Juanjo Beal DO    Discharging Nurse: Mo Dacosta Unit/Room#: 3840/3361-Z  Discharging Unit Phone Number: [phone]    Emergency Contact:   Extended Emergency Contact Information  Primary Emergency Contact: [id number]  Address: [address] [place] [address] [address] Phone: [phone]  Mobile Phone: [phone]  Relation: Spouse   needed? No    Past Surgical History:  Past Surgical History:   Procedure Laterality Date    CT NEEDLE BIOPSY LIVER PERCUTANEOUS  2021    CT NEEDLE BIOPSY LIVER PERCUTANEOUS 2021 Bebo Arroyo MD [facility] CT    DILATION AND CURETTAGE OF UTERUS      LIVER BIOPSY  2016    SPINE SURGERY N/A 2021    KYPHOPLASTY, L5 performed by Rafa Feldman MD at [address] [place] [address]       Immunization History:   Immunization History   Administered Date(s) Administered    COVID-19, Jada Class, Primary or Immunocompromised, PF, 100mcg/0.5mL 2021, 2021    Influenza, Quadv, IM, (6 mo and older Fluzone, Flulaval, Fluarix and 3 yrs and older Afluria) 2019    Influenza, Quadv, IM, PF (6 mo and older Fluzone, Flulaval, Fluarix, and 3 yrs and older Afluria) 10/02/2020    Pneumococcal Polysaccharide ([id number]) 10/02/2020       Active Problems:  Patient Active Problem List   Diagnosis Code    Acute respiratory failure with hypoxia (HCC) J96.01    Alcohol abuse F10.10    Cerebral artery occlusion with cerebral infarction ([facility] [address] I63.50    Hypertension I10    Depression F32. A    Hyperlipidemia E78.5    Seizures (HCC) G39.1    Alcoholic intoxication without complication (HCC) L38.326    Hyponatremia E87.1    Nicotine dependence F17.200    Marijuana user F12.90 Fall at home, initial encounter [address] XXXA, Y92.009    Acute on chronic diastolic (congestive) heart failure (HCC) I50.33    Pedal edema R60.0    Chronic obstructive pulmonary disease (HCC) J44.9    Tobacco abuse Z72.0    Hepatic cirrhosis (HCC) K74.60    Hypokalemia E87.6    Hypomagnesemia E83.42    Dehydration E86.0    Compression fracture of first lumbar vertebra with nonunion S32.010K    Closed compression fracture of L5 vertebra (HCC) S32.050A    Hepatocellular carcinoma (HCC) C22.0    Closed compression fracture of L5 lumbar vertebra, initial encounter ([facility] [address] S32.050A    Metastatic cancer to bone ([facility] [address] C79.51       Isolation/Infection:   Isolation            No Isolation          Patient Infection Status       Infection Onset Added Last Indicated Last Indicated By Review Planned Expiration Resolved Resolved By    None active    Resolved    COVID-19 12/06/21 12/06/21 12/06/21 COVID-19, Rapid   12/07/21 Mariah Henderson RN    12/6/-Rapid Covid-Indeterminate  12/6/-Resp panel molecular-Not Detected      COVID-19 (Rule Out) 11/15/21 11/15/21 11/15/21 COVID-19 (Ordered)   11/15/21 Rule-Out Test Resulted            Nurse Assessment:  Last Vital Signs: BP (!) 107/59   Pulse 107   Temp 98.5 °F (36.9 °C) (Oral)   Resp 24   Ht 4' 10\" (1.473 m)   Wt 175 lb (79.4 kg)   SpO2 96%   BMI 36.58 kg/m²     Last documented pain score (0-10 scale): Pain Level: 10  Last Weight:   Wt Readings from Last 1 Encounters:   11/25/21 175 lb (79.4 kg)     Mental Status:  disoriented and alert    IV Access:  - midline right upper arm     Nursing Mobility/ADLs:  Walking   Assisted  Transfer  Assisted  Bathing  Dependent  Dressing  Dependent  Toileting  Dependent  Feeding  Dependent  Med Admin  Assisted  Med Delivery   whole    Wound Care Documentation and Therapy:        Elimination:  Continence: Bowel: No  Bladder: bateman catheter  Urinary Catheter:  Insertion Date: 11/22/2021    Colostomy/Ileostomy/Ileal Conduit: No Date of Last BM: 12/9/2021    Intake/Output Summary (Last 24 hours) at 12/10/2021 1429  Last data filed at 12/10/2021 1414  Gross per 24 hour   Intake 860 ml   Output 675 ml   Net 185 ml     I/O last 3 completed shifts: In: 800 [P.O.:800]  Out: [address] [Urine:1125]    Safety Concerns: At Risk for Falls    Impairments/Disabilities:      None    Nutrition Therapy:  Current Nutrition Therapy:   - Oral Diet:  General    Routes of Feeding: Oral  Liquids: No Restrictions  Daily Fluid Restriction: no  Last Modified Barium Swallow with Video (Video Swallowing Test): not done    Treatments at the Time of Hospital Discharge:   Respiratory Treatments: none   Oxygen Therapy:  is on oxygen at 3 L/min per nasal cannula.   Ventilator:    - No ventilator support    Rehab Therapies: Physical Therapy and Occupational Therapy  Weight Bearing Status/Restrictions: No weight bearing restirctions  Other Medical Equipment (for information only, NOT a DME order):  wheelchair, walker, bedside commode, and hospital bed  Other Treatments: none    Patient's personal belongings (please select all that are sent with patient):  Kiran KING SIGNATURE:  Electronically signed by Andra Thibodeaux RN on 12/10/21 at 2:35 PM EST    CASE MANAGEMENT/SOCIAL WORK SECTION    Inpatient Status Date: ***    Readmission Risk Assessment Score:  Readmission Risk              Risk of Unplanned Readmission:  39           Discharging to Facility/ Agency   Name:   Address:  Phone:  Fax:    Dialysis Facility (if applicable)   Name:  Address:  Dialysis Schedule:  Phone:  Fax:    / signature: {Esignature:[id number]}    PHYSICIAN SECTION    Prognosis: {Prognosis:[id number]}    Condition at Discharge: [facility] Patient Condition:[id number]}    Rehab Potential (if transferring to Rehab): {Prognosis:[id number]}    Recommended Labs or Other Treatments After Discharge: ***    Physician Certification: I certify the above information and transfer of Melissa Cali

## 2021-12-11 NOTE — PROGRESS NOTES
Occupational Therapy  OCCUPATIONAL THERAPY TREATMENT SESSION    [facility] [address] [facility]  [address] [place] [address] BEDSIDE TREATMENT NOTE      Date:2021  Patient Name: Harmeet Real  MRN: [id number]  : 1960  Room: [address] [place] [address]       Per OT Eval:      Evaluating OT: Lavonne Osorio OTR/L #[id number]    Referring Sigifredo Quezada MD  Specific Provider Orders/Date: OT eval and treat      Diagnosis: Compression fracture of first lumbar vertebra with nonunion [S32.010K]   Pt admitted to hospital with back pain     Surgery / Procedure: 21  s/p L5 kyphoplasty      Pertinent Medical History:  has a past medical history of Alcohol abuse, Cerebral artery occlusion with cerebral infarction ([facility] Depression, Diverticulosis of colon, Elevated blood sugar, H/O cardiovascular stress test, Hepatitis C, Hyperlipidemia, Hypertension, Liver cirrhosis ([address] Magnesium deficiency, Marijuana user, Nicotine dependence, Seizures ([address] and Subclinical hypothyroidism.      Precautions:  Fall Risk, O2, spine neutrality, bed alarm, TAPS, TSM     Assessment of current deficits    [x]? ? Functional mobility          [x]? ?ADLs           [x]? ? Strength                  []? ? Cognition    [x]? ? Functional transfers        [x]? ? IADLs         [x]? ? Safety Awareness   [x]? ?Endurance    []? ? Fine Coordination                        [x]? ? Balance      []? ? Vision/perception   []? ? Sensation      []? ?Gross Motor Coordination            []? ? ROM           []? ? Delirium                   []? ? Motor Control      OT PLAN OF CARE   OT POC based on physician orders, patient diagnosis and results of clinical assessment     Frequency/Duration 1-3 days/wk for 2 weeks PRN   Specific OT Treatment Interventions to include:   * Instruction/training on adapted ADL techniques and AE recommendations to increase functional independence within precautions       * Training on energy conservation strategies, correct breathing pattern and techniques to improve independence/tolerance for self-care routine  * Functional transfer/mobility training/DME recommendations for increased independence, safety, and fall prevention  * Patient/Family education to increase follow through with safety techniques and functional independence  * Recommendation of environmental modifications for increased safety with functional transfers/mobility and ADLs  * Cognitive retraining/development of therapeutic activities to improve problem solving, judgement, memory, and attention for increased safety/participation in ADL/IADL tasks  * Therapeutic exercise to improve motor endurance, ROM, and functional strength for ADLs/functional transfers  * Therapeutic activities to facilitate/challenge dynamic balance, stand tolerance for increased safety and independence with ADLs  * Therapeutic activities to facilitate gross/fine motor skills for increased independence with ADLs  * Neuro-muscular re-education: facilitation of righting/equilibrium reactions, midline orientation, scapular stability/mobility, normalization of muscle tone, and facilitation of volitional active controled movement  * Positioning to improve skin integrity, interaction with environment and functional independence  * Delirium prevention/treatment     Recommended Adaptive Equipment:  TBD      Home Living: Pt lives alone in a 1 story home with ramp entrance    Bathroom setup: walk-in shower    Equipment owned: cane, w/c     Prior Level of Function: mod I with ADLs , assist prn with IADLs; ambulated cane vs w/c for mobility   Driving: no   Occupation: enjoys cooking     Pain Level: Pt complained of back pain this session; unable to quantify. Reinforced spinal precautions and management strategies     Cognition: Oriented: x1 to name, difficulty staying alert during session, Increased difficulty following commands w/ max verbal, tactile and visual cues. lethargy noted             Memory:  poor-             Sequencing:  poor-             Problem solving:  poor-             Judgement/safety:  poor-               Functional Assessment:  AM-PAC Daily Activity Raw Score: 6/24    Initial Eval Status  Date: 12/1/21 Treatment Status  Date: 12/11/21 STGs = LTGs  Time frame: 10-14 days   Feeding Minimal Assist      Mod I   Grooming Minimal Assist  Dependent  Washed face and hands     Modified [place]    UB Dressing Moderate Assist  Dependent    Stand by Assist    LB Dressing Dependent  Dependent    Socks Moderate Assist    Bathing Maximal Assist  Minimal Assist    Toileting Dependent   Dependent      Moderate Assist    Bed Mobility  Supine to sit: Maximal Assist x2   Sit to supine: Maximal Assist x2 Rolling: Dependent  Supine>sit EOB:Dependent x2  Sit EOB>supine: Dependent x2    Supine to sit: Moderate Assist   Sit to supine: Moderate Assist    Functional Transfers Moderate Assist x2 NT  Deferred secondary to poor safety Minimal Assist    Functional Mobility NT  NT    Minimal Assist    Balance Sitting: Max A progressing to min A  (posterior lean)     Standing: Mod A x2  Sitting EOB:  Dependent     Standing:  NT        Activity Tolerance F- Poor- F+   Visual/  Perceptual Glasses: yes  wfl                         Comments: Upon arrival pt lying in bed. At end of session pt lying in bed (bed alarm on) all lines and tubes intact, call light within reach. Treatment: Therapist facilitated self-care retraining: grooming task (to improve alertness) while educating/cuing pt on attention and sequencing. Facilitation of bed mobility (rolling L/R, supine><sit EOB utilizing logroll technique w/ education/cues for body mechanics, spinal precautions) and unsupported sitting balance (education/max cues for attention, forward gaze, posture, weight shifting and safety to prep for ADL's. Heavy posterior lean noted - unable to correct, total assist to maintain upright posture).  At end of session, facilitated optimal bed repositioning w/ TAPS, pillow props and B heel protectors. Reinforced importance for skin and joint integrity. Skilled monitoring of HR, O2 sats and pts response to treatment. Patient demonstrated decreased independence and safety during completion of ADL/functional transfer/mobility tasks. Pt would benefit from continued skilled OT to increase safety and independence with ADL/IADL tasks for functional independence and quality of life. · Pt has made limited progress towards set goals.        Treatment Time In:08:45            Treatment Time Out: 09:10             Treatment Charges: Mins Units   Ther Ex  [id number]     Manual Therapy Yari Galarza [id number]   ADL/Home Mgt [id number]     Neuro Re-ed [id number]     Group Therapy      Orthotic manage/training  [id number]     Non-Billable Time     Total Timed Treatment 25 45 Tiffany Kamara OTR/L #[id number]

## 2021-12-11 NOTE — PROGRESS NOTES
[address] [place] [address] [facility]  Progress Note    Chief complaint: Aspiration pneumonia       SUBJECTIVE:    Pt is awake, alert, confused   Afebrile   Patient is tolerating medications. No reported adverse drug reactions. No nausea, vomiting, diarrhea. Review of systems:  As stated above in the chief complaint, otherwise negative.     Medications:  Scheduled Meds:   potassium chloride  10 mEq IntraVENous Q1H    lidocaine  5 mL IntraDERmal Once    sodium chloride flush  5-40 mL IntraVENous 2 times per day    heparin flush  1 mL IntraVENous 2 times per day    ampicillin-sulbactam  3,000 mg IntraVENous Q6H    levetiracetam  500 mg IntraVENous BID    enoxaparin  1 mg/kg SubCUTAneous BID    lactulose  30 g Oral TID    gabapentin  200 mg Oral TID    lidocaine  1 patch TransDERmal Daily    [Held by provider] amLODIPine  2.5 mg Oral Daily    ARIPiprazole  5 mg Oral Daily    [Held by provider] atenolol  25 mg Oral Daily    atorvastatin  40 mg Oral Nightly    citalopram  20 mg Oral Daily    levothyroxine  25 mcg Oral Daily    [Held by provider] lisinopril  10 mg Oral Daily    magnesium oxide  400 mg Oral Daily    therapeutic multivitamin-minerals  1 tablet Oral Daily    nicotine  1 patch TransDERmal Daily    rifaximin  550 mg Oral BID    vitamin B-1  100 mg Oral Daily     Continuous Infusions:   sodium chloride       PRN Meds:potassium chloride **OR** potassium alternative oral replacement **OR** potassium chloride, sodium chloride flush, sodium chloride, heparin flush, cyclobenzaprine, oxyCODONE, haloperidol, ondansetron, ondansetron, polyethylene glycol, acetaminophen **OR** acetaminophen, magnesium hydroxide    OBJECTIVE:  BP (!) 125/53   Pulse 114   Temp 99.8 °F (37.7 °C) (Axillary)   Resp 20   Ht 4' 10\" (1.473 m)   Wt 175 lb (79.4 kg)   SpO2 92%   BMI 36.58 kg/m²   Temp  Av.4 °F (37.4 °C)  Min: 99 °F (37.2 °C)  Max: 99.8 °F (37.7 °C)     Constitutional: The patient is alert with confusion  Skin: Warm and dry. No rashes were noted. HEENT: Round and reactive pupils. dry mucous membranes. No ulcerations or thrush. Neck: Supple to movements. Chest: Clear bilaterally   Cardiovascular: S1 and S2 are rhythmic and regular. No murmurs appreciated. Abdomen: Soft, non tender, bowel sound +  Genitourinary: Female, [place] yellow urine  Extremities: No clubbing, no cyanosis, + edema.   Lines: peripheral  12/10/21 midline  Laboratory and Tests Review:  Lab Results   Component Value Date    WBC 14.5 (H) 12/11/2021    WBC 20.4 (H) 12/10/2021    WBC 13.1 (H) 12/09/2021    HGB 12.7 12/11/2021    HCT 37.5 12/11/2021    MCV 93.8 12/11/2021     12/11/2021     Lab Results   Component Value Date    NEUTROABS 11.02 (H) 12/11/2021    NEUTROABS 17.75 (H) 12/10/2021    NEUTROABS 8.91 (H) 12/09/2021     No results found for: CRPHS  Lab Results   Component Value Date     (H) 12/11/2021     (H) 12/11/2021    ALKPHOS 192 (H) 12/11/2021    BILITOT 1.1 12/11/2021     Lab Results   Component Value Date     12/11/2021    K 2.9 12/11/2021     12/11/2021    CO2 31 12/11/2021    BUN 18 12/11/2021    CREATININE 0.6 12/11/2021    CREATININE 0.6 12/10/2021    CREATININE 0.5 12/09/2021    GFRAA >60 12/11/2021    LABGLOM >60 12/11/2021    GLUCOSE 104 12/11/2021    PROT 6.8 12/11/2021    LABALBU 2.2 12/11/2021    CALCIUM 11.1 12/11/2021    BILITOT 1.1 12/11/2021    ALKPHOS 192 12/11/2021     12/11/2021     12/11/2021     No results found for: CRP  No results found for: Foster Mires  Radiology:  Reviewed    Microbiology:   Lab Results   Component Value Date    BC 5 Days no growth 12/05/2021    BC 5 Days no growth 11/22/2021    ORG Klebsiella pneumoniae ssp pneumoniae 11/09/2021     Lab Results   Component Value Date    BLOODCULT2 5 Days no growth 12/05/2021    BLOODCULT2 5 Days no growth 11/22/2021    ORG Klebsiella pneumoniae ssp pneumoniae 11/09/2021     No

## 2021-12-11 NOTE — PROGRESS NOTES
side step with [address] Gen and Mod x 2  NT 20 feet with Mod Ind     Stair negotiation: ascended and descended  NT NT 3 steps with AAD 2 rail   ROM BUE:  Defer to OT eval  BLE:  wfl BLE: WFL     Strength BUE: Defer to OT eval  RLE:  Grossly 4-/5  LLE:  Grossly  4-/5  4/5   Balance Sitting EOB:  Min A  Dynamic Standing:  Mod A x 2 Sitting EOB: Dep  Standing: NT Sitting EOB:  Sup  Dynamic Standing:  Sup       Pt is lethargic and not conversing. Pt not following commands or participating with functional mobility. Sensation:  Unable to formally assess  Edema:  None noted in BLE    Therapeutic Exercises:    NT    Patient education  Pt educated on bed mobility, transfer technique, seated balance strategies    Patient response to education:   Pt verbalized understanding Pt demonstrated skill Pt requires further education in this area   No  No  Yes     ASSESSMENT:    Comments:  Pt found in [facility] on a right turn via TAPS and sleeping. Pt opened her eyes to name, but not able to hold a conversation or maintain eyes opened. Pt previously alert and responding to questions being asked to the roommate just prior to treatment. Pt assisted to seated EOB utilizing log roll technique/spine precautions to elicit increased alertness with little success. Pt put forth no effort to assist with task. Once seated EOB, pt dependent to maintain upright posture. It was not safe to attempt transfer or gait at this time. Pt returned to supine and positioned to the [facility]. Pt placed on right turn via TAPS as found. Call light and tray table in reach.     Treatment:  Patient practiced and was instructed in the following treatment:     Bed mobility training - pt given verbal and tactile cues to facilitate proper sequencing and safety during rolling and supine>sit as well as provided with physical assistance to complete task   Transfer training: NT   Gait training: NT    Skilled repositioning in bed to promote improved posture and improved cardiorespiratory function. PLAN:    Patient is making inconsistent progress towards established goals. Will continue with current POC.       Time in  0845  Time out  0915    Total Treatment Time  30 minutes     CPT codes:  [] Gait training 09699 0 minutes  [] Manual therapy 32952 0 minutes  [x] Therapeutic activities 27369 30 minutes  [] Therapeutic exercises 96387 0 minutes  [] Neuromuscular reeducation [address] 0 minutes    Jannie Senior, PT, DPT  License [id number]

## 2021-12-11 NOTE — CONSULTS
Palliative Care Department  Palliative Care Initial Consult  Provider: Mike Roy [facility] Day: 17  Date of Initial Consult: 12/8/2021  Referring Provider: Dr. Venda Schilder was consulted for assistance with: Code status Discussion, Assist with goals of care and Family Support    Chief Complaint: Marichuy Navas is a 59 y.o. female with chief complaint of back pain    HPI:   Marichuy Navas is a 59 y.o. female with significant medical history of alcohol abuse, hepatitis C, liver cirrhosis, who was admitted on 11/25/2021 after presenting with back pain found to have L5 pathologic compression fracture, status post kyphoplasty. Pathology shows carcinoma, there is considerable concern of hepatocellular carcinoma, significant liver involvement including portal vein thrombus, as well as metastatic process throughout her lungs. She additionally had decompensated liver failure with hypoalbuminemia encephalopathy. She being followed closely by oncology, hepatobiliary surgery, and is status post liver biopsy. Palliative has been consulted to assist with goals of care and CODE STATUS. ASSESSMENT/PLAN:     Pertinent Hospital Diagnoses:  Current medical issues leading to Palliative Medicine involvement include   Active Hospital Problems    Diagnosis Date Noted    Metastatic cancer to bone [facility]) [C79.51]     [facility]at[facility] [C22.0]     Closed compression fracture of L5 vertebra ([facility] [S32.050A] 11/25/2021    Chronic obstructive pulmonary disease (HCC) [J44.9]     Hepatic cirrhosis ([facility] [K74.60]     Hypertension [I10]     Depression [F32. A]     Hyperlipidemia [E78.5]     Seizures ([facility] [address] [R56.9]     Nicotine dependence [F17.200]     Alcohol abuse [F10.10]          Palliative Care Encounter / Counseling Regarding Goals of Care:  Please see detailed goals of care discussion as below.    At this time, Marichuy Navas, Does Not have capacity for medical decision-making. Capacity is time limited and situation/question specific.  During encounter patient's  was surrogate medical decision-maker   Outcome of goals of care meeting: Spoke to  regarding her condition, she will not get any better. No wants to take her home so she can passively peacefully with hospice   Code status: DNR CC   Advanced Directives: None   Surrogate/Legal Lilliam Mensah ([phone]) is the patient's     There are no further PM needs at this time. PM will now sign off. If new PM needs arise, please re-consult. Thank you. Referrals: none today    SUBJECTIVE:   Events/Discussions:  Chart reviewed, patient seen the bedside her  was present. We had a discussion regarding her current condition, she is not can improve. She will continue declining, she has multiple lesions throughout her liver and bones. She has had a difficult life, recommended hospice care for her. Change CODE STATUS to DNR CC, as he does not want to keep her on the vent or provide resuscitation for her.      Past Medical History:   Diagnosis Date    Alcohol abuse     Cerebral artery occlusion with cerebral infarction ([address]     Depression     Diverticulosis of colon     Elevated blood sugar     H/O cardiovascular stress test 11/13/2021    Nuclear Lexiscan Stress Test    Hepatitis C     from blood transfusion, underwent treatment per patient and \"cured\"    Hyperlipidemia     Hypertension     Liver cirrhosis ([address]     Magnesium deficiency     Marijuana user     Nicotine dependence     Seizures ([address]     Subclinical hypothyroidism        Past Surgical History:   Procedure Laterality Date    CT NEEDLE BIOPSY LIVER PERCUTANEOUS  12/6/2021    CT NEEDLE BIOPSY LIVER PERCUTANEOUS 12/6/2021 Joelle Medina MD [facility] CT    DILATION AND CURETTAGE OF UTERUS      LIVER BIOPSY  07/05/2016    SPINE SURGERY N/A 11/30/2021    KYPHOPLASTY, L5 performed by Hugo Chu MD at [address] [place] [address] Family History   Problem Relation Age of Onset    Cancer Mother         Breast    Alcohol Abuse Father     Drug Abuse Father     Cancer Brother     Alcohol Abuse Brother     Drug Abuse Brother        Allergies   Allergen Reactions    Codeine Nausea And Vomiting       ROS: UNLESS STATED ABOVE PATIENT DENIES:  CONSTITUTIONAL:  fever, chill, rigors, nausea, vomiting, fatigue. HEENT: blurry vision, double vision, hearing problem, tinnitus, hoarseness, dysphagia, odynophagia  RESPIRATORY: cough, shortness of breath, sputum expectoration. CARDIOVASCULAR:  Chest pain/pressure, palpitation, syncope, irregular beats  GASTROINTESTINAL:  abdominal or rectal pain, diarrhea, constipation, . GENITOURINARY:  Burning, frequency, urgency, incontinence, discharge  INTEGUMENTARY: rash, wound, pruritis  HEMATOLOGIC/LYMPHATIC:  Swelling, sores, gum bleeding, easy bruising, pica. MUSCULOSKELETAL:  pain, edema, joint swelling or redness  NEUROLOGICAL:  light headed, dizziness, loss of consciousness, weakness, change in memory, seizures, tremors    OBJECTIVE:   Prognosis: Poor    Physical Exam:  BP (!) 125/53   Pulse 114   Temp 99.8 °F (37.7 °C) (Axillary)   Resp 20   Ht 4' 10\" (1.473 m)   Wt 175 lb (79.4 kg)   SpO2 92%   BMI 36.58 kg/m²     Gen:  Alert, ill-appearing, in no acute distress  HEENT:  Normocephalic, no drainage, mucosa moist  Neck:  Supple  Lungs: No increased work of breathing noted  Heart: Tachycardia present  Abd: Distended  M/S/Ext:  Moving all extremities, + edema, pulses present  Skin:  Warm and dry  Neuro:  PERRL, Alert, disoriented, following commands    Objective data reviewed: labs, images, records, medication use, vitals and chart    Time/Communication:  Greater than 50% of time spent, total 70 minutes in counseling and coordination of care at the bedside regarding goals of care, diagnosis and prognosis and see above.     Jose G Valle MD  Palliative Medicine    Patient and the plan of care discussed with the other IDT members of Palliative Care Team, and with consultants, Primary Attending, patient, family and floor nurse, as appropriate and available. Thank you for allowing Palliative Medicine to participate in the care of Kevin Tierney. Note: This report was completed using computerMake Music [employer] voiced recognition software. Every effort has been made to ensure accuracy; however, inadvertent computerized transcription errors may be present.

## 2021-12-11 NOTE — CONSULTS
Nephrology Consult  [facility]  Josefina Jorge MD    CC:   Hypernatremia, hypokalemia    HPI:   The pt is a 60 yo female with a pmh of etoh, cva, htn, hyperlipidemia, cirrhosis, hepatitis C, tobacco abuse of 3-4 ppd,  who was admitted to [facility] for back pain and found to have a L5 compression fracture. She was transferred here for kyphoplasty 11/30. Ct hsowed liver lesions and portal vein thrombosis and pulm lesions. She underwent liver biopsy  12/6. kyphoplasty pathology showed metastatic HCC. She is seeing id for possible septic phlebitis of the thrombosed portal vein and asp pneumonia and is on unasyn. afp 1829. Labs have shown na 159 k 2.9, co2 31, bun 18, cr 0.6, alb 2.2, wbc 14, hgb 12.7, plt 198. She was ordered 30 meq iv kcl today and is on no fluids.          PMH:    Past Medical History:   Diagnosis Date    Alcohol abuse     Cerebral artery occlusion with cerebral infarction ([address]     Depression     Diverticulosis of colon     Elevated blood sugar     H/O cardiovascular stress test 11/13/2021    Nuclear Lexiscan Stress Test    Hepatitis C     from blood transfusion, underwent treatment per patient and \"cured\"    Hyperlipidemia     Hypertension     Liver cirrhosis ([address]     Magnesium deficiency     Marijuana user     Nicotine dependence     Seizures ([address]     Subclinical hypothyroidism        Patient Active Problem List   Diagnosis    Acute respiratory failure with hypoxia (HCC)    Alcohol abuse    Cerebral artery occlusion with cerebral infarction ([address]    Hypertension    Depression    Hyperlipidemia    Seizures (HCC)    Alcoholic intoxication without complication ([address]    Hyponatremia    Nicotine dependence    Marijuana user    Fall at home, initial encounter    Acute on chronic diastolic (congestive) heart failure (HCC)    Pedal edema    Chronic obstructive pulmonary disease ([address]    Tobacco abuse    Hepatic cirrhosis ([address]    Hypokalemia    Hypomagnesemia    Dehydration    Compression fracture of first lumbar vertebra with nonunion    Closed compression fracture of L5 vertebra (HCC)    Hepatocellular carcinoma (HCC)    Closed compression fracture of L5 lumbar vertebra, initial encounter (HCC)    Metastatic cancer to bone (HCC)       Meds:     potassium chloride  10 mEq IntraVENous Q1H    lidocaine  5 mL IntraDERmal Once    sodium chloride flush  5-40 mL IntraVENous 2 times per day    heparin flush  1 mL IntraVENous 2 times per day    ampicillin-sulbactam  3,000 mg IntraVENous Q6H    levetiracetam  500 mg IntraVENous BID    enoxaparin  1 mg/kg SubCUTAneous BID    lactulose  30 g Oral TID    gabapentin  200 mg Oral TID    lidocaine  1 patch TransDERmal Daily    [Held by provider] amLODIPine  2.5 mg Oral Daily    ARIPiprazole  5 mg Oral Daily    [Held by provider] atenolol  25 mg Oral Daily    atorvastatin  40 mg Oral Nightly    citalopram  20 mg Oral Daily    levothyroxine  25 mcg Oral Daily    [Held by provider] lisinopril  10 mg Oral Daily    magnesium oxide  400 mg Oral Daily    therapeutic multivitamin-minerals  1 tablet Oral Daily    nicotine  1 patch TransDERmal Daily    rifaximin  550 mg Oral BID    vitamin B-1  100 mg Oral Daily        sodium chloride         Meds prn:     potassium chloride **OR** potassium alternative oral replacement **OR** potassium chloride, sodium chloride flush, sodium chloride, heparin flush, cyclobenzaprine, oxyCODONE, haloperidol, ondansetron, ondansetron, polyethylene glycol, acetaminophen **OR** acetaminophen, magnesium hydroxide    Meds prior to admission:     No current facility-administered medications on file prior to encounter.      Current Outpatient Medications on File Prior to Encounter   Medication Sig Dispense Refill    magnesium oxide (MAG-OX) 400 MG tablet Take 1 tablet by mouth daily 30 tablet 1    albuterol (PROVENTIL) (2.5 MG/3ML) 0.083% nebulizer solution Take 3 mLs by nebulization every 6 hours as needed for Wheezing 120 each 3    lisinopril (PRINIVIL;ZESTRIL) 10 MG tablet Take 1 tablet by mouth daily 30 tablet 1    haloperidol (HALDOL) 0.5 MG tablet Take 1 tablet by mouth every 6 hours as needed (agitation) 120 tablet 3    atenolol (TENORMIN) 25 MG tablet Take 1 tablet by mouth daily 30 tablet 3    polyethylene glycol (GLYCOLAX) 17 g packet Take 17 g by mouth daily as needed for Constipation 527 g 1    Multiple Vitamins-Minerals (THERAPEUTIC MULTIVITAMIN-MINERALS) tablet Take 1 tablet by mouth daily 1 tablet 0    thiamine mononitrate (THIAMINE) 100 MG tablet Take 1 tablet by mouth daily 1 tablet 0    lactulose (CHRONULAC) 10 GM/15ML solution Take 30 mLs by mouth 2 times daily 1800 mL 1    rifaximin (XIFAXAN) 550 MG tablet Take 1 tablet by mouth 2 times daily 42 tablet 5    nicotine (NICODERM CQ) 21 MG/24HR Place 1 patch onto the skin daily 30 patch 3    levothyroxine (SYNTHROID) 25 MCG tablet take 1 tablet by mouth once daily 90 tablet 1    amLODIPine (NORVASC) 5 MG tablet take 1 tablet by mouth once daily 90 tablet 1    atorvastatin (LIPITOR) 40 MG tablet take 1 tablet by mouth once daily 90 tablet 1    citalopram (CELEXA) 40 MG tablet take 1 tablet by mouth once daily 90 tablet 1    clopidogrel (PLAVIX) 75 MG tablet take 1 tablet by mouth once daily 90 tablet 1    levETIRAcetam (KEPPRA) 500 MG tablet take 1 tablet by mouth twice a day 180 tablet 1    ARIPiprazole (ABILIFY) 5 MG tablet take 1 tablet by mouth once daily 30 tablet 0    ondansetron (ZOFRAN-ODT) 4 MG disintegrating tablet Take 1 tablet by mouth every 8 hours as needed for Nausea or Vomiting 1 tablet 0    ondansetron (ZOFRAN) 4 MG/2ML injection Infuse 2 mLs intravenously every 6 hours as needed for Nausea or Vomiting 84 mL 0       Allergies:    Codeine    Social History:     reports that she has been smoking. She started smoking about 51 years ago. She has a 49.00 pack-year smoking history.  She has never used smokeless tobacco. She reports current alcohol use of about 28.0 standard drinks of alcohol per week. She reports current drug use. Frequency: 7.00 times per week. Drug: Marijuana Chrissy Luis M).     Family History:         Problem Relation Age of Onset    Cancer Mother         Breast    Alcohol Abuse Father     Drug Abuse Father     Cancer Brother     Alcohol Abuse Brother     Drug Abuse Brother        ROS:     General: co back pain  Heent: no nasal congestion, sore throat   Resp: no cough, sob , hemoptysis  Cardiac: no cp , le edema, palpitations  Gi: diarrhea   Gu: no hematuria, dysuria   Neruo: no numbness, weakness, headache, blurry vision   Endocrine:  no h/o dm  Derm: no rash , petechia  Heme: no epistaxis, bruising  All other sx negative     Physical Exam:      Patient Vitals for the past 24 hrs:   BP Temp Temp src Pulse Resp SpO2   12/11/21 0507 (!) 125/53 99.8 °F (37.7 °C) Axillary 114 20 92 %   12/10/21 1550 129/62 99 °F (37.2 °C) Oral 108 22 93 %         Intake/Output Summary (Last 24 hours) at 12/11/2021 1347  Last data filed at 12/11/2021 0510  Gross per 24 hour   Intake 1117 ml   Output 1075 ml   Net 42 ml       Constitutional: Patient in no acute distress   Head: normocephalic, atraumatic   Neck: supple, no jvd  Cardiovascular: regular rate and rhythm, no murmurs, gallops, or rubs   Respiratory: Clear, no rales, rhochi, or wheezes,   Gastrointestinal: soft, nontender, nondistended, no hepatosplenomegaly  Ext: tr edema  Neuro: oriented to self only  Skin: dry, no rash   Back: nontender    Data:    Recent Labs     12/09/21  0446 12/10/21  0521 12/11/21  0549   WBC 13.1* 20.4* 14.5*   HGB 14.5 13.7 12.7   HCT 44.8 41.5 37.5   MCV 99.1 98.1 93.8    209 198       Recent Labs     12/09/21  0446 12/10/21  0521 12/11/21  0549   * 152* 159*   K 2.8* 3.2* 2.9*   * 115* 121*   CO2 29 29 31*   CREATININE 0.5 0.6 0.6   BUN 18 14 18   LABGLOM >60 >60 >60   GLUCOSE 116* 99 104*   CALCIUM 11.4* 10.9* 11.1* MG 2.1 2.0 2.1       No results found for: VITD25    No results found for: PTH    Recent Labs     12/09/21  0446 12/10/21  0521 12/11/21  0549   * 274* 259*   * 221* 229*   ALKPHOS 175* 164* 192*   BILITOT 1.6* 1.4* 1.1   BILIDIR 0.6* 0.5* 0.4*       Recent Labs     12/09/21  0446 12/10/21  0521 12/11/21  0549   LABALBU 2.2* 2.2* 2.2*       No results found for: FERRITIN, IRON, TIBC    Vitamin B-12   Date Value Ref Range Status   12/03/2019 625 211 - 946 pg/mL Final       Folate   Date Value Ref Range Status   12/03/2019 6.2 4.8 - 24.2 ng/mL Final         Lab Results   Component Value Date    COLORU Yellow 12/10/2021    NITRU Negative 12/10/2021    GLUCOSEU Negative 12/10/2021    KETUA TRACE 12/10/2021    UROBILINOGEN 0.2 12/10/2021    BILIRUBINUR Negative 12/10/2021       No results found for: AMANDA, CREURRAN, MACREATRATIO, OSMOU    No components found for: URIC    No results found for: LIPIDPAN      Assessment and Plan:    1. Hypernatremia  In setting of free water deficit  Pt with little po and has diarrhea on lactulose  Start d5  Follow na q 12    2. Hypokalemia  In setting of diarrhea and decreased po  Contraction alkalosis  k replacment iv    3. Asp pna  On abx per id    4. [address]  Mets to bone pulm  onc seeing  Sp kypho for L5 fx    5. Cirrhosis  Hep c and etoh  Sp ciwa protocol    6. Tobacco abuse    7. htn  norvasc and bb held due to hypotension    [facility]  Cherie Mixon MD

## 2021-12-11 NOTE — PROGRESS NOTES
Hospitalist Progress Note      SYNOPSIS: Patient admitted on 2021 for Closed compression fracture of L5 vertebra. The pt initially presented to [facility] ER with back pain, weakness, fatigue. She had CT scan for back pain showing L5 compression fracture and was transferred to [facility] (1-) for NSG kyphoplasty. with pathology at that time showing carcinoma. Imaging was also concerning for wide spread metastatic HCC with PV involvement, with multiple LNs and pulmonary involvement. She underwent liver biopsy today to confirm. AFP not resulted yet. Sodium worsening and Nephrology consulted. Hospice and palliative on but the pt continues to be full code. SUBJECTIVE:    Patient seen and examined  Records reviewed. The pt states that she has some back pain but is comfortable. She is confused and thinks she is going home. Electrolytes worsening. Sodium high and potassium low. .   Temp (24hrs), Av.4 °F (37.4 °C), Min:99 °F (37.2 °C), Max:99.8 °F (37.7 °C)    DIET: ADULT DIET; Regular  ADULT ORAL NUTRITION SUPPLEMENT; Breakfast, Lunch, Dinner; Standard High Calorie/High Protein Oral Supplement  ADULT ORAL NUTRITION SUPPLEMENT; Breakfast, Dinner; Wound Healing Oral Supplement  CODE: Prior    Intake/Output Summary (Last 24 hours) at 2021 0953  Last data filed at 2021 0510  Gross per 24 hour   Intake 1117 ml   Output 1075 ml   Net 42 ml       OBJECTIVE:    BP (!) 125/53   Pulse 114   Temp 99.8 °F (37.7 °C) (Axillary)   Resp 20   Ht 4' 10\" (1.473 m)   Wt 175 lb (79.4 kg)   SpO2 92%   BMI 36.58 kg/m²     General appearance: ill appearing, No apparent distress, appears stated age and cooperative. HEENT:  Conjunctivae/corneas clear. Neck: Supple. No jugular venous distention. Respiratory: Diminished,  Clear to auscultation bilaterally  Cardiovascular: Regular rate rhythm, normal S1-S2  Abdomen: Soft, nontender, nondistended  Musculoskeletal: No clubbing, cyanosis, no bilateral lower extremity edema. 100 mg Oral Daily     PRN Meds: sodium chloride flush, sodium chloride, heparin flush, cyclobenzaprine, oxyCODONE, haloperidol, ondansetron, ondansetron, polyethylene glycol, acetaminophen **OR** acetaminophen, magnesium hydroxide    Labs:     Recent Labs     12/09/21 0446 12/10/21  0521 12/11/21  0549   WBC 13.1* 20.4* 14.5*   HGB 14.5 13.7 12.7   HCT 44.8 41.5 37.5    209 198       Recent Labs     12/09/21  0446 12/10/21  0521 12/11/21  0549   * 152* 159*   K 2.8* 3.2* 2.9*   * 115* 121*   CO2 29 29 31*   BUN 18 14 18   CREATININE 0.5 0.6 0.6   CALCIUM 11.4* 10.9* 11.1*       Recent Labs     12/09/21  0446 12/10/21  0521 12/11/21  0549   PROT 6.7 7.0 6.8   ALKPHOS 175* 164* 192*   * 274* 259*   * 221* 229*   BILITOT 1.6* 1.4* 1.1       Recent Labs     12/09/21  0446 12/10/21  0750 12/11/21  0549   INR 1.8 1.7 1.6       No results for input(s): CKTOTAL, TROPONINI in the last 72 hours. Chronic labs:    Lab Results   Component Value Date    CHOL 122 11/10/2021    TRIG 129 11/10/2021    HDL 21 11/10/2021    LDLCALC 75 11/10/2021    TSH 3.450 11/20/2021    INR 1.6 12/11/2021    LABA1C 5.9 (H) 11/09/2021       Radiology: REVIEWED DAILY    +++++++++++++++++++++++++++++++++++++++++++++++++  Segun Olsen MD  [employer] - [facility], [place]  +++++++++++++++++++++++++++++++++++++++++++++++++  NOTE: This report was transcribed using voice recognition software. Every effort was made to ensure accuracy; however, inadvertent computerized transcription errors may be present.

## 2021-12-11 NOTE — PROGRESS NOTES
Referral received. Chart reviewed. Call placed to vonda Rjlali Rahmankeltonapoorva. No answer. Left message. Will await return call.

## 2021-12-12 NOTE — PROGRESS NOTES
Nephrology Consult  [facility]  Samuel Jorge MD    CC:   Hypernatremia, hypokalemia    HPI:   The pt is a 58 yo female with a pmh of etoh, cva, htn, hyperlipidemia, cirrhosis, hepatitis C, tobacco abuse of 3-4 ppd,  who was admitted to [facility] for back pain and found to have a L5 compression fracture. She was transferred here for kyphoplasty 11/30. Ct hsowed liver lesions and portal vein thrombosis and pulm lesions. She underwent liver biopsy  12/6. kyphoplasty pathology showed metastatic HCC. She is seeing id for possible septic phlebitis of the thrombosed portal vein and asp pneumonia and is on unasyn. afp 1829. Labs have shown na 159 k 2.9, co2 31, bun 18, cr 0.6, alb 2.2, wbc 14, hgb 12.7, plt 198. She was ordered 30 meq iv kcl today and is on no fluids.          PMH:    Past Medical History:   Diagnosis Date    Alcohol abuse     Cerebral artery occlusion with cerebral infarction ([address]     Depression     Diverticulosis of colon     Elevated blood sugar     H/O cardiovascular stress test 11/13/2021    Nuclear Lexiscan Stress Test    Hepatitis C     from blood transfusion, underwent treatment per patient and \"cured\"    Hyperlipidemia     Hypertension     Liver cirrhosis ([address]     Magnesium deficiency     Marijuana user     Nicotine dependence     Seizures ([address]     Subclinical hypothyroidism        Patient Active Problem List   Diagnosis    Acute respiratory failure with hypoxia (HCC)    Alcohol abuse    Cerebral artery occlusion with cerebral infarction ([address]    Hypertension    Depression    Hyperlipidemia    Seizures (HCC)    Alcoholic intoxication without complication ([address]    Hyponatremia    Nicotine dependence    Marijuana user    Fall at home, initial encounter    Acute on chronic diastolic (congestive) heart failure (HCC)    Pedal edema    Chronic obstructive pulmonary disease ([address]    Tobacco abuse    Hepatic cirrhosis ([address]    Hypokalemia    Hypomagnesemia    Dehydration    Compression fracture of first lumbar vertebra with nonunion    Closed compression fracture of L5 vertebra (HCC)    Hepatocellular carcinoma (HCC)    Closed compression fracture of L5 lumbar vertebra, initial encounter (HCC)    Metastatic cancer to bone (HCC)       Meds:     lidocaine  5 mL IntraDERmal Once    sodium chloride flush  5-40 mL IntraVENous 2 times per day    heparin flush  1 mL IntraVENous 2 times per day    ampicillin-sulbactam  3,000 mg IntraVENous Q6H    levetiracetam  500 mg IntraVENous BID    enoxaparin  1 mg/kg SubCUTAneous BID    lactulose  30 g Oral TID    gabapentin  200 mg Oral TID    lidocaine  1 patch TransDERmal Daily    [Held by provider] amLODIPine  2.5 mg Oral Daily    ARIPiprazole  5 mg Oral Daily    [Held by provider] atenolol  25 mg Oral Daily    atorvastatin  40 mg Oral Nightly    citalopram  20 mg Oral Daily    levothyroxine  25 mcg Oral Daily    [Held by provider] lisinopril  10 mg Oral Daily    magnesium oxide  400 mg Oral Daily    therapeutic multivitamin-minerals  1 tablet Oral Daily    nicotine  1 patch TransDERmal Daily    rifaximin  550 mg Oral BID    vitamin B-1  100 mg Oral Daily        dextrose      sodium chloride         Meds prn:     potassium chloride **OR** potassium alternative oral replacement **OR** potassium chloride, sodium chloride flush, sodium chloride, heparin flush, cyclobenzaprine, oxyCODONE, haloperidol, ondansetron, ondansetron, polyethylene glycol, acetaminophen **OR** acetaminophen, magnesium hydroxide    Meds prior to admission:     No current facility-administered medications on file prior to encounter.      Current Outpatient Medications on File Prior to Encounter   Medication Sig Dispense Refill    magnesium oxide (MAG-OX) 400 MG tablet Take 1 tablet by mouth daily 30 tablet 1    albuterol (PROVENTIL) (2.5 MG/3ML) 0.083% nebulizer solution Take 3 mLs by nebulization every 6 hours as needed for Wheezing 120 each 3    lisinopril (PRINIVIL;ZESTRIL) 10 MG tablet Take 1 tablet by mouth daily 30 tablet 1    haloperidol (HALDOL) 0.5 MG tablet Take 1 tablet by mouth every 6 hours as needed (agitation) 120 tablet 3    polyethylene glycol (GLYCOLAX) 17 g packet Take 17 g by mouth daily as needed for Constipation 527 g 1    thiamine mononitrate (THIAMINE) 100 MG tablet Take 1 tablet by mouth daily 1 tablet 0    lactulose (CHRONULAC) 10 GM/15ML solution Take 30 mLs by mouth 2 times daily 1800 mL 1    rifaximin (XIFAXAN) 550 MG tablet Take 1 tablet by mouth 2 times daily 42 tablet 5    nicotine (NICODERM CQ) 21 MG/24HR Place 1 patch onto the skin daily 30 patch 3    levothyroxine (SYNTHROID) 25 MCG tablet take 1 tablet by mouth once daily 90 tablet 1    amLODIPine (NORVASC) 5 MG tablet take 1 tablet by mouth once daily 90 tablet 1    citalopram (CELEXA) 40 MG tablet take 1 tablet by mouth once daily 90 tablet 1    clopidogrel (PLAVIX) 75 MG tablet take 1 tablet by mouth once daily 90 tablet 1    levETIRAcetam (KEPPRA) 500 MG tablet take 1 tablet by mouth twice a day 180 tablet 1    ARIPiprazole (ABILIFY) 5 MG tablet take 1 tablet by mouth once daily 30 tablet 0    ondansetron (ZOFRAN-ODT) 4 MG disintegrating tablet Take 1 tablet by mouth every 8 hours as needed for Nausea or Vomiting 1 tablet 0    ondansetron (ZOFRAN) 4 MG/2ML injection Infuse 2 mLs intravenously every 6 hours as needed for Nausea or Vomiting 84 mL 0       Allergies:    Codeine    Social History:     reports that she has been smoking. She started smoking about 51 years ago. She has a 49.00 pack-year smoking history. She has never used smokeless tobacco. She reports current alcohol use of about 28.0 standard drinks of alcohol per week. She reports current drug use. Frequency: 7.00 times per week. Drug: Marijuana Juni Blow).     Family History:         Problem Relation Age of Onset    Cancer Mother         Breast    Alcohol Abuse Father     Drug Abuse Father     Cancer Brother     Alcohol Abuse Brother     Drug Abuse Brother        ROS:     General: co back pain  Heent: no nasal congestion, sore throat   Resp: no cough, sob , hemoptysis  Cardiac: no cp , le edema, palpitations  Gi: diarrhea   Gu: no hematuria, dysuria   Neruo: no numbness, weakness, headache, blurry vision   Endocrine:  no h/o dm  Derm: no rash , petechia  Heme: no epistaxis, bruising  All other sx negative     Physical Exam:      Patient Vitals for the past 24 hrs:   BP Temp Temp src Pulse Resp SpO2   12/12/21 0830 (!) 142/61 98.4 °F (36.9 °C) Axillary 117 28 94 %   12/11/21 2138 134/60 98.9 °F (37.2 °C) Axillary 110 20 94 %   12/11/21 1520 (!) 150/43 98.4 °F (36.9 °C) Axillary 108 22 93 %         Intake/Output Summary (Last 24 hours) at 12/12/2021 1130  Last data filed at 12/12/2021 7128  Gross per 24 hour   Intake 1100 ml   Output 1300 ml   Net -200 ml       Constitutional: Patient in no acute distress   Head: normocephalic, atraumatic   Neck: supple, no jvd  Cardiovascular: regular rate and rhythm, no murmurs, gallops, or rubs   Respiratory: Clear, no rales, rhochi, or wheezes,   Gastrointestinal: soft, nontender, nondistended, no hepatosplenomegaly  Ext: tr edema  Neuro: oriented to self only  Skin: dry, no rash   Back: nontender    Data:    Recent Labs     12/10/21  0521 12/11/21  0549 12/12/21  0453   WBC 20.4* 14.5* 13.9*   HGB 13.7 12.7 12.9   HCT 41.5 37.5 39.7   MCV 98.1 93.8 98.3    198 187       Recent Labs     12/10/21  0521 12/11/21  0549 12/12/21  0453   * 159* 162*   K 3.2* 2.9* 3.2*   * 121* 124*   CO2 29 31* 27   CREATININE 0.6 0.6 0.6   BUN 14 18 17   LABGLOM >60 >60 >60   GLUCOSE 99 104* 91   CALCIUM 10.9* 11.1* 11.2*   MG 2.0 2.1 2.3       No results found for: VITD25    No results found for: PTH    Recent Labs     12/10/21  0521 12/11/21  0549 12/12/21  0453   * 259* 264*   * 229* 268*   ALKPHOS 164* 192* 175*   BILITOT 1.4* 1.1 1.6* BILIDIR 0.5* 0.4* 0.6*       Recent Labs     12/10/21  0521 12/11/21  0549 12/12/21  0453   LABALBU 2.2* 2.2* 2.2*       No results found for: FERRITIN, IRON, TIBC    Vitamin B-12   Date Value Ref Range Status   12/03/2019 625 211 - 946 pg/mL Final       Folate   Date Value Ref Range Status   12/03/2019 6.2 4.8 - 24.2 ng/mL Final         Lab Results   Component Value Date    COLORU Yellow 12/10/2021    NITRU Negative 12/10/2021    GLUCOSEU Negative 12/10/2021    KETUA TRACE 12/10/2021    UROBILINOGEN 0.2 12/10/2021    BILIRUBINUR Negative 12/10/2021       No results found for: AMANDA, CREURRAN, MACREATRATIO, OSMOU    No components found for: URIC    No results found for: LIPIDPAN      Assessment and Plan:    1. Hypernatremia  In setting of free water deficit  Pt with little po and has diarrhea on lactulose  Start d5  Follow na q 12    2. Hypokalemia  In setting of diarrhea and decreased po  Contraction alkalosis  k replacment iv    3. Asp pna  On abx per id    4. [address]  Mets to bone pulm  onc seeing  Sp kypho for L5 fx    5. Cirrhosis  Hep c and etoh  Sp ciwa protocol    6. Tobacco abuse    7. htn  norvasc and bb held due to hypotension    8. Hypercalcemia  Will continue ivf  Since she is going to hospice have not ordered pth etc    Sonu Cramer.  Elsie Smith MD

## 2021-12-12 NOTE — CARE COORDINATION
12/12/21 DAELINA Note: Received a call regarding patients  requesting to take patient home with HOV hopefully today. Call placed to [facility] on call person, Carlitos Slade, and requested a return call from her to discuss possible discharge options. Will follow.  Electronically signed by Monique Duke RN CM on 12/12/2021 at 10:58 AM

## 2021-12-12 NOTE — DISCHARGE SUMMARY
Hospitalist Discharge Summary    Patient ID: Sascha Aggarwal   Patient : 1960  Patient's PCP: Farzaneh Correia DO    Admit Date: 2021   Admitting Physician: Chio Babcock MD    Discharge Date:  2021   Discharge Physician: Elvira Meza MD   Discharge Condition: Stable  Discharge Disposition: [facility] course in brief:  (Please refer to daily progress notes for a comprehensive review of the hospitalization by requesting medical records)    Patient admitted on 2021 for Closed compression fracture of L5 vertebra. The pt initially presented to [facility] ER with back pain, weakness, fatigue. She had CT scan for back pain showing L5 compression fracture and was transferred to [facility] (1[facility] for NSG kyphoplasty. with pathology at that time showing carcinoma. Imaging was also concerning for wide spread metastatic HCC with PV involvement, with multiple Lymph Nodess and pulmonary involvement. Pt has history of  Cirrhosis and Lactulose was given as well. She underwent liver biopsy to confirm by IR. Pt was started on Theraputic Lovenox for Portal Vein thrombosis. AFP 1829 and Hem Onc saw the pt and Hepatocellular Carcinoma is Extremely likely.  was updated about poor prognosis. ID we consulted for Aspiration pneumonia as well and antibiotics were given. The pt continued to to require supplemental oxygen for acute respiratory failure. Sodium continues to get worst even with IV fluids. Nephrology consulted as well. Mental status continued to get worst. Hospice and palliative were consulted. After discussions and given the poor prognosis, family decided to change the code status to [facility]. Hospice was following and the pt is to discharge to hospice house today.       Consults:   IP CONSULT TO SOCIAL WORK  IP CONSULT TO GENERAL SURGERY  IP CONSULT TO ONCOLOGY  IP CONSULT TO SOCIAL WORK  IP CONSULT TO INFECTIOUS DISEASES  IP CONSULT TO PALLIATIVE CARE  IP CONSULT TO HOSPICE  IP CONSULT TO NEPHROLOGY  IP CONSULT TO PALLIATIVE CARE  IP CONSULT TO HOSPICE    Discharge Diagnoses:    Metastatic Hepatocellular Carcinoma to the lungs nodes and bone  Hepatic Encephalopathy  Possible Aspiration pneumonia  Hypernatremia  Hypokalemia  Leukocytosis  Portal vein thrombosis  Alcohol abuse, and hospitalization complicated by alcohol withdrawal  Infectious vs Metastatic etiology. HLD  Depression  COPD  Acute hypoxic respiratory failure     Discharge Instructions / Follow up:    Future Appointments   Date Time Provider [place]   12/27/2021 12:30 PM Amado Metz MD [facility]   12/28/2021  1:15 PM [facility]       Continued appropriate risk factor modification of blood pressure, diabetes and serum lipids will remain essential to reducing risk of future atherosclerotic development    Activity: activity as tolerated    Significant labs:  CBC:   Recent Labs     12/10/21  0521 12/11/21  0549 12/12/21  0453   WBC 20.4* 14.5* 13.9*   RBC 4.23 4.00 4.04   HGB 13.7 12.7 12.9   HCT 41.5 37.5 39.7   MCV 98.1 93.8 98.3   RDW 15.6* 15.7* 15.7*    198 187     BMP:   Recent Labs     12/10/21  0521 12/11/21  0549 12/12/21  0453   * 159* 162*   K 3.2* 2.9* 3.2*   * 121* 124*   CO2 29 31* 27   BUN 14 18 17   CREATININE 0.6 0.6 0.6   MG 2.0 2.1 2.3     LFT:  Recent Labs     12/10/21  0521 12/11/21  0549 12/12/21  0453   PROT 7.0 6.8 6.1*   ALKPHOS 164* 192* 175*   * 259* 264*   * 229* 268*   BILITOT 1.4* 1.1 1.6*     PT/INR:   Recent Labs     12/10/21  0750 12/11/21  0549 12/12/21  0453   INR 1.7 1.6 1.8     BNP: No results for input(s): BNP in the last 72 hours.   Hgb A1C:   Lab Results   Component Value Date    LABA1C 5.9 (H) 11/09/2021     Folate and B12:   Lab Results   Component Value Date    LVWYWCNP18 625 12/03/2019   ,   Lab Results   Component Value Date    FOLATE 6.2 12/03/2019     Thyroid Studies:   Lab Results   Component Value Date    TSH 3.450 11/20/2021       Urinalysis:    Lab Results   Component Value Date    NITRU Negative 12/10/2021    WBCUA 0-1 12/10/2021    BACTERIA RARE 12/10/2021    RBCUA NONE 12/10/2021    BLOODU Negative 12/10/2021    SPECGRAV 1.010 12/10/2021    GLUCOSEU Negative 12/10/2021       Imaging:  CT ABDOMEN PELVIS W WO CONTRAST Additional Contrast? Radiologist Recommendation    Result Date: 12/2/2021  EXAMINATION: CT OF THE ABDOMEN AND PELVIS WITH AND WITHOUT CONTRAST 12/2/2021 4:02 pm TECHNIQUE: CT of the abdomen and pelvis was performed with and without the administration of intravenous contrast.  Multiplanar reformatted images are provided for review. Dose modulation, iterative reconstruction, and/or weight based adjustment of the mA/kV was utilized to reduce the radiation dose to as low as reasonably achievable. COMPARISON: CT chest 10/07/2020 HISTORY: ORDERING SYSTEM PROVIDED HISTORY: abdominal pain TECHNOLOGIST PROVIDED HISTORY: Reason for exam:->abdominal pain Additional Contrast?->Radiologist Recommendation What reading provider will be dictating this exam?->CRC FINDINGS: The visualized chest demonstrates new pulmonary nodules in the left lower lobe, the largest of which measures 12 mm x 12 mm. There are enlarged pericardial lymph nodes on the right mitral annular calcifications which are dense. Very large heterogeneous mass in the right hepatic lobe which was not clearly apparent on the comparison CT chest.  There appear to be multiple other smaller hepatic masses in both lobes, particularly the inferior aspect of the caudate lobe (series 2, image 66). Multiple enlarged mesenteric and retroperitoneal lymph nodes. There is a markedly enlarged right retroperitoneal lymph node at the level of the right kidney. Enlarged retrocrural lymph nodes. Thrombosis of the main portal vein is new from prior. Small spleen. Benign-appearing roughly 2.6 cm left adnexal cyst. No free air.   Findings suggestive of a mild ileus; oral contrast review. Dose modulation, iterative reconstruction, and/or weight based adjustment of the mA/kV was utilized to reduce the radiation dose to as low as reasonably achievable. COMPARISON: October 7, 2020 HISTORY: ORDERING SYSTEM PROVIDED HISTORY: metastatic workup TECHNOLOGIST PROVIDED HISTORY: Reason for exam:->metastatic workup What reading provider will be dictating this exam?->CRC FINDINGS: There is cardiomegaly with coronary artery calcification. The great vessels are normal.  Enlarged mediastinal lymph nodes are noted with lymph nodes measuring up to 2.7 x 2.1 cm in the paratracheal region, similar ones in the prevascular space and subcarinal region measuring 1.3 x 2.4 cm. Small lymph nodes in the hilum are noted. Large lymph nodes are identified in the supraclavicular region bilaterally, larger 1 on the left side measuring 1.6 cm. Trachea and major bronchi are patent. There are multiple bilateral pulmonary nodules ranging from a few mm with largest 1 in the left lower lobe measuring up to 1.4 cm. There is minimal atelectasis in lung bases. Multiple rib fractures are identified which could be pathologic. Partially identified are multiple heterogeneous masses in the liver, largest 1 in the right hepatic lobe measuring 12 x 12 cm. Additional smaller ones are also noted. Bilateral adrenal masses are identified, larger 1 on the right side measuring 2.4 x 2.1 cm. Lymph nodes are identified in the GE junction and retro crural area measuring up to 2 cm. Extensive widespread pulmonary metastasis with multiple bilateral pulmonary nodules with extensive lymphadenopathy in the mediastinum hilum and supraclavicular region. Heterogeneous masses in the liver concerning for liver malignancy with metastatic involvement in the adjacent lymph nodes and the adrenal glands and portal venous thrombosis. There may be possible developing bone metastasis as well.   Consider PET-CT scan     CT LUMBAR SPINE WO CONTRAST    Result Date: 11/22/2021  EXAMINATION: CT OF THE LUMBAR SPINE WITHOUT CONTRAST  11/22/2021 TECHNIQUE: CT of the lumbar spine was performed without the administration of intravenous contrast. Multiplanar reformatted images are provided for review. Adjustment of mA and/or kV according to patient size was utilized. Dose modulation, iterative reconstruction, and/or weight based adjustment of the mA/kV was utilized to reduce the radiation dose to as low as reasonably achievable. COMPARISON: None HISTORY: ORDERING SYSTEM PROVIDED HISTORY: VERTEBRAL COMPRESSION FRACTURE TECHNOLOGIST PROVIDED HISTORY: Reason for exam:->lumbar compression fracture FINDINGS: BONES/ALIGNMENT: 4 mm of retrolisthesis of L5 secondary to compression fracture of L5. Estimated 75% vertebral body height loss. Subtle foci of lucency throughout the vertebral body could be from osteopenia versus pathologic fracture. There is suspected soft tissue density versus migrated disc extrusion posterior to the vertebral body. Otherwise preserved vertebral body heights. DEGENERATIVE CHANGES: Multilevel facet arthrosis. Suspect multilevel disc bulging. Epidural lipomatosis in the lower lumbosacral spine contributing to at least moderate spinal canal stenosis at L4-S1. SOFT TISSUES/RETROPERITONEUM: Partially seen age-indeterminate 13 mm pulmonary nodule in the left lower lobe, not seen on CT chest from October 7, 2020. prominent retroperitoneal lymph nodes lymph nodes, less likely varicosities near the aorta and distal esophagus. Compression fracture of L5 with 4 mm of bony retropulsion. Heterogeneous lytic appearance of the L5 vertebral body is suspicious for pathologic fracture. Recommend correlation with PET-CT or lumbar spine MRI with and without contrast.  Malignancy or infection not excluded. Suspicious 13 mm left lower lobe pulmonary nodule, not present on exam from 2020.   Recommend dedicated CT chest. Mildly prominent indeterminate peritoneal and retroperitoneal lymph nodes. Metastatic disease not excluded. CT GUIDED NEEDLE PLACEMENT    Result Date: 2021  Patient MRN:  [id number] : 1960 Age: 59 years Gender: Female Order Date:  2021 10:00 AM EXAM: CT NEEDLE BIOPSY LIVER PERCUTANEOUS, CT GUIDED NEEDLE PLACEMENT NUMBER OF IMAGES:  87 INDICATION:  multiple hepatic masses - likely HCC multiple hepatic masses - likely [address]. What reading provider will be dictating this exam?->[facility] COMPARISON: None After obtaining informed consent and following the routine sterile prep and drape, a needle was inserted. Through this guide needle a biopsy needle was inserted. Multiple passes were made. Good specimen was obtained. Patient tolerated the procedure well. The procedure was performed under local anesthesia. Dylan Robe 1 second of fluoroscopy was utilized. A timeout was performed at 1039 hours . Patient was monitored for 60 minutes  by registered nurse. MEDS: 1mgm versed iv @ 1020; 50mcg fentanyl iv @ 1021    Successful uncomplicated CT and fluoroscopic guided liver mass biopsy. If there are any physician concerns regarding this report, a Radiologist can be reached by calling the following number [phone].      MRI THORACIC SPINE WO CONTRAST    Result Date: 2021  EXAMINATION: MRI OF THE THORACIC AND LUMBAR SPINE WITHOUT CONTRAST; MRI OF THE THORACIC SPINE WITHOUT CONTRAST, 2021  12:40 TECHNIQUE: Multiplanar multisequence MRI of the lumbar spine was performed without the administration of intravenous contrast.; Multiplanar multisequence MRI of the thoracic spine was performed without the administration of intravenous contrast. COMPARISON: Noncontrast CT of the lumbar spine 2021 and 10/22/2021; plain radiographs of the lumbar spine 2021; noncontrast MRI of the lumbar spine 2018 HISTORY: ORDERING SYSTEM PROVIDED HISTORY: Compression Fractures TECHNOLOGIST PROVIDED HISTORY: Reason for exam:->Compression Fractures FINDINGS: These exams are limited by patient motion, in addition to the lack of intravenous contrast.  Given these factors: OSSEOUS STRUCTURES/ALIGNMENT: There is acute/subacute compression deformity, with approximate 75%, central-posterior loss of L5 vertebral body height and moderate, retropulsion of the L5 vertebral body margin. Along with below-described epidural lipomatosis, this contributes to severe/marked central spinal canal stenosis/functional narrowing of the traversing thecal sac, as well as central spinal canal stenosis/neural foraminal narrowing at the L4/5 and L5/S1 levels as detailed below. Chronic, multilevel, minimal to mild, asymmetric vertebral compression deformities are otherwise most notable anteriorly at T9, progressively lessening both superiorly and inferiorly throughout the remainder of the thoracic spine, further contributing to a mildly-exaggerated thoracic kyphosis, in addition to a moderately-exaggerated lumbosacral junction lordosis. There is otherwise nonspecific, moderate diffuse background marrow cellularity, greater than expected for the patient's age. SPINAL CORD/CONUS MEDULLARIS/CAUDA EQUINA: At the level of the sacrum, there is moderate circumferential epidural lipomatosis, producing severe functional narrowing of the caudal thecal sac, all progressively lessening superiorly through the lumbar region. The conus medullaris terminates posterior to the L1/2 intervertebral disc space. Given technical artifacts, it, and the visualized spinal cord, appear otherwise unremarkable. However, there is variable nerve root contact/compression equivalent to the below-described central spinal canal stenoses/neural foraminal narrowing. DEGENERATIVE DISEASE: All visualized intervertebral discs are moderately to severely dehydrated, and show mild to moderate asymmetric disc height loss, with trace to moderate mixed discogenic endplate change, overall most notable at L4/5 and L5/S1.  At L4/5 and L5/S1 there is mild/moderate circumferential disc-osteophyte complex, asymmetric posterior, and moderate  bilateral facet arthropathy, and moderate circumferential epidural lipomatosis. These further contribute to moderate/severe central spinal canal stenosis and equivalent bilateral neural foraminal narrowing, overall slightly worse at L5/S1. Lesser degenerative disease is scattered throughout the remainder of the visualized thoracic and lumbar spine, without significant identified central spinal canal stenosis or neural foraminal narrowing. SOFT TISSUES: There is moderate/severe gluteal and inferoposterior paraspinal muscular atrophy, lessening superiorly. No other clinically-significant changes are noted. .     1. Limited exams 2. Acute/subacute compression deformity, with approximate 75%, central-posterior loss of L5 vertebral body height and moderate, retropulsion of the L5 vertebral body margin. Along with moderate circumferential epidural lipomatosis, this contributes to severe/marked central spinal canal stenosis/functional narrowing of the traversing thecal sac posterior to the L5 vertebral body. 3.  Chronic multilevel asymmetric vertebral compression deformities throughout the remainder of the thoracic and lumbar spine, as described, further contributing to a mildly-exaggerated thoracic kyphosis, in addition to a moderately-exaggerated lumbosacral junction lordosis. 4.  Multilevel degenerative disease, overall most notable at L4/5 and L5/S1, where there is moderate/severe central spinal canal stenosis and equivalent bilateral neural foraminal narrowing, overall slightly worse at L5/S1. 5.   Nonspecific, moderate diffuse background marrow cellularity, greater than expected for the patient's age. 6.   Moderate/severe gluteal and inferoposterior paraspinal muscular atrophy, lessening superiorly.  Findings were sent to the [facility] Results [id number] at 2:03 pm on 11/23/2021 to be communicated to a licensed caregiver. Attempts to contact the referring physician are currently in progress. Tova Arias MRI LUMBAR SPINE WO CONTRAST    Result Date: 11/23/2021  EXAMINATION: MRI OF THE THORACIC AND LUMBAR SPINE WITHOUT CONTRAST; MRI OF THE THORACIC SPINE WITHOUT CONTRAST, 11/23/2021  12:40 TECHNIQUE: Multiplanar multisequence MRI of the lumbar spine was performed without the administration of intravenous contrast.; Multiplanar multisequence MRI of the thoracic spine was performed without the administration of intravenous contrast. COMPARISON: Noncontrast CT of the lumbar spine 11/22/2021 and 10/22/2021; plain radiographs of the lumbar spine 11/09/2021; noncontrast MRI of the lumbar spine 02/12/2018 HISTORY: ORDERING SYSTEM PROVIDED HISTORY: Compression Fractures TECHNOLOGIST PROVIDED HISTORY: Reason for exam:->Compression Fractures FINDINGS: These exams are limited by patient motion, in addition to the lack of intravenous contrast.  Given these factors: OSSEOUS STRUCTURES/ALIGNMENT: There is acute/subacute compression deformity, with approximate 75%, central-posterior loss of L5 vertebral body height and moderate, retropulsion of the L5 vertebral body margin. Along with below-described epidural lipomatosis, this contributes to severe/marked central spinal canal stenosis/functional narrowing of the traversing thecal sac, as well as central spinal canal stenosis/neural foraminal narrowing at the L4/5 and L5/S1 levels as detailed below. Chronic, multilevel, minimal to mild, asymmetric vertebral compression deformities are otherwise most notable anteriorly at T9, progressively lessening both superiorly and inferiorly throughout the remainder of the thoracic spine, further contributing to a mildly-exaggerated thoracic kyphosis, in addition to a moderately-exaggerated lumbosacral junction lordosis.  There is otherwise nonspecific, moderate diffuse background marrow cellularity, greater than expected for the patient's age. SPINAL CORD/CONUS MEDULLARIS/CAUDA EQUINA: At the level of the sacrum, there is moderate circumferential epidural lipomatosis, producing severe functional narrowing of the caudal thecal sac, all progressively lessening superiorly through the lumbar region. The conus medullaris terminates posterior to the L1/2 intervertebral disc space. Given technical artifacts, it, and the visualized spinal cord, appear otherwise unremarkable. However, there is variable nerve root contact/compression equivalent to the below-described central spinal canal stenoses/neural foraminal narrowing. DEGENERATIVE DISEASE: All visualized intervertebral discs are moderately to severely dehydrated, and show mild to moderate asymmetric disc height loss, with trace to moderate mixed discogenic endplate change, overall most notable at L4/5 and L5/S1. At L4/5 and L5/S1 there is mild/moderate circumferential disc-osteophyte complex, asymmetric posterior, and moderate  bilateral facet arthropathy, and moderate circumferential epidural lipomatosis. These further contribute to moderate/severe central spinal canal stenosis and equivalent bilateral neural foraminal narrowing, overall slightly worse at L5/S1. Lesser degenerative disease is scattered throughout the remainder of the visualized thoracic and lumbar spine, without significant identified central spinal canal stenosis or neural foraminal narrowing. SOFT TISSUES: There is moderate/severe gluteal and inferoposterior paraspinal muscular atrophy, lessening superiorly. No other clinically-significant changes are noted. .     1. Limited exams 2. Acute/subacute compression deformity, with approximate 75%, central-posterior loss of L5 vertebral body height and moderate, retropulsion of the L5 vertebral body margin.   Along with moderate circumferential epidural lipomatosis, this contributes to severe/marked central spinal canal stenosis/functional narrowing of the traversing thecal sac posterior to the L5 vertebral body. 3.  Chronic multilevel asymmetric vertebral compression deformities throughout the remainder of the thoracic and lumbar spine, as described, further contributing to a mildly-exaggerated thoracic kyphosis, in addition to a moderately-exaggerated lumbosacral junction lordosis. 4.  Multilevel degenerative disease, overall most notable at L4/5 and L5/S1, where there is moderate/severe central spinal canal stenosis and equivalent bilateral neural foraminal narrowing, overall slightly worse at L5/S1. 5.   Nonspecific, moderate diffuse background marrow cellularity, greater than expected for the patient's age. 6.   Moderate/severe gluteal and inferoposterior paraspinal muscular atrophy, lessening superiorly. Findings were sent to the [facility] [id number] at 2:03 pm on 11/23/2021 to be communicated to a licensed caregiver. Attempts to contact the referring physician are currently in progress. .     XR CHEST PORTABLE    Result Date: 12/10/2021  EXAMINATION: ONE XRAY VIEW OF THE CHEST 12/10/2021 3:47 pm COMPARISON: 12/08/2021 HISTORY: ORDERING SYSTEM PROVIDED HISTORY: leukocytosis TECHNOLOGIST PROVIDED HISTORY: Reason for exam:->leukocytosis What reading provider will be dictating this exam?->CRC FINDINGS: The lungs are without acute focal process. There is no effusion or pneumothorax. Stable cardiomegaly. The osseous structures are without acute process. No acute process. Stable appearance of the chest compared to 12/08/2021. XR CHEST PORTABLE    Result Date: 12/8/2021  EXAMINATION: ONE XRAY VIEW OF THE CHEST 12/8/2021 8:47 am COMPARISON: 12/05/2021 HISTORY: ORDERING SYSTEM PROVIDED HISTORY: follow up aspiration pna TECHNOLOGIST PROVIDED HISTORY: Reason for exam:->follow up aspiration pna FINDINGS: The heart is enlarged. There are no findings of pneumonia.  There are vague nodular density seen within the right and left lungs consistent with the history of metastatic disease. There is no pleural effusion. 1. Cardiomegaly. No findings of failure or pneumonia 2. Vague bilateral pulmonary nodules consistent with the known metastatic disease. XR CHEST PORTABLE    Result Date: 2021  EXAMINATION: ONE XRAY VIEW OF THE CHEST 2021 3:02 pm COMPARISON: CT scan chest 2021 HISTORY: ORDERING SYSTEM PROVIDED HISTORY: follow up fever, hypoxia, suspected aspiration TECHNOLOGIST PROVIDED HISTORY: Reason for exam:->follow up fever, hypoxia, suspected aspiration What reading provider will be dictating this exam?->CRC FINDINGS: Bilateral nodular densities consistent the patient's known metastatic disease. .  There is no effusion or pneumothorax. Cardiomegaly. The osseous structures are without acute process. No definitive consolidation. Bilateral nodular densities consistent with the patient's known metastatic disease. CT NEEDLE BIOPSY LIVER PERCUTANEOUS    Result Date: 2021  Patient MRN:  [id number] : 1960 Age: 59 years Gender: Female Order Date:  2021 10:00 AM EXAM: CT NEEDLE BIOPSY LIVER PERCUTANEOUS, CT GUIDED NEEDLE PLACEMENT NUMBER OF IMAGES:  87 INDICATION:  multiple hepatic masses - likely HCC multiple hepatic masses - likely [address]. What reading provider will be dictating this exam?->[facility] COMPARISON: None After obtaining informed consent and following the routine sterile prep and drape, a needle was inserted. Through this guide needle a biopsy needle was inserted. Multiple passes were made. Good specimen was obtained. Patient tolerated the procedure well. The procedure was performed under local anesthesia. Emmet Chime 1 second of fluoroscopy was utilized. A timeout was performed at 1039 hours . Patient was monitored for 60 minutes  by registered nurse. MEDS: 1mgm versed iv @ 1020; 50mcg fentanyl iv @ 1021    Successful uncomplicated CT and fluoroscopic guided liver mass biopsy.  If there are any physician concerns regarding this atorvastatin 40 MG tablet  Commonly known as: LIPITOR     oxyCODONE 5 MG immediate release tablet  Commonly known as: ROXICODONE     therapeutic multivitamin-minerals tablet           Where to Get Your Medications      You can get these medications from any pharmacy    Bring a paper prescription for each of these medications  · ampicillin-sulbactam  infusion         Time Spent on discharge is more than 45 minutes in the examination, evaluation, counseling and review of medications and discharge plan.    +++++++++++++++++++++++++++++++++++++++++++++++++  Franki Robb MD  [address]  +++++++++++++++++++++++++++++++++++++++++++++++++  NOTE: This report was transcribed using voice recognition software. Every effort was made to ensure accuracy; however, inadvertent computerized transcription errors may be present.

## 2021-12-12 NOTE — CARE COORDINATION
12/12/21 ADELINA Note: Spoke with Sherry from [facility] who states she made attempts to reach the  and left messages and he has not returned any of her calls. She will see patient again today and attempt to reach out to the  again to discuss discharge plan. Floor notified.  Electronically signed by Joe Dietz RN CM on 12/12/2021 at 2:07 PM

## 2021-12-12 NOTE — PROGRESS NOTES
[facility]  Nephrology Attending Progress Note  Bradford Knapp. Stephanie Galeana MD        SUBJECTIVE:     12/11: The pt is a 58 yo female with a pmh of etoh, cva, htn, hyperlipidemia, cirrhosis, hepatitis C, tobacco abuse of 3-4 ppd,  who was admitted to [facility] for back pain and found to have a L5 compression fracture. She was transferred here for kyphoplasty 11/30. Ct hsowed liver lesions and portal vein thrombosis and pulm lesions. She underwent liver biopsy  12/6. kyphoplasty pathology showed metastatic HCC. She is seeing id for possible septic phlebitis of the thrombosed portal vein and asp pneumonia and is on unasyn. afp 1829. Labs have shown na 159 k 2.9, co2 31, bun 18, cr 0.6, alb 2.2, wbc 14, hgb 12.7, plt 198. She was ordered 30 meq iv kcl today and is on no fluids.      12/12: pt seen in room, nonresponsive, on d5w at 100, plans for hospice underway    PROBLEM LIST:    Patient Active Problem List   Diagnosis    Acute respiratory failure with hypoxia ([address]    Alcohol abuse    Cerebral artery occlusion with cerebral infarction ([address]    Hypertension    Depression    Hyperlipidemia    Seizures (HCC)    Alcoholic intoxication without complication ([address]    Hyponatremia    Nicotine dependence    Marijuana user    Fall at home, initial encounter    Acute on chronic diastolic (congestive) heart failure (HCC)    Pedal edema    Chronic obstructive pulmonary disease (HCC)    Tobacco abuse    Hepatic cirrhosis (HCC)    Hypokalemia    Hypomagnesemia    Dehydration    Compression fracture of first lumbar vertebra with nonunion    Closed compression fracture of L5 vertebra (HCC)    Hepatocellular carcinoma (HCC)    Closed compression fracture of L5 lumbar vertebra, initial encounter ([address]    Metastatic cancer to bone [facility])        PAST MEDICAL HISTORY:    Past Medical History:   Diagnosis Date    Alcohol abuse     Cerebral artery occlusion with cerebral infarction ([address]     Depression     Diverticulosis of colon     Elevated blood sugar     H/O cardiovascular stress test 11/13/2021    Nuclear Lexiscan Stress Test    Hepatitis C     from blood transfusion, underwent treatment per patient and \"cured\"    Hyperlipidemia     Hypertension     Liver cirrhosis [facility]     Magnesium deficiency     Marijuana user     Nicotine dependence     Seizures ([facility]     Subclinical hypothyroidism        DIET:    ADULT DIET; Regular  ADULT ORAL NUTRITION SUPPLEMENT; Breakfast, Lunch, Dinner; Standard High Calorie/High Protein Oral Supplement  ADULT ORAL NUTRITION SUPPLEMENT; Breakfast, Dinner;  Wound Healing Oral Supplement     PHYSICAL EXAM:     Patient Vitals for the past 24 hrs:   BP Temp Temp src Pulse Resp SpO2   12/12/21 0830 (!) 142/61 98.4 °F (36.9 °C) Axillary 117 28 94 %   12/11/21 2138 134/60 98.9 °F (37.2 °C) Axillary 110 20 94 %   12/11/21 1520 (!) 150/43 98.4 °F (36.9 °C) Axillary 108 22 93 %   @      Intake/Output Summary (Last 24 hours) at 12/12/2021 1438  Last data filed at 12/12/2021 1432  Gross per 24 hour   Intake 420 ml   Output 1275 ml   Net -855 ml         Wt Readings from Last 3 Encounters:   11/25/21 175 lb (79.4 kg)   11/20/21 175 lb (79.4 kg)   11/18/21 181 lb (82.1 kg)       Constitutional:  Pt is in nonresponsive  Head: normocephalic, atraumatic  Neck: no JVD  Cardiovascular: regular rate and rhythm, no murmurs, gallops, or rubs  Respiratory:  No rales, rhochi, or wheezes  Gastrointestinal:  Soft, nontender  Ext: tr edema  Skin: dry, no rash    MEDS (scheduled):    lidocaine  5 mL IntraDERmal Once    sodium chloride flush  5-40 mL IntraVENous 2 times per day    heparin flush  1 mL IntraVENous 2 times per day    ampicillin-sulbactam  3,000 mg IntraVENous Q6H    levetiracetam  500 mg IntraVENous BID    enoxaparin  1 mg/kg SubCUTAneous BID    lactulose  30 g Oral TID    gabapentin  200 mg Oral TID    lidocaine  1 patch TransDERmal Daily    [Held by provider] amLODIPine  2.5 mg Oral Daily    ARIPiprazole  5 mg Oral Daily    [Held by provider] atenolol  25 mg Oral Daily    atorvastatin  40 mg Oral Nightly    citalopram  20 mg Oral Daily    levothyroxine  25 mcg Oral Daily    [Held by provider] lisinopril  10 mg Oral Daily    magnesium oxide  400 mg Oral Daily    therapeutic multivitamin-minerals  1 tablet Oral Daily    nicotine  1 patch TransDERmal Daily    rifaximin  550 mg Oral BID    vitamin B-1  100 mg Oral Daily       MEDS (infusions):   dextrose      sodium chloride         MEDS (prn):  potassium chloride **OR** potassium alternative oral replacement **OR** potassium chloride, sodium chloride flush, sodium chloride, heparin flush, cyclobenzaprine, oxyCODONE, haloperidol, ondansetron, ondansetron, polyethylene glycol, acetaminophen **OR** acetaminophen, magnesium hydroxide    DATA:    Recent Labs     12/10/21  0521 12/11/21  0549 12/12/21  0453   WBC 20.4* 14.5* 13.9*   HGB 13.7 12.7 12.9   HCT 41.5 37.5 39.7   MCV 98.1 93.8 98.3    198 187     Recent Labs     12/10/21  0521 12/11/21  0549 12/12/21  0453   * 159* 162*   K 3.2* 2.9* 3.2*   * 121* 124*   CO2 29 31* 27   BUN 14 18 17   CREATININE 0.6 0.6 0.6   LABGLOM >60 >60 >60   GLUCOSE 99 104* 91   CALCIUM 10.9* 11.1* 11.2*   * 259* 264*   * 229* 268*   BILIDIR 0.5* 0.4* 0.6*   BILITOT 1.4* 1.1 1.6*   ALKPHOS 164* 192* 175*   MG 2.0 2.1 2.3       Lab Results   Component Value Date    LABALBU 2.2 (L) 12/12/2021    LABALBU 2.2 (L) 12/11/2021    LABALBU 2.2 (L) 12/10/2021     Lab Results   Component Value Date    TSH 3.450 11/20/2021       Iron Studies  No results found for: IRON, TIBC, FERRITIN  Vitamin B-12   Date Value Ref Range Status   12/03/2019 625 211 - 946 pg/mL Final     Folate   Date Value Ref Range Status   12/03/2019 6.2 4.8 - 24.2 ng/mL Final       No results found for: VITD25  No results found for: PTH    No components found for: URIC    Lab Results   Component Value Date COLORU Yellow 12/10/2021    NITRU Negative 12/10/2021    GLUCOSEU Negative 12/10/2021    KETUA TRACE 12/10/2021    UROBILINOGEN 0.2 12/10/2021    BILIRUBINUR Negative 12/10/2021       No results found for: Quinton Cedrick      IMPRESSION/RECOMMENDATIONS:      1. Hypernatremia  In setting of free water deficit  Pt with little po and has diarrhea on lactulose  Start d5  Follow na q 12  Will inc d5     2. Hypokalemia  In setting of diarrhea and decreased po  Contraction alkalosis  k replacment iv     3. Asp pna  On abx per id     4. [address]  Mets to bone pulm  onc seeing  Sp kypho for L5 fx     5. Cirrhosis  Hep c and etoh  Sp ciwa protocol     6. Tobacco abuse     7. htn  norvasc and bb held due to hypotension    Charlie Ramirez.  Janie Robertson MD

## 2021-12-12 NOTE — PROGRESS NOTES
[address] [place] [address] [facility]  Progress Note    Chief complaint: Aspiration pneumonia       SUBJECTIVE:    Pt is awake, alert, confused   Afebrile   Patient is tolerating medications. No reported adverse drug reactions. Review of systems:  As stated above in the chief complaint, otherwise negative. Medications:  Scheduled Meds:   lidocaine  5 mL IntraDERmal Once    sodium chloride flush  5-40 mL IntraVENous 2 times per day    heparin flush  1 mL IntraVENous 2 times per day    ampicillin-sulbactam  3,000 mg IntraVENous Q6H    levetiracetam  500 mg IntraVENous BID    enoxaparin  1 mg/kg SubCUTAneous BID    lactulose  30 g Oral TID    gabapentin  200 mg Oral TID    lidocaine  1 patch TransDERmal Daily    [Held by provider] amLODIPine  2.5 mg Oral Daily    ARIPiprazole  5 mg Oral Daily    [Held by provider] atenolol  25 mg Oral Daily    atorvastatin  40 mg Oral Nightly    citalopram  20 mg Oral Daily    levothyroxine  25 mcg Oral Daily    [Held by provider] lisinopril  10 mg Oral Daily    magnesium oxide  400 mg Oral Daily    therapeutic multivitamin-minerals  1 tablet Oral Daily    nicotine  1 patch TransDERmal Daily    rifaximin  550 mg Oral BID    vitamin B-1  100 mg Oral Daily     Continuous Infusions:   dextrose      sodium chloride       PRN Meds:potassium chloride **OR** potassium alternative oral replacement **OR** potassium chloride, sodium chloride flush, sodium chloride, heparin flush, cyclobenzaprine, oxyCODONE, haloperidol, ondansetron, ondansetron, polyethylene glycol, acetaminophen **OR** acetaminophen, magnesium hydroxide    OBJECTIVE:  BP (!) 142/61   Pulse 117   Temp 98.4 °F (36.9 °C) (Axillary)   Resp 28   Ht 4' 10\" (1.473 m)   Wt 175 lb (79.4 kg)   SpO2 94%   BMI 36.58 kg/m²   Temp  Av.6 °F (37 °C)  Min: 98.4 °F (36.9 °C)  Max: 98.9 °F (37.2 °C)     Constitutional: Awake, very confused   Skin: Warm and dry. No rashes were noted.    HEENT: Round and reactive pupils. dry mucous membranes. No ulcerations or thrush. Neck: Supple to movements. Chest: Clear bilaterally   Cardiovascular: S1 and S2 are rhythmic and regular. No murmurs appreciated. Abdomen: Soft, non tender, distended, bowel sound +  Genitourinary: Female, bateman yellow urine  Extremities: No clubbing, no cyanosis, + edema.   Lines: peripheral  12/10/21 midline  Laboratory and Tests Review:  Lab Results   Component Value Date    WBC 13.9 (H) 12/12/2021    WBC 14.5 (H) 12/11/2021    WBC 20.4 (H) 12/10/2021    HGB 12.9 12/12/2021    HCT 39.7 12/12/2021    MCV 98.3 12/12/2021     12/12/2021     Lab Results   Component Value Date    NEUTROABS 9.17 (H) 12/12/2021    NEUTROABS 11.02 (H) 12/11/2021    NEUTROABS 17.75 (H) 12/10/2021     No results found for: CRPHS  Lab Results   Component Value Date     (H) 12/12/2021     (H) 12/12/2021    ALKPHOS 175 (H) 12/12/2021    BILITOT 1.6 (H) 12/12/2021     Lab Results   Component Value Date     12/12/2021    K 3.2 12/12/2021     12/12/2021    CO2 27 12/12/2021    BUN 17 12/12/2021    CREATININE 0.6 12/12/2021    CREATININE 0.6 12/11/2021    CREATININE 0.6 12/10/2021    GFRAA >60 12/12/2021    LABGLOM >60 12/12/2021    GLUCOSE 91 12/12/2021    PROT 6.1 12/12/2021    LABALBU 2.2 12/12/2021    CALCIUM 11.2 12/12/2021    BILITOT 1.6 12/12/2021    ALKPHOS 175 12/12/2021     12/12/2021     12/12/2021     No results found for: CRP  No results found for: [address]  Radiology:  Reviewed    Microbiology:   Lab Results   Component Value Date    BC 24 Hours no growth 12/10/2021    BC 5 Days no growth 12/05/2021    BC 5 Days no growth 11/22/2021    ORG Klebsiella pneumoniae ssp pneumoniae 11/09/2021     Lab Results   Component Value Date    BLOODCULT2 24 Hours no growth 12/10/2021    BLOODCULT2 5 Days no growth 12/05/2021    BLOODCULT2 5 Days no growth 11/22/2021    ORG Klebsiella pneumoniae ssp pneumoniae 11/09/2021

## 2021-12-12 NOTE — PROGRESS NOTES
Call received from patient's . Introduced myself. Nichole Smith provided reason for this admission and the decline patient is having since admission. Explained hospice philosophy and no longer pursuing any further aggressive treatment. Explained hospice benefit and reviewed all levels of care including the hospice house for short term symptom management. Once symptoms are managed, patient would transfer to a routine level of care either home or ECF with hospice. Explained room and board would be private pay at a facility. Discussed roles and frequency visits of skilled nursing, personal care team and Curtis Peterson stated he was told by staff that patient can go to the hospice house until she dies. Explained again the use of the hospice house. Nicholereilly Smith stated that he can not care for patient at home. He is in a wheelchair and has aides to help him. Nichole Luis expressed that he always told his wife she could die at home. He thought hospice was available more frequently in the home. Nichole Smith would like to see if patient qualifies for the hospice house. Visit made to unit. Patient is awake. Able to answer simple questions. Denies pain. No signs of SOB, restlessness or anxiety. No comfort medications have been given. Patient on 4LNC continuously. Abdomen distended. Patient stated she wants to go home. /61 RR 20 . Call placed to [facility] medical director. Reviewed chart and assessment. Patient does not meet criteria for the hospice house at this time. Patient does meet criteria for routine level of hospice care either at home or ECF per Medicare guidelines. Kj Miller and provided update. Nichole mSith stated he does not know what he's going to do. Discussed Medicaid. He does not feel she would qualify but would like to file an application again. He would like for the hospital SW/CM to follow up with him tomorrow. Emotional support and active listening provided. Updated bedside RN. Will continue to follow.

## 2021-12-13 NOTE — PROGRESS NOTES
How Severe Is Your Skin Lesion?: mild Physical Therapy  Facility/Department: [address] PICU  Treatment Note  NAME: Jabier Rollins  : 1960  MRN: [id number]    Date of Service: 2021    Evaluating PT: Marilu Castellano, PT [id number]     Room #:  5862/0365-S  Diagnosis:  Compression fracture of first lumbar vertebra with nonunion [S32.010K]  PMHx/PSHx:     has a past medical history of Alcohol abuse, Cerebral artery occlusion with cerebral infarction [facility]), Depression, Diverticulosis of colon, Elevated blood sugar, H/O cardiovascular stress test, Hepatitis C, Hyperlipidemia, Hypertension, Liver cirrhosis ([facility] Magnesium deficiency, Marijuana user, Nicotine dependence, Seizures ([facility] and Subclinical hypothyroidism.    has a past surgical history that includes Dilation and curettage of uterus; liver biopsy (2016); and Spine surgery (N/A, 2021).       Procedure/Surgery: Anabella Adams, L5  Precautions:  Falls, falls, alarm and O2 , FWB (full weight bearing), Spinal precautions no \"BLT\" and Spinal neutral mechanics  Equipment Needs: Wheeled Walker     SUBJECTIVE:  Patient lives alone  in a ranch home  with Ramp to enter. Reyna Valdez ambulated using furniture at home and utilized a quad cane for community ambulation PTA. Equipment owned: Wheelchair and Cane     OBJECTIVE:    Initial Evaluation  Date: 21 Treatment  Date: 21 Short Term/ Long Term   Goals   AM-PAC 6 Clicks 71/70      Was pt agreeable to Eval/treatment? Yes Yes     Does pt have pain?  Yes No c/o pain     Bed Mobility  Rolling: Mod   Supine to sit:   Max x 2   Sit to supine: Max x 2   Scooting: Max x 2  Rolling: MaxA  Supine to sit: NT  Sit to supine: NT   Scooting: Max x 2 Rolling: Sup  Supine to sit: Sup  Sit to supine: Sup  Scooting: Sup   Transfers Sit to stand: Mod x 2   Stand to sit: Mod x 2  Stand pivot: Mod x 2  Sit to stand: NT   Stand to sit: NT  Stand pivot: NT Sit to stand: Mod Ind   Stand to sit: Mod Ind    Stand pivot: Mod Ind    Ambulation    1 leg Has Your Skin Lesion Been Treated?: not been treated advanced side step with [address] and Mod x 2  NT 20 feet with Mod Ind     Stair negotiation: ascended and descended  NT NT 3 steps with AAD 2 rail   ROM BUE:  Defer to OT eval  BLE:  wfl BLE: WFL     Strength BUE: Defer to OT eval  RLE:  Grossly 4-/5  LLE:  Grossly  4-/5  4/5   Balance Sitting EOB:  Min A  Dynamic Standing:  Mod A x 2 Sitting EOB: NT  Standing: NT Sitting EOB:  Sup  Dynamic Standing:  Sup       Pt is A & O x 1, patient responds to name, unable to verbalize coherent sentences throughout  Sensation:  Pt denies numbness and tingling to extremities  Edema:  None noted    Vitals:  SPO2 monitored throughout session. On 4L: At rest 92%  After rolling to R and repositioning 87% with slow recovery to 88%    After activity on 5L 90%    Patient education  Pt educated on role of PT, sequencing for safe transfers, PLB    Patient response to education:   Pt verbalized understanding Pt demonstrated skill Pt requires further education in this area   partial no yes     ASSESSMENT:    Comments:  RN cleared patient for treatment prior to session. Patient semi-supine in bed upon arrival and agreeable to PT treatment with encouragement. Patient unable to verbalize coherent words throughout session, but able to partially follow commands to roll to R side. SPO2 monitored throughout session with education provided on PLB with no success. After rolling and repositioning, patient's SPO2 reading 87-88% on 4L -- increased O2 to 5L with patient able to recover to 90%, RN aware. Patient repositioned in semi-supine at end of session with all needs met. Treatment:  Patient practiced and was instructed in the following treatment:     Bed Mobility: VCs provided for sequencing and safety during mobility. Manual assist provided for completion of task.    Skilled Positioning: Positioning for improved posture, joint and skin integrity    Patient Education: Education provided on PLB, sequencing for safe transfers    PLAN:    Patient is Is This A New Presentation, Or A Follow-Up?: Skin Lesions making fair progress towards established goals. Will continue with current POC.       Time in  0825  Time out  0840    Total Treatment Time  15 minutes     CPT codes:  [] Gait training 51466 - minutes  [] Manual therapy 77234 - minutes  [x] Therapeutic activities 69374 15 minutes  [] Therapeutic exercises 58791 - minutes  [] Neuromuscular reeducation 54385 - minutes    Sander Aase PT, DPT  [id number]

## 2021-12-13 NOTE — DISCHARGE SUMMARY
Hospitalist Discharge Summary    Patient ID: Kirby oLckwood   Patient : 1960  Patient's PCP: Tiago Cano DO    Admit Date: 2021   Admitting Physician: Tiago Hartman MD    Discharge Date:  2021   Discharge Physician: Tiago Hartman MD   Discharge Condition: Stable  Discharge Disposition: SNF      Hospital course in brief:  (Please refer to daily progress notes for a comprehensive review of the hospitalization by requesting medical records)    Patient admitted on 2021 for Closed compression fracture of L5 vertebra. The pt initially presented to [facility] ER with back pain, weakness, fatigue. She had CT scan for back pain showing L5 compression fracture and was transferred to [facility] (1-) for NSG kyphoplasty. with pathology at that time showing carcinoma. Imaging was also concerning for wide spread metastatic HCC with PV involvement, with multiple Lymph Nodess and pulmonary involvement. Pt has history of  Cirrhosis and Lactulose was given as well. She underwent liver biopsy to confirm by IR. Pt was started on Theraputic Lovenox for Portal Vein thrombosis. AFP 1829 and Hem Onc saw the pt and Hepatocellular Carcinoma is Extremely likely.  was updated about poor prognosis. ID we consulted for Aspiration pneumonia as well and antibiotics were given. The pt continued to to require supplemental oxygen for acute respiratory failure. Sodium continues to get worst even with IV fluids. Nephrology consulted as well. Mental status continued to get worst. Hospice and palliative were consulted. After discussions and given the poor prognosis, family decided to change the code status to [facility]. Hospice was following but due to financial constraints pt is to discharged skilled with plans to transition to hospice at the SNF. No indication for readmission at this time with these goals established.      Consults:   IP CONSULT TO SOCIAL WORK  IP CONSULT TO GENERAL SURGERY  IP CONSULT TO ONCOLOGY  IP CONSULT TO SOCIAL WORK  IP CONSULT TO INFECTIOUS DISEASES  IP CONSULT TO PALLIATIVE CARE  IP CONSULT TO HOSPICE  IP CONSULT TO NEPHROLOGY  IP CONSULT TO PALLIATIVE CARE  IP CONSULT TO HOSPICE    Discharge Diagnoses:    Metastatic Hepatocellular Carcinoma to the lungs nodes and bone  Hepatic Encephalopathy  Possible Aspiration pneumonia  Hypernatremia  Hypokalemia  Leukocytosis  Portal vein thrombosis  Alcohol abuse, and hospitalization complicated by alcohol withdrawal  Infectious vs Metastatic etiology. HLD  Depression  COPD  Acute hypoxic respiratory failure     Discharge Instructions / Follow up:    Future Appointments   Date Time Provider [place] Mortensen   12/27/2021 12:30 PM Poppy Howe MD [facility]   12/28/2021  1:15 PM CHRIS Nelson [facility]       Continued appropriate risk factor modification of blood pressure, diabetes and serum lipids will remain essential to reducing risk of future atherosclerotic development    Activity: activity as tolerated    Significant labs:  CBC:   Recent Labs     12/11/21  0549 12/12/21 0453 12/13/21  0516   WBC 14.5* 13.9* 12.3*   RBC 4.00 4.04 3.83   HGB 12.7 12.9 12.0   HCT 37.5 39.7 36.5   MCV 93.8 98.3 95.3   RDW 15.7* 15.7* 15.9*    187 174     BMP:   Recent Labs     12/11/21  0549 12/12/21 0453 12/13/21  0516   * 162* 158*   K 2.9* 3.2* 3.0*   * 124* 122*   CO2 31* 27 25   BUN 18 17 12   CREATININE 0.6 0.6 0.6   MG 2.1 2.3 2.1     LFT:  Recent Labs     12/11/21  0549 12/12/21 0453 12/13/21  0516   PROT 6.8 6.1* 6.0*   ALKPHOS 192* 175* 159*   * 264* 246*   * 268* 264*   BILITOT 1.1 1.6* 1.6*     PT/INR:   Recent Labs     12/11/21  0549 12/12/21 0453 12/13/21  0516   INR 1.6 1.8 1.8     BNP: No results for input(s): BNP in the last 72 hours.   Hgb A1C:   Lab Results   Component Value Date    LABA1C 5.9 (H) 11/09/2021     Folate and B12:   Lab Results   Component Value Date    LRXDBUIX05 625 12/03/2019   ,   Lab Results Component Value Date    FOLATE 6.2 12/03/2019     Thyroid Studies:   Lab Results   Component Value Date    TSH 3.450 11/20/2021       Urinalysis:    Lab Results   Component Value Date    NITRU Negative 12/10/2021    WBCUA 0-1 12/10/2021    BACTERIA RARE 12/10/2021    RBCUA NONE 12/10/2021    BLOODU Negative 12/10/2021    SPECGRAV 1.010 12/10/2021    GLUCOSEU Negative 12/10/2021       Imaging:  CT ABDOMEN PELVIS W WO CONTRAST Additional Contrast? Radiologist Recommendation    Result Date: 12/2/2021  EXAMINATION: CT OF THE ABDOMEN AND PELVIS WITH AND WITHOUT CONTRAST 12/2/2021 4:02 pm TECHNIQUE: CT of the abdomen and pelvis was performed with and without the administration of intravenous contrast.  Multiplanar reformatted images are provided for review. Dose modulation, iterative reconstruction, and/or weight based adjustment of the mA/kV was utilized to reduce the radiation dose to as low as reasonably achievable. COMPARISON: CT chest 10/07/2020 HISTORY: ORDERING SYSTEM PROVIDED HISTORY: abdominal pain TECHNOLOGIST PROVIDED HISTORY: Reason for exam:->abdominal pain Additional Contrast?->Radiologist Recommendation What reading provider will be dictating this exam?->CRC FINDINGS: The visualized chest demonstrates new pulmonary nodules in the left lower lobe, the largest of which measures 12 mm x 12 mm. There are enlarged pericardial lymph nodes on the right mitral annular calcifications which are dense. Very large heterogeneous mass in the right hepatic lobe which was not clearly apparent on the comparison CT chest.  There appear to be multiple other smaller hepatic masses in both lobes, particularly the inferior aspect of the caudate lobe (series 2, image 66). Multiple enlarged mesenteric and retroperitoneal lymph nodes. There is a markedly enlarged right retroperitoneal lymph node at the level of the right kidney. Enlarged retrocrural lymph nodes. Thrombosis of the main portal vein is new from prior.  Small spleen. Benign-appearing roughly 2.6 cm left adnexal cyst. No free air. Findings suggestive of a mild ileus; oral contrast has reached the right colon. .  Nondilated appendix. Degenerative changes of the spine. Moderate height loss of L5 and kyphoplasty change of L5 are new from prior. Very large heterogeneous mass in the right hepatic lobe. There are multiple other smaller masses throughout the liver as well. These findings are suggestive of multiple hepatocellular carcinomas. Pulmonary nodules in the left lower lobe are suggestive of metastases. Mesenteric, retroperitoneal, and retrocrural lymphadenopathy. Thrombosis of the portal vein. Findings suggestive of a mild ileus. Moderate height loss of L5, status post kyphoplasty. Refer to recently performed MRI lumbar spine for further evaluation. CT HEAD WO CONTRAST    Result Date: 12/6/2021  EXAMINATION: CT OF THE HEAD WITHOUT CONTRAST  12/6/2021 5:52 am TECHNIQUE: CT of the head was performed without the administration of intravenous contrast. Dose modulation, iterative reconstruction, and/or weight based adjustment of the mA/kV was utilized to reduce the radiation dose to as low as reasonably achievable. COMPARISON: 7 October 2020 HISTORY: ORDERING SYSTEM PROVIDED HISTORY: patient lethargic TECHNOLOGIST PROVIDED HISTORY: Reason for exam:->patient lethargic Has a \"code stroke\" or \"stroke alert\" been called? ->No What reading provider will be dictating this exam?->CRC FINDINGS: No hemorrhage, mass or midline shift. There is multifocal encephalomalacia bilaterally compatible with old infarcts. There is atrophy as well as chronic microvascular ischemic disease. Unremarkable bony calvarium. Multifocal encephalomalacia compatible with old infarcts. Atrophy and chronic microvascular ischemic disease. No significant change.      CT CHEST W CONTRAST    Result Date: 12/4/2021  EXAMINATION: CT OF THE CHEST WITH CONTRAST 12/4/2021 10:18 am TECHNIQUE: CT of the chest was performed with the administration of intravenous contrast. Multiplanar reformatted images are provided for review. Dose modulation, iterative reconstruction, and/or weight based adjustment of the mA/kV was utilized to reduce the radiation dose to as low as reasonably achievable. COMPARISON: October 7, 2020 HISTORY: ORDERING SYSTEM PROVIDED HISTORY: metastatic workup TECHNOLOGIST PROVIDED HISTORY: Reason for exam:->metastatic workup What reading provider will be dictating this exam?->CRC FINDINGS: There is cardiomegaly with coronary artery calcification. The great vessels are normal.  Enlarged mediastinal lymph nodes are noted with lymph nodes measuring up to 2.7 x 2.1 cm in the paratracheal region, similar ones in the prevascular space and subcarinal region measuring 1.3 x 2.4 cm. Small lymph nodes in the hilum are noted. Large lymph nodes are identified in the supraclavicular region bilaterally, larger 1 on the left side measuring 1.6 cm. Trachea and major bronchi are patent. There are multiple bilateral pulmonary nodules ranging from a few mm with largest 1 in the left lower lobe measuring up to 1.4 cm. There is minimal atelectasis in lung bases. Multiple rib fractures are identified which could be pathologic. Partially identified are multiple heterogeneous masses in the liver, largest 1 in the right hepatic lobe measuring 12 x 12 cm. Additional smaller ones are also noted. Bilateral adrenal masses are identified, larger 1 on the right side measuring 2.4 x 2.1 cm. Lymph nodes are identified in the GE junction and retro crural area measuring up to 2 cm. Extensive widespread pulmonary metastasis with multiple bilateral pulmonary nodules with extensive lymphadenopathy in the mediastinum hilum and supraclavicular region.  Heterogeneous masses in the liver concerning for liver malignancy with metastatic involvement in the adjacent lymph nodes and the adrenal glands and portal venous thrombosis. There may be possible developing bone metastasis as well. Consider PET-CT scan     CT LUMBAR SPINE WO CONTRAST    Result Date: 11/22/2021  EXAMINATION: CT OF THE LUMBAR SPINE WITHOUT CONTRAST  11/22/2021 TECHNIQUE: CT of the lumbar spine was performed without the administration of intravenous contrast. Multiplanar reformatted images are provided for review. Adjustment of mA and/or kV according to patient size was utilized. Dose modulation, iterative reconstruction, and/or weight based adjustment of the mA/kV was utilized to reduce the radiation dose to as low as reasonably achievable. COMPARISON: None HISTORY: ORDERING SYSTEM PROVIDED HISTORY: VERTEBRAL COMPRESSION FRACTURE TECHNOLOGIST PROVIDED HISTORY: Reason for exam:->lumbar compression fracture FINDINGS: BONES/ALIGNMENT: 4 mm of retrolisthesis of L5 secondary to compression fracture of L5. Estimated 75% vertebral body height loss. Subtle foci of lucency throughout the vertebral body could be from osteopenia versus pathologic fracture. There is suspected soft tissue density versus migrated disc extrusion posterior to the vertebral body. Otherwise preserved vertebral body heights. DEGENERATIVE CHANGES: Multilevel facet arthrosis. Suspect multilevel disc bulging. Epidural lipomatosis in the lower lumbosacral spine contributing to at least moderate spinal canal stenosis at L4-S1. SOFT TISSUES/RETROPERITONEUM: Partially seen age-indeterminate 13 mm pulmonary nodule in the left lower lobe, not seen on CT chest from October 7, 2020. prominent retroperitoneal lymph nodes lymph nodes, less likely varicosities near the aorta and distal esophagus. Compression fracture of L5 with 4 mm of bony retropulsion. Heterogeneous lytic appearance of the L5 vertebral body is suspicious for pathologic fracture. Recommend correlation with PET-CT or lumbar spine MRI with and without contrast.  Malignancy or infection not excluded.  Suspicious 13 mm left lower lobe pulmonary nodule, not present on exam from . Recommend dedicated CT chest. Mildly prominent indeterminate peritoneal and retroperitoneal lymph nodes. Metastatic disease not excluded. CT GUIDED NEEDLE PLACEMENT    Result Date: 2021  Patient MRN:  [id number] : 1960 Age: 59 years Gender: Female Order Date:  2021 10:00 AM EXAM: CT NEEDLE BIOPSY LIVER PERCUTANEOUS, CT GUIDED NEEDLE PLACEMENT NUMBER OF IMAGES:  87 INDICATION:  multiple hepatic masses - likely HCC multiple hepatic masses - likely [address]. What reading provider will be dictating this exam?->[facility] COMPARISON: None After obtaining informed consent and following the routine sterile prep and drape, a needle was inserted. Through this guide needle a biopsy needle was inserted. Multiple passes were made. Good specimen was obtained. Patient tolerated the procedure well. The procedure was performed under local anesthesia. BuddyBouncee Samuels 1 second of fluoroscopy was utilized. A timeout was performed at 1039 hours . Patient was monitored for 60 minutes  by registered nurse. MEDS: 1mgm versed iv @ 1020; 50mcg fentanyl iv @ 1021    Successful uncomplicated CT and fluoroscopic guided liver mass biopsy. If there are any physician concerns regarding this report, a Radiologist can be reached by calling the following number [phone].      MRI THORACIC SPINE WO CONTRAST    Result Date: 2021  EXAMINATION: MRI OF THE THORACIC AND LUMBAR SPINE WITHOUT CONTRAST; MRI OF THE THORACIC SPINE WITHOUT CONTRAST, 2021  12:40 TECHNIQUE: Multiplanar multisequence MRI of the lumbar spine was performed without the administration of intravenous contrast.; Multiplanar multisequence MRI of the thoracic spine was performed without the administration of intravenous contrast. COMPARISON: Noncontrast CT of the lumbar spine 2021 and 10/22/2021; plain radiographs of the lumbar spine 2021; noncontrast MRI of the lumbar spine 2018 HISTORY:  Shaji Wakefield PROVIDED HISTORY: Compression Fractures TECHNOLOGIST PROVIDED HISTORY: Reason for exam:->Compression Fractures FINDINGS: These exams are limited by patient motion, in addition to the lack of intravenous contrast.  Given these factors: OSSEOUS STRUCTURES/ALIGNMENT: There is acute/subacute compression deformity, with approximate 75%, central-posterior loss of L5 vertebral body height and moderate, retropulsion of the L5 vertebral body margin. Along with below-described epidural lipomatosis, this contributes to severe/marked central spinal canal stenosis/functional narrowing of the traversing thecal sac, as well as central spinal canal stenosis/neural foraminal narrowing at the L4/5 and L5/S1 levels as detailed below. Chronic, multilevel, minimal to mild, asymmetric vertebral compression deformities are otherwise most notable anteriorly at T9, progressively lessening both superiorly and inferiorly throughout the remainder of the thoracic spine, further contributing to a mildly-exaggerated thoracic kyphosis, in addition to a moderately-exaggerated lumbosacral junction lordosis. There is otherwise nonspecific, moderate diffuse background marrow cellularity, greater than expected for the patient's age. SPINAL CORD/CONUS MEDULLARIS/CAUDA EQUINA: At the level of the sacrum, there is moderate circumferential epidural lipomatosis, producing severe functional narrowing of the caudal thecal sac, all progressively lessening superiorly through the lumbar region. The conus medullaris terminates posterior to the L1/2 intervertebral disc space. Given technical artifacts, it, and the visualized spinal cord, appear otherwise unremarkable. However, there is variable nerve root contact/compression equivalent to the below-described central spinal canal stenoses/neural foraminal narrowing.  DEGENERATIVE DISEASE: All visualized intervertebral discs are moderately to severely dehydrated, and show mild to moderate asymmetric disc height loss, with trace to moderate mixed discogenic endplate change, overall most notable at L4/5 and L5/S1. At L4/5 and L5/S1 there is mild/moderate circumferential disc-osteophyte complex, asymmetric posterior, and moderate  bilateral facet arthropathy, and moderate circumferential epidural lipomatosis. These further contribute to moderate/severe central spinal canal stenosis and equivalent bilateral neural foraminal narrowing, overall slightly worse at L5/S1. Lesser degenerative disease is scattered throughout the remainder of the visualized thoracic and lumbar spine, without significant identified central spinal canal stenosis or neural foraminal narrowing. SOFT TISSUES: There is moderate/severe gluteal and inferoposterior paraspinal muscular atrophy, lessening superiorly. No other clinically-significant changes are noted. .     1. Limited exams 2. Acute/subacute compression deformity, with approximate 75%, central-posterior loss of L5 vertebral body height and moderate, retropulsion of the L5 vertebral body margin. Along with moderate circumferential epidural lipomatosis, this contributes to severe/marked central spinal canal stenosis/functional narrowing of the traversing thecal sac posterior to the L5 vertebral body. 3.  Chronic multilevel asymmetric vertebral compression deformities throughout the remainder of the thoracic and lumbar spine, as described, further contributing to a mildly-exaggerated thoracic kyphosis, in addition to a moderately-exaggerated lumbosacral junction lordosis. 4.  Multilevel degenerative disease, overall most notable at L4/5 and L5/S1, where there is moderate/severe central spinal canal stenosis and equivalent bilateral neural foraminal narrowing, overall slightly worse at L5/S1. 5.   Nonspecific, moderate diffuse background marrow cellularity, greater than expected for the patient's age.  6.   Moderate/severe gluteal and inferoposterior paraspinal muscular atrophy, lessening lordosis. There is otherwise nonspecific, moderate diffuse background marrow cellularity, greater than expected for the patient's age. SPINAL CORD/CONUS MEDULLARIS/CAUDA EQUINA: At the level of the sacrum, there is moderate circumferential epidural lipomatosis, producing severe functional narrowing of the caudal thecal sac, all progressively lessening superiorly through the lumbar region. The conus medullaris terminates posterior to the L1/2 intervertebral disc space. Given technical artifacts, it, and the visualized spinal cord, appear otherwise unremarkable. However, there is variable nerve root contact/compression equivalent to the below-described central spinal canal stenoses/neural foraminal narrowing. DEGENERATIVE DISEASE: All visualized intervertebral discs are moderately to severely dehydrated, and show mild to moderate asymmetric disc height loss, with trace to moderate mixed discogenic endplate change, overall most notable at L4/5 and L5/S1. At L4/5 and L5/S1 there is mild/moderate circumferential disc-osteophyte complex, asymmetric posterior, and moderate  bilateral facet arthropathy, and moderate circumferential epidural lipomatosis. These further contribute to moderate/severe central spinal canal stenosis and equivalent bilateral neural foraminal narrowing, overall slightly worse at L5/S1. Lesser degenerative disease is scattered throughout the remainder of the visualized thoracic and lumbar spine, without significant identified central spinal canal stenosis or neural foraminal narrowing. SOFT TISSUES: There is moderate/severe gluteal and inferoposterior paraspinal muscular atrophy, lessening superiorly. No other clinically-significant changes are noted. .     1. Limited exams 2. Acute/subacute compression deformity, with approximate 75%, central-posterior loss of L5 vertebral body height and moderate, retropulsion of the L5 vertebral body margin.   Along with moderate circumferential epidural lipomatosis, this contributes to severe/marked central spinal canal stenosis/functional narrowing of the traversing thecal sac posterior to the L5 vertebral body. 3.  Chronic multilevel asymmetric vertebral compression deformities throughout the remainder of the thoracic and lumbar spine, as described, further contributing to a mildly-exaggerated thoracic kyphosis, in addition to a moderately-exaggerated lumbosacral junction lordosis. 4.  Multilevel degenerative disease, overall most notable at L4/5 and L5/S1, where there is moderate/severe central spinal canal stenosis and equivalent bilateral neural foraminal narrowing, overall slightly worse at L5/S1. 5.   Nonspecific, moderate diffuse background marrow cellularity, greater than expected for the patient's age. 6.   Moderate/severe gluteal and inferoposterior paraspinal muscular atrophy, lessening superiorly. Findings were sent to the [facility] [id number] at 2:03 pm on 11/23/2021 to be communicated to a licensed caregiver. Attempts to contact the referring physician are currently in progress. .     XR CHEST PORTABLE    Result Date: 12/10/2021  EXAMINATION: ONE XRAY VIEW OF THE CHEST 12/10/2021 3:47 pm COMPARISON: 12/08/2021 HISTORY: ORDERING SYSTEM PROVIDED HISTORY: leukocytosis TECHNOLOGIST PROVIDED HISTORY: Reason for exam:->leukocytosis What reading provider will be dictating this exam?->CRC FINDINGS: The lungs are without acute focal process. There is no effusion or pneumothorax. Stable cardiomegaly. The osseous structures are without acute process. No acute process. Stable appearance of the chest compared to 12/08/2021. XR CHEST PORTABLE    Result Date: 12/8/2021  EXAMINATION: ONE XRAY VIEW OF THE CHEST 12/8/2021 8:47 am COMPARISON: 12/05/2021 HISTORY: ORDERING SYSTEM PROVIDED HISTORY: follow up aspiration pna TECHNOLOGIST PROVIDED HISTORY: Reason for exam:->follow up aspiration pna FINDINGS: The heart is enlarged. There are no findings of pneumonia. There are vague nodular density seen within the right and left lungs consistent with the history of metastatic disease. There is no pleural effusion. 1. Cardiomegaly. No findings of failure or pneumonia 2. Vague bilateral pulmonary nodules consistent with the known metastatic disease. XR CHEST PORTABLE    Result Date: 2021  EXAMINATION: ONE XRAY VIEW OF THE CHEST 2021 3:02 pm COMPARISON: CT scan chest 2021 HISTORY: ORDERING SYSTEM PROVIDED HISTORY: follow up fever, hypoxia, suspected aspiration TECHNOLOGIST PROVIDED HISTORY: Reason for exam:->follow up fever, hypoxia, suspected aspiration What reading provider will be dictating this exam?->CRC FINDINGS: Bilateral nodular densities consistent the patient's known metastatic disease. .  There is no effusion or pneumothorax. Cardiomegaly. The osseous structures are without acute process. No definitive consolidation. Bilateral nodular densities consistent with the patient's known metastatic disease. CT NEEDLE BIOPSY LIVER PERCUTANEOUS    Result Date: 2021  Patient MRN:  [id number] : 1960 Age: 59 years Gender: Female Order Date:  2021 10:00 AM EXAM: CT NEEDLE BIOPSY LIVER PERCUTANEOUS, CT GUIDED NEEDLE PLACEMENT NUMBER OF IMAGES:  87 INDICATION:  multiple hepatic masses - likely HCC multiple hepatic masses - likely [address]. What reading provider will be dictating this exam?->[facility] COMPARISON: None After obtaining informed consent and following the routine sterile prep and drape, a needle was inserted. Through this guide needle a biopsy needle was inserted. Multiple passes were made. Good specimen was obtained. Patient tolerated the procedure well. The procedure was performed under local anesthesia. [place] 1 second of fluoroscopy was utilized. A timeout was performed at 1039 hours . Patient was monitored for 60 minutes  by registered nurse.  MEDS: 1mgm versed iv @ 1020; 50mcg fentanyl iv @ 1021    Successful uncomplicated CT and fluoroscopic guided liver mass biopsy. If there are any physician concerns regarding this report, a Radiologist can be reached by calling the following number [phone]. US ABDOMEN LIMITED    Result Date: 11/22/2021  EXAMINATION: Limited abdominal ULTRASOUND 11/22/2021 1:41 pm COMPARISON: None. HISTORY: ORDERING SYSTEM PROVIDED HISTORY: ascites TECHNOLOGIST PROVIDED HISTORY: Reason for exam:->ascites What reading provider will be dictating this exam?->CRC FINDINGS: Scanning of the abdomen shows no definite ascites. Organs are not specifically studied although on limited visualization the hepatic appearance is consistent with the history of cirrhosis. No ascites is identified. FLUORO FOR SURGICAL PROCEDURES    Result Date: 11/30/2021  EXAMINATION: SPOT FLUOROSCOPIC IMAGES 11/30/2021 11:08 am TECHNIQUE: Fluoroscopy was provided by the radiology department for procedure. Radiologist was not present during examination. FLUOROSCOPY DOSE AND TYPE OR TIME AND EXPOSURES: Fluoroscopy time equals 51.9 seconds. Total dose equals 38.45 mGy COMPARISON: None HISTORY: ORDERING SYSTEM PROVIDED HISTORY: kyphoplasty TECHNOLOGIST PROVIDED HISTORY: Reason for exam:->kyphoplasty What reading provider will be dictating this exam?->CRC Intraprocedural imaging. FINDINGS: 1 spot images of the lumbar spine were obtained. Intraprocedural fluoroscopic spot images as above. See separate procedure report for more information. MRI BRAIN WO CONTRAST    Result Date: 12/7/2021  EXAMINATION: MRI OF THE BRAIN WITHOUT CONTRAST  12/7/2021 6:06 pm TECHNIQUE: Only sagittal T1 and axial DWI sequences could be obtained. COMPARISON: None.  HISTORY: ORDERING SYSTEM PROVIDED HISTORY: eval for mets as pt confusion is increased TECHNOLOGIST PROVIDED HISTORY: Reason for exam:->eval for mets as pt confusion is increased What reading provider will be dictating this exam?->CRC FINDINGS: Due to patient's confusion and agitation, only sagittal T1 and axial DWI sequences could be obtained. There is no evidence of an acute or early subacute infarction. No mass effect or midline shift is evident on the images provided. 1. Very limited study reveals no gross acute abnormality. 2. Repeat MRI of the brain is recommended at a later time when the patient is either sedated or is better able to lie still. NM Cardiac Stress Test Nuclear Imaging    Result Date: 11/13/2021  INDICATION: CHF PROTOCOL: Rest/stress single isotope myocardial perfusion imaging with pharmacologic stress and gated SPECT imaging utilizing regadenoson. Reconstruction and reorientation of SPECT images in the short, vertical and horizontal long axis. Gated SPECT wall motion study with quantitative assessment of left ventricular ejection fraction. TECHNIQUE: Regadenoson dose: 0.4 mg Radiopharmaceutical stress dose: 27.7 mCi Tc-99m sestamibi Radiopharmaceutical rest dose: 8.0 mCi Tc-99m sestamibi FINDINGS: The technical quality of the study is fair. Rotating planar images demonstrate no significant patient motion. There is soft tissue breast attenuation. The heart appears normal in size on both rest and stress images. Post-stress tomographic images demonstrate moderate-sized area of mildly decreased uptake in the basal to mid anterolateral distal mid inferolateral walls including the mid inferior wall. Resting images demonstrate reversibility. Gated images demonstrate normal wall motion and thickening, with a calculated left ventricular ejection fraction of 89%. End-diastolic volume 75 mL. TID ratio 1.00.     1. The myocardial perfusion is abnormal. 2. Reversible defect in the anterolateral and inferolateral walls suggestive of ischemia. 3. Normal LV systolic function, EF greater than 70%. 4. There is no transient ischemic dilation. 5. Intermediate risk myocardial perfusion study.       Discharge Medications:      Medication List      START taking these medications    ampicillin-sulbactam  infusion  Commonly known as: UNASYN  Infuse 3,000 mg intravenously every 6 hours for 14 days Compound per protocol        CONTINUE taking these medications    albuterol (2.5 MG/3ML) 0.083% nebulizer solution  Commonly known as: PROVENTIL  Take 3 mLs by nebulization every 6 hours as needed for Wheezing     amLODIPine 5 MG tablet  Commonly known as: NORVASC  take 1 tablet by mouth once daily     ARIPiprazole 5 MG tablet  Commonly known as: ABILIFY  take 1 tablet by mouth once daily     citalopram 40 MG tablet  Commonly known as: CELEXA  take 1 tablet by mouth once daily     clopidogrel 75 MG tablet  Commonly known as: PLAVIX  take 1 tablet by mouth once daily     haloperidol 0.5 MG tablet  Commonly known as: HALDOL  Take 1 tablet by mouth every 6 hours as needed (agitation)     lactulose 10 GM/15ML solution  Commonly known as: CHRONULAC  Take 30 mLs by mouth 2 times daily     levETIRAcetam 500 MG tablet  Commonly known as: KEPPRA  take 1 tablet by mouth twice a day     levothyroxine 25 MCG tablet  Commonly known as: SYNTHROID  take 1 tablet by mouth once daily     lisinopril 10 MG tablet  Commonly known as: PRINIVIL;ZESTRIL  Take 1 tablet by mouth daily     magnesium oxide 400 MG tablet  Commonly known as: MAG-OX  Take 1 tablet by mouth daily     nicotine 21 MG/24HR  Commonly known as: NICODERM CQ  Place 1 patch onto the skin daily     ondansetron 4 MG disintegrating tablet  Commonly known as: ZOFRAN-ODT  Take 1 tablet by mouth every 8 hours as needed for Nausea or Vomiting     ondansetron 4 MG/2ML injection  Commonly known as: ZOFRAN  Infuse 2 mLs intravenously every 6 hours as needed for Nausea or Vomiting     polyethylene glycol 17 g packet  Commonly known as: GLYCOLAX  Take 17 g by mouth daily as needed for Constipation     rifaximin 550 MG tablet  Commonly known as: XIFAXAN  Take 1 tablet by mouth 2 times daily     vitamin B-1 100 MG tablet  Commonly known as: THIAMINE  Take 1 tablet by mouth daily        STOP taking these medications    atenolol 25 MG tablet  Commonly known as: TENORMIN     atorvastatin 40 MG tablet  Commonly known as: LIPITOR     oxyCODONE 5 MG immediate release tablet  Commonly known as: ROXICODONE     therapeutic multivitamin-minerals tablet           Where to Get Your Medications      You can get these medications from any pharmacy    Bring a paper prescription for each of these medications  · ampicillin-sulbactam  infusion       Time Spent on discharge is more than 45 minutes in the examination, evaluation, counseling and review of medications and discharge plan.    +++++++++++++++++++++++++++++++++++++++++++++++++  MD JOHNATHON Ramos/ Bud Major[address]  +++++++++++++++++++++++++++++++++++++++++++++++++  NOTE: This report was transcribed using voice recognition software. Every effort was made to ensure accuracy; however, inadvertent computerized transcription errors may be present.

## 2021-12-13 NOTE — PROGRESS NOTES
[facility]  Nephrology Attending Progress Note        SUBJECTIVE:     HPI from 12/11: \"The pt is a 60 yo female with a pmh of etoh, cva, htn, hyperlipidemia, cirrhosis, hepatitis C, tobacco abuse of 3-4 ppd,  who was admitted to [facility] for back pain and found to have a L5 compression fracture. She was transferred here for kyphoplasty 11/30. Ct hsowed liver lesions and portal vein thrombosis and pulm lesions. She underwent liver biopsy  12/6. kyphoplasty pathology showed metastatic HCC. She is seeing id for possible septic phlebitis of the thrombosed portal vein and asp pneumonia and is on unasyn. afp 1829. Labs have shown na 159 k 2.9, co2 31, bun 18, cr 0.6, alb 2.2, wbc 14, hgb 12.7, plt 198. She was ordered 30 meq iv kcl today and is on no fluids. \"    12/13/2021: no acute events overnight - VS Stable - remains lethargic - restless - responds to voice by eye opening only - nothing else to command - not eating - plan is home w/ Hospice       DIET:    ADULT DIET; Regular  ADULT ORAL NUTRITION SUPPLEMENT; Breakfast, Lunch, Dinner; Standard High Calorie/High Protein Oral Supplement  ADULT ORAL NUTRITION SUPPLEMENT; Breakfast, Dinner;  Wound Healing Oral Supplement     PHYSICAL EXAM:     Patient Vitals for the past 24 hrs:   BP Temp Temp src Pulse Resp SpO2 Weight   12/13/21 0937       175 lb 3.2 oz (79.5 kg)   12/13/21 0749 127/85 98.6 °F (37 °C) Axillary 104 20 93 %    12/12/21 2128 (!) 140/63 98.1 °F (36.7 °C) Axillary 103 24 92 %    12/12/21 1500 128/60 99.6 °F (37.6 °C) Axillary 102 24 93 %    @      Intake/Output Summary (Last 24 hours) at 12/13/2021 1058  Last data filed at 12/13/2021 0736  Gross per 24 hour   Intake 3399 ml   Output 1150 ml   Net 2249 ml         Wt Readings from Last 3 Encounters:   12/13/21 175 lb 3.2 oz (79.5 kg)   11/20/21 175 lb (79.4 kg)   11/18/21 181 lb (82.1 kg)       Constitutional:  Pt responds to voice, lethargic   Head: normocephalic, atraumatic  Neck: no JVD  Cardiovascular: regular rate and rhythm, no murmurs, gallops, or rubs  Respiratory:  No rales, rhonchi, or wheezes  Gastrointestinal:  Distended, large, firm   Ext: tr edema  Skin: dry, no rash    MEDS (scheduled):    lidocaine  5 mL IntraDERmal Once    sodium chloride flush  5-40 mL IntraVENous 2 times per day    heparin flush  1 mL IntraVENous 2 times per day    ampicillin-sulbactam  3,000 mg IntraVENous Q6H    levetiracetam  500 mg IntraVENous BID    enoxaparin  1 mg/kg SubCUTAneous BID    lactulose  30 g Oral TID    gabapentin  200 mg Oral TID    lidocaine  1 patch TransDERmal Daily    [Held by provider] amLODIPine  2.5 mg Oral Daily    ARIPiprazole  5 mg Oral Daily    [Held by provider] atenolol  25 mg Oral Daily    atorvastatin  40 mg Oral Nightly    citalopram  20 mg Oral Daily    levothyroxine  25 mcg Oral Daily    [Held by provider] lisinopril  10 mg Oral Daily    magnesium oxide  400 mg Oral Daily    therapeutic multivitamin-minerals  1 tablet Oral Daily    nicotine  1 patch TransDERmal Daily    rifaximin  550 mg Oral BID    vitamin B-1  100 mg Oral Daily       MEDS (infusions):   dextrose 150 mL/hr at 12/13/21 0934    sodium chloride         MEDS (prn):  potassium chloride **OR** potassium alternative oral replacement **OR** potassium chloride, sodium chloride flush, sodium chloride, heparin flush, cyclobenzaprine, oxyCODONE, haloperidol, ondansetron, ondansetron, polyethylene glycol, acetaminophen **OR** acetaminophen, magnesium hydroxide    DATA:    Recent Labs     12/11/21  0549 12/12/21  0453 12/13/21  0516   WBC 14.5* 13.9* 12.3*   HGB 12.7 12.9 12.0   HCT 37.5 39.7 36.5   MCV 93.8 98.3 95.3    187 174     Recent Labs     12/11/21  0549 12/12/21  0453 12/13/21  0516   * 162* 158*   K 2.9* 3.2* 3.0*   * 124* 122*   CO2 31* 27 25   BUN 18 17 12   CREATININE 0.6 0.6 0.6   LABGLOM >60 >60 >60   GLUCOSE 104* 91 133*   CALCIUM 11.1* 11.2* 10.8*   * 264* 246*   * 268* 264*   BILIDIR 0.4* 0.6* 0.5*   BILITOT 1.1 1.6* 1.6*   ALKPHOS 192* 175* 159*   MG 2.1 2.3 2.1       Lab Results   Component Value Date    LABALBU 1.8 (L) 12/13/2021    LABALBU 2.2 (L) 12/12/2021    LABALBU 2.2 (L) 12/11/2021     Lab Results   Component Value Date    TSH 3.450 11/20/2021       Iron Studies  No results found for: IRON, TIBC, FERRITIN  Vitamin B-12   Date Value Ref Range Status   12/03/2019 625 211 - 946 pg/mL Final     Folate   Date Value Ref Range Status   12/03/2019 6.2 4.8 - 24.2 ng/mL Final       No results found for: VITD25  No results found for: PTH    No components found for: URIC    Lab Results   Component Value Date    COLORU Yellow 12/10/2021    NITRU Negative 12/10/2021    GLUCOSEU Negative 12/10/2021    KETUA TRACE 12/10/2021    UROBILINOGEN 0.2 12/10/2021    BILIRUBINUR Negative 12/10/2021       No results found for: Jeanette Marquez      IMPRESSION/RECOMMENDATIONS:      1. Hypernatremia  In setting of free water deficit  Pt with little po and has diarrhea on lactulose  Peak 162--> 158  Follow Na on D5W @ 100 ml/ hr   Recheck labs at 1600 today     2. Hypokalemia  In setting of diarrhea and decreased po  Contraction alkalosis  K+ replacment PRN     3. Aspiration pneumonia  On abx per id     4. [address]  Mets to bone pulm  onc seeing  Sp kypho for L5 fx  Hospice following - DNR-CC     5. Cirrhosis  Hep c and etoh  Sp ciwa protocol     6. Tobacco abuse     7. HTN  norvasc and bb held due to hypotension    José Goddard, APRN - CNP     Patient seen and examined all key components of the physical performed independently , case discussed with NP, all pertinent labs and radiologic tests personally reviewed agree with above.       Lianet Oliver MD

## 2021-12-13 NOTE — CARE COORDINATION
Spoke with patient's spouse. We again discussed what we had previously discussed on Friday. Spouse cannot provide 24 hour care at home and patient does not have finances available to pay privately for room and board at nursing facility. He would like patient to go to [facility] skilled, then will apply for LT medicaid once she gets there for continued care at [place] [id number] after skilled time is no longer covered. Requested therapy updates and then insurance is currently waiving auth. [facility] can accept today after speaking with spouse. Physicians arranged for . Facility requesting covid test again prior to discharge, nursing to complete. Spouse notified of discharge time. For questions I can be reached at [phone].  [facility]

## 2021-12-13 NOTE — PROGRESS NOTES
Occupational Therapy  OCCUPATIONAL THERAPY TREATMENT SESSION    [place] [address] [address] [address]     CEON:  Patient Name: Estee More  MRN: [id number]  : 1960  Room: [address] [place] [address]     Evaluating OT: Vernard Gaucher OTR/L #[id number]    Referring Bran Pringle MD  Specific Provider Orders/Date: OT eval and treat      Diagnosis: Compression fracture of first lumbar vertebra with nonunion [S32.010K]   Pt admitted to hospital with back pain     Surgery / Procedure: 21  s/p L5 kyphoplasty      Pertinent Medical History:  has a past medical history of Alcohol abuse, Cerebral artery occlusion with cerebral infarction [facility]), Depression, Diverticulosis of colon, Elevated blood sugar, H/O cardiovascular stress test, Hepatitis C, Hyperlipidemia, Hypertension, Liver cirrhosis ([facility] [address] Magnesium deficiency, Marijuana user, Nicotine dependence, Seizures ([facility] [address] and Subclinical hypothyroidism.      Precautions:  Fall Risk, O2 5L, spine neutrality, bed alarm, TAPS, TSM     Assessment of current deficits    [x]? ? Functional mobility          [x]? ?ADLs           [x]? ? Strength                  []? ? Cognition    [x]? ? Functional transfers        [x]? ? IADLs         [x]? ? Safety Awareness   [x]? ?Endurance    []? ? Fine Coordination                        [x]? ? Balance      []? ? Vision/perception   []? ? Sensation      []? ?Gross Motor Coordination            []? ? ROM           []? ? Delirium                   []? ? Motor Control      OT PLAN OF CARE   OT POC based on physician orders, patient diagnosis and results of clinical assessment     Frequency/Duration 1-3 days/wk for 2 weeks PRN   Specific OT Treatment Interventions to include:   * Instruction/training on adapted ADL techniques and AE recommendations to increase functional independence within precautions       * Training on energy conservation strategies, correct breathing pattern and techniques to improve independence/tolerance for self-care routine  * Functional transfer/mobility training/DME recommendations for increased independence, safety, and fall prevention  * Patient/Family education to increase follow through with safety techniques and functional independence  * Recommendation of environmental modifications for increased safety with functional transfers/mobility and ADLs  * Cognitive retraining/development of therapeutic activities to improve problem solving, judgement, memory, and attention for increased safety/participation in ADL/IADL tasks  * Therapeutic exercise to improve motor endurance, ROM, and functional strength for ADLs/functional transfers  * Therapeutic activities to facilitate/challenge dynamic balance, stand tolerance for increased safety and independence with ADLs  * Therapeutic activities to facilitate gross/fine motor skills for increased independence with ADLs  * Neuro-muscular re-education: facilitation of righting/equilibrium reactions, midline orientation, scapular stability/mobility, normalization of muscle tone, and facilitation of volitional active controled movement  * Positioning to improve skin integrity, interaction with environment and functional independence  * Delirium prevention/treatment     Recommended Adaptive Equipment:  TBD      Home Living: Pt lives alone in a 1 story home with ramp entrance    Bathroom setup: walk-in shower    Equipment owned: cane, w/c     Prior Level of Function: mod I with ADLs , assist prn with IADLs; ambulated cane vs w/c for mobility   Driving: no   Occupation: enjoys cooking     Pain Level: Pt unable to state pain. Moaning with movement and grimacing.      Cognition: Oriented: x 0 , difficulty staying alert during session. Increased difficulty following commands w/ max verbal, tactile and visual cues.   Patient very lethargic thi date.  [place]:  poor-             Sequencing:  poor-             Problem solving:  poor-             Judgement/safety:  poor-               Functional Assessment:  AM-PAC Daily Activity Raw Score: 6/24    Initial Eval Status  Date: 12/1/21 Treatment Status  Date: 12/13/21 STGs = LTGs  Time frame: 10-14 days   Feeding Minimal Assist  N/T    Mod I   Grooming Minimal Assist  Dependent overall; Washed face with Max A and Dep A to improve face hygiene and hand hygiene.    Modified [place]    UB Dressing Moderate Assist  Dependent; To dontrell gown on properly.     Stand by Assist    LB Dressing Dependent  Dependent; Pt required Dep A to dontrell/doff socks while in supine. Moderate Assist    Bathing Maximal Assist Dep A;    Pt simulated bathing tasks with pt requiring Dep A. Minimal Assist    Toileting Dependent   Dependent; Pt incontinent and requires Dep A for hygiene.       Moderate Assist    Bed Mobility  Supine to sit: Maximal Assist x2   Sit to supine: Maximal Assist x2 Rolling: Max A of 2 (pt able to reach for side railing - better to roll to left side). Supine>sit: N/A     Scooting to HOB: Dep x 2     Supine to sit: Moderate Assist   Sit to supine: Moderate Assist    Functional Transfers Moderate Assist x2 NT    Deferred secondary to poor safety Minimal Assist    Functional Mobility NT  NT;     Due to level of cognition this date.     Minimal Assist    Balance Sitting: Max A progressing to min A  (posterior lean)     Standing: Mod A x2  Sitting EOB: N/A     Standing:  NT        Activity Tolerance F- Poor- F+   Visual/  Perceptual Glasses: yes  wfl                       Comments: Upon arrival pt lying in bed very limited cognition. O2 level on 5L at 89-92% throughout session. Attempted to have patient sit on the EOB however due to poor mental status and lethargic behavior pt unsafe to transfer to EOB at this time. At end of session pt lying in bed (bed alarm on) all lines and tubes intact, call light within reach.      Patient demonstrated decreased independence and

## 2021-12-13 NOTE — PROGRESS NOTES
[address] [place] [address] [facility]  Progress Note    Chief complaint: Aspiration pneumonia       SUBJECTIVE:    Pt opens eyes to voice, confused   Afebrile   Patient is tolerating medications. No reported adverse drug reactions. Review of systems:  As stated above in the chief complaint, otherwise negative. Medications:  Scheduled Meds:   lidocaine  5 mL IntraDERmal Once    sodium chloride flush  5-40 mL IntraVENous 2 times per day    heparin flush  1 mL IntraVENous 2 times per day    ampicillin-sulbactam  3,000 mg IntraVENous Q6H    levetiracetam  500 mg IntraVENous BID    enoxaparin  1 mg/kg SubCUTAneous BID    lactulose  30 g Oral TID    gabapentin  200 mg Oral TID    lidocaine  1 patch TransDERmal Daily    [Held by provider] amLODIPine  2.5 mg Oral Daily    ARIPiprazole  5 mg Oral Daily    [Held by provider] atenolol  25 mg Oral Daily    atorvastatin  40 mg Oral Nightly    citalopram  20 mg Oral Daily    levothyroxine  25 mcg Oral Daily    [Held by provider] lisinopril  10 mg Oral Daily    magnesium oxide  400 mg Oral Daily    therapeutic multivitamin-minerals  1 tablet Oral Daily    nicotine  1 patch TransDERmal Daily    rifaximin  550 mg Oral BID    vitamin B-1  100 mg Oral Daily     Continuous Infusions:   dextrose 150 mL/hr at 21 0934    sodium chloride       PRN Meds:potassium chloride **OR** potassium alternative oral replacement **OR** potassium chloride, sodium chloride flush, sodium chloride, heparin flush, cyclobenzaprine, oxyCODONE, haloperidol, ondansetron, ondansetron, polyethylene glycol, acetaminophen **OR** acetaminophen, magnesium hydroxide    OBJECTIVE:  /85   Pulse 104   Temp 98.6 °F (37 °C) (Axillary)   Resp 20   Ht 4' 10\" (1.473 m)   Wt 175 lb 3.2 oz (79.5 kg)   SpO2 93%   BMI 36.62 kg/m²   Temp  Av.8 °F (37.1 °C)  Min: 98.1 °F (36.7 °C)  Max: 99.6 °F (37.6 °C)     Constitutional: Awake, obtunded, very confused   Skin: Warm and dry.  No rashes were noted. HEENT:   dry mucous membranes. No ulcerations or thrush. Neck: Supple to movements. Chest: Coarse bilaterally   Cardiovascular: S1 and S2 are rhythmic and regular. No murmurs appreciated. Abdomen: Soft, non tender, distended, bowel sound +  Genitourinary: Female, bateman yellow urine  Extremities: No clubbing, no cyanosis, + edema.   Lines: peripheral  12/10/21 midline  Laboratory and Tests Review:  Lab Results   Component Value Date    WBC 12.3 (H) 12/13/2021    WBC 13.9 (H) 12/12/2021    WBC 14.5 (H) 12/11/2021    HGB 12.0 12/13/2021    HCT 36.5 12/13/2021    MCV 95.3 12/13/2021     12/13/2021     Lab Results   Component Value Date    NEUTROABS 8.61 (H) 12/13/2021    NEUTROABS 9.17 (H) 12/12/2021    NEUTROABS 11.02 (H) 12/11/2021     No results found for: CRPHS  Lab Results   Component Value Date     (H) 12/13/2021     (H) 12/13/2021    ALKPHOS 159 (H) 12/13/2021    BILITOT 1.6 (H) 12/13/2021     Lab Results   Component Value Date     12/13/2021    K 3.0 12/13/2021     12/13/2021    CO2 25 12/13/2021    BUN 12 12/13/2021    CREATININE 0.6 12/13/2021    CREATININE 0.6 12/12/2021    CREATININE 0.6 12/11/2021    GFRAA >60 12/13/2021    LABGLOM >60 12/13/2021    GLUCOSE 133 12/13/2021    PROT 6.0 12/13/2021    LABALBU 1.8 12/13/2021    CALCIUM 10.8 12/13/2021    BILITOT 1.6 12/13/2021    ALKPHOS 159 12/13/2021     12/13/2021     12/13/2021     No results found for: CRP  No results found for: [facility]  Radiology:  Reviewed    Microbiology:   Lab Results   Component Value Date    BC 24 Hours no growth 12/10/2021    BC 5 Days no growth 12/05/2021    BC 5 Days no growth 11/22/2021    ORG Klebsiella pneumoniae ssp pneumoniae 11/09/2021     Lab Results   Component Value Date    BLOODCULT2 24 Hours no growth 12/10/2021    BLOODCULT2 5 Days no growth 12/05/2021    BLOODCULT2 5 Days no growth 11/22/2021    ORG Klebsiella pneumoniae ssp pneumoniae 11/09/2021     No results found for: WNDABS  No results found for: RESPSMEAR  No results found for: MPNEUMO, CLAMYDCU, LABLEGI, AFBCX, FUNGSM, LABFUNG  No results found for: CULTRESP  No results found for: CXCATHTIP  Body Fluid Culture, Sterile   Date Value Ref Range Status   04/19/2016 Growth not present  Final     No results found for: CXSURG  Urine Culture, Routine   Date Value Ref Range Status   11/09/2021 >100,000 CFU/ml  Final     No results found for: [facility]    ASSESSMENT:  Metastatic HCC to the lungs nodes and bone; liver biopsy today AFP -  2385  Metabolic encephalopathy  Aspiration pneumonia  Rule out septic thrombosis of the portal vein  Pictures may be from underlying metastatic disease than infection   Leukocytosis- 13 K     PLAN:  Unasyn 3 grams IV q 6  Hrs for 8 stop ate 12/23/21 -med rec done  CBC with diff -wbc 12.3  Monitor labs      GAMALIEL Lindsay CNP  11:19 AM  12/13/2021  Pt seen and examined. Above discussed agree with advanced practice nurse. Labs, cultures, and radiographs reviewed. Face to Face encounter occurred. Changes made as necessary.      Srinivasan De Dios MD

## 2021-12-17 NOTE — DISCHARGE SUMMARY
Discharge Summary    Date: 12/17/2021  Patient Name: Anjelica Davis YOB: 1960 Age: 59 y.o. Admit Date: 11/9/2021  Discharge Date: 11/19/2021  Discharge Condition: Stable    Admission Diagnosis  UTI (urinary tract infection) (N39.0); Hypoxia (R09.02); Acute cystitis without hematuria (N30.00); Ambulatory dysfunction (R26.2);CHF (congestive heart failure), NYHA class I, acute on chronic, combined (HCC) (I50.43); Acute on chronic congestive heart failure, unspecified heart failure type (HCC) (I50.9)     Discharge Diagnosis  Active Problems: Acute on chronic diastolic (congestive) heart failure (HCC) Pedal edema Chronic obstructive pulmonary disease (HCC) Tobacco abuse Hepatic cirrhosis (HCC)  Hypokalemia  HypomagnesemiaResolved Problems:  UTI (urinary tract infection)    Hospital Stay  Narrative of Hospital Course:  Admitted for CHF, UTI, debility,  Responded well to IV diuresis, cardiac monitoring, IV antibiotics and PT,  Discharged home in stable conditon    Consultants:  [address] [place] [address] NURSE/COORDINATORIP CONSULT TO P.[facility] 234 CONSULT TO Off [facility], [facility]    Surgeries/procedures Performed:       Treatments:    IV Hydration, Analgesia and Cardiac Medications        Discharge Plan/Disposition:  Home    Hospital/Incidental Findings Requiring Follow Up:    Patient Instructions:    Diet: Cardiac Diet    Activity:Activity as Tolerated  For number of days (if applicable): Other Instructions:    Provider Follow-Up:   No follow-ups on file.      Significant Diagnostic Studies:    Recent Labs:  Admission on 11/09/2021, Discharged on 11/19/2021WBC                                           Date: 11/09/2021Value: 10.1        Ref range: 4.5 - 11.5 E9/L    Status: FinalRB                                           Date: 11/09/2021Value: 4.72        Ref range: 3.50 - 5.50 E12/L  Status: FinalHemoglobin                                    Date: 11/09/2021Value: 15.7*       Ref range: 11.5 - 15.5 g/dL   Status: FinalHematocrit                                    Date: 11/09/2021Value: 44.6        Ref range: 34.0 - 48.0 %      Status: FinalMCV                                           Date: 11/09/2021Value: 94.5        Ref range: 80.0 - 99.9 fL     Status: [facility]                                           Date: 11/09/2021Value: 33.3        Ref range: 26.0 - 35.0 pg     Status: [address]                                          Date: 11/09/2021Value: 35.2*       Ref range: 32.0 - 34.5 %      Status: FinalRDW                                           Date: 11/09/2021Value: 13.7        Ref range: 11.5 - 15.0 fL     Status: FinalPlatelets                                     Date: 11/09/2021Value: 277         Ref range: 130 - 450 E9/L     Status: FinalMPV                                           Date: 11/09/2021Value: 10.4        Ref range: 7.0 - 12.0 fL      Status: FinalNeutrophils %                                 Date: 11/09/2021Value: 65.0        Ref range: 43.0 - 80.0 %      Status: FinalLymphocytes %                                 Date: 11/09/2021Value: 17.0*       Ref range: 20.0 - 42.0 %      Status: FinalMonocytes %                                   Date: 11/09/2021Value: 15.0*       Ref range: 2.0 - 12.0 %       Status: FinalEosinophils %                                 Date: 11/09/2021Value: 3.0         Ref range: 0.0 - 6.0 %        Status: FinalBasophils %                                   Date: 11/09/2021Value: 0.0         Ref range: 0.0 - 2.0 %        Status: FinalNeutrophils Absolute                          Date: 11/09/2021Value: 6.57        Ref range: 1.80 - 7.30 E9/L   Status: FinalLymphocytes Absolute                          Date: 11/09/2021Value: 1.72        Ref range: 1.50 - 4.00 E9/L   Status: FinalMonocytes Absolute                            Date: 11/09/2021Value: 1.52*       Ref range: 0.10 - 0.95 E9/L   Status: FinalEosinophils Absolute                          Date: 11/09/2021Value: 0.30        Ref range: 0.05 - 0.50 E9/L   Status: FinalBasophils Absolute                            Date: 11/09/2021Value: 0.00        Ref range: 0.00 - 0.20 E9/L   Status: FinalPoikilocytes                                  Date: 11/09/2021Value: 1+            Status: FinalBurr Cells                                    Date: 11/09/2021Value: 1+            Status: FinalSodium                                        Date: 11/09/2021Value: 129*        Ref range: 132 - 146 mmol/L   Status: FinalPotassium                                     Date: 11/09/2021Value: 3.5         Ref range: 3.5 - 5.0 mmol/L   Status: FinalChloride                                      Date: 11/09/2021Value: 90*         Ref range: 98 - 107 mmol/L    Status: FinalCO2                                           Date: 11/09/2021Value: 26          Ref range: 22 - 29 mmol/L     Status: FinalAnion Gap                                     Date: 11/09/2021Value: 13          Ref range: 7 - 16 mmol/L      Status: FinalGlucose                                       Date: 11/09/2021Value: 109*        Ref range: 74 - 99 mg/dL      Status: FinalBUN                                           Date: 11/09/2021Value: 7           Ref range: 6 - 23 mg/dL       Status: FinalCREATININE                                    Date: 11/09/2021Value: 0.4*        Ref range: 0.5 - 1.0 mg/dL    Status: FinalGFR Non-                      Date: 11/09/2021Value: >60         Ref range: >=60 mL/min/1.73   Status: Final              Comment: Chronic Kidney Disease: less than 60 ml/min/1.73 sq.m. Kidney Failure: less than 15 ml/min/1.73 sq. m. Results valid for patients 18 years and older. GFR                           Date: 11/09/2021Value: >60           Status: FinalCalcium Date: 11/09/2021Value: 9.6         Ref range: 8.6 - 10.2 mg/dL   Status: FinalTotal Protein                                 Date: 11/09/2021Value: 7.4         Ref range: 6.4 - 8.3 g/dL     Status: FinalAlbumin                                       Date: 11/09/2021Value: 3.3*        Ref range: 3.5 - 5.2 g/dL     Status: FinalTotal Bilirubin                               Date: 11/09/2021Value: 1.0         Ref range: 0.0 - 1.2 mg/dL    Status: FinalAlkaline Phosphatase                          Date: 11/09/2021Value: 154*        Ref range: 35 - 104 U/L       Status: FinalALT                                           Date: 11/09/2021Value: 61*         Ref range: 0 - 32 U/L         Status: FinalAST                                           Date: 11/09/2021Value: 82*         Ref range: 0 - 31 U/L         Status: FinalColor, UA                                     Date: 11/09/2021Value: Yellow      Ref range: Straw/Yellow       Status: FinalClarity, UA                                   Date: 11/09/2021Value: Clear       Ref range: Clear              Status: FinalGlucose, Ur                                   Date: 11/09/2021Value: Negative    Ref range: Negative mg/dL     Status: FinalBilirubin Urine                               Date: 11/09/2021Value: Negative    Ref range: Negative           Status: FinalKetones, Urine                                Date: 11/09/2021Value: 15*         Ref range: Negative mg/dL     Status: FinalSpecific Gravity, UA                          Date: 11/09/2021Value: 1.010       Ref range: 1.005 - 1.030      Status: FinalBlood, Urine                                  Date: 11/09/2021Value: LARGE*      Ref range: Negative           Status: FinalpH, UA                                        Date: 11/09/2021Value: 5.5         Ref range: 5.0 - 9.0          Status: FinalProtein, UA                                   Date: 11/09/2021Value: Negative    Ref range: Negative mg/dL     Status: FinalUrobilinogen, Urine                           Date: 11/09/2021Value: 2.0*        Ref range: <2.0 E.U./dL       Status: FinalNitrite, Urine                                Date: 11/09/2021Value: POSITIVE*   Ref range: Negative           Status: FinalLeukocyte Esterase, Urine                     Date: 11/09/2021Value: MODERATE*   Ref range: Negative           Status: FinalTroponin, High Sensitivity                    Date: 11/09/2021Value: 10*         Ref range: 0 - 9 ng/L         Status: Final              Comment: High Sensitivity Troponin values cannot be compared withother Troponin methodologies. Patients with high levels of Biotin oral intake (i.e. >5 mg/day)may have falsely decreased Troponin levels. Samples collectedwithin 8 hours of biotin intake may require additional informationfor diagnosis. Ventricular Rate                              Date: 11/09/2021Value: 89          Ref range: BPM                Status: FinalAtrial Rate                                   Date: 11/09/2021Value: 89          Ref range: BPM                Status: FinalP-R Interval                                  Date: 11/09/2021Value: 182         Ref range: ms                 Status: FinalQRS Duration                                  Date: 11/09/2021Value: 90          Ref range: ms                 Status: FinalQ-T Interval                                  Date: 11/09/2021Value: 380         Ref range: ms                 Status: FinalQTc Calculation (Bazett)                      Date: 11/09/2021Value: 462         Ref range: ms                 Status: FinalP Axis                                        Date: 11/09/2021Value: 46          Ref range: degrees            Status: FinalR Axis                                        Date: 11/09/2021Value: 56          Ref range: degrees            Status: FinalT Axis                                        Date: 11/09/2021Value: 56          Ref range: degrees            Status: FinalPro-BNP Ref range: 0 - 199 mg/dL      Status: FinalTriglycerides                                 Date: 11/10/2021Value: 129         Ref range: 0 - 149 mg/dL      Status: FinalHDL                                           Date: 11/10/2021Value: 21          Ref range: >40 mg/dL          Status: FinalLDL Calculated                                Date: 11/10/2021Value: 75          Ref range: 0 - 99 mg/dL       Status: FinalVLDL Cholesterol Calculated                   Date: 11/10/2021Value: 26          Ref range: mg/dL              Status: FinalMagnesium                                     Date: 11/10/2021Value: 1.6         Ref range: 1.6 - 2.6 mg/dL    Status: FinalSodium                                        Date: 11/10/2021Value: 135         Ref range: 132 - 146 mmol/L   Status: FinalPotassium                                     Date: 11/10/2021Value: 3.6         Ref range: 3.5 - 5.0 mmol/L   Status: FinalChloride                                      Date: 11/10/2021Value: 93*         Ref range: 98 - 107 mmol/L    Status: FinalCO2                                           Date: 11/10/2021Value: 26          Ref range: 22 - 29 mmol/L     Status: FinalAnion Gap                                     Date: 11/10/2021Value: 16          Ref range: 7 - 16 mmol/L      Status: FinalGlucose                                       Date: 11/10/2021Value: 105*        Ref range: 74 - 99 mg/dL      Status: FinalBUN                                           Date: 11/10/2021Value: 9           Ref range: 6 - 23 mg/dL       Status: FinalCREATININE                                    Date: 11/10/2021Value: 0.4*        Ref range: 0.5 - 1.0 mg/dL    Status: FinalGFR Non-                      Date: 11/10/2021Value: >60         Ref range: >=60 mL/min/1.73   Status: Final              Comment: Chronic Kidney Disease: less than 60 ml/min/1.73 sq.m. Kidney Failure: less than 15 ml/min/1.73 sq. m. Results valid for patients 18 years and older. GFR                           Date: 11/10/2021Value: >60           Status: FinalCalcium                                       Date: 11/10/2021Value: 9.6         Ref range: 8.6 - 10.2 mg/dL   Status: FinalMagnesium                                     Date: 11/11/2021Value: 1.5*        Ref range: 1.6 - 2.6 mg/dL    Status: FinalSodium                                        Date: 11/11/2021Value: 133         Ref range: 132 - 146 mmol/L   Status: FinalPotassium                                     Date: 11/11/2021Value: 3.2*        Ref range: 3.5 - 5.0 mmol/L   Status: FinalChloride                                      Date: 11/11/2021Value: 91*         Ref range: 98 - 107 mmol/L    Status: FinalCO2                                           Date: 11/11/2021Value: 29          Ref range: 22 - 29 mmol/L     Status: FinalAnion Gap                                     Date: 11/11/2021Value: 13          Ref range: 7 - 16 mmol/L      Status: FinalGlucose                                       Date: 11/11/2021Value: 128*        Ref range: 74 - 99 mg/dL      Status: FinalBUN                                           Date: 11/11/2021Value: 10          Ref range: 6 - 23 mg/dL       Status: FinalCREATININE                                    Date: 11/11/2021Value: 0.4*        Ref range: 0.5 - 1.0 mg/dL    Status: FinalGFR Non-                      Date: 11/11/2021Value: >60         Ref range: >=60 mL/min/1.73   Status: Final              Comment: Chronic Kidney Disease: less than 60 ml/min/1.73 sq.m. Kidney Failure: less than 15 ml/min/1.73 sq. m. Results valid for patients 18 years and older. GFR                           Date: 11/11/2021Value: >60           Status: FinalCalcium                                       Date: 11/11/2021Value: 9.2         Ref range: 8.6 - 10.2 mg/dL   Status: FinalPro-BNP                                       Date: 11/12/2021Value: 231* Ref range: 0 - 125 pg/mL      Status: FinalMagnesium                                     Date: 11/12/2021Value: 1.5*        Ref range: 1.6 - 2.6 mg/dL    Status: FinalSodium                                        Date: 11/12/2021Value: 134         Ref range: 132 - 146 mmol/L   Status: FinalPotassium                                     Date: 11/12/2021Value: 3.7         Ref range: 3.5 - 5.0 mmol/L   Status: FinalChloride                                      Date: 11/12/2021Value: 91*         Ref range: 98 - 107 mmol/L    Status: FinalCO2                                           Date: 11/12/2021Value: 30*         Ref range: 22 - 29 mmol/L     Status: FinalAnion Gap                                     Date: 11/12/2021Value: 13          Ref range: 7 - 16 mmol/L      Status: FinalGlucose                                       Date: 11/12/2021Value: 183*        Ref range: 74 - 99 mg/dL      Status: FinalBUN                                           Date: 11/12/2021Value: 10          Ref range: 6 - 23 mg/dL       Status: FinalCREATININE                                    Date: 11/12/2021Value: 0.5         Ref range: 0.5 - 1.0 mg/dL    Status: FinalGFR Non-                      Date: 11/12/2021Value: >60         Ref range: >=60 mL/min/1.73   Status: Final              Comment: Chronic Kidney Disease: less than 60 ml/min/1.73 sq.m. Kidney Failure: less than 15 ml/min/1.73 sq. m. Results valid for patients 18 years and older. GFR                           Date: 11/12/2021Value: >60           Status: FinalCalcium                                       Date: 11/12/2021Value: 9.7         Ref range: 8.6 - 10.2 mg/dL   Status: FinalLeft Ventricular Ejection Fraction            Date: 11/13/2021Value: 89            Status: Final-EditedLVEF MODALITY                                 Date: 11/13/2021Value: Nuclear       Status: Final-EditedMagnesium                                     Date: 11/13/2021Value: 2.0         Ref range: 1.6 - 2.6 mg/dL    Status: FinalSodium                                        Date: 11/13/2021Value: 134         Ref range: 132 - 146 mmol/L   Status: FinalPotassium                                     Date: 11/13/2021Value: 3.8         Ref range: 3.5 - 5.0 mmol/L   Status: FinalChloride                                      Date: 11/13/2021Value: 91*         Ref range: 98 - 107 mmol/L    Status: FinalCO2                                           Date: 11/13/2021Value: 30*         Ref range: 22 - 29 mmol/L     Status: FinalAnion Gap                                     Date: 11/13/2021Value: 13          Ref range: 7 - 16 mmol/L      Status: FinalGlucose                                       Date: 11/13/2021Value: 103*        Ref range: 74 - 99 mg/dL      Status: FinalBUN                                           Date: 11/13/2021Value: 12          Ref range: 6 - 23 mg/dL       Status: FinalCREATININE                                    Date: 11/13/2021Value: 0.6         Ref range: 0.5 - 1.0 mg/dL    Status: FinalGFR Non-                      Date: 11/13/2021Value: >60         Ref range: >=60 mL/min/1.73   Status: Final              Comment: Chronic Kidney Disease: less than 60 ml/min/1.73 sq.m. Kidney Failure: less than 15 ml/min/1.73 sq. m. Results valid for patients 18 years and older. GFR                           Date: 11/13/2021Value: >60           Status: FinalCalcium                                       Date: 11/13/2021Value: 9.9         Ref range: 8.6 - 10.2 mg/dL   Status: FinalMagnesium                                     Date: 11/14/2021Value: 1.9         Ref range: 1.6 - 2.6 mg/dL    Status: FinalSodium                                        Date: 11/14/2021Value: 135         Ref range: 132 - 146 mmol/L   Status: FinalPotassium                                     Date: 11/14/2021Value: 3.7         Ref range: 3.5 - 5.0 mmol/L Status: FinalChloride                                      Date: 11/14/2021Value: 94*         Ref range: 98 - 107 mmol/L    Status: FinalCO2                                           Date: 11/14/2021Value: 28          Ref range: 22 - 29 mmol/L     Status: FinalAnion Gap                                     Date: 11/14/2021Value: 13          Ref range: 7 - 16 mmol/L      Status: FinalGlucose                                       Date: 11/14/2021Value: 119*        Ref range: 74 - 99 mg/dL      Status: FinalBUN                                           Date: 11/14/2021Value: 12          Ref range: 6 - 23 mg/dL       Status: FinalCREATININE                                    Date: 11/14/2021Value: 0.6         Ref range: 0.5 - 1.0 mg/dL    Status: FinalGFR Non-                      Date: 11/14/2021Value: >60         Ref range: >=60 mL/min/1.73   Status: Final              Comment: Chronic Kidney Disease: less than 60 ml/min/1.73 sq.m. Kidney Failure: less than 15 ml/min/1.73 sq. m. Results valid for patients 18 years and older. GFR                           Date: 11/14/2021Value: >60           Status: FinalCalcium                                       Date: 11/14/2021Value: 9.8         Ref range: 8.6 - 10.2 mg/dL   Status: FinalPro-BNP                                       Date: 11/15/2021Value: 221*        Ref range: 0 - 125 pg/mL      Status: [id number], NAAT                              Date: 11/15/2021Value: Not Detected                   Ref range: Not Detected       Status: Final              Comment: Rapid NAAT:   Negative results should be treated as presumptive and,if inconsistent with clinical signs and symptoms or necessary forpatient management, should be tested with an alternative molecularassay. Negative results do not preclude SARS-CoV-2 infection andshould not be used as the sole basis for patient management decisions. This test has been authorized by the FDA under an Emergency UseAuthorization (EUA) for use by authorized laboratories. Fact sheet for [employer] sheet for Patients: [place]: Isothermal Nucleic Acid AmplificationPro-BNP                                       Date: 11/18/2021Value: 337*        Ref range: 0 - 125 pg/mL      Status: FinalAmmonia                                       Date: 11/17/2021Value: 67.0*       Ref range: 11.0 - 51.0 umol*  Status: FinalSodium                                        Date: 11/17/2021Value: 134         Ref range: 132 - 146 mmol/L   Status: FinalPotassium                                     Date: 11/17/2021Value: 3.9         Ref range: 3.5 - 5.0 mmol/L   Status: FinalChloride                                      Date: 11/17/2021Value: 97*         Ref range: 98 - 107 mmol/L    Status: FinalCO2                                           Date: 11/17/2021Value: 25          Ref range: 22 - 29 mmol/L     Status: FinalAnion Gap                                     Date: 11/17/2021Value: 12          Ref range: 7 - 16 mmol/L      Status: FinalGlucose                                       Date: 11/17/2021Value: 107*        Ref range: 74 - 99 mg/dL      Status: FinalBUN                                           Date: 11/17/2021Value: 14          Ref range: 6 - 23 mg/dL       Status: FinalCREATININE                                    Date: 11/17/2021Value: 0.6         Ref range: 0.5 - 1.0 mg/dL    Status: FinalGFR Non-                      Date: 11/17/2021Value: >60         Ref range: >=60 mL/min/1.73   Status: Final              Comment: Chronic Kidney Disease: less than 60 ml/min/1.73 sq.m. Kidney Failure: less than 15 ml/min/1.73 sq. m. Results valid for patients 18 years and older. GFR                           Date: 11/17/2021Value: >60           Status: Mario Smith Date: 11/17/2021Value: 10.5*       Ref range: 8.6 - 10.2 mg/dL   Status: Final------------    Radiology last 7 days:  XR CHEST PORTABLEResult Date: 12/10/2021No acute process. Stable appearance of the chest compared to 12/08/2021. [unfilled]    Discharge Medications    Discharge Medication List as of 11/18/2021  4:10 PMSTART taking these medicationslactulose (CHRONULAC) 10 GM/15ML solutionTake 30 mLs by mouth 2 times daily, Disp-1800 mL, R-1Normalrifaximin (XIFAXAN) 550 MG tabletTake 1 tablet by mouth 2 times daily, Disp-42 tablet, R-5NormallevoFLOXacin (LEVAQUIN) 500 MG tabletTake 1 tablet by mouth daily for 5 doses, Disp-5 tablet, R-0Normalnicotine (NICODERM CQ) 21 MG/24HRPlace 1 patch onto the skin daily, Disp-30 patch, R-3Normalspironolactone (ALDACTONE) 25 MG tabletTake 1 tablet by mouth daily, Disp-30 tablet, R-3Normalfurosemide (LASIX) 20 MG tabletTake 1 tablet by mouth 2 times daily, Disp-60 tablet, R-0Normal    Discharge Medication List as of 11/18/2021  4:10 PM    Discharge Medication List as of 11/18/2021  4:10 PMCONTINUE these medications which have NOT CHANGEDoxyCODONE (ROXICODONE) 5 MG immediate release tabletTake 5 mg by mouth every 8 hours as needed for Pain. Historical Medlevothyroxine (SYNTHROID) 25 MCG tablettake 1 tablet by mouth once daily, Disp-90 tablet, R-1Normalmagnesium oxide (MAG-OX) 400 MG tabletTake 1 tablet by mouth twice daily, Disp-60 tablet, R-3Normalmeloxicam (MOBIC) 15 MG tabletTake 1 tablet by mouth daily, Disp-90 tablet, R-1NormalamLODIPine (NORVASC) 5 MG tablettake 1 tablet by mouth once daily, Disp-90 tablet, R-1Normalcitalopram (CELEXA) 40 MG tablettake 1 tablet by mouth once daily, Disp-90 tablet, R-1Normalclopidogrel (PLAVIX) 75 MG tablettake 1 tablet by mouth once daily, Disp-90 tablet, R-1NormallevETIRAcetam (KEPPRA) 500 MG tablettake 1 tablet by mouth twice a day, Disp-180 tablet, R-1Normalalbuterol sulfate HFA (PROAIR HFA) 108 (90 Base) MCG/ACT inhalerInhale 2 puffs into the lungs every 6 hours as needed for Wheezing, Disp-1 Inhaler, R-5Normalatenolol (TENORMIN) 50 MG tablettake 1 tablet by mouth once daily, Disp-90 tablet, R-1Normalatorvastatin (LIPITOR) 40 MG tablettake 1 tablet by mouth once daily, Disp-90 tablet, R-1Normalbenazepril (LOTENSIN) 20 MG tablettake 1 tablet by mouth once daily, Disp-90 tablet, R-1Normalcyclobenzaprine (FLEXERIL) 5 MG tablettake 1 tablet by mouth twice a day if needed for muscle spasm - DO NOT TAKE WHILE DRINKING ALCOHOL OR DRIVING, OKUF-99 tablet, R-5NormalARIPiprazole (ABILIFY) 5 MG tablettake 1 tablet by mouth once daily, Disp-30 tablet, R-0Normal    Discharge Medication List as of 11/18/2021  4:10 PM    Time Spent on Discharge:4E] minutes were spent in patient examination, evaluation, counseling as well as medication reconciliation, prescriptions for required medications, discharge plan, and follow up.     Electronically signed by Bonny Santiago DO on 12/17/21 at 2:46 PM EST

## 2021-12-20 PROBLEM — E86.0 DEHYDRATION: Status: RESOLVED | Noted: 2021-01-01 | Resolved: 2021-12-20

## 2021-12-25 NOTE — DISCHARGE SUMMARY
Discharge Summary    Date: 12/25/2021  Patient Name: Concepcion Santiago YOB: 1960 Age: 59 y.o. Admit Date: 11/20/2021  Discharge Date: 11/25/2021  Discharge Condition: Good    Admission Diagnosis  Dehydration (E86.0); Alcoholism ([facility] [address] (F10.20); Hyponatremia (E87.1); Unable to ambulate (R26.2)     Discharge Diagnosis  Active Problems: Closed compression fracture of L5 lumbar vertebra, initial encounter ([facility])Resolved Problems: Dehydration    Hospital Stay  Narrative of Hospital Course:  Admittted for CHF, compression fracture, cirrhosis, hepatic encephalopathy    Responded well to Hosptial course, discharged to NH for further rehab    Consultants:  Parish Blevins TO PODIATRYIOSVALDO CONSULT TO [address] [place] [address]    Surgeries/procedures Performed:       Treatments:   Analgesia        Discharge Plan/Disposition:  To [facility]/Incidental Findings Requiring Follow Up:    Patient Instructions:    Diet:    Activity:  For number of days (if applicable): Other Instructions:    Provider Follow-Up:   No follow-ups on file. Significant Diagnostic Studies:    Recent Labs:  Admission on 11/25/2021, Discharged on 12/13/2021No results displayed because visit has over 200 results. ------------Admission on 11/20/2021, Discharged on 11/25/2021WBC                                        Date: 11/20/2021Value: 13.0*       Ref range: 4.5 - 11.5 E9/L    Status: FinalRBC                                           Date: 11/20/2021Value: 4.85        Ref range: 3.50 - 5.50 E12/L  Status: FinalHemoglobin                                    Date: 11/20/2021Value: 15.8*       Ref range: 11.5 - 15.5 g/dL   Status: FinalHematocrit                                    Date: 11/20/2021Value: 44.2        Ref range: 34.0 - 48.0 %      Status: FinalMCV                                           Date: 11/20/2021Value: 91.1 Ref range: 80.0 - 99.9 fL     Status: [address]                                           Date: 11/20/2021Value: 32.6        Ref range: 26.0 - 35.0 pg     Status: [address]                                          Date: 11/20/2021Value: 35.7*       Ref range: 32.0 - 34.5 %      Status: FinalRDW                                           Date: 11/20/2021Value: 13.2        Ref range: 11.5 - 15.0 fL     Status: FinalPlatelets                                     Date: 11/20/2021Value: 267         Ref range: 130 - 450 E9/L     Status: FinalMPV                                           Date: 11/20/2021Value: 10.5        Ref range: 7.0 - 12.0 fL      Status: FinalNeutrophils %                                 Date: 11/20/2021Value: 69.5        Ref range: 43.0 - 80.0 %      Status: FinalLymphocytes %                                 Date: 11/20/2021Value: 16.9*       Ref range: 20.0 - 42.0 %      Status: FinalMonocytes %                                   Date: 11/20/2021Value: 12.7*       Ref range: 2.0 - 12.0 %       Status: FinalEosinophils %                                 Date: 11/20/2021Value: 0.8         Ref range: 0.0 - 6.0 %        Status: FinalBasophils %                                   Date: 11/20/2021Value: 0.4         Ref range: 0.0 - 2.0 %        Status: FinalNeutrophils Absolute                          Date: 11/20/2021Value: 9.10*       Ref range: 1.80 - 7.30 E9/L   Status: FinalLymphocytes Absolute                          Date: 11/20/2021Value: 2.21        Ref range: 1.50 - 4.00 E9/L   Status: FinalMonocytes Absolute                            Date: 11/20/2021Value: 1.69*       Ref range: 0.10 - 0.95 E9/L   Status: FinalEosinophils Absolute                          Date: 11/20/2021Value: 0.10        Ref range: 0.05 - 0.50 E9/L   Status: FinalBasophils Absolute                            Date: 11/20/2021Value: 0.00        Ref range: 0.00 - 0.20 E9/L   Status: FinalPoikilocytes Date: 11/20/2021Value: 2+            Status: FinalBurr Cells                                    Date: 11/20/2021Value: 1+            Status: FinalTarget Cells                                  Date: 11/20/2021Value: 1+            Status: FinalLipase                                        Date: 11/20/2021Value: 22          Ref range: 13 - 60 U/L        Status: FinalLactic Acid                                   Date: 11/20/2021Value: 4.0*        Ref range: 0.5 - 2.2 mmol/L   Status: FinalProtime                                       Date: 11/20/2021Value: 16.2*       Ref range: 9.3 - 12.4 sec     Status: FinalINR                                           Date: 11/20/2021Value: 1.4           Status: FinalGlucose                                       Date: 11/20/2021Value: 106         Ref range: mg/dL              Status: FinalEthanol Lvl                                   Date: 11/20/2021Value: <10         Ref range: mg/dL              Status: Final              Comment: Not DetectedAcetaminophen Level                           Date: 11/20/2021Value: <5.0*       Ref range: 10.0 - 30.0 mcg/*  Status: FinalSalicylate, Serum                             Date: 11/20/2021Value: <0.3        Ref range: 0.0 - 30.0 mg/dL   Status: FinalTCA Scrn                                      Date: 11/20/2021Value: NEGATIVE    Ref range: Cutoff:300 ng/mL   Status: FinalTroponin, High Sensitivity                    Date: 11/20/2021Value: 16*         Ref range: 0 - 9 ng/L         Status: Final              Comment: High Sensitivity Troponin values cannot be compared withother Troponin methodologies. Patients with high levels of Biotin oral intake (i.e. >5 mg/day)may have falsely decreased Troponin levels. Samples collectedwithin 8 hours of biotin intake may require additional informationfor diagnosis. Ventricular Rate                              Date: 11/20/2021Value: 83          Ref range: BPM                Status: FinalAtrial Rate Date: 11/20/2021Value: 83          Ref range: BPM                Status: FinalP-R Interval                                  Date: 11/20/2021Value: 170         Ref range: ms                 Status: FinalQRS Duration                                  Date: 11/20/2021Value: 90          Ref range: ms                 Status: FinalQ-T Interval                                  Date: 11/20/2021Value: 394         Ref range: ms                 Status: FinalQTc Calculation (Bazett)                      Date: 11/20/2021Value: 462         Ref range: ms                 Status: FinalP Axis                                        Date: 11/20/2021Value: 41          Ref range: degrees            Status: FinalR Axis                                        Date: 11/20/2021Value: 54          Ref range: degrees            Status: FinalT Axis                                        Date: 11/20/2021Value: 59          Ref range: degrees            Status: FinalAmmonia                                       Date: 11/20/2021Value: 37.0        Ref range: 11.0 - 51.0 umol*  Status: FinalTotal Protein                                 Date: 11/20/2021Value: 7.9         Ref range: 6.4 - 8.3 g/dL     Status: FinalAlbumin                                       Date: 11/20/2021Value: 3.3*        Ref range: 3.5 - 5.2 g/dL     Status: FinalAlkaline Phosphatase                          Date: 11/20/2021Value: 160*        Ref range: 35 - 104 U/L       Status: FinalALT                                           Date: 11/20/2021Value: 120*        Ref range: 0 - 32 U/L         Status: FinalAST                                           Date: 11/20/2021Value: 255*        Ref range: 0 - 31 U/L         Status: Final              Comment: Specimen is slightly Hemolyzed. Result may be artificially increased. Total Bilirubin                               Date: 11/20/2021Value: 1.7*        Ref range: 0.0 - 1.2 mg/dL    Status: FinalBilirubin, Direct                             Date: 11/20/2021Value: 0.4*        Ref range: 0.0 - 0.3 mg/dL    Status: Final              Comment: Specimen is slightly Hemolyzed. Result may be artificially increased. Bilirubin, Indirect                           Date: 11/20/2021Value: 1.3*        Ref range: 0.0 - 1.0 mg/dL    Status: FinalSodium                                        Date: 11/20/2021Value: 128*        Ref range: 132 - 146 mmol/L   Status: FinalPotassium                                     Date: 11/20/2021Value: 4.3         Ref range: 3.5 - 5.0 mmol/L   Status: FinalChloride                                      Date: 11/20/2021Value: 88*         Ref range: 98 - 107 mmol/L    Status: FinalCO2                                           Date: 11/20/2021Value: 21*         Ref range: 22 - 29 mmol/L     Status: FinalAnion Gap                                     Date: 11/20/2021Value: 19*         Ref range: 7 - 16 mmol/L      Status: FinalGlucose                                       Date: 11/20/2021Value: 101*        Ref range: 74 - 99 mg/dL      Status: FinalBUN                                           Date: 11/20/2021Value: 18          Ref range: 6 - 23 mg/dL       Status: FinalCREATININE                                    Date: 11/20/2021Value: 0.6         Ref range: 0.5 - 1.0 mg/dL    Status: FinalGFR Non-                      Date: 11/20/2021Value: >60         Ref range: >=60 mL/min/1.73   Status: Final              Comment: Chronic Kidney Disease: less than 60 ml/min/1.73 sq.m. Kidney Failure: less than 15 ml/min/1.73 sq. m. Results valid for patients 18 years and older. GFR                           Date: 11/20/2021Value: >60           Status: FinalCalcium                                       Date: 11/20/2021Value: 9.6         Ref range: 8.6 - 10.2 mg/dL   Status: [address]                                           Date: 11/20/2021Value: 3.450       Ref range: 0.270 - 4.200 uI*  Status: FinalPro-BNP                                       Date: 11/20/2021Value: 290*        Ref range: 0 - 125 pg/mL      Status: FinalMeter Glucose                                 Date: 11/20/2021Value: 106*        Ref range: 74 - 99 mg/dL      Status: FinalLactic Acid, Sepsis                           Date: 11/22/2021Value: 2.3*        Ref range: 0.5 - 1.9 mmol/L   Status: [address] [facility]                                           Date: 11/22/2021Value: 14.9*       Ref range: 4.5 - 11.5 E9/L    Status: FinalRBC                                           Date: 11/22/2021Value: 4.41        Ref range: 3.50 - 5.50 E12/L  Status: FinalHemoglobin                                    Date: 11/22/2021Value: 14.7        Ref range: 11.5 - 15.5 g/dL   Status: FinalHematocrit                                    Date: 11/22/2021Value: 41.8        Ref range: 34.0 - 48.0 %      Status: FinalMCV                                           Date: 11/22/2021Value: 94.8        Ref range: 80.0 - 99.9 fL     Status: [address]                                           Date: 11/22/2021Value: 33.3        Ref range: 26.0 - 35.0 pg     Status: [address]                                          Date: 11/22/2021Value: 35.2*       Ref range: 32.0 - 34.5 %      Status: FinalRDW                                           Date: 11/22/2021Value: 13.8        Ref range: 11.5 - 15.0 fL     Status: FinalPlatelets                                     Date: 11/22/2021Value: 281         Ref range: 130 - 450 E9/L     Status: FinalMPV                                           Date: 11/22/2021Value: 10.8        Ref range: 7.0 - 12.0 fL      Status: FinalCulture, Blood 2                              Date: 11/22/2021Value: 5 Days no growth                     Status: FinalBlood Culture, Routine                        Date: 11/22/2021Value: 5 Days no growth                     Status: FinalSodium                                        Date: 11/22/2021Value: 134 Ref range: 132 - 146 mmol/L   Status: FinalPotassium                                     Date: 11/22/2021Value: 3.2*        Ref range: 3.5 - 5.0 mmol/L   Status: FinalChloride                                      Date: 11/22/2021Value: 97*         Ref range: 98 - 107 mmol/L    Status: FinalCO2                                           Date: 11/22/2021Value: 24          Ref range: 22 - 29 mmol/L     Status: FinalAnion Gap                                     Date: 11/22/2021Value: 13          Ref range: 7 - 16 mmol/L      Status: FinalGlucose                                       Date: 11/22/2021Value: 158*        Ref range: 74 - 99 mg/dL      Status: FinalBUN                                           Date: 11/22/2021Value: 26*         Ref range: 6 - 23 mg/dL       Status: FinalCREATININE                                    Date: 11/22/2021Value: 0.6         Ref range: 0.5 - 1.0 mg/dL    Status: FinalGFR Non-                      Date: 11/22/2021Value: >60         Ref range: >=60 mL/min/1.73   Status: Final              Comment: Chronic Kidney Disease: less than 60 ml/min/1.73 sq.m. Kidney Failure: less than 15 ml/min/1.73 sq. m. Results valid for patients 18 years and older. GFR                           Date: 11/22/2021Value: >60           Status: FinalCalcium                                       Date: 11/22/2021Value: 9.5         Ref range: 8.6 - 10.2 mg/dL   Status: FinalTotal Protein                                 Date: 11/22/2021Value: 7.3         Ref range: 6.4 - 8.3 g/dL     Status: FinalAlbumin                                       Date: 11/22/2021Value: 2.9*        Ref range: 3.5 - 5.2 g/dL     Status: FinalTotal Bilirubin                               Date: 11/22/2021Value: 1.2         Ref range: 0.0 - 1.2 mg/dL    Status: FinalAlkaline Phosphatase                          Date: 11/22/2021Value: 160*        Ref range: 35 - 104 U/L       Status: FinalALT Date: 11/22/2021Value: 132*        Ref range: 0 - 32 U/L         Status: FinalAST                                           Date: 11/22/2021Value: 221*        Ref range: 0 - 31 U/L         Status: FinalSodium                                        Date: 11/24/2021Value: 140         Ref range: 132 - 146 mmol/L   Status: FinalPotassium                                     Date: 11/24/2021Value: 3.0*        Ref range: 3.5 - 5.0 mmol/L   Status: FinalChloride                                      Date: 11/24/2021Value: 103         Ref range: 98 - 107 mmol/L    Status: FinalCO2                                           Date: 11/24/2021Value: 25          Ref range: 22 - 29 mmol/L     Status: FinalAnion Gap                                     Date: 11/24/2021Value: 12          Ref range: 7 - 16 mmol/L      Status: FinalGlucose                                       Date: 11/24/2021Value: 91          Ref range: 74 - 99 mg/dL      Status: FinalBUN                                           Date: 11/24/2021Value: 12          Ref range: 6 - 23 mg/dL       Status: FinalCREATININE                                    Date: 11/24/2021Value: 0.5         Ref range: 0.5 - 1.0 mg/dL    Status: FinalGFR Non-                      Date: 11/24/2021Value: >60         Ref range: >=60 mL/min/1.73   Status: Final              Comment: Chronic Kidney Disease: less than 60 ml/min/1.73 sq.m. Kidney Failure: less than 15 ml/min/1.73 sq. m. Results valid for patients 18 years and older. GFR                           Date: 11/24/2021Value: >60           Status: FinalCalcium                                       Date: 11/24/2021Value: 9.1         Ref range: 8.6 - 10.2 mg/dL   Status: FinalAmmonia                                       Date: 11/24/2021Value: 60.0*       Ref range: 11.0 - 51.0 umol*  Status: FinalLactic Acid                                   Date: 11/24/2021Value: 1.4         Ref range: 0.5 - 2.2 mmol/L   Status: FinalSodium                                        Date: 11/25/2021Value: 138         Ref range: 132 - 146 mmol/L   Status: FinalPotassium                                     Date: 11/25/2021Value: 3.3*        Ref range: 3.5 - 5.0 mmol/L   Status: FinalChloride                                      Date: 11/25/2021Value: 102         Ref range: 98 - 107 mmol/L    Status: FinalCO2                                           Date: 11/25/2021Value: 24          Ref range: 22 - 29 mmol/L     Status: FinalAnion Gap                                     Date: 11/25/2021Value: 12          Ref range: 7 - 16 mmol/L      Status: FinalGlucose                                       Date: 11/25/2021Value: 90          Ref range: 74 - 99 mg/dL      Status: FinalBUN                                           Date: 11/25/2021Value: 10          Ref range: 6 - 23 mg/dL       Status: FinalCREATININE                                    Date: 11/25/2021Value: 0.5         Ref range: 0.5 - 1.0 mg/dL    Status: FinalGFR Non-                      Date: 11/25/2021Value: >60         Ref range: >=60 mL/min/1.73   Status: Final              Comment: Chronic Kidney Disease: less than 60 ml/min/1.73 sq.m. Kidney Failure: less than 15 ml/min/1.73 sq. m. Results valid for patients 18 years and older. GFR                           Date: 11/25/2021Value: >60           Status: FinalCalcium                                       Date: 11/25/2021Value: 8.8         Ref range: 8.6 - 10.2 mg/dL   Status: Final------------    Radiology last 7 days:  No results found.      [unfilled]    Discharge Medications    Discharge Medication List as of 11/25/2021  4:39 PMSTART taking these medicationsalbuterol (PROVENTIL) (2.5 MG/3ML) 0.083% nebulizer solutionTake 3 mLs by nebulization every 6 hours as needed for Wheezing, Disp-120 each, R-3NO PRINTondansetron (ZOFRAN-ODT) 4 MG disintegrating tabletTake 1 tablet by mouth every 8 hours as needed for Nausea or Vomiting, Disp-1 tablet, R-0NO PRINTondansetron (ZOFRAN) 4 MG/2ML injectionInfuse 2 mLs intravenously every 6 hours as needed for Nausea or Vomiting, Disp-84 mL, R-0NO PRINTlisinopril (PRINIVIL;ZESTRIL) 10 MG tabletTake 1 tablet by mouth daily, Disp-30 tablet, R-1NO PRINThaloperidol (HALDOL) 0.5 MG tabletTake 1 tablet by mouth every 6 hours as needed (agitation), Disp-120 tablet, R-3NO PRINTpolyethylene glycol (GLYCOLAX) 17 g packetTake 17 g by mouth daily as needed for Constipation, Disp-527 g, R-1NO PRINTthiamine mononitrate (THIAMINE) 100 MG tabletTake 1 tablet by mouth daily, Disp-1 tablet, R-0NO PRINTMultiple Vitamins-Minerals (THERAPEUTIC MULTIVITAMIN-MINERALS) tabletTake 1 tablet by mouth daily, Disp-1 tablet, R-0NO PRINT    Discharge Medication List as of 11/25/2021  4:39 PMCONTINUE these medications which have CHANGEDmagnesium oxide (MAG-OX) 400 MG tabletTake 1 tablet by mouth daily, Disp-30 tablet, R-1NO PRINToxyCODONE (ROXICODONE) 5 MG immediate release tabletTake 1 tablet by mouth every 6 hours as needed for Pain for up to 3 days. , Disp-1 tablet, R-0NO PRINTatenolol (TENORMIN) 25 MG tabletTake 1 tablet by mouth daily, Disp-30 tablet, R-3NO PRINT    Discharge Medication List as of 11/25/2021  4:39 PMCONTINUE these medications which have NOT CHANGEDlactulose (CHRONULAC) 10 GM/15ML solutionTake 30 mLs by mouth 2 times daily, Disp-1800 mL, R-1Normalrifaximin (XIFAXAN) 550 MG tabletTake 1 tablet by mouth 2 times daily, Disp-42 tablet, R-5Normalnicotine (NICODERM CQ) 21 MG/24HRPlace 1 patch onto the skin daily, Disp-30 patch, R-3Normallevothyroxine (SYNTHROID) 25 MCG tablettake 1 tablet by mouth once daily, Disp-90 tablet, R-1NormalamLODIPine (NORVASC) 5 MG tablettake 1 tablet by mouth once daily, Disp-90 tablet, R-1Normalcitalopram (CELEXA) 40 MG tablettake 1 tablet by mouth once daily, Disp-90 tablet, R-1Normalclopidogrel (PLAVIX) 75 MG tablettake 1 tablet by mouth once daily, Disp-90 tablet, R-1NormallevETIRAcetam (KEPPRA) 500 MG tablettake 1 tablet by mouth twice a day, Disp-180 tablet, R-1Normalatorvastatin (LIPITOR) 40 MG tablettake 1 tablet by mouth once daily, Disp-90 tablet, R-1NormalARIPiprazole (ABILIFY) 5 MG tablettake 1 tablet by mouth once daily, Disp-30 tablet, R-0Normal    Discharge Medication List as of 11/25/2021  4:39 PMSTOP taking these medicationsspironolactone (ALDACTONE) 25 MG tabletComments:Reason for Stopping:furosemide (LASIX) 20 MG tabletComments:Reason for Stopping:meloxicam (MOBIC) 15 MG tabletComments:Reason for Stopping:albuterol sulfate HFA (PROAIR HFA) 108 (90 Base) MCG/ACT inhalerComments:Reason for Stopping:benazepril (LOTENSIN) 20 MG tabletComments:Reason for Stopping:cyclobenzaprine (FLEXERIL) 5 MG tabletComments:Reason for Stopping:    Time Spent on Discharge:4E] minutes were spent in patient examination, evaluation, counseling as well as medication reconciliation, prescriptions for required medications, discharge plan, and follow up.     Electronically signed by Vanessa Perez DO on 12/25/21 at 1:14 PM EST

## 2022-08-17 NOTE — CARE COORDINATION
"Rehab Progress Note     Encounter Date: 8/17/2022     CC: Doing okay    Interval Events (Subjective)  Vital signs: Stable  Bowel: Last movement 8/17  Bladder: Voiding volitionally  PRN Rx: None    Patient seen and examined in her room, watching YouTube.  Patient states that she is doing well.  Looking forward to going home.  Does not have any systemic complaints at this time.    14 point ROS reviewed and negative except as stated above.     Objective:  VITAL SIGNS: /68   Pulse 84   Temp 36.4 °C (97.6 °F) (Oral)   Resp 18   Ht 1.499 m (4' 11\")   Wt 65.2 kg (143 lb 11.2 oz)   SpO2 95%   BMI 29.02 kg/m²     GEN: No apparent distress, sitting up watching YouTube.  HEENT: Head normocephalic, atraumatic.  Sclera nonicteric bilaterally, no ocular discharge appreciated bilaterally.  CV: Extremities warm and well-perfused, no peripheral edema appreciated bilaterally.  PULMONARY: Breathing nonlabored on room air, no respiratory accessory muscle use.  Not requiring supplemental oxygen.  ABD: Soft, nontender.  SKIN: No appreciable skin breakdown on exposed areas of skin.  PSYCH: Mood and affect within normal limits.  Slightly flat affect but does smile.  NEURO: Awake alert.  Conversational.  Logical thought content.  Answers questions appropriately.  Moving all extremities volitionally, ambulatory without assistive device.      Recent Results (from the past 72 hour(s))   POCT glucose device results    Collection Time: 08/14/22  5:21 PM   Result Value Ref Range    POC Glucose, Blood 119 (H) 65 - 99 mg/dL   POCT glucose device results    Collection Time: 08/14/22  7:56 PM   Result Value Ref Range    POC Glucose, Blood 138 (H) 65 - 99 mg/dL   POCT glucose device results    Collection Time: 08/15/22  7:25 AM   Result Value Ref Range    POC Glucose, Blood 106 (H) 65 - 99 mg/dL   POCT glucose device results    Collection Time: 08/15/22 11:18 AM   Result Value Ref Range    POC Glucose, Blood 97 65 - 99 mg/dL   POCT " Precert pending for [facility]. Spouse at bedside today, updated him on precert at [facility]. Another involved conversation regarding LTC planning, he plans to follow up with facility to start medicaid taylor for patient to have continued stay at [facility] once no longer skilled. DARIA pending and ambulance on soft chart. For questions I can be reached at [phone].  Melissa Calderón, [place] glucose device results    Collection Time: 08/15/22  5:27 PM   Result Value Ref Range    POC Glucose, Blood 111 (H) 65 - 99 mg/dL   POCT glucose device results    Collection Time: 08/15/22  8:36 PM   Result Value Ref Range    POC Glucose, Blood 100 (H) 65 - 99 mg/dL   Basic Metabolic Panel    Collection Time: 08/16/22  5:26 AM   Result Value Ref Range    Sodium 138 135 - 145 mmol/L    Potassium 4.2 3.6 - 5.5 mmol/L    Chloride 102 96 - 112 mmol/L    Co2 25 20 - 33 mmol/L    Glucose 98 65 - 99 mg/dL    Bun 9 8 - 22 mg/dL    Creatinine 0.42 (L) 0.50 - 1.40 mg/dL    Calcium 8.9 8.5 - 10.5 mg/dL    Anion Gap 11.0 7.0 - 16.0   ESTIMATED GFR    Collection Time: 08/16/22  5:26 AM   Result Value Ref Range    GFR (CKD-EPI) 114 >60 mL/min/1.73 m 2   POCT glucose device results    Collection Time: 08/16/22  8:08 AM   Result Value Ref Range    POC Glucose, Blood 102 (H) 65 - 99 mg/dL   POCT glucose device results    Collection Time: 08/16/22 11:23 AM   Result Value Ref Range    POC Glucose, Blood 95 65 - 99 mg/dL   POCT glucose device results    Collection Time: 08/16/22  5:13 PM   Result Value Ref Range    POC Glucose, Blood 93 65 - 99 mg/dL   POCT glucose device results    Collection Time: 08/16/22  8:56 PM   Result Value Ref Range    POC Glucose, Blood 102 (H) 65 - 99 mg/dL   POCT glucose device results    Collection Time: 08/17/22  8:05 AM   Result Value Ref Range    POC Glucose, Blood 128 (H) 65 - 99 mg/dL   POCT glucose device results    Collection Time: 08/17/22 11:12 AM   Result Value Ref Range    POC Glucose, Blood 84 65 - 99 mg/dL       Current Facility-Administered Medications   Medication Frequency    atorvastatin (LIPITOR) tablet 20 mg Q EVENING    sodium chloride (SALT) tablet 2 g TID WITH MEALS    vitamin D2 (Ergocalciferol) (Drisdol) capsule 50,000 Units Q7 DAYS    menthol ([place]alls) lozenge 1 Lozenge Q2HRS PRN    hydrOXYzine HCl (ATARAX) tablet 50 mg Q6HRS PRN    melatonin tablet 3 mg HS PRN    Respiratory Therapy  Consult Continuous RT    Pharmacy Consult Request ...Pain Management Review 1 Each PHARMACY TO DOSE    acetaminophen (Tylenol) tablet 650 mg Q4HRS PRN    senna-docusate (PERICOLACE or SENOKOT S) 8.6-50 MG per tablet 2 Tablet BID    And    polyethylene glycol/lytes (MIRALAX) PACKET 1 Packet QDAY PRN    And    magnesium hydroxide (MILK OF MAGNESIA) suspension 30 mL QDAY PRN    And    bisacodyl (DULCOLAX) suppository 10 mg QDAY PRN    lactulose 20 GM/30ML solution 30 mL QDAY PRN    docusate sodium (ENEMEEZ) enema 283 mg QDAY PRN    artificial tears ophthalmic solution 1 Drop PRN    mag hydrox-al hydrox-simeth (MAALOX PLUS ES or MYLANTA DS) suspension 20 mL Q2HRS PRN    ondansetron (ZOFRAN ODT) dispertab 4 mg 4X/DAY PRN    Or    ondansetron (ZOFRAN) syringe/vial injection 4 mg 4X/DAY PRN    traZODone (DESYREL) tablet 50 mg QHS PRN    sodium chloride (OCEAN) 0.65 % nasal spray 2 Spray PRN    insulin regular (HumuLIN R,NovoLIN R) injection 4X/DAY ACHS    And    dextrose 50% (D50W) injection 25 g Q15 MIN PRN    enoxaparin (Lovenox) inj 40 mg DAILY AT 1800    lisinopril (PRINIVIL) tablet 40 mg Q DAY    metFORMIN (GLUCOPHAGE) tablet 850 mg BID WITH MEALS    SITagliptin (JANUVIA) tablet 50 mg DAILY    scopolamine (TRANSDERM-SCOP) patch 1 Patch Q72HRS    amLODIPine (NORVASC) tablet 10 mg Q DAY       Orders Placed This Encounter   Procedures    Diet Order Diet: Consistent CHO (Diabetic)     Standing Status:   Standing     Number of Occurrences:   1     Order Specific Question:   Diet:     Answer:   Consistent CHO (Diabetic) [4]       Assessment:  Active Hospital Problems    Diagnosis     *Hemorrhagic stroke (HCC)     Other hyperlipidemia     Type 2 diabetes mellitus with hyperglycemia, without long-term current use of insulin (HCC)     Hypertension        Medical Decision Making and Plan:  R cerebellar hemorrhagic stroke  - s/p suboccipital  craniectomy with evacuation of left cerebellar IPH duraplasty and C1 laminectomy     -initiate comprehensive IRF level therapy with 3hrs of therapy 5 days per week with PT/OT/SLP  - Psychology consult     Brain swelling  Continue salt tabs to keep sodium above 140  - 8/13 Na 138 while on salt tab 1g TID, will give additional 1 gm today, may need to switch to 4mg total daily  8/15: Continue salt tabs 2 g 3 times daily  8/16: BMP -sodium improving to 138 with goal of above 140.  Monitor.  8/18 BMP     Dizziness  Continue scopolamine patch     Hypertension  Amlodipine  Lisinopril  Consult hospitalist     Diabetes with hyperglycemia  New diagnosis  Hemoglobin A1c of 7.2  Metformin  Januvia  Sliding scale insulin  Consult hospitalist     Hyperlipidemia    Start high-dose statin    Bladder  Low PVRs  Voiding volitionally    Bowel  Last bowel movement 8/15    DVT prophylaxis  Lovenox 40 mg daily    Vitamin D deficiency  7 8/13/2022  8/15: Start high-dose vitamin D supplementation - ergocalciferol x2 doses then daily supplementation    Total time:  36 minutes.  I spent greater than 50% of the time for patient care and coordination on this date, including unit/floor time, and face-to-face time with the patient as per assessment and plan above.    Nika Mccormack D.O.    IDT Team Meeting 8/17/2022    I, Nika Mccormack D.O., was present and led the interdisciplinary team conference on 8/17/2022.  I led the IDT conference and agree with the IDT conference documentation and plan of care as noted below.     RN: Sutures have been taken out. Continent B/B. Barrier: BG low in AM at 84.   PT: Difficult to motivate. Poor insight. Ambulatory. Slows gait when presented with obstacle. Walking without walker. In controlled environment should be okay walking. Lists left.   OT: Supervised for ADLs. Impaired balance and insight into deficits. Impulsive. Independently cooked egg on stove good sequencing.   SLP: Overall mild on cog impairment. Independent with meds and finances. Poor insight and safety  awareness.   CM: Continues to work on disposition and DME needs.   DC/Disposition:  - [facility]: Home Health. 8/21/22

## 2023-08-28 NOTE — PROGRESS NOTES
Pt did not follow commands or attempt to verbalize although  Noted otherwise in the flowsheets.    Will attempt tomorrow Mart-1 - Negative Histology Text: MART-1 staining demonstrates a normal density and pattern of melanocytes along the dermal-epidermal junction. The surgical margins are negative for tumor cells.

## 2024-11-02 NOTE — TELEPHONE ENCOUNTER
Patient in 8/10 pain, MD notified    Writer contacted  to inform of 30 day readmission risk. Writer's attempt to contact Dr. Phil Simeon was unsuccessful.

## (undated) DEVICE — GLOVE ORANGE PI 8   MSG9080

## (undated) DEVICE — SYRINGE 20ML LL S/C 50

## (undated) DEVICE — BANDAGE ADH W0.75XL3IN UNIV WVN FAB NAT GEN USE STRP N ADH

## (undated) DEVICE — C-ARM: Brand: UNBRANDED

## (undated) DEVICE — GAUZE,SPONGE,4"X4",16PLY,XRAY,STRL,LF: Brand: MEDLINE

## (undated) DEVICE — DOUBLE BASIN SET: Brand: MEDLINE INDUSTRIES, INC.

## (undated) DEVICE — MEDIA CONTRAST RX ISOVUE-300 61% 30ML VIALS

## (undated) DEVICE — COUNTER NDL 30 COUNT DBL MAG

## (undated) DEVICE — PACK,LAPAROTOMY,NO GOWNS: Brand: MEDLINE

## (undated) DEVICE — DRAPE XR CASS L UNIV FIT ADH CLSR

## (undated) DEVICE — SYRINGE MED 10ML LUERLOCK TIP W/O SFTY DISP

## (undated) DEVICE — GLOVE SURG 8.5 PF POLYMER WHT STRL SIGN LTX ESSENTIAL LTX

## (undated) DEVICE — DRAPE,REIN 53X77,STERILE: Brand: MEDLINE

## (undated) DEVICE — GOWN,SIRUS,FABRNF,XL,20/CS: Brand: MEDLINE

## (undated) DEVICE — CLOTH SURG PREP PREOPERATIVE CHLORHEXIDINE GLUC 2% READYPREP

## (undated) DEVICE — DRAPE THER FLUID WARMING 66X44 IN FLAT SLUSH DBL DISC ORS

## (undated) DEVICE — APPLICATOR PREP 26ML 0.7% IOD POVACRYLEX 74% ISO ALC ST

## (undated) DEVICE — BONE FILLER DEVICE F04B SIZE 3

## (undated) DEVICE — INTENDED FOR TISSUE SEPARATION, AND OTHER PROCEDURES THAT REQUIRE A SHARP SURGICAL BLADE TO PUNCTURE OR CUT.: Brand: BARD-PARKER ® STAINLESS STEEL BLADES

## (undated) DEVICE — GOWN,SIRUS,FABRNF,L,20/CS: Brand: MEDLINE

## (undated) DEVICE — LABEL MED 4 IN SURG PANEL W/ PEN STRL

## (undated) DEVICE — HYPODERMIC SAFETY NEEDLE: Brand: MAGELLAN

## (undated) DEVICE — TOWEL,OR,DSP,ST,BLUE,DLX,10/PK,8PK/CS: Brand: MEDLINE

## (undated) DEVICE — ADHESIVE SKIN CLOSURE TOP 36 CC HI VISC DERMBND MINI

## (undated) DEVICE — BONE BIOPSY DEVICE F05A BBD SIZE 3: Brand: MEDTRONIC REUSABLE INSTRUMENTS

## (undated) DEVICE — JACKSON TABLE POSITIONER KIT: Brand: MEDLINE INDUSTRIES, INC.

## (undated) DEVICE — INTRODUCER T15K ONE STEP OID BEVEL: Brand: KYPHON® ONE-STEP™ OSTEO INTRODUCER™ SYSTEM

## (undated) DEVICE — 3M™ IOBAN™ 2 ANTIMICROBIAL INCISE DRAPE 6650EZ: Brand: IOBAN™ 2

## (undated) DEVICE — MARKER,SKIN,WI/RULER AND LABELS: Brand: MEDLINE

## (undated) DEVICE — TRAP,MUCUS SPECIMEN,40CC: Brand: MEDLINE

## (undated) DEVICE — TAMP K08A XPAN INFLAT BONE  SIZE 20/3-RB: Brand: KYPHON XPANDER™ INFLATABLE BONE TAMP